# Patient Record
Sex: MALE | Race: WHITE | Employment: UNEMPLOYED | URBAN - METROPOLITAN AREA
[De-identification: names, ages, dates, MRNs, and addresses within clinical notes are randomized per-mention and may not be internally consistent; named-entity substitution may affect disease eponyms.]

---

## 2019-01-02 ENCOUNTER — TRANSFERRED RECORDS (OUTPATIENT)
Dept: HEALTH INFORMATION MANAGEMENT | Facility: CLINIC | Age: 64
End: 2019-01-02

## 2019-01-04 ENCOUNTER — TRANSFERRED RECORDS (OUTPATIENT)
Dept: HEALTH INFORMATION MANAGEMENT | Facility: CLINIC | Age: 64
End: 2019-01-04

## 2019-02-18 ENCOUNTER — TRANSFERRED RECORDS (OUTPATIENT)
Dept: HEALTH INFORMATION MANAGEMENT | Facility: CLINIC | Age: 64
End: 2019-02-18

## 2019-03-04 ENCOUNTER — TRANSFERRED RECORDS (OUTPATIENT)
Dept: HEALTH INFORMATION MANAGEMENT | Facility: CLINIC | Age: 64
End: 2019-03-04

## 2019-04-22 ENCOUNTER — TRANSFERRED RECORDS (OUTPATIENT)
Dept: HEALTH INFORMATION MANAGEMENT | Facility: CLINIC | Age: 64
End: 2019-04-22

## 2019-05-06 ENCOUNTER — TRANSFERRED RECORDS (OUTPATIENT)
Dept: HEALTH INFORMATION MANAGEMENT | Facility: CLINIC | Age: 64
End: 2019-05-06

## 2019-05-06 ENCOUNTER — DOCUMENTATION ONLY (OUTPATIENT)
Dept: CARE COORDINATION | Facility: CLINIC | Age: 64
End: 2019-05-06

## 2019-05-06 NOTE — TELEPHONE ENCOUNTER
Action    Action Taken Records received from [facility] via fax.       Action    Action Taken Records request and imaging request faxed to [facility]

## 2019-05-06 NOTE — TELEPHONE ENCOUNTER
RECORDS RECEIVED FROM: External - [facility]   DATE RECEIVED: 5/14/19   NOTES (FOR ALL VISITS) STATUS DETAILS   OFFICE NOTE from referring provider Received 5/1/19  4/5/19  3/15/19  2/27/18  (additional visits received)   OFFICE NOTE from other specialist Received [facility]:  5/6/19  3/6/19  2/26/19  1/10/19   DISCHARGE SUMMARY from hospital Care Everywhere [facility]:  2/20/19-2/22/19    [facility]:  1/17/19-1/30/19   DISCHARGE REPORT from the ER N/A    OPERATIVE REPORT N/A    MEDICATION LIST Received    IMAGING  (FOR ALL VISITS)     EMG N/A    EEG N/A    ECT N/A    MRI (HEAD, NECK, SPINE) Received [facility]:  MRI Brain 4/22/19  MRI Thoracic Spine 3/4/19  MRI Cervical Spine 3/4/19  MRI Brain 3/4/19  MRI Brain 2/12/19   CT (HEAD, NECK, SPINE) Care Everywhere [facility]:  CT Head Brain 2/20/19     Action    Action Taken Faxed records request to [facility]       Phone Call:     Contact Name [facility]    Outcome Images will be pushed

## 2019-05-10 NOTE — TELEPHONE ENCOUNTER
Imaging Received  [facility]   Image Type (x): Disc: X  PACS:    Exam Date/Name MRI Brain 4/22/19  MRI Thoracic Spine 3/4/19  MRI Cervical Spine 3/4/19  MRI Brain 3/4/19  MRI Brain 2/12/19 Comments: Imaging disks received via mail. Sent to scanning.

## 2019-05-14 ENCOUNTER — OFFICE VISIT (OUTPATIENT)
Dept: NEUROLOGY | Facility: CLINIC | Age: 64
End: 2019-05-14
Attending: PSYCHIATRY & NEUROLOGY
Payer: COMMERCIAL

## 2019-05-14 ENCOUNTER — PRE VISIT (OUTPATIENT)
Dept: NEUROLOGY | Facility: CLINIC | Age: 64
End: 2019-05-14

## 2019-05-14 VITALS
WEIGHT: 128.9 LBS | SYSTOLIC BLOOD PRESSURE: 103 MMHG | BODY MASS INDEX: 18.9 KG/M2 | HEART RATE: 77 BPM | DIASTOLIC BLOOD PRESSURE: 79 MMHG

## 2019-05-14 DIAGNOSIS — G37.9 DEMYELINATING DISEASE OF CENTRAL NERVOUS SYSTEM (H): Primary | ICD-10-CM

## 2019-05-14 PROCEDURE — G0463 HOSPITAL OUTPT CLINIC VISIT: HCPCS | Mod: ZF

## 2019-05-14 RX ORDER — PRAMIPEXOLE DIHYDROCHLORIDE 0.5 MG/1
0.5 TABLET ORAL
COMMUNITY
End: 2021-03-31

## 2019-05-14 RX ORDER — LORAZEPAM 1 MG/1
1 TABLET ORAL DAILY PRN
COMMUNITY
Start: 2019-01-30 | End: 2020-03-02

## 2019-05-14 RX ORDER — ZOLPIDEM TARTRATE 10 MG/1
10 TABLET ORAL AT BEDTIME
COMMUNITY
End: 2020-03-02

## 2019-05-14 RX ORDER — LANOLIN ALCOHOL/MO/W.PET/CERES
3 CREAM (GRAM) TOPICAL AT BEDTIME
COMMUNITY
Start: 2019-01-16 | End: 2020-05-14

## 2019-05-14 RX ORDER — MIRTAZAPINE 30 MG/1
30 TABLET, FILM COATED ORAL AT BEDTIME
Status: ON HOLD | COMMUNITY
Start: 2019-01-29 | End: 2020-05-10

## 2019-05-14 RX ORDER — HYDROXYZINE HYDROCHLORIDE 25 MG/1
TABLET, FILM COATED ORAL DAILY PRN
Refills: 0 | Status: ON HOLD | COMMUNITY
Start: 2019-04-26 | End: 2020-03-03

## 2019-05-14 RX ORDER — ALBUTEROL SULFATE 90 UG/1
2 AEROSOL, METERED RESPIRATORY (INHALATION) EVERY 4 HOURS PRN
COMMUNITY
Start: 2019-01-03

## 2019-05-14 RX ORDER — ERGOCALCIFEROL 1.25 MG/1
50000 CAPSULE, LIQUID FILLED ORAL WEEKLY
COMMUNITY
Start: 2019-03-01

## 2019-05-14 RX ORDER — CELECOXIB 100 MG/1
CAPSULE ORAL DAILY
Refills: 5 | Status: ON HOLD | COMMUNITY
Start: 2019-04-24 | End: 2020-03-03

## 2019-05-14 RX ORDER — GABAPENTIN 250 MG/5ML
SOLUTION ORAL 3 TIMES DAILY
Refills: 2 | COMMUNITY
Start: 2019-04-25 | End: 2020-03-02

## 2019-05-14 ASSESSMENT — ENCOUNTER SYMPTOMS
DYSURIA: 0
TREMORS: 0
HALLUCINATIONS: 0
HYPOTENSION: 0
DIZZINESS: 1
BACK PAIN: 1
DISTURBANCES IN COORDINATION: 1
RECTAL PAIN: 0
ARTHRALGIAS: 1
SPEECH CHANGE: 1
JAUNDICE: 0
SHORTNESS OF BREATH: 1
SYNCOPE: 1
ORTHOPNEA: 0
WEIGHT GAIN: 0
EXERCISE INTOLERANCE: 0
HEADACHES: 0
EYE WATERING: 0
INCREASED ENERGY: 1
FLANK PAIN: 0
COUGH DISTURBING SLEEP: 0
TINGLING: 1
JOINT SWELLING: 0
HEMOPTYSIS: 0
HEMATURIA: 0
TASTE DISTURBANCE: 1
TROUBLE SWALLOWING: 1
NERVOUS/ANXIOUS: 1
NECK PAIN: 1
ABDOMINAL PAIN: 0
HOARSE VOICE: 0
FATIGUE: 1
CHILLS: 0
FEVER: 0
NUMBNESS: 0
COUGH: 1
PANIC: 1
NAUSEA: 0
SMELL DISTURBANCE: 1
HEARTBURN: 0
SINUS CONGESTION: 0
EYE REDNESS: 0
ALTERED TEMPERATURE REGULATION: 0
BOWEL INCONTINENCE: 0
DIARRHEA: 0
POSTURAL DYSPNEA: 0
STIFFNESS: 1
LOSS OF CONSCIOUSNESS: 1
INSOMNIA: 1
LIGHT-HEADEDNESS: 1
SNORES LOUDLY: 0
POLYPHAGIA: 1
DECREASED CONCENTRATION: 1
LEG PAIN: 1
DECREASED APPETITE: 1
WEAKNESS: 1
MEMORY LOSS: 1
EYE PAIN: 0
DEPRESSION: 1
NIGHT SWEATS: 0
BLOOD IN STOOL: 0
DIFFICULTY URINATING: 1
DOUBLE VISION: 1
HYPERTENSION: 0
VOMITING: 0
MUSCLE WEAKNESS: 1
PARALYSIS: 0
BLOATING: 0
SPUTUM PRODUCTION: 1
DYSPNEA ON EXERTION: 0
SINUS PAIN: 0
CONSTIPATION: 0
MUSCLE CRAMPS: 1
SLEEP DISTURBANCES DUE TO BREATHING: 0
SORE THROAT: 0
WEIGHT LOSS: 1
WHEEZING: 1
POLYDIPSIA: 0
EYE IRRITATION: 0
MYALGIAS: 1
PALPITATIONS: 0
SEIZURES: 0

## 2019-05-14 ASSESSMENT — PAIN SCALES - GENERAL: PAINLEVEL: MODERATE PAIN (5)

## 2019-05-14 NOTE — LETTER
"5/14/2019       RE: Vincent Zuniga  [address]  [place] [address]     Dear Colleague,    Thank you for referring your patient, Vincent Zuniga, to the [facility] MULTIPLE SCLEROSIS at [facility]. Please see a copy of my visit note below.    Service Date: 05/14/2019      NEUROLOGY CONSULT      REASON FOR VISIT: Vincent Zuniga is 64-year-old man who I am meeting for the first time today.  He tells me he was referred by his primary physician, Dr. Mccain, for a second opinion regarding acute disseminated encephalomyelitis versus multiple sclerosis.      HISTORY OF PRESENT ILLNESS:  Iraj was admitted to [facility] in early January, he says because \"I was just out of it and didn't know what was happening.\"  His daughter, Michelle, accompanies him today and tells me he had been ill for 2 or 3 days but then on the third day he seemed weak and clumsy on the left side and had a left facial droop.  He was not talking much and his speech seemed slurred.  He had a brain MRI (not available to me today) which showed approximately 2 dozen white matter lesions, the majority of which were enhancing, felt to be consistent with ADEM.  He had a spinal tap which showed 61 nucleated cells, normal protein of 45, with 5 unique oligoclonal bands and normal IgG index and IgG synthesis rate.  CSF flow cytometry did not show evidence of lymphoma.  He had a CT of the chest, abdomen and pelvis which showed no evidence of malignancy.  He was treated with steroids, which he tolerated poorly, mainly with agitation and being unable to sleep.  His daughter does say his left-sided weakness improved after the steroids.  I believe he received plasmapheresis as well.  He was discharged but quickly readmitted, I believe, at [facility].  He had been recommended to go to a transitional care unit but refused.  There was briefly some consideration of seizure and his daughter describes those as an " "episode where she was trying to get him into the shower after he had been incontinent, and his \"eyes rolled up and he dropped, then he came to right away, but then it happened again.\"  Both of these episodes lasted at most 1 minute.  He was briefly treated with Keppra but then this was stopped.  These episodes sound most likely to be syncopal in nature.      Since discharge he has been following with Dr. Combs at [facility].  He had been considering ADEM versus multiple sclerosis.  A repeat MRI in April showed a new left parietal cortical/juxtacortical lesion.  Apparently at that point he was diagnosed with multiple sclerosis and was started on Aubagio.  He tolerates that fine.  His main residual symptoms are apathy and a feeling of global weakness as well as some cognitive slowing.  His daughter describes it as \"he's not eating, not speaking much, does not get up and move around like he used to.\"  Bladder and bowel function are fine.  He does not have any focal numbness or weakness.      PAST MEDICAL  HISTORY:   1.  History of alcohol and drug abuse.   2.  ADEM/demyelinating disease.   3.  Emphysema.   4.  GERD.      ALLERGIES:  No known drug allergies.      MEDICATIONS:   1.  Hydroxyzine.   2.  Gabapentin.   3.  Valproic acid.   4.  Celebrex.   5.  Vitamin D.   6.  Lorazepam p.r.n.   7.  Mirtazapine.   8.  Melatonin.   9.  Albuterol.   10.  Alprazolam.   11.  Advair.   12.  Pramipexole.   13.  Zolpidem.      FAMILY HISTORY:  There is no family history of multiple sclerosis.      SOCIAL HISTORY:  He lives with his daughter and I believe wife or significant other in [place].  He is a cigarette smoker.  He uses alcohol occasionally.  He denies recent drug use.      PHYSICAL EXAMINATION:   VITAL SIGNS:  Blood pressure 103/79.  Pulse 77.  Weight 128 pounds.   GENERAL:  He is a thin, age-appropriate  man accompanied by his daughter and in no apparent distress.  Affect is calm.   NEUROLOGIC:  Language " functions appear normal.  He recalls 1/3 items after delay.  He was unable to recite the months backwards, stopping about detention through after making 1 transposition error.  Pupils are equal and normally reactive.  There was no afferent pupillary defect.  The ophthalmoscopic exam was unremarkable.  Eye movements were full without diplopia or nystagmus.  Facial strength and sensation were normal.  Palate elevates midline.  Muscle bulk and tone are normal and muscle power is normal in the proximal and distal muscles of all 4 limbs.  There was a high frequency, low amplitude postural tremor of the hands consistent with essential tremor.  Finger tapping, toe tapping and finger-nose-finger were normal.  Light touch and pinprick were intact in the arms and legs.  Deep tendon reflexes were normal and symmetric.  Gait was narrow-based and stable.  Tandem gait was mildly impaired.      I reviewed extensive medical records including hospital notes, primary care notes and neurology clinic notes as well as laboratory results and MRIs of the brain and spine from February, March and April of this year as well as an older study from 2007.  I do not have the MRIs from January of this year available.  The MRI from 2007 was normal but the more recent scans show extensive T2 hyperintense lesions in the cerebral white matter including large periventricular lesions in the right hemisphere and multiple smaller ones in both hemispheres.  These lesions are consistent with, although not exactly typical for, multiple sclerosis.  On the 02/22 MRI there is a new cortical/juxtacortical lesion in the left parietal lobe.      Relevant labs not already described in the HPI include a negative paraneoplastic panel, negative voltage-gated potassium and calcium channel antibodies, negative SSA, SSB, Smith antibody and antiDNA antibodies, negative HSV-1 and 2 PCR on the CSF, negative HIV screen, negative Lyme on CSF, negative NMO IgG, normal thiamine  level of 125 and a low vitamin D of 14.5.      IMPRESSION:  ADEM (acute disseminated encephalomyelitis).  His clinical presentation in January with confusion and focal neurologic symptoms following a systemic illness and with multiple large inflammatory brain lesions, the majority of which were all actively enhancing, is certainly most consistent with ADEM.  While this disorder is rare in adults, I agree that is the most likely diagnosis.  The question of MS mainly arises due to the new lesion in April.  ADEM is generally a monophasic illness and that lesion clearly was not present on the earlier scans.  MS certainly is a possibility, but MS relapses seldom present with change in level of or content of consciousness, while ADEM virtually always does.  It is also quite unusual to present with the initial manifestations of MS in the 60s.  The positive oligoclonal bands are more common in MS than ADEM, but can be seen in either.      I spent 65 minutes with Iraj and his daughter today, greater than 50% of which was spent in counseling and coordination of care and going over his MRIs together.  The spinal MRIs were normal.  I discussed ADEM and MS and the similarities and differences between them.  I told him that I expect he will continue to gradually improve from this attack, over the course of about 1 year total.  He is trying to re-establish with physical and occupational therapy and I think that is reasonable given that he does not appear to be back at his baseline.  While I am not entirely convinced that he has multiple sclerosis, I do think it was reasonable to start him on treatment given the accumulating lesions,and he does meet diagnostic criteria for MS so I think it is prudent to try to prevent future attacks.  It is somewhat unusual to have patients on MS treatment (much less starting on treatment) in their 60s, but in this situation I would treat him up through his mid-60s if not through the entire 60s.  I  told him I agree completely with Dr. Combs's workup and management.      PLAN:  He will continue to follow with Dr. Combs at [facility].     BROOKLYN HERNANDEZ MD       D: 2019   T: 2019   MT: nh      Name:     LATRICE ROSADO   MRN:      [id number]        Account:      [id number]   :      1955           Service Date: 2019      Document: [id number]

## 2019-05-17 NOTE — PROGRESS NOTES
"Service Date: 05/14/2019      NEUROLOGY CONSULT      REASON FOR VISIT: Vincent Zuniga is 64-year-old man who I am meeting for the first time today.  He tells me he was referred by his primary physician, Dr. Mccain, for a second opinion regarding acute disseminated encephalomyelitis versus multiple sclerosis.      HISTORY OF PRESENT ILLNESS:  Iraj was admitted to [facility] in early January, he says because \"I was just out of it and didn't know what was happening.\"  His daughter, Michelle, accompanies him today and tells me he had been ill for 2 or 3 days but then on the third day he seemed weak and clumsy on the left side and had a left facial droop.  He was not talking much and his speech seemed slurred.  He had a brain MRI (not available to me today) which showed approximately 2 dozen white matter lesions, the majority of which were enhancing, felt to be consistent with ADEM.  He had a spinal tap which showed 61 nucleated cells, normal protein of 45, with 5 unique oligoclonal bands and normal IgG index and IgG synthesis rate.  CSF flow cytometry did not show evidence of lymphoma.  He had a CT of the chest, abdomen and pelvis which showed no evidence of malignancy.  He was treated with steroids, which he tolerated poorly, mainly with agitation and being unable to sleep.  His daughter does say his left-sided weakness improved after the steroids.  I believe he received plasmapheresis as well.  He was discharged but quickly readmitted, I believe, at [facility].  He had been recommended to go to a transitional care unit but refused.  There was briefly some consideration of seizure and his daughter describes those as an episode where she was trying to get him into the shower after he had been incontinent, and his \"eyes rolled up and he dropped, then he came to right away, but then it happened again.\"  Both of these episodes lasted at most 1 minute.  He was briefly treated with Keppra but then this was " "stopped.  These episodes sound most likely to be syncopal in nature.      Since discharge he has been following with Dr. Combs at [facility].  He had been considering ADEM versus multiple sclerosis.  A repeat MRI in April showed a new left parietal cortical/juxtacortical lesion.  Apparently at that point he was diagnosed with multiple sclerosis and was started on Aubagio.  He tolerates that fine.  His main residual symptoms are apathy and a feeling of global weakness as well as some cognitive slowing.  His daughter describes it as \"he's not eating, not speaking much, does not get up and move around like he used to.\"  Bladder and bowel function are fine.  He does not have any focal numbness or weakness.      PAST MEDICAL  HISTORY:   1.  History of alcohol and drug abuse.   2.  ADEM/demyelinating disease.   3.  Emphysema.   4.  GERD.      ALLERGIES:  No known drug allergies.      MEDICATIONS:   1.  Hydroxyzine.   2.  Gabapentin.   3.  Valproic acid.   4.  Celebrex.   5.  Vitamin D.   6.  Lorazepam p.r.n.   7.  Mirtazapine.   8.  Melatonin.   9.  Albuterol.   10.  Alprazolam.   11.  Advair.   12.  Pramipexole.   13.  Zolpidem.      FAMILY HISTORY:  There is no family history of multiple sclerosis.      SOCIAL HISTORY:  He lives with his daughter and I believe wife or significant other in [place].  He is a cigarette smoker.  He uses alcohol occasionally.  He denies recent drug use.      PHYSICAL EXAMINATION:   VITAL SIGNS:  Blood pressure 103/79.  Pulse 77.  Weight 128 pounds.   GENERAL:  He is a thin, age-appropriate  man accompanied by his daughter and in no apparent distress.  Affect is calm.   NEUROLOGIC:  Language functions appear normal.  He recalls 1/3 items after delay.  He was unable to recite the months backwards, stopping about prison through after making 1 transposition error.  Pupils are equal and normally reactive.  There was no afferent pupillary defect.  The ophthalmoscopic exam was " unremarkable.  Eye movements were full without diplopia or nystagmus.  Facial strength and sensation were normal.  Palate elevates midline.  Muscle bulk and tone are normal and muscle power is normal in the proximal and distal muscles of all 4 limbs.  There was a high frequency, low amplitude postural tremor of the hands consistent with essential tremor.  Finger tapping, toe tapping and finger-nose-finger were normal.  Light touch and pinprick were intact in the arms and legs.  Deep tendon reflexes were normal and symmetric.  Gait was narrow-based and stable.  Tandem gait was mildly impaired.      I reviewed extensive medical records including hospital notes, primary care notes and neurology clinic notes as well as laboratory results and MRIs of the brain and spine from February, March and April of this year as well as an older study from 2007.  I do not have the MRIs from January of this year available.  The MRI from 2007 was normal but the more recent scans show extensive T2 hyperintense lesions in the cerebral white matter including large periventricular lesions in the right hemisphere and multiple smaller ones in both hemispheres.  These lesions are consistent with, although not exactly typical for, multiple sclerosis.  On the 02/22 MRI there is a new cortical/juxtacortical lesion in the left parietal lobe.      Relevant labs not already described in the HPI include a negative paraneoplastic panel, negative voltage-gated potassium and calcium channel antibodies, negative SSA, SSB, Smith antibody and antiDNA antibodies, negative HSV-1 and 2 PCR on the CSF, negative HIV screen, negative Lyme on CSF, negative NMO IgG, normal thiamine level of 125 and a low vitamin D of 14.5.      IMPRESSION:  ADEM (acute disseminated encephalomyelitis).  His clinical presentation in January with confusion and focal neurologic symptoms following a systemic illness and with multiple large inflammatory brain lesions, the majority of  which were all actively enhancing, is certainly most consistent with ADEM.  While this disorder is rare in adults, I agree that is the most likely diagnosis.  The question of MS mainly arises due to the new lesion in April.  ADEM is generally a monophasic illness and that lesion clearly was not present on the earlier scans.  MS certainly is a possibility, but MS relapses seldom present with change in level of or content of consciousness, while ADEM virtually always does.  It is also quite unusual to present with the initial manifestations of MS in the 60s.  The positive oligoclonal bands are more common in MS than ADEM, but can be seen in either.      I spent 65 minutes with Iraj and his daughter today, greater than 50% of which was spent in counseling and coordination of care and going over his MRIs together.  The spinal MRIs were normal.  I discussed ADEM and MS and the similarities and differences between them.  I told him that I expect he will continue to gradually improve from this attack, over the course of about 1 year total.  He is trying to re-establish with physical and occupational therapy and I think that is reasonable given that he does not appear to be back at his baseline.  While I am not entirely convinced that he has multiple sclerosis, I do think it was reasonable to start him on treatment given the accumulating lesions,and he does meet diagnostic criteria for MS so I think it is prudent to try to prevent future attacks.  It is somewhat unusual to have patients on MS treatment (much less starting on treatment) in their 60s, but in this situation I would treat him up through his mid-60s if not through the entire 60s.  I told him I agree completely with Dr. Combs's workup and management.      PLAN:  He will continue to follow with Dr. Combs at [facility].         BROOKLYN HERNANDEZ MD             D: 05/17/2019   T: 05/17/2019   MT: nh      Name:     LATRICE ROSADO   MRN:      [id number]         Account:      [id number]   :      1955           Service Date: 2019      Document: [id number]

## 2020-03-02 ENCOUNTER — HOSPITAL ENCOUNTER (INPATIENT)
Facility: CLINIC | Age: 65
LOS: 2 days | Discharge: HOME | DRG: 058 | End: 2020-03-04
Attending: EMERGENCY MEDICINE | Admitting: PSYCHIATRY & NEUROLOGY
Payer: COMMERCIAL

## 2020-03-02 ENCOUNTER — APPOINTMENT (OUTPATIENT)
Dept: CT IMAGING | Facility: CLINIC | Age: 65
DRG: 058 | End: 2020-03-02
Attending: STUDENT IN AN ORGANIZED HEALTH CARE EDUCATION/TRAINING PROGRAM
Payer: COMMERCIAL

## 2020-03-02 ENCOUNTER — APPOINTMENT (OUTPATIENT)
Dept: MRI IMAGING | Facility: CLINIC | Age: 65
DRG: 058 | End: 2020-03-02
Attending: PSYCHIATRY & NEUROLOGY
Payer: COMMERCIAL

## 2020-03-02 DIAGNOSIS — R13.10 DYSPHAGIA, UNSPECIFIED TYPE: ICD-10-CM

## 2020-03-02 DIAGNOSIS — M62.81 GENERALIZED MUSCLE WEAKNESS: ICD-10-CM

## 2020-03-02 DIAGNOSIS — R53.1 GENERALIZED WEAKNESS: ICD-10-CM

## 2020-03-02 DIAGNOSIS — E44.0 MODERATE PROTEIN-CALORIE MALNUTRITION (H): ICD-10-CM

## 2020-03-02 DIAGNOSIS — J69.0 ASPIRATION PNEUMONITIS (H): ICD-10-CM

## 2020-03-02 DIAGNOSIS — G35 MULTIPLE SCLEROSIS (H): Primary | ICD-10-CM

## 2020-03-02 LAB
ALBUMIN SERPL-MCNC: 2.5 G/DL (ref 3.4–5)
ALBUMIN UR-MCNC: NEGATIVE MG/DL
ALP SERPL-CCNC: 84 U/L (ref 40–150)
ALT SERPL W P-5'-P-CCNC: 22 U/L (ref 0–70)
ANION GAP SERPL CALCULATED.3IONS-SCNC: 5 MMOL/L (ref 3–14)
APPEARANCE UR: CLEAR
AST SERPL W P-5'-P-CCNC: 20 U/L (ref 0–45)
BASOPHILS # BLD AUTO: 0 10E9/L (ref 0–0.2)
BASOPHILS NFR BLD AUTO: 0.3 %
BILIRUB SERPL-MCNC: 0.3 MG/DL (ref 0.2–1.3)
BILIRUB UR QL STRIP: NEGATIVE
BUN SERPL-MCNC: 9 MG/DL (ref 7–30)
CALCIUM SERPL-MCNC: 8.2 MG/DL (ref 8.5–10.1)
CHLORIDE SERPL-SCNC: 112 MMOL/L (ref 94–109)
CO2 SERPL-SCNC: 26 MMOL/L (ref 20–32)
COLOR UR AUTO: ABNORMAL
CREAT SERPL-MCNC: 0.59 MG/DL (ref 0.66–1.25)
DIFFERENTIAL METHOD BLD: ABNORMAL
EOSINOPHIL # BLD AUTO: 0.1 10E9/L (ref 0–0.7)
EOSINOPHIL NFR BLD AUTO: 0.5 %
ERYTHROCYTE [DISTWIDTH] IN BLOOD BY AUTOMATED COUNT: 14.3 % (ref 10–15)
GFR SERPL CREATININE-BSD FRML MDRD: >90 ML/MIN/{1.73_M2}
GLUCOSE SERPL-MCNC: 84 MG/DL (ref 70–99)
GLUCOSE UR STRIP-MCNC: NEGATIVE MG/DL
HCT VFR BLD AUTO: 33.4 % (ref 40–53)
HGB BLD-MCNC: 10.8 G/DL (ref 13.3–17.7)
HGB UR QL STRIP: NEGATIVE
IMM GRANULOCYTES # BLD: 0 10E9/L (ref 0–0.4)
IMM GRANULOCYTES NFR BLD: 0.2 %
KETONES UR STRIP-MCNC: NEGATIVE MG/DL
LEUKOCYTE ESTERASE UR QL STRIP: NEGATIVE
LYMPHOCYTES # BLD AUTO: 2.7 10E9/L (ref 0.8–5.3)
LYMPHOCYTES NFR BLD AUTO: 24.9 %
MCH RBC QN AUTO: 35.2 PG (ref 26.5–33)
MCHC RBC AUTO-ENTMCNC: 32.3 G/DL (ref 31.5–36.5)
MCV RBC AUTO: 109 FL (ref 78–100)
MONOCYTES # BLD AUTO: 1.9 10E9/L (ref 0–1.3)
MONOCYTES NFR BLD AUTO: 17.6 %
MUCOUS THREADS #/AREA URNS LPF: PRESENT /LPF
NEUTROPHILS # BLD AUTO: 6.2 10E9/L (ref 1.6–8.3)
NEUTROPHILS NFR BLD AUTO: 56.5 %
NITRATE UR QL: NEGATIVE
NRBC # BLD AUTO: 0 10*3/UL
NRBC BLD AUTO-RTO: 0 /100
PH UR STRIP: 6.5 PH (ref 5–7)
PLATELET # BLD AUTO: 155 10E9/L (ref 150–450)
POTASSIUM SERPL-SCNC: 4.1 MMOL/L (ref 3.4–5.3)
PROT SERPL-MCNC: 5.8 G/DL (ref 6.8–8.8)
RBC # BLD AUTO: 3.07 10E12/L (ref 4.4–5.9)
RBC #/AREA URNS AUTO: 1 /HPF (ref 0–2)
SODIUM SERPL-SCNC: 142 MMOL/L (ref 133–144)
SOURCE: ABNORMAL
SP GR UR STRIP: 1.01 (ref 1–1.03)
UROBILINOGEN UR STRIP-MCNC: NORMAL MG/DL (ref 0–2)
WBC # BLD AUTO: 11 10E9/L (ref 4–11)
WBC #/AREA URNS AUTO: 1 /HPF (ref 0–5)

## 2020-03-02 PROCEDURE — 81001 URINALYSIS AUTO W/SCOPE: CPT | Performed by: STUDENT IN AN ORGANIZED HEALTH CARE EDUCATION/TRAINING PROGRAM

## 2020-03-02 PROCEDURE — A9585 GADOBUTROL INJECTION: HCPCS | Performed by: PSYCHIATRY & NEUROLOGY

## 2020-03-02 PROCEDURE — 99285 EMERGENCY DEPT VISIT HI MDM: CPT | Mod: Z6 | Performed by: EMERGENCY MEDICINE

## 2020-03-02 PROCEDURE — 12000001 ZZH R&B MED SURG/OB UMMC

## 2020-03-02 PROCEDURE — 25800030 ZZH RX IP 258 OP 636: Performed by: STUDENT IN AN ORGANIZED HEALTH CARE EDUCATION/TRAINING PROGRAM

## 2020-03-02 PROCEDURE — 25000132 ZZH RX MED GY IP 250 OP 250 PS 637: Performed by: EMERGENCY MEDICINE

## 2020-03-02 PROCEDURE — 80053 COMPREHEN METABOLIC PANEL: CPT | Performed by: EMERGENCY MEDICINE

## 2020-03-02 PROCEDURE — 25000132 ZZH RX MED GY IP 250 OP 250 PS 637: Performed by: STUDENT IN AN ORGANIZED HEALTH CARE EDUCATION/TRAINING PROGRAM

## 2020-03-02 PROCEDURE — 25000128 H RX IP 250 OP 636: Performed by: STUDENT IN AN ORGANIZED HEALTH CARE EDUCATION/TRAINING PROGRAM

## 2020-03-02 PROCEDURE — 71250 CT THORAX DX C-: CPT

## 2020-03-02 PROCEDURE — 99285 EMERGENCY DEPT VISIT HI MDM: CPT | Mod: 25 | Performed by: EMERGENCY MEDICINE

## 2020-03-02 PROCEDURE — 25500064 ZZH RX 255 OP 636: Performed by: PSYCHIATRY & NEUROLOGY

## 2020-03-02 PROCEDURE — 25000132 ZZH RX MED GY IP 250 OP 250 PS 637: Performed by: PSYCHIATRY & NEUROLOGY

## 2020-03-02 PROCEDURE — 70553 MRI BRAIN STEM W/O & W/DYE: CPT

## 2020-03-02 PROCEDURE — 85025 COMPLETE CBC W/AUTO DIFF WBC: CPT | Performed by: EMERGENCY MEDICINE

## 2020-03-02 RX ORDER — POTASSIUM CHLORIDE 7.45 MG/ML
10 INJECTION INTRAVENOUS
Status: DISCONTINUED | OUTPATIENT
Start: 2020-03-02 | End: 2020-03-04 | Stop reason: HOSPADM

## 2020-03-02 RX ORDER — GADOBUTROL 604.72 MG/ML
7.5 INJECTION INTRAVENOUS ONCE
Status: DISCONTINUED | OUTPATIENT
Start: 2020-03-02 | End: 2020-03-04 | Stop reason: HOSPADM

## 2020-03-02 RX ORDER — ALBUTEROL SULFATE 90 UG/1
1-2 AEROSOL, METERED RESPIRATORY (INHALATION) EVERY 4 HOURS PRN
Status: DISCONTINUED | OUTPATIENT
Start: 2020-03-02 | End: 2020-03-02

## 2020-03-02 RX ORDER — NALOXONE HYDROCHLORIDE 0.4 MG/ML
.1-.4 INJECTION, SOLUTION INTRAMUSCULAR; INTRAVENOUS; SUBCUTANEOUS
Status: DISCONTINUED | OUTPATIENT
Start: 2020-03-02 | End: 2020-03-02

## 2020-03-02 RX ORDER — GABAPENTIN 800 MG/1
TABLET ORAL
Status: ON HOLD | COMMUNITY
Start: 2019-09-04 | End: 2020-03-03

## 2020-03-02 RX ORDER — CHOLESTYRAMINE 4 G/9G
2 POWDER, FOR SUSPENSION ORAL EVERY 8 HOURS
Status: DISCONTINUED | OUTPATIENT
Start: 2020-03-02 | End: 2020-03-02

## 2020-03-02 RX ORDER — SODIUM CHLORIDE 9 MG/ML
INJECTION, SOLUTION INTRAVENOUS CONTINUOUS
Status: DISCONTINUED | OUTPATIENT
Start: 2020-03-02 | End: 2020-03-03

## 2020-03-02 RX ORDER — LANOLIN ALCOHOL/MO/W.PET/CERES
3 CREAM (GRAM) TOPICAL
Status: DISCONTINUED | OUTPATIENT
Start: 2020-03-02 | End: 2020-03-04 | Stop reason: HOSPADM

## 2020-03-02 RX ORDER — ATORVASTATIN CALCIUM 20 MG/1
20 TABLET, FILM COATED ORAL DAILY
Status: DISCONTINUED | OUTPATIENT
Start: 2020-03-03 | End: 2020-03-04 | Stop reason: HOSPADM

## 2020-03-02 RX ORDER — POTASSIUM CHLORIDE 1.5 G/1.58G
20-40 POWDER, FOR SOLUTION ORAL
Status: DISCONTINUED | OUTPATIENT
Start: 2020-03-02 | End: 2020-03-04 | Stop reason: HOSPADM

## 2020-03-02 RX ORDER — CHOLESTYRAMINE 4 G/9G
2 POWDER, FOR SUSPENSION ORAL EVERY 8 HOURS
Status: DISCONTINUED | OUTPATIENT
Start: 2020-03-02 | End: 2020-03-04 | Stop reason: HOSPADM

## 2020-03-02 RX ORDER — TRAZODONE HYDROCHLORIDE 50 MG/1
50 TABLET, FILM COATED ORAL AT BEDTIME
COMMUNITY
Start: 2020-01-27 | End: 2020-05-14 | Stop reason: SINTOL

## 2020-03-02 RX ORDER — ESZOPICLONE 2 MG/1
TABLET, FILM COATED ORAL
Status: ON HOLD | COMMUNITY
Start: 2019-06-29 | End: 2020-03-03

## 2020-03-02 RX ORDER — LOPERAMIDE HCL 2 MG
CAPSULE ORAL
Status: ON HOLD | COMMUNITY
Start: 2020-02-21 | End: 2020-03-03

## 2020-03-02 RX ORDER — DIVALPROEX SODIUM 250 MG/1
500 TABLET, DELAYED RELEASE ORAL EVERY MORNING
Status: DISCONTINUED | OUTPATIENT
Start: 2020-03-03 | End: 2020-03-02

## 2020-03-02 RX ORDER — PAROXETINE 20 MG/1
20 TABLET, FILM COATED ORAL AT BEDTIME
Status: ON HOLD | COMMUNITY
Start: 2020-02-21 | End: 2020-03-03

## 2020-03-02 RX ORDER — AMOXICILLIN 250 MG
1 CAPSULE ORAL 2 TIMES DAILY PRN
Status: DISCONTINUED | OUTPATIENT
Start: 2020-03-02 | End: 2020-03-04 | Stop reason: HOSPADM

## 2020-03-02 RX ORDER — GADOBUTROL 604.72 MG/ML
7.5 INJECTION INTRAVENOUS ONCE
Status: COMPLETED | OUTPATIENT
Start: 2020-03-02 | End: 2020-03-02

## 2020-03-02 RX ORDER — HYDROMORPHONE HYDROCHLORIDE 2 MG/1
TABLET ORAL
COMMUNITY
Start: 2020-02-23 | End: 2020-05-14

## 2020-03-02 RX ORDER — LIDOCAINE 40 MG/G
CREAM TOPICAL
Status: DISCONTINUED | OUTPATIENT
Start: 2020-03-02 | End: 2020-03-04 | Stop reason: HOSPADM

## 2020-03-02 RX ORDER — HYDROMORPHONE HYDROCHLORIDE 2 MG/1
2 TABLET ORAL ONCE
Status: COMPLETED | OUTPATIENT
Start: 2020-03-02 | End: 2020-03-02

## 2020-03-02 RX ORDER — DIVALPROEX SODIUM 250 MG/1
500 TABLET, DELAYED RELEASE ORAL EVERY MORNING
Status: DISCONTINUED | OUTPATIENT
Start: 2020-03-03 | End: 2020-03-03

## 2020-03-02 RX ORDER — NALOXONE HYDROCHLORIDE 0.4 MG/ML
.1-.4 INJECTION, SOLUTION INTRAMUSCULAR; INTRAVENOUS; SUBCUTANEOUS
Status: DISCONTINUED | OUTPATIENT
Start: 2020-03-02 | End: 2020-03-04 | Stop reason: HOSPADM

## 2020-03-02 RX ORDER — LANSOPRAZOLE 30 MG/1
CAPSULE, DELAYED RELEASE ORAL
COMMUNITY
Start: 2020-01-03 | End: 2020-05-14

## 2020-03-02 RX ORDER — DIVALPROEX SODIUM 250 MG/1
750 TABLET, DELAYED RELEASE ORAL EVERY 12 HOURS SCHEDULED
Status: DISCONTINUED | OUTPATIENT
Start: 2020-03-02 | End: 2020-03-03

## 2020-03-02 RX ORDER — VALPROIC ACID 250 MG/1
CAPSULE, LIQUID FILLED ORAL
COMMUNITY
Start: 2019-10-24 | End: 2020-03-23 | Stop reason: DRUGHIGH

## 2020-03-02 RX ORDER — ATORVASTATIN CALCIUM 20 MG/1
20 TABLET, FILM COATED ORAL DAILY
COMMUNITY
Start: 2020-02-07 | End: 2021-03-31

## 2020-03-02 RX ORDER — ONDANSETRON 4 MG/1
4 TABLET, ORALLY DISINTEGRATING ORAL EVERY 6 HOURS PRN
Status: DISCONTINUED | OUTPATIENT
Start: 2020-03-02 | End: 2020-03-04 | Stop reason: HOSPADM

## 2020-03-02 RX ORDER — DOXYCYCLINE 100 MG/1
100 CAPSULE ORAL 2 TIMES DAILY
Status: ON HOLD | COMMUNITY
Start: 2020-01-29 | End: 2020-03-03

## 2020-03-02 RX ORDER — POLYETHYLENE GLYCOL 3350 17 G/17G
17 POWDER, FOR SOLUTION ORAL DAILY PRN
Status: DISCONTINUED | OUTPATIENT
Start: 2020-03-02 | End: 2020-03-04 | Stop reason: HOSPADM

## 2020-03-02 RX ORDER — POTASSIUM CL/LIDO/0.9 % NACL 10MEQ/0.1L
10 INTRAVENOUS SOLUTION, PIGGYBACK (ML) INTRAVENOUS
Status: DISCONTINUED | OUTPATIENT
Start: 2020-03-02 | End: 2020-03-04 | Stop reason: HOSPADM

## 2020-03-02 RX ORDER — ZOLPIDEM TARTRATE 12.5 MG/1
12.5 TABLET, FILM COATED, EXTENDED RELEASE ORAL AT BEDTIME
Status: ON HOLD | COMMUNITY
Start: 2019-07-30 | End: 2020-05-10

## 2020-03-02 RX ORDER — AMOXICILLIN 250 MG
2 CAPSULE ORAL 2 TIMES DAILY PRN
Status: DISCONTINUED | OUTPATIENT
Start: 2020-03-02 | End: 2020-03-04 | Stop reason: HOSPADM

## 2020-03-02 RX ORDER — LORAZEPAM 0.5 MG/1
TABLET ORAL
Status: ON HOLD | COMMUNITY
Start: 2019-06-07 | End: 2020-03-03

## 2020-03-02 RX ORDER — OXYCODONE HYDROCHLORIDE 5 MG/1
TABLET ORAL
COMMUNITY
Start: 2019-07-30 | End: 2020-05-14

## 2020-03-02 RX ORDER — ESCITALOPRAM OXALATE 10 MG/1
10 TABLET ORAL DAILY
Status: ON HOLD | COMMUNITY
Start: 2020-02-09 | End: 2020-03-03

## 2020-03-02 RX ORDER — DIVALPROEX SODIUM 250 MG/1
750 TABLET, DELAYED RELEASE ORAL EVERY MORNING
Status: DISCONTINUED | OUTPATIENT
Start: 2020-03-03 | End: 2020-03-02

## 2020-03-02 RX ORDER — MELOXICAM 15 MG/1
15 TABLET ORAL DAILY
COMMUNITY
Start: 2020-02-08 | End: 2021-03-31

## 2020-03-02 RX ORDER — DIVALPROEX SODIUM 250 MG/1
TABLET, DELAYED RELEASE ORAL
Status: ON HOLD | COMMUNITY
Start: 2020-02-22 | End: 2020-05-10

## 2020-03-02 RX ORDER — MAGNESIUM SULFATE HEPTAHYDRATE 40 MG/ML
4 INJECTION, SOLUTION INTRAVENOUS EVERY 4 HOURS PRN
Status: DISCONTINUED | OUTPATIENT
Start: 2020-03-02 | End: 2020-03-04 | Stop reason: HOSPADM

## 2020-03-02 RX ORDER — POTASSIUM CHLORIDE 750 MG/1
20-40 TABLET, EXTENDED RELEASE ORAL
Status: DISCONTINUED | OUTPATIENT
Start: 2020-03-02 | End: 2020-03-04 | Stop reason: HOSPADM

## 2020-03-02 RX ORDER — POTASSIUM CHLORIDE 29.8 MG/ML
20 INJECTION INTRAVENOUS
Status: DISCONTINUED | OUTPATIENT
Start: 2020-03-02 | End: 2020-03-04 | Stop reason: HOSPADM

## 2020-03-02 RX ORDER — GABAPENTIN 400 MG/1
400 CAPSULE ORAL 2 TIMES DAILY
COMMUNITY
Start: 2020-02-05 | End: 2020-10-28

## 2020-03-02 RX ORDER — LIDOCAINE 40 MG/G
CREAM TOPICAL
Status: DISCONTINUED | OUTPATIENT
Start: 2020-03-02 | End: 2020-03-02

## 2020-03-02 RX ORDER — ONDANSETRON 2 MG/ML
4 INJECTION INTRAMUSCULAR; INTRAVENOUS EVERY 6 HOURS PRN
Status: DISCONTINUED | OUTPATIENT
Start: 2020-03-02 | End: 2020-03-04 | Stop reason: HOSPADM

## 2020-03-02 RX ORDER — VARENICLINE TARTRATE 1 MG/1
1 TABLET, FILM COATED ORAL 2 TIMES DAILY
Status: ON HOLD | COMMUNITY
Start: 2019-06-29 | End: 2020-03-03

## 2020-03-02 RX ADMIN — HYDROMORPHONE HYDROCHLORIDE 2 MG: 2 TABLET ORAL at 18:55

## 2020-03-02 RX ADMIN — ENOXAPARIN SODIUM 40 MG: 40 INJECTION SUBCUTANEOUS at 23:53

## 2020-03-02 RX ADMIN — DIVALPROEX SODIUM 750 MG: 250 TABLET, DELAYED RELEASE ORAL at 23:52

## 2020-03-02 RX ADMIN — Medication 3 MG: at 23:52

## 2020-03-02 RX ADMIN — HYDROMORPHONE HYDROCHLORIDE 2 MG: 2 TABLET ORAL at 13:53

## 2020-03-02 RX ADMIN — SODIUM CHLORIDE: 9 INJECTION, SOLUTION INTRAVENOUS at 23:56

## 2020-03-02 RX ADMIN — GADOBUTROL 5 ML: 604.72 INJECTION INTRAVENOUS at 22:31

## 2020-03-02 ASSESSMENT — ENCOUNTER SYMPTOMS
COUGH: 1
ABDOMINAL PAIN: 0
BLOOD IN STOOL: 0
APPETITE CHANGE: 1
DIARRHEA: 1
FEVER: 0
DYSURIA: 0
TREMORS: 1
VOMITING: 0
HEMATURIA: 0
RHINORRHEA: 0
WEAKNESS: 1

## 2020-03-02 ASSESSMENT — ACTIVITIES OF DAILY LIVING (ADL)
RETIRED_EATING: 0-->INDEPENDENT
RETIRED_COMMUNICATION: 0-->UNDERSTANDS/COMMUNICATES WITHOUT DIFFICULTY
AMBULATION: 0-->INDEPENDENT
BATHING: 0-->INDEPENDENT
WHICH_OF_THE_ABOVE_FUNCTIONAL_RISKS_HAD_A_RECENT_ONSET_OR_CHANGE?: SWALLOWING
COGNITION: 0 - NO COGNITION ISSUES REPORTED
NUMBER_OF_TIMES_PATIENT_HAS_FALLEN_WITHIN_LAST_SIX_MONTHS: 1
TRANSFERRING: 0-->INDEPENDENT
FALL_HISTORY_WITHIN_LAST_SIX_MONTHS: YES
SWALLOWING: 2-->DIFFICULTY SWALLOWING LIQUIDS/FOODS
TOILETING: 0-->INDEPENDENT
DRESS: 0-->INDEPENDENT

## 2020-03-02 ASSESSMENT — MIFFLIN-ST. JEOR: SCORE: 1311.55

## 2020-03-02 NOTE — CONSULTS
[facility]  Neurology Consultation    Patient Name:  Vincent Zuniga  MRN:  [id number]    :  1955  Date of Service:  2020  Primary care provider:  Karel Orozco      Neurology consultation service was asked to see Vincent Zuniga by Dr. Juares to evaluate progressive decline in the setting of MS.    History of Present Illness:   65 year old male h/o multiple sclerosis diagnosed last year treated with Aubagio who presents with 1-2 weeks of acutely progressive decline. His history is obtained mostly from chart review and from discussion with his daughter as he is a difficult historian, speaking mostly in 2-3 word sentences. His daughter estimates that he has been on therapy with Aubagio for about 8 months, presenting with acutely progressive symptoms including unbalanced gait, urinary incontincne, taste changes, and decreased PO intake.  His daughter reports the he appeared to be mostly stable following his diagnosis of MS for the first 5-6 months of treatment with Aubagio, however about 2-3 months ago she began noticing a progressive decline in his functioning. He began complaining of his taste changing and subsequently began to eat and drink less. She says he was mostly eating mushroom soup for the last couple of months. He has been mostly independent with his ADL's, with intermittent need for assistance. However, over the past 1-2 weeks, she reports that he has had acutely worsening unsteady gait, unable to do anything without her having to assist him. He complains of a new sensation over the left temporal face, though is unable to specifically describe what this sensation is. He has had headaches, which his daughter states he has not experienced prior to the past 2 weeks. He endorses double vision, which his daughter says has been present occasionally at night or in the morning, however over the last 2 weeks has been nearly constant. He has persistent  "left lower leg numbness. He has had increasing difficulty with urinary incontinence Additionally, he has stopped taking anything by mouth over the past week, refusing to eat or drink. He has had a G tube placed, and is currently receiving all nutrition and fluids via the G tube. He has lost approximately 40 pounds over the past 2-3 months. His desire to communicate and to leave the house has been slowly dwindling, and his daughter notes that he has become much less communicative, limiting his responses to about 3-5 words maximum, over the past 2 months.  He was recently admitted to [facility] on 2/18 to 2/22/2020, and presented to the ED about one week later as he was contiuing to decline functionally at home.     His daughter also complains that he has had persistent issues with uncontrolled seizures. She describes that these seizures can occur in various forms, from isolated episodes of a leg shaking to \"grand mal\" type episodes. He is on Depakote, and she says that he has not had any recent dose changes. He has been following with [facility] for these. Daughter describes that patient will have good days and bad days, where he may not have any seizures one day and then as many as 20 the next.     His daughter reports that a possibly new active lesion was discovered on imaging with his previous admission at [facility], and that they have not yet been able to meet with an MS specialist to discuss this.    ROS  A 10-point ROS was performed as per HPI. Pertinent negatives: No nausea or vomiting. Positives:   Diarrhea, 4-5 episodes per day. Increased urinary incontinence.    PMH  Past Medical History:   Diagnosis Date     Arthritis      Asthma      Chronic infection     sinus     Chronic pain     secondary to disc disease     Coughing      Depression      Difficulty in walking(719.7)      Dyspnea on exertion      Emphysema      History of blood transfusion     after spleenectomy  1974     Numbness and " "tingling     in legs     Shortness of breath      Past Surgical History:   Procedure Laterality Date     BACK SURGERY      Lumbar     SEPTOPLASTY  10/31/2012    Procedure: SEPTOPLASTY;  Septoplasty, turbinoplasty, enlargement of maxillary ostia;  Surgeon: Carolina Robison MD;  Location: [facility] OR     spleen[  Age 18    spleenectomy       Medications   (Not in a hospital admission)      Allergies  Allergies   Allergen Reactions     Immune Globulin Rash       Social History  Social History     Tobacco Use     Smoking status: Current Every Day Smoker     Packs/day: 1.00     Years: 30.00     Pack years: 30.00     Types: Cigarettes     Smokeless tobacco: Never Used   Substance Use Topics     Alcohol use: No       Family History    Family History   Problem Relation Age of Onset     Cancer Father         Lung     Heart Disease Brother 35        Four heart attacks     Alcohol/Drug Brother      Psychotic Disorder Brother         Drug addiction     Unknown/Adopted Son      Unknown/Adopted Daughter      Unknown/Adopted Daughter      Unknown/Adopted Daughter      Unknown/Adopted Daughter          Physical Examination   Vitals: /80   Pulse 66   Temp 98.2  F (36.8  C) (Oral)   Resp 16   Ht 1.753 m (5' 9\")   Wt 52.6 kg (116 lb)   SpO2 96%   BMI 17.13 kg/m    General: Adult male patient, lying in bed, NAD. Appears cachectic.  HEENT: NC/AT, no icterus, op pink and moist  Cardiac: RRR  Chest: non-labored on RA  Abdomen: S/NT/ND  Extremities: Warm, no edema  Skin: No rash or lesion   Psych: Mood pleasant, affect congruent  Neuro:  Mental status: Awake, alert, attentive, oriented to self, time, place, and circumstance. Language is coherent with intact comprehension of complex commands, though he uses 3-word phrases at most.  Cranial nerves: VFF, PERRL, conjugate gaze, EOMI, facial sensation intact, face symmetric, shoulder shrug strong, tongue/uvula midline, no dysarthria.   Motor: Normal bulk and tone, though moderately " "rigid with possible clonus (1-2 beats) in ankle. 5/5 strength in 4/4 extremities.    - Moderate frequency and mild amplitude flapping tremors of the upper extremities, present both at rest and only mildly worsened with intention/activity  Reflexes: Normoreflexic and symmetric biceps, brachioradialis, triceps, patellae, and achilles (2+ in uppers and 1+ throughout in lowers). Negative Simpson, no clonus, R toes down-going, L equivocal.  Sensory: Intact to light touch.  Coordination: FNF and HS without ataxia or dysmetria (though tremulous). Rapid alternating movements intact.   Gait: Normal width, stride length, turn, and arm swing. Station normal. Heel, toe, and tandem walk deferred.    Investigations     Notable Laboratory Findings this Admission:    Protein 5.8  Albumin 2.5    Hgb 10.8      Notable Laboratory Findings from OSH:    LP 02/20/2020:  DIVINE-1 neg  DIVINE-2 neg  WBC 1  RBC 1  Neutrophil 0  Glucose 51  Protein 26  IGG 1.78  Albumin 15.8  IGG index 0.44  IGG synthesis <0.00  Oligoclonal bands 3 (equivocal)  Gram stain negative  Cytology negative for malignancy  Culture NGTD x5 days    Autoimmune encephalopathy panel negative    Vit B1 141.9 (wnl)  Vit B12 1,282 (elevated)  Folate 11.6    UA bland with 15 ketones    JCV antibody positive (3.85; indicative of prior exposure)    Imaging from OSH:    EXAM: MR SPINE THORACIC WWO  LOCATION: [facility]  DATE/TIME: 2/19/2020 7:21 PM    IMPRESSION:  \"1.  No cord signal abnormality or findings to explain clinical symptoms.  2.  Mild thoracic spondylosis at T7-T8 and T8-T9.\"     EXAM: MR SPINE CERVICAL WWO  LOCATION: [facility]  DATE/TIME: 2/19/2020 7:47 PM     IMPRESSION:  \"1.  No cord signal abnormality.  2.  Multilevel cervical spondylosis as described.  3.  Moderate to severe right and mild to moderate left foraminal stenosis at C3-C4.  4.  Severe right foraminal stenosis at C5-C6.  5.  Moderate to severe right and moderate to severe left foraminal " "stenosis at C6-C7.\"     EXAM: MR HEAD BRAIN WWO  LOCATION: [facility]  DATE/TIME: 2/18/2020 8:31 PM    IMPRESSION:  \"1.  History of demyelination is reported clinically. Multifocal areas of  T2/FLAIR hyperintense signal abnormality at the supratentorial level most marked  at the right centrum semiovale and corona radiata level with periventricular  predominance is compatible with patient's provided clinical history of   demyelination. There is an area of enhancing signal abnormality left periatrial  level which may reflect active phase plaque of demyelination with no additional  evidence for enhancing plaques at the supratentorial level. White matter signal  abnormality at the supratentorial level is largely new from 07/23/2013 brain MRI  performed without contrast.  2.  Poorly defined enhancement at the right paramedian cervicomedullary level  seen on axial data set may represent artifactual enhancement, however, the  possibility of choroid plexus enhancement or surface/leptomeningeal enhancement  enhancing abnormality cannot be excluded. There is not definite parenchymal  signal abnormality at this level. Could consider CSF evaluation in the  appropriate clinical setting to exclude infectious/inflammatory process or  active phase demyelination as indicated.   3.  No evidence for acute infarction.  4.  Additional details are provided above.\"     EXAM: CT CHEST ABDOMEN PELVIS W  LOCATION: [facility]  DATE/TIME: 2/18/2020 6:25 PM    FINDINGS:   \"LUNGS AND PLEURA: Emphysematous changes both lungs. Few calcified pulmonary granulomata. Triangular shaped nodular opacity in the right middle lobe along the minor fissure measuring 7 x 4 mm (series 3 image 201) likely benign possibly  an intrafissural lymph node. Small cluster of nodules or nodular infiltrate in  the right middle lobe base anteriorly and left upper lobe posteriorly likely due  to mild infectious process. Minimal nodularity along the left major " "fissure with  nodules measuring a maximum of 3-4 mm in mean diameter. A few strands of linear fibrosis or atelectasis in the lung bases. No acute appearing infiltrates.\"    IMPRESSION:  \"1.  Minimal nodular infiltrate or cluster of nodules left upper lobe posteriorly  and right middle lobe base likely due to an infectious or inflammatory process  of uncertain chronicity.  2.  Emphysematous changes both lungs.  3.  Triangular shaped nodular opacity right middle lobe along the minor fissure  with additional minimal nodularity along the left major fissure likely benign  possibly intrafissural lymph nodes. The largest nodular opacity in the right  middle lobe has a mean diameter of 5.5 mm.  4.  Postoperative splenectomy.\"        Assessment and Plan:    Mr. Vincent Zuniga is a 66yo M with h/o multiple sclerosis (on Aubagio, PINA virus serology indicative of past exposure), severe malnutrition with recent G-tube placement, and depression who presents for second opinion regarding MS management and failure to thrive.    # Multiple sclerosis  Recent outside imaging with new subacute vs active lesion (T2 flair hyperintensity of \"right centrum semiovale and corona radiata level with periventricular predominance\") suggestive of recurrent disease and failure of Aubagio. Initially diagnosed in 01/2019. Presented at that time with \"confusion, L-sided clumsiness and facial droop\" per chart review with approximately 2 dozen white matter lesions, the majority of which were enhancing. The possibility of acute demyelinating encephalomyelitis was raised, though unclear whether the patient ever officially received this diagnosis. He was initially treated with corticosteroids and course was c/b \"agitation and insomnia\" and followed by five sessions of plasma exchange. He was started on Aubagio and was followed by Dr. Combs at [facility] until Dr. Combs's group home. Appears to have had several months of relative stability before progressive " slow decline in general function over the last two months, leading to multiple admissions to [facility]. LP with CSF of 1 WBC, 1 RBC, glucose 51, protein 26, IGG index 0.44, IGG synthesis <0.00, and equivocal (3) oligoclonal bands. Autoimmune encephalopathy panel was negative. Imaging was as above -- in summary new periventricular lesion without evidence of spinal cord involvement of disease at this time.   - Admit to neurology  - Repeat MRI brain with and without contrast  - Aubagio elimination procedure   -8g cholestyramine q8g -or- 50g activated charcoal per pharmacy preference (given feeding tube)  - Will continue to consider alternative disease modifying agent  - Holding ergocalciferol  - Continue pta valproic acid  - Will attempt to get imaging pushed from OSH in AM    # Chronic cough  # Hx of DAMIAN nodular infiltrate  # COPD  HD stable and no evidence of sepsis at this point along with no leukocytosis (though upper limit of normal). However, history of minimal nodular infiltrates of DAMIAN, in the background of emphysematous changes. Unable to review imaging at this time, but appears to be somewhat minor on OSH read. However, given ongoing infection may have exacerbated current MS flare, warrants further investigation.  - Repeat CT chest  - UA to r/o UTI  - Trend WBC  - Cont advair, albuterol PRN    # Malnutrition 2/2 poor PO intake and taste disturbance, severe  # Cachexia  # Depression  # Insomnia  Etiology of malnutrition unclear, previously thought to be due to severe depression and tend to agree at this time. Now on G-tube feeds. Severity of malnutrition certainly suspicious underlying occult malignancy, which can be further explored this admission (or further as outpatient) should patient not respond MS management. Disturbed taste sensation may be secondary to Aubagio, though remains unclear. Several recent changes to antidepressants with most recent treatment with remeron to promote sleep and appetite.  -  Holding most home meds for now pending med rec (possible hx med non-compliance per OSH records)  - Nutrition consult for tube feeds  - Will consider psychiatry consult  - Will consider oncologic workup    # Macrocytic anemia  Unclear chronicity, but suspect subacute in the setting of malnutrition most likely to represent folate and/or B12 deficiency. However, recent (02/19) folate 11.6 (wnl, though highly variable if recently received replete meal) and B12 above reference range at 1,282. No suspicion for occult or active bleeding at this time.   - Monitor w CBC qAM  - Nutrition as above    # RLS  - Holding pramipexole    Thank you for involving Neurology in the care of Vincent Zuniga.  Please do not hesitate to call with questions/concerns (consult pager [phone]).      Patient was seen and discussed with Dr. Guerra.    Meagan Harris, MS3 functioned as a scribe for the preparation of this record. I interviewed and examined the patient independently and made edits as necessary    Jordan Cao MD PhD  Neurology Service  Internal Medicine PGY-1  Pager: [phone]    Resident/Fellow Attestation   I, Jennyfer Plaza, was present with the medical student who participated in the service and in the documentation of the note.  I have verified the history and personally performed the physical exam and medical decision making.  I agree with the assessment and plan of care as documented in the note.        Jennyfer Plaza MD  PGY2  Date of Service (when I saw the patient): 03/02/20    I saw and evaluated the patient with the resident and agree with the assessment and plan. I was present for key portions of the above examination.    Impression: I saw the patient in the ER on 3-2-2020. He has a recent diagnosis of MS 2018 and 2-4 week decline, MRI of the brain at outside hospital on 2-22-20 reports an enhancing lesion in the cervico medullary junction supportive of an MS exacerbation, patient WAS NOT treated with  steroids and has not improved, additionally CT chest from same date (2-22) reported findings suggestive of aspiration pneumonia (family reports cough for the past few weeks), patient WAS NOT treated with antibiotics.    Plan:  Admit for repeat MRI to evaluate for evidence of active inflammation in the cervicomedullary junctio that would require IV steroids.  Repeat CT chest to evaluate for aspiration pneumonia and start antibiotics  Swallowing evaluation since he may have dysphagia and silent aspiration.  The family understands and agrees with the plan.     Cassandra Guerra MD  Chief, Multiple Sclerosis Division  Department of Neurology  [facility]

## 2020-03-02 NOTE — ED PROVIDER NOTES
[place] EMERGENCY DEPARTMENT ([facility])  March 2, 2020    History     Chief Complaint   Patient presents with     Generalized Weakness     HPI  Vincent Zuniga is a 65 year old male  with history notable for multiple sclerosis from a year ago (on Aubagio), malnutrition (G-tube in place placed 2/20/20), and major depressive disorder who presents to the ED for multiple complaints and seeking a second opinion for his symptoms.  Per review of Care Everywhere, patient has had 2 hospitalizations in the past month, most recently from 2/28-3/2/2024 (2/18-2/22/20 admission as well) for declining health (strength) felt secondary to his MS over the past month.  He has had extensive testing done including MRI of the C/T-spine, and MRI of the brain, CT Chest/Abdomen/Pelvis,  JVC antibody titer,  CSF, spinal fluid culture stain, CSF cytology, encephalopathy autoimmune evaluation, ,IgG, H. pylori, and C. difficile  amongst others. Please see imaging results and laboratory results below. Neurology did see the patient during this last admission and was noted to have appointment with MS specialist in the next few months. He was also seen by Psychiatry during last admission,  who discontinued his Paxil and started him on Remeron.  Family requested that patient be transferred to tertiary care facility, [facility] for continuation of care. He was discharged from [facility] this morning and patient's family brought hm here.     Per his brother, patient has been gradually worsening weakness and has not been eating for the past several weeks.  Patient states that most of the generalized weakness is in his legs.  He states he has been able to ambulate around and able to go to the bathroom by himself for the last day and 1/2, which is an improvement from his previous hospitalization where he required assistance from his daughter, who lives with him.  His brother also noted that his tremors are worse  "today.  Patient is also complaining of \"explosive\" diarrhea and has 4-5 episodes in daily, but denies blood in the stool.  He also reports having 3 weeks of ongoing cough, left testicle pain has been ongoing for 1 week, ear fullness that started a week ago. His brother states the patient was able to eat a sausage link and some scrambled eggs this morning orally, but didn't have his ensure through his feeding tube. He otherwise denies fevers, dysuria, hematuria, abdominal pain, vomiting, or rhinorrhea.     Diagnostic Studies from 2/20/20:  -JVC antibody was positive  -CSF culture had no growth to date  -CSF showed 1 WBC, 1 RBC, protein 26, glucose 51, lymphocytes 91,  Monocytes 9  -Encephalopathy Autoimmune Eval CSF Spinal Fluid was negative  -CSF cytology was negative for malignancy  -C. difficile PCR and H pylori were negative  - LP showed oligoclonal bands        EXAM: MR SPINE THORACIC WWO  LOCATION: [facility]  DATE/TIME: 2/19/2020 7:21 PM  IMPRESSION:  1.  No cord signal abnormality or findings to explain clinical symptoms.  2.  Mild thoracic spondylosis at T7-T8 and T8-T9.    EXAM: MR SPINE CERVICAL WWO  LOCATION: [facility]  DATE/TIME: 2/19/2020 7:47 PM    IMPRESSION:  1.  No cord signal abnormality.  2.  Multilevel cervical spondylosis as described.  3.  Moderate to severe right and mild to moderate left foraminal stenosis at  C3-C4.  4.  Severe right foraminal stenosis at C5-C6.  5.  Moderate to severe right and moderate to severe left foraminal stenosis at  C6-C7.    EXAM: MR HEAD BRAIN WWO  LOCATION: [facility]  DATE/TIME: 2/18/2020 8:31 PM  IMPRESSION:  1.  History of demyelination is reported clinically. Multifocal areas of  T2/FLAIR hyperintense signal abnormality at the supratentorial level most marked  at the right centrum semiovale and corona radiata level with periventricular  predominance is compatible with patient's provided clinical history of  demyelination. There is an area of " enhancing signal abnormality left periatrial  level which may reflect active phase plaque of demyelination with no additional  evidence for enhancing plaques at the supratentorial level. White matter signal  abnormality at the supratentorial level is largely new from 07/23/2013 brain MRI  performed without contrast.    2.  Poorly defined enhancement at the right paramedian cervicomedullary level  seen on axial data set may represent artifactual enhancement, however, the  possibility of choroid plexus enhancement or surface/leptomeningeal enhancement  enhancing abnormality cannot be excluded. There is not definite parenchymal  signal abnormality at this level. Could consider CSF evaluation in the  appropriate clinical setting to exclude infectious/inflammatory process or  active phase demyelination as indicated.     3.  No evidence for acute infarction.    4.  Additional details are provided above.     EXAM: CT CHEST ABDOMEN PELVIS W  LOCATION: [facility]  DATE/TIME: 2/18/2020 6:25 PM  IMPRESSION:    1.  Minimal nodular infiltrate or cluster of nodules left upper lobe posteriorly  and right middle lobe base likely due to an infectious or inflammatory process  of uncertain chronicity.  2.  Emphysematous changes both lungs.  3.  Triangular shaped nodular opacity right middle lobe along the minor fissure  with additional minimal nodularity along the left major fissure likely benign  possibly intrafissural lymph nodes. The largest nodular opacity in the right  middle lobe has a mean diameter of 5.5 mm.  4.  Postoperative splenectomy.    PAST MEDICAL HISTORY  Past Medical History:   Diagnosis Date     Arthritis      Asthma      Chronic infection     sinus     Chronic pain     secondary to disc disease     Coughing      Depression      Difficulty in walking(719.7)      Dyspnea on exertion      Emphysema      History of blood transfusion     after spleenectomy  1974     Numbness and tingling     in legs     Shortness of  breath      PAST SURGICAL HISTORY  Past Surgical History:   Procedure Laterality Date     BACK SURGERY      Lumbar     SEPTOPLASTY  10/31/2012    Procedure: SEPTOPLASTY;  Septoplasty, turbinoplasty, enlargement of maxillary ostia;  Surgeon: Carolina Robison MD;  Location: [facility] OR     spleen[  Age 18    spleenectomy     FAMILY HISTORY  Family History   Problem Relation Age of Onset     Cancer Father         Lung     Heart Disease Brother 35        Four heart attacks     Alcohol/Drug Brother      Psychotic Disorder Brother         Drug addiction     Unknown/Adopted Son      Unknown/Adopted Daughter      Unknown/Adopted Daughter      Unknown/Adopted Daughter      Unknown/Adopted Daughter      SOCIAL HISTORY  Social History     Tobacco Use     Smoking status: Current Every Day Smoker     Packs/day: 1.00     Years: 30.00     Pack years: 30.00     Types: Cigarettes     Smokeless tobacco: Never Used   Substance Use Topics     Alcohol use: No     MEDICATIONS  No current facility-administered medications for this encounter.      Current Outpatient Medications   Medication     albuterol (PROVENTIL HFA) 108 (90 Base) MCG/ACT inhaler     ALPRAZolam (XANAX) 0.5 MG tablet     celecoxib (CELEBREX) 100 MG capsule     fluticasone-salmeterol (ADVAIR DISKUS) 250-50 MCG/DOSE diskus inhaler     gabapentin (NEURONTIN) 250 MG/5ML solution     hydrOXYzine (ATARAX) 25 MG tablet     LORazepam (ATIVAN) 1 MG tablet     melatonin 3 MG tablet     mirtazapine (REMERON) 30 MG tablet     pramipexole (MIRAPEX) 0.5 MG tablet     valproic acid (DEPAKENE) 250 MG/5ML syrup     vitamin D2 (ERGOCALCIFEROL) 21856 units (1250 mcg) capsule     zolpidem (AMBIEN) 10 MG tablet     ALLERGIES  Allergies   Allergen Reactions     Immune Globulin Rash         I have reviewed the Medications, Allergies, Past Medical and Surgical History, and Social History in the Epic system.    Review of Systems   Constitutional: Positive for appetite change (loss of appetite).  "Negative for fever.   HENT: Negative for rhinorrhea.    Respiratory: Positive for cough.    Gastrointestinal: Positive for diarrhea. Negative for abdominal pain, blood in stool and vomiting.   Genitourinary: Positive for testicular pain (L). Negative for dysuria and hematuria.   Neurological: Positive for tremors and weakness (generalized).   All other systems reviewed and are negative.      Physical Exam   BP: (!) 140/74  Pulse: 66  Temp: 98.2  F (36.8  C)  Resp: 18  Height: 175.3 cm (5' 9\")  Weight: 52.6 kg (116 lb)  SpO2: 97 %      Physical Exam  GEN: Alert, no acute distress, emaciated appearing, pale  HEENT:  PERRL, EOMI, Mucous membranes are moist.   Cardio:  RRR, no murmur, radial pulses equal bilaterally  PULM:  Lungs clear, good air movement, no wheezes, rales  Abd:  Soft, normal bowel sounds, no focal tenderness.  G-tube in place, no associated drainage or erythema.  Musculoskeletal:  normal range of motion, no lower extremity swelling or calf tenderness   exam: No scrotal or testicular pain or redness.  The area of pain is on the proximal medial left thigh area, not the testicle.  This area is not tender and has no erythema, crepitance, swelling.  Neuro:  Alert, not oriented but recognizes family members, Follows commands, CN exam is normal, finger to nose is normal, sensation and strength is normal throughout, patellar reflexes are normal, no pronator drift, there is significant tremor noted in the hands with raising the arms.  Skin:  Warm, dry    ED Course        Procedures   11:39 AM  The patient was seen and examined by Dr. Ramirez in Vertical Room A.             Labs Ordered and Resulted from Time of ED Arrival Up to the Time of Departure from the ED   CBC WITH PLATELETS DIFFERENTIAL - Abnormal; Notable for the following components:       Result Value    RBC Count 3.07 (*)     Hemoglobin 10.8 (*)     Hematocrit 33.4 (*)      (*)     MCH 35.2 (*)     Absolute Monocytes 1.9 (*)     All other " components within normal limits   COMPREHENSIVE METABOLIC PANEL - Abnormal; Notable for the following components:    Chloride 112 (*)     Creatinine 0.59 (*)     Calcium 8.2 (*)     Albumin 2.5 (*)     Protein Total 5.8 (*)     All other components within normal limits       Consults  Neurology: Responded (03/02/20 1121)  Other (Comment): Responded(i.r.) (03/02/20 5304)    Neurology saw the patient and plans to admit him for further treatment and evaluation.    Assessments & Plan (with Medical Decision Making)   Patient presents with generalized weakness that he is had for several weeks and is actually better over the last day and a half, however, he is requesting a second opinion from the neurology service here regarding his MS diagnosis and treatment options.  Patient is a very complex history and has had a lot of testing done recently.  He does not appear to have an acute infection, no sign of acute stroke, no focal deficits on my neurologic exam.  He will be admitted to the neurology service for further evaluation and treatment regarding his worsened MS.    I have reviewed the nursing notes.    I have reviewed the findings, diagnosis, plan and need for follow up with the patient.    New Prescriptions    No medications on file       Final diagnoses:   Multiple sclerosis (H)   Generalized muscle weakness     IJeovanny, am serving as a trained medical scribe to document services personally performed by Shyla Ramirez MD, based on the provider's statements to me.      IShyla MD, was physically present and have reviewed and verified the accuracy of this note documented by Jeovanny Taylor.     3/2/2020   [facility], [place], EMERGENCY DEPARTMENT     Shyla Ramirez MD  03/02/20 2718

## 2020-03-02 NOTE — ED TRIAGE NOTES
"Triage Assessment & Note:    BP (!) 140/74   Pulse 66   Temp 98.2  F (36.8  C) (Oral)   Resp 18   Ht 1.753 m (5' 9\")   Wt 52.6 kg (116 lb)   SpO2 97%   BMI 17.13 kg/m      Patient presents with: PT c/o increasing weakness in the presences of MS. PT reports he has been feeling more weakness than usual and has not been seen at the clinic for this.     Home Treatments/Remedies: None    Febrile / Afebrile? Afebrile     Duration of C/o: Several weeks     Timothy Rivera RN  March 2, 2020      "

## 2020-03-03 ENCOUNTER — APPOINTMENT (OUTPATIENT)
Dept: SPEECH THERAPY | Facility: CLINIC | Age: 65
DRG: 058 | End: 2020-03-03
Attending: STUDENT IN AN ORGANIZED HEALTH CARE EDUCATION/TRAINING PROGRAM
Payer: COMMERCIAL

## 2020-03-03 PROBLEM — J43.1 PANLOBULAR EMPHYSEMA (H): Status: ACTIVE | Noted: 2019-01-02

## 2020-03-03 PROBLEM — R19.7 DIARRHEA: Status: ACTIVE | Noted: 2020-02-28

## 2020-03-03 PROBLEM — E46 MALNUTRITION (H): Status: ACTIVE | Noted: 2020-02-28

## 2020-03-03 PROBLEM — R83.4 ABNORMAL CSF IMMUNOLOGICAL FINDING: Status: ACTIVE | Noted: 2019-02-21

## 2020-03-03 PROBLEM — E55.9 VITAMIN D DEFICIENCY: Status: ACTIVE | Noted: 2019-02-21

## 2020-03-03 PROBLEM — R56.9 GENERALIZED CONVULSIVE SEIZURE (H): Status: ACTIVE | Noted: 2019-01-17

## 2020-03-03 PROBLEM — F29 PSYCHOSIS, UNSPECIFIED PSYCHOSIS TYPE (H): Status: ACTIVE | Noted: 2017-02-02

## 2020-03-03 PROBLEM — Z72.0 TOBACCO ABUSE: Status: ACTIVE | Noted: 2019-01-02

## 2020-03-03 PROBLEM — R13.10 DYSPHAGIA: Status: ACTIVE | Noted: 2019-01-06

## 2020-03-03 PROBLEM — F33.9 RECURRENT MAJOR DEPRESSIVE EPISODES (H): Status: ACTIVE | Noted: 2017-02-09

## 2020-03-03 PROBLEM — Z91.199 MEDICALLY NONCOMPLIANT: Status: ACTIVE | Noted: 2019-01-02

## 2020-03-03 PROBLEM — R76.8 JC VIRUS ANTIBODY POSITIVE: Status: ACTIVE | Noted: 2020-02-28

## 2020-03-03 PROBLEM — G93.40 ENCEPHALOPATHY: Status: ACTIVE | Noted: 2019-01-17

## 2020-03-03 PROBLEM — R53.1 GENERALIZED WEAKNESS: Status: ACTIVE | Noted: 2019-01-17

## 2020-03-03 PROBLEM — E78.5 HYPERLIPIDEMIA: Status: ACTIVE | Noted: 2019-01-02

## 2020-03-03 PROBLEM — G93.40 ENCEPHALOPATHY: Status: RESOLVED | Noted: 2019-01-17 | Resolved: 2020-03-03

## 2020-03-03 LAB
ALBUMIN SERPL-MCNC: 2.8 G/DL (ref 3.4–5)
ALP SERPL-CCNC: 90 U/L (ref 40–150)
ALT SERPL W P-5'-P-CCNC: 26 U/L (ref 0–70)
ANION GAP SERPL CALCULATED.3IONS-SCNC: 4 MMOL/L (ref 3–14)
AST SERPL W P-5'-P-CCNC: 22 U/L (ref 0–45)
BILIRUB SERPL-MCNC: 0.4 MG/DL (ref 0.2–1.3)
BUN SERPL-MCNC: 6 MG/DL (ref 7–30)
CALCIUM SERPL-MCNC: 8.6 MG/DL (ref 8.5–10.1)
CHLORIDE SERPL-SCNC: 104 MMOL/L (ref 94–109)
CO2 SERPL-SCNC: 30 MMOL/L (ref 20–32)
CREAT SERPL-MCNC: 0.57 MG/DL (ref 0.66–1.25)
DEPRECATED CALCIDIOL+CALCIFEROL SERPL-MC: 43 UG/L (ref 20–75)
ERYTHROCYTE [DISTWIDTH] IN BLOOD BY AUTOMATED COUNT: 13.9 % (ref 10–15)
GFR SERPL CREATININE-BSD FRML MDRD: >90 ML/MIN/{1.73_M2}
GLUCOSE BLDC GLUCOMTR-MCNC: 75 MG/DL (ref 70–99)
GLUCOSE BLDC GLUCOMTR-MCNC: 79 MG/DL (ref 70–99)
GLUCOSE BLDC GLUCOMTR-MCNC: 88 MG/DL (ref 70–99)
GLUCOSE BLDC GLUCOMTR-MCNC: 93 MG/DL (ref 70–99)
GLUCOSE SERPL-MCNC: 76 MG/DL (ref 70–99)
HCT VFR BLD AUTO: 37.3 % (ref 40–53)
HGB BLD-MCNC: 12.1 G/DL (ref 13.3–17.7)
MAGNESIUM SERPL-MCNC: 1.8 MG/DL (ref 1.6–2.3)
MCH RBC QN AUTO: 35 PG (ref 26.5–33)
MCHC RBC AUTO-ENTMCNC: 32.4 G/DL (ref 31.5–36.5)
MCV RBC AUTO: 108 FL (ref 78–100)
PLATELET # BLD AUTO: 180 10E9/L (ref 150–450)
POTASSIUM SERPL-SCNC: 3.4 MMOL/L (ref 3.4–5.3)
PROCALCITONIN SERPL-MCNC: <0.05 NG/ML
PROT SERPL-MCNC: 6.4 G/DL (ref 6.8–8.8)
RBC # BLD AUTO: 3.46 10E12/L (ref 4.4–5.9)
SODIUM SERPL-SCNC: 138 MMOL/L (ref 133–144)
TSH SERPL DL<=0.005 MIU/L-ACNC: 2.33 MU/L (ref 0.4–4)
WBC # BLD AUTO: 7.7 10E9/L (ref 4–11)

## 2020-03-03 PROCEDURE — 84145 PROCALCITONIN (PCT): CPT | Performed by: STUDENT IN AN ORGANIZED HEALTH CARE EDUCATION/TRAINING PROGRAM

## 2020-03-03 PROCEDURE — 84443 ASSAY THYROID STIM HORMONE: CPT | Performed by: PSYCHIATRY & NEUROLOGY

## 2020-03-03 PROCEDURE — 25000128 H RX IP 250 OP 636: Performed by: STUDENT IN AN ORGANIZED HEALTH CARE EDUCATION/TRAINING PROGRAM

## 2020-03-03 PROCEDURE — 84145 PROCALCITONIN (PCT): CPT | Performed by: PSYCHIATRY & NEUROLOGY

## 2020-03-03 PROCEDURE — 92610 EVALUATE SWALLOWING FUNCTION: CPT | Mod: GN

## 2020-03-03 PROCEDURE — 25000132 ZZH RX MED GY IP 250 OP 250 PS 637: Performed by: PSYCHIATRY & NEUROLOGY

## 2020-03-03 PROCEDURE — 25000132 ZZH RX MED GY IP 250 OP 250 PS 637: Performed by: STUDENT IN AN ORGANIZED HEALTH CARE EDUCATION/TRAINING PROGRAM

## 2020-03-03 PROCEDURE — 12000001 ZZH R&B MED SURG/OB UMMC

## 2020-03-03 PROCEDURE — 83735 ASSAY OF MAGNESIUM: CPT | Performed by: PSYCHIATRY & NEUROLOGY

## 2020-03-03 PROCEDURE — 00000146 ZZHCL STATISTIC GLUCOSE BY METER IP

## 2020-03-03 PROCEDURE — 82306 VITAMIN D 25 HYDROXY: CPT | Performed by: PSYCHIATRY & NEUROLOGY

## 2020-03-03 PROCEDURE — 80053 COMPREHEN METABOLIC PANEL: CPT | Performed by: PSYCHIATRY & NEUROLOGY

## 2020-03-03 PROCEDURE — 92526 ORAL FUNCTION THERAPY: CPT | Mod: GN

## 2020-03-03 PROCEDURE — 36415 COLL VENOUS BLD VENIPUNCTURE: CPT | Performed by: PSYCHIATRY & NEUROLOGY

## 2020-03-03 PROCEDURE — 85027 COMPLETE CBC AUTOMATED: CPT | Performed by: PSYCHIATRY & NEUROLOGY

## 2020-03-03 PROCEDURE — 25800030 ZZH RX IP 258 OP 636: Performed by: STUDENT IN AN ORGANIZED HEALTH CARE EDUCATION/TRAINING PROGRAM

## 2020-03-03 RX ORDER — PRAMIPEXOLE DIHYDROCHLORIDE 0.5 MG/1
0.5 TABLET ORAL AT BEDTIME
Status: DISCONTINUED | OUTPATIENT
Start: 2020-03-03 | End: 2020-03-04 | Stop reason: HOSPADM

## 2020-03-03 RX ORDER — GABAPENTIN 400 MG/1
400 CAPSULE ORAL 3 TIMES DAILY
Status: DISCONTINUED | OUTPATIENT
Start: 2020-03-03 | End: 2020-03-04 | Stop reason: HOSPADM

## 2020-03-03 RX ORDER — AMINO AC/PROTEIN HYDR/WHEY PRO 10G-100/30
1 LIQUID (ML) ORAL 2 TIMES DAILY
Status: DISCONTINUED | OUTPATIENT
Start: 2020-03-03 | End: 2020-03-04 | Stop reason: HOSPADM

## 2020-03-03 RX ORDER — TRAZODONE HYDROCHLORIDE 50 MG/1
50 TABLET, FILM COATED ORAL AT BEDTIME
Status: DISCONTINUED | OUTPATIENT
Start: 2020-03-03 | End: 2020-03-03

## 2020-03-03 RX ORDER — DEXTROSE MONOHYDRATE 100 MG/ML
INJECTION, SOLUTION INTRAVENOUS CONTINUOUS PRN
Status: DISCONTINUED | OUTPATIENT
Start: 2020-03-03 | End: 2020-03-04 | Stop reason: HOSPADM

## 2020-03-03 RX ORDER — CELECOXIB 200 MG/1
200 CAPSULE ORAL 2 TIMES DAILY
Status: DISCONTINUED | OUTPATIENT
Start: 2020-03-03 | End: 2020-03-04 | Stop reason: HOSPADM

## 2020-03-03 RX ORDER — MULTIPLE VITAMINS W/ MINERALS TAB 9MG-400MCG
1 TAB ORAL DAILY
Status: DISCONTINUED | OUTPATIENT
Start: 2020-03-03 | End: 2020-03-04 | Stop reason: HOSPADM

## 2020-03-03 RX ORDER — ACETAMINOPHEN 325 MG/1
650 TABLET ORAL EVERY 4 HOURS PRN
Status: DISCONTINUED | OUTPATIENT
Start: 2020-03-03 | End: 2020-03-04 | Stop reason: HOSPADM

## 2020-03-03 RX ORDER — PANTOPRAZOLE SODIUM 20 MG/1
40 TABLET, DELAYED RELEASE ORAL
Status: DISCONTINUED | OUTPATIENT
Start: 2020-03-03 | End: 2020-03-04 | Stop reason: HOSPADM

## 2020-03-03 RX ORDER — MIRTAZAPINE 15 MG/1
30 TABLET, FILM COATED ORAL AT BEDTIME
Status: DISCONTINUED | OUTPATIENT
Start: 2020-03-03 | End: 2020-03-04 | Stop reason: HOSPADM

## 2020-03-03 RX ORDER — IBUPROFEN 600 MG/1
600 TABLET, FILM COATED ORAL EVERY 4 HOURS PRN
Status: DISCONTINUED | OUTPATIENT
Start: 2020-03-03 | End: 2020-03-03

## 2020-03-03 RX ADMIN — VALPROIC ACID 750 MG: 250 SOLUTION ORAL at 20:17

## 2020-03-03 RX ADMIN — CHOLESTYRAMINE 8 G: 4 POWDER, FOR SUSPENSION ORAL at 05:44

## 2020-03-03 RX ADMIN — SODIUM CHLORIDE: 9 INJECTION, SOLUTION INTRAVENOUS at 20:10

## 2020-03-03 RX ADMIN — GABAPENTIN 400 MG: 400 CAPSULE ORAL at 12:24

## 2020-03-03 RX ADMIN — AMOXICILLIN AND CLAVULANATE POTASSIUM 1 TABLET: 875; 125 TABLET, FILM COATED ORAL at 20:16

## 2020-03-03 RX ADMIN — CHOLESTYRAMINE 8 G: 4 POWDER, FOR SUSPENSION ORAL at 12:25

## 2020-03-03 RX ADMIN — PRAMIPEXOLE DIHYDROCHLORIDE 0.5 MG: 0.5 TABLET ORAL at 22:21

## 2020-03-03 RX ADMIN — CELECOXIB 200 MG: 200 CAPSULE ORAL at 12:24

## 2020-03-03 RX ADMIN — CHOLESTYRAMINE 8 G: 4 POWDER, FOR SUSPENSION ORAL at 21:05

## 2020-03-03 RX ADMIN — PANTOPRAZOLE SODIUM 40 MG: 20 TABLET, DELAYED RELEASE ORAL at 12:24

## 2020-03-03 RX ADMIN — GABAPENTIN 400 MG: 400 CAPSULE ORAL at 20:16

## 2020-03-03 RX ADMIN — VALPROIC ACID 500 MG: 250 SOLUTION ORAL at 09:46

## 2020-03-03 RX ADMIN — ACETAMINOPHEN 650 MG: 325 TABLET, FILM COATED ORAL at 09:47

## 2020-03-03 RX ADMIN — MIRTAZAPINE 30 MG: 15 TABLET, FILM COATED ORAL at 22:21

## 2020-03-03 RX ADMIN — ACETAMINOPHEN 650 MG: 325 TABLET, FILM COATED ORAL at 16:10

## 2020-03-03 RX ADMIN — MULTIPLE VITAMINS W/ MINERALS TAB 1 TABLET: TAB at 16:10

## 2020-03-03 RX ADMIN — VALPROIC ACID 750 MG: 250 SOLUTION ORAL at 12:26

## 2020-03-03 RX ADMIN — ENOXAPARIN SODIUM 40 MG: 40 INJECTION SUBCUTANEOUS at 20:17

## 2020-03-03 RX ADMIN — FLUTICASONE FUROATE AND VILANTEROL TRIFENATATE 1 PUFF: 100; 25 POWDER RESPIRATORY (INHALATION) at 12:25

## 2020-03-03 RX ADMIN — ACETAMINOPHEN 650 MG: 325 TABLET, FILM COATED ORAL at 20:17

## 2020-03-03 RX ADMIN — GABAPENTIN 400 MG: 400 CAPSULE ORAL at 16:09

## 2020-03-03 RX ADMIN — ATORVASTATIN CALCIUM 20 MG: 20 TABLET, FILM COATED ORAL at 09:07

## 2020-03-03 RX ADMIN — MELATONIN TAB 3 MG 3 MG: 3 TAB at 22:21

## 2020-03-03 ASSESSMENT — ACTIVITIES OF DAILY LIVING (ADL)
ADLS_ACUITY_SCORE: 17
ADLS_ACUITY_SCORE: 17
ADLS_ACUITY_SCORE: 19
ADLS_ACUITY_SCORE: 17

## 2020-03-03 NOTE — PROGRESS NOTES
03/03/20 1313   General Information   Onset Date 03/02/20   Start of Care Date 03/03/20   Referring Physician Jennyfer Plaza MD   Patient Profile Review/OT: Additional Occupational Profile Info See Profile for full history and prior level of function   Patient/Family Goals Statement To eat and drink but continue to have main source of nurtition/hydration via PEG   Swallowing Evaluation Bedside swallow evaluation   Behaviorial Observations WFL (within functional limits)  (Suspect poor health historian)   Mode of current nutrition Oral diet   Type of oral diet Regular;Thin liquid   Respiratory Status Room air   Comments Pt is a 65 year old male h/o multiple sclerosis diagnosed last year treated with Aubagio who presents with 1-2 weeks of acutely progressive decline. His history is obtained mostly from chart review and from discussion with his daughter as he is a difficult historian, speaking mostly in 2-3 word sentences. His daughter estimates that he has been on therapy with Aubagio for about 8 months, presenting with acutely progressive symptoms including unbalanced gait, urinary incontincne, taste changes, and decreased PO intake.  His daughter reports to MD the he appeared to be mostly stable following his diagnosis of MS for the first 5-6 months of treatment with Aubagio, however about 2-3 months ago she began noticing a progressive decline in his functioning. He began complaining of his taste changing and subsequently began to eat and drink less. She says he was mostly eating mushroom soup for the last couple of months. Additionally, he has stopped taking anything by mouth over the past week, refusing to eat or drink. He has had a G tube placed, and is currently receiving all nutrition and fluids via the G tube. He has lost approximately 40 pounds over the past 2-3 months.   His daughter also complains that he has had persistent issues with uncontrolled seizures. She describes that these seizures can  "occur in various forms, from isolated episodes of a leg shaking to \"grand mal\" type episodes. He is on Depakote, and she says that he has not had any recent dose changes. He has been following with [facility] for these.    Clinical Swallow Evaluation   Oral Musculature generally intact  (generalized weakness, WFL)   Structural Abnormalities none present   Dentition present and adequate  (mssing various teeth)   Mucosal Quality adequate   Mandibular Strength and Mobility intact   Oral Labial Strength and Mobility WFL   Lingual Strength and Mobility WFL   Buccal Strength and Mobility intact   Laryngeal Function Cough;Throat clear;Swallow;Voicing initiated  (WFL)   Oral Musculature Comments Oral mech unremarkable.  No significant dysarthria present.   Additional Documentation Yes   Swallow Eval   Feeding Assistance frequent cues/help required  (per tremors for self-feeding with thin liquids)   Clinical Swallow Eval: Thin Liquid Texture Trial   Mode of Presentation, Thin Liquids cup;straw;fed by clinician   Volume of Liquid or Food Presented 1 oz   Oral Phase of Swallow Premature pharyngeal entry   Pharyngeal Phase of Swallow impaired;coughing/choking;reduction in laryngeal movement   Diagnostic Statement High aspiration risk   Clinical Swallow Eval: Nectar Thick Liquid Texture Trial   Mode of Presentation, Nectar cup;straw;fed by clinician   Volume of Nectar Presented 4 oz   Oral Phase, [place] WFL   Pharyngeal Phase, [place] intact   Diagnostic Statement No overt s/sx of aspiration, oral phase WFL   Clinical Swallow Eval: Solid Food Texture Trial   Mode of Presentation, Solid self-fed   Volume of Solid Food Presented 1.5 crackers   Oral Phase, Solid WFL   Pharyngeal Phase, Solid intact   Diagnostic Statement No overt s/sx of aspiration, oral phase WFL   Swallow Compensations   Swallow Compensations Alternate viscosity of consistencies;Pacing;Reduce amounts   Esophageal Phase of Swallow   Patient reports or presents " with symptoms of esophageal dysphagia No   General Therapy Interventions   Planned Therapy Interventions Dysphagia Treatment   Dysphagia treatment Oropharyngeal exercise training;Modified diet education;Instruction of safe swallow strategies;Compensatory strategies for swallowing   Swallow Eval: Clinical Impressions   Skilled Criteria for Therapy Intervention Skilled criteria met.  Treatment indicated.   Functional Assessment Scale (FAS) 3   Treatment Diagnosis Moderate orophayrngeal dysphagia   Diet texture recommendations Regular diet;Nectar thick liquids   Recommended Feeding/Eating Techniques alternate between small bites and sips of food/liquid;maintain upright posture during/after eating for 30 mins;small sips/bites   Demonstrates Need for Referral to Another Service dietitian;occupational therapy;physical therapy   Therapy Frequency 5x/week   Predicted Duration of Therapy Intervention (days/wks) 2 weeks   Anticipated Discharge Disposition extended care facility   Risks and Benefits of Treatment have been explained. Yes   Patient, family and/or staff in agreement with Plan of Care Yes   Clinical Impression Comments Clinical swallow evaluation completed per MD order.  Pt presents with a moderate oropharyngeal dysphagia in the setting of MS, weakness.  Recommend regular diet and nectar-thick liquids with intermittent supervision.  Ensure pt is fully alert and upright for all PO, given small bites/sips, alternating bites/sips.  Pt awake and alert, oral mech WFL.  Coughing with thin liquid via cup/straw in limited trials.  No overt s/sx of aspiration with nectar thick liquids via cup/straw, solid texture.  Oral phase WFL.  Per pt report, limited PO consumption PTA, most of nutrition/hydration needs meet via FT.  Pt will require VFSS prior to diet advancement per hx of aspiration with delayed cough and silent aspiration with thin liquids on VFSS completed 6/2019.  SLP to follow for oropharyngeal strengthening  exercises, PO tolerance, VFSS in 2-3 weeks or as appropriate per progress in therapy.  VFSS may be completed as OP.   Total Evaluation Time   Total Evaluation Time (Minutes) 10

## 2020-03-03 NOTE — PLAN OF CARE
Status: Pt admitted for functional decline. Hx of MS, reports having had some seizures recently   VS: VSS.   Neuros: A&Ox4, flat affect. Essential tremor throughout. Reports numbness and pain in LLE starting from knee down. Weakness throughout, 5/5.  GI: Clear liquids, PEG in place. Tolerates meds orally.   : Voiding with frequency all night, reports intermittent incontinence at home, none here. Brief in place.  Respiratory: WDL.    Skin: WDL. Pale, cachexic.   IV: PIV infusing NS @ 100 ml/hr.   Activity: SBA/GB/with IV pole  Pain: Reports minor pain in his LLE which is baseline.   Plan of care: Continue to monitor and follow POC.

## 2020-03-03 NOTE — PLAN OF CARE
Status: Pt admitted from ED approximately at 2100 for functional decline. Hx of MS, reports having had some seizures recently (seizure pads ordered) and bed alarm is on.      VS: VSS.   Neuros: A/OX4, flat affect. Essential tremor throughout. Reports numbness and pain in LLE starting from knee down otherwise intact.        GI: Clear liquids, PEG in place. Tolerates most meds via PEG, per report can swallow liquids well.    : Voiding with frequency, reports intermittent incontinence at home, none here. Brief in place.        Respiratory: WDL.   Skin: WDL. Pale, cachexic.   IV: PIV SL currently as pt went down to MRI, should be started on  ml/hr.   Activity: SBA, GB due to unsteady gait.   Pain: Reports minor pain in his LLE which is baseline.   Plan of care: UA sent to lab. Pt went to MRI at approximately 2200. Continue to monitor and follow POC.

## 2020-03-03 NOTE — PLAN OF CARE
Discharge Planner SLP   Patient plan for discharge: Pt did not state  Current status: Clinical swallow evaluation completed per MD order.  Pt presents with a moderate oropharyngeal dysphagia in the setting of MS, weakness.  Recommend regular diet and nectar-thick liquids with intermittent supervision.  Ensure pt is fully alert and upright for all PO, given small bites/sips, alternating bites/sips.   Coughing with thin liquid via cup/straw in limited trials.  No overt s/sx of aspiration with nectar thick liquids via cup/straw, solid texture.  Per pt report, limited PO consumption PTA, most of nutrition/hydration needs meet via FT.  Pt will require VFSS prior to diet advancement per hx of aspiration with delayed cough and silent aspiration with thin liquids on VFSS completed 6/2019.  SLP to follow for oropharyngeal strengthening exercises, PO tolerance, VFSS in 2-3 weeks or as appropriate per progress in therapy.  VFSS may be completed as OP.  Barriers to return to prior living situation: Dysphagia, weakness, medical status  Recommendations for discharge: Defer to PT, anticipate pt will require SLP swallow tx at discharge  Rationale for recommendations: Below baseline function; pt will benefit from ongoing ST targeting swallowing       Entered by: Maribeth Gaming 03/03/2020 1:33 PM

## 2020-03-03 NOTE — ED NOTES
[facility], [place]   ED Nurse to Floor Handoff     Vincent Zuniga is a 65 year old male who speaks English and lives with family members,  in a home  They arrived in the ED by car from home    ED Chief Complaint: Generalized Weakness    ED Dx;   Final diagnoses:   Multiple sclerosis (H)   Generalized muscle weakness         Needed?: No    Allergies:   Allergies   Allergen Reactions     Immune Globulin Rash   .  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     Numbness and tingling     in legs     Shortness of breath       Baseline Mental status: WDL  Current Mental Status changes: at basesline    Infection present or suspected this encounter: no  Sepsis suspected: No  Isolation type: No active isolations     Activity level - Baseline/Home:  Independent  Activity Level - Current:  Up with assist    Bariatric equipment needed?: No    In the ED these meds were given:   Medications   melatonin liquid 3 mg (has no administration in time range)   HYDROmorphone (DILAUDID) tablet 2 mg (2 mg Oral Given 3/2/20 3283)   HYDROmorphone (DILAUDID) tablet 2 mg (2 mg Oral Given 3/2/20 4355)       Drips running?  No    Home pump  No    Current LDAs  Peripheral IV 03/02/20 Left Hand (Active)   Site Assessment WDL 3/2/2020 12:32 PM   Line Status Saline locked 3/2/2020 12:32 PM   Phlebitis Scale 0-->no symptoms 3/2/2020 12:32 PM   Infiltration Scale 0 3/2/2020 12:32 PM   Number of days: 0       ETT (adult) 7.5  (Active)   Number of days: 2679       Incision/Surgical Site 10/31/12 Bilateral Nose (Active)   Number of days: 2679       Labs results:   Labs Ordered and Resulted from Time of ED Arrival Up to the Time  "of Departure from the ED   CBC WITH PLATELETS DIFFERENTIAL - Abnormal; Notable for the following components:       Result Value    RBC Count 3.07 (*)     Hemoglobin 10.8 (*)     Hematocrit 33.4 (*)      (*)     MCH 35.2 (*)     Absolute Monocytes 1.9 (*)     All other components within normal limits   COMPREHENSIVE METABOLIC PANEL - Abnormal; Notable for the following components:    Chloride 112 (*)     Creatinine 0.59 (*)     Calcium 8.2 (*)     Albumin 2.5 (*)     Protein Total 5.8 (*)     All other components within normal limits   ROUTINE UA WITH MICROSCOPIC REFLEX TO CULTURE   INTAKE AND OUTPUT   ENCOURAGE FLUIDS   DELIRIUM PREVENTION AND TREATMENT   PATIENT CARE ORDER   PATIENT EDUCATION: UNDERSTANDING DELIRIUM       Imaging Studies: No results found for this or any previous visit (from the past 24 hour(s)).    Recent vital signs:   /75   Pulse 60   Temp 98.2  F (36.8  C) (Oral)   Resp 16   Ht 1.753 m (5' 9\")   Wt 52.6 kg (116 lb)   SpO2 98%   BMI 17.13 kg/m      Higinio Coma Scale Score: 15 (03/02/20 1857)       Cardiac Rhythm: Other  Pt needs tele? No  Skin/wound Issues: G tube    Code Status: Full Code    Pain control: poor    Nausea control: pt had none    Abnormal labs/tests/findings requiring intervention: NA    Family present during ED course? Yes   Family Comments/Social Situation comments: Daughter is caregiver for pt    Tasks needing completion: CT/UA    Abimbola Ramos, RN    [phone] [facility]      "

## 2020-03-03 NOTE — PROGRESS NOTES
CLINICAL NUTRITION SERVICES - ASSESSMENT NOTE     Nutrition Prescription    RECOMMENDATIONS FOR MDs/PROVIDERS TO ORDER:  - Continue oral diet as tolerated; SLP assessment in the setting of c/o swallowing problems/choking.   - Consider checking vitamin D and replacing if low if pt not already on replacement PTA.   - Total daily fluids/adjustments per MD   - Electrolyte replacement per protocol as indicated (do not suspect high risk refeeding if pt administering appropriate TFs, however, unclear of intakes PTA considering weight status).  - Bowel regimen as indicated     Malnutrition Status:    - Severe malnutrition in the context of acute on chronic illness     Recommendations already ordered by Registered Dietitian (RD):  - Recommend initiating via current access/gastrostomy: Isosource 1.5, 1 can QID (4 cans daily) to provide 1000 ml, 1500 kcals (28 kcals/kg), 68 gm PRO, 176 gm CHO, 15 gm fiber, 760 ml free water.   - Rec additional Prosource BID: 22 gm PRO, 80 kcals. Total provisions = 1580 kcals (30 kcals/kg) and 85 gm PRO (1.6 gm/kg).  - Bolus 1 can QID or per pt preference.  - Recommend 30-60 ml q4hr fluid flushes for tube patency. Additional fluids and/or adjustments per MD.    - Order multivitamin/mineral (15 ml/day via FT) to help ensure micronutrient needs being met with suspected hypermetabolic demands and potential interruptions to TF infusions.  - Elevated HOB during and directly after boluses.  - Ordered emory counts to help assess PO intakes and EN adjustment needs.   - Ordered Boost Plus with meals BID     Future/Additional Recommendations:  - EN initiation via long-term access/tolerance   - SLP/PO/supp adequacy (emory counts ordered 3/3) and EN adjustments   - Weight trends     REASON FOR ASSESSMENT  Vincent Zuniga is a/an 65 year old male assessed by the dietitian for Provider Order - Registered Dietitian to Assess and Order TF per Medical Nutrition Therapy Protocol    Medical history: h/o COPD,  "multiple sclerosis (on Aubagio, PINA virus serology indicative of past exposure), severe malnutrition with recent G-tube placement, and depression.    NUTRITION HISTORY  Per chart review:  Per daughter: He began complaining of his taste changing and subsequently began to eat and drink less. She says he was mostly eating mushroom soup for the last couple of months. Does report a history over the past 3 days or so of choking sensation when eating/swallowing.    H/o macrocytic anemia; recent folate and B12 WNL. Pt has h/o malnutrition per chart.     Per pt:  Pt reports weight loss PTA: 40 lbs over 2 weeks per report. Pt uses FT at home for nutrition (daughter helps) with Ensure? Pt boluses 4 per day (approximately 1400 kcals and 52 gm PRO). Pt endorses minimal PO intake and appetite. Duration of interview kept timely per pt request to rest.     CURRENT NUTRITION ORDERS  Diet: Regular  Intake/Tolerance: minimal. Does not tolerate much at baseline.   Pt prefers to bolus TFs as he was doing at home. Does not want continuous or nocturnal regimen.     LABS  Labs reviewed  BUN: 6 (L)  Creatinine: 0.57 (L)  Ketones negative     MEDICATIONS  Medications reviewed  Lipitor   Remeron  IVF @ 100 ml/hr     ANTHROPOMETRICS  Height: 175.3 cm (5' 9\") 69\"  Most Recent Weight: 52.6 kg (116 lb)    IBW: 73 kg;   BMI: Underweight BMI <18.5  Weight History:   Wt Readings from Last 9 Encounters:   03/02/20 52.6 kg (116 lb)   05/14/19 58.5 kg (128 lb 14.4 oz)   11/28/12 55.8 kg (123 lb)   11/07/12 56.2 kg (123 lb 12.8 oz)   10/24/12 52.9 kg (116 lb 9.6 oz)   10/22/12 54.8 kg (120 lb 12.8 oz)   9% weight loss over ~ 10 months. UBW reported as 145 lbs about 1 year ago = 20% weight loss over ~ 1 year per pt report.     Dosing Weight: 53 kg (admit weight)    ASSESSED NUTRITION NEEDS  Estimated Energy Needs: 8629-2714+ kcals/day (30 - 35+ kcals/kg )  Justification: Increased needs  Estimated Protein Needs:  grams protein/day (1.5 - 2+ grams of " pro/kg)  Justification: Increased needs  Estimated Fluid Needs: 1619-7250 mL/day (1 mL/kcal)   Justification: Maintenance and Per provider pending fluid status    PHYSICAL FINDINGS  See malnutrition section below.  Poor skin turgor; pale; dry  Obvious fat/muscle wasting throughout entire body     MALNUTRITION  % Intake: Unable to assess: reports using EN PTA  % Weight Loss: Up to 20% in 1 year (non-severe)  Subcutaneous Fat Loss: Facial region, Upper arm, Lower arm and Thoracic/intercostal: all severe  Muscle Loss: Temporal, Facial & jaw region, Scapular bone, Thoracic region (clavicle, acromium bone, deltoid, trapezius, pectoral), Upper arm (bicep, tricep), Lower arm  (forearm), Dorsal hand, Upper leg (quadricep, hamstring), Patellar region and Posterior calf: all severe  Fluid Accumulation/Edema: None noted  Malnutrition Diagnosis: Severe malnutrition in the context of acute on chronic illness     NUTRITION DIAGNOSIS  Inadequate oral intake related to dysphagia, neuro status, decreased appetite as evidenced by minimal PO intakes requiring long-term EN to meet 100% nutrition needs.       INTERVENTIONS  Implementation  Nutrition Education: Provided education on nutrition POC; role of RD; supplements    Enteral Nutrition - Initiate as ordered and monitor  Stas counts and supplements; monitor weight/PO and need for EN adjustments     Goals  Total avg nutritional intake to meet a minimum of 30 kcal/kg and 1.5 g PRO/kg daily (per dosing wt 53 kg).     Monitoring/Evaluation  Progress toward goals will be monitored and evaluated per protocol.    Viviana Dias RD, LD, [facility]  Neuro ICU  Pager: [phone]

## 2020-03-03 NOTE — PHARMACY-CONSULT NOTE
Pharmacy Tube Feeding Consult    Medication reviewed for administration by feeding tube and for potential food/drug interactions.    Recommendation: No changes are needed at this time. Patient able to tolerate oral medications.     Pharmacy will continue to follow as new medications are ordered.    Live SullivanD

## 2020-03-03 NOTE — PLAN OF CARE
Status: Admitted r/t functional decline. Hx of MS  Vitals: VSS  Neuros: A/O x4, flat affect. N/T, pain in LLE from knee down. Generalized weakness  IV: PIV infusing NS at 100/hr  Resp/trach: RA O2 sats stable, Lungs clear  Diet: Reg diet, nectar thick liq.  Bowel status: BS+ No BM today  : Voiding  Skin: Intact - pale.  Pain: LLE pain, improved w/gabapentin, celebrex, and tylenol  Activity: Up indep. In room, Otherwise SBA/GB  Plan: Start TF tonight (bolus), pending Mg results. Daughter requesting call from Neuro team - Mds paged. Continue to monitor and follow POC.

## 2020-03-03 NOTE — PROGRESS NOTES
Care Coordinator Progress Note    Admission Date/Time:  3/2/2020  Attending MD:  Dr Cassandra Guerra    Data  Chart reviewed, discussed with interdisciplinary team. In 6A Discharge Rounds Dr Plaza reported pt has MS, now with failure to thrive, malnourished. Has a g-tube; refusing to eat because food tastes bad. Plan for chest CT & brain MRI. Pt was at home with his daughter, possible caregiver burnout.     Patient was admitted for:    Multiple sclerosis  Generalized muscle weakness.  Failure to thrive    Pt was recently hospitalized at [facility] from 2-18 to 2-22 and 2-28 to 3-02. Family was requesting a transfer to a tertiary care facility; he was discharged from [facility] and family brought him to the [facility] ER.      Concerns with insurance coverage for discharge needs: None. Pt's insurance is [employer] Non [employer].   Current Living Situation: Patient lives with family.  Support System: Supportive  Services Involved: DME  Transportation at Discharge: Family or friend will provide  Transportation to Medical Appointments:   - Name of caregiver: Daughter  Barriers to Discharge: None    Coordination of Care and Referrals  Chart reviewed. Noted g-tube was placed at an outside hospital. Has lost about 40 pounds in the last 2-3 months. Nutrition consulted.        Plan  Anticipated Discharge Date:  TBD  Anticipated Discharge Plan:   RN[facility] will continue to follow for plan of care and any discharge needs.       Uma De Dios RN Care Coordinator  Unit 6A, [facility]

## 2020-03-03 NOTE — H&P
"See consult note from 3/2/2020.    Changes today:  Patient Summary:  Mr. Vincent Zuniga is a 66yo M with h/o multiple sclerosis (on Aubagio, PINA virus serology indicative of past exposure), severe malnutrition with recent G-tube placement, and depression who presents for evaluation of likely MS relapse and failure to thrive.    Interval history:   No acute events overnight. Was not able to sleep well as his roommate's monitor alarms went off multiple times. Is taking his cholestyramine orally, thought complains of the taste and would be interested in having this administered through the G tube if possible. Does report a history over the past 3 days or so of choking sensation when eating/swallowing. Denies a history of dream enactment or falling out of bed at night.    Physical Examination   Vitals: BP (!) 146/68   Pulse 60   Temp 97.6  F (36.4  C) (Oral)   Resp 16   Ht 1.753 m (5' 9\")   Wt 52.6 kg (116 lb)   SpO2 100%   BMI 17.13 kg/m    General: Adult, in NAD, cooperative. Cachectic  HEENT: NC/AT, no icterus, op pink and moist  Cardiac: RRR, no m/r/g  Chest: CTAB, no w/c/r  Abdomen: S/NT/ND  Extremities: No LE swelling.  Skin: No rash or lesion.   Psych: Mood pleasant, affect flat    Neuro:  Mental status: Awake, alert, attentive, oriented. Speech is fluent, no dysarthria.  Cranial nerves: CN2-12 tested: eyes conjugate, PERRL, EOMI, occasional saccadic intrusions in smooth pursuit, face symmetric, facial sensation intact, shoulder shrug strong, tongue/uvula midline.   Motor: Tone normal on right side, increased in left upper and lower extremities. 5/5 strength in all 4 extremities. No atrophy or twitches. Tremor present in bilateral arms, hands, and tongue, only mildly increased with intention/activity.  Reflexes: Symmetric biceps, brachioradialis, patellar, and achilles (2+ in upper extremities, 1+ in lower extremities). Toes down-going.on right, up-going on the left. About 1-2 beats of clonus in left " "ankle.  Sensory: Intact to LT and PP in all four extremities  Coordination: FNF no dysmetria, PEEWEE normal  Gait: Not tested.       Investigations   Labs:  CBC: Hbg 12.1 (L),  (H)  UA: WNL    Imaging:   MR Brain W/WO Contrast 3/3/2020:   Impression:   1. The study demonstrates between 15 and 20 foci of T2-hyperintensity  within the cerebral white matter consistent with the clinical  suspicion of demyelinating disease. There are no foci of abnormal  enhancement noted intracranially .   2. There has been a marginal decrease in conspicuity of the lesions  particularly the more confluent area in the right frontal lobe. No  definite new lesions.    Ct Chest W/O Contrast 3/2/2020:   Impression:   1. Scattered tree-in-bud nodularity throughout the lungs, most  pronounced in the right upper lobe, in addition to streaky bibasilar  consolidation. Favor infection/aspiration, especially given patulous  esophagus and debris in the central airways.  2. Multiple scattered pulmonary nodules bilaterally, largest measuring  6 mm in the right middle lobe. Consider comparison with outside images  if available to document stability. Otherwise, could obtain follow-up  chest CT in 3-6 months then 18-24 months per Fleischner Society  criteria.    Assessment and Plan:  Mr. Vincent Zuniga is a 66yo M with h/o multiple sclerosis (on Aubagio, PINA virus serology indicative of past exposure), severe malnutrition with recent G-tube placement, and depression who presents for evaluation of likely relapse of MS and failure to thrive.      # Multiple sclerosis  Recent outside imaging with new subacute vs active lesion (T2 flair hyperintensity of \"right centrum semiovale and corona radiata level with periventricular predominance\") suggestive of recurrent disease and failure of Aubagio. Initially diagnosed in 01/2019. Presented at that time with \"confusion, L-sided clumsiness and facial droop\" per chart review with approximately 2 dozen white " "matter lesions, the majority of which were enhancing. The possibility of acute demyelinating encephalomyelitis was raised, though unclear whether the patient ever officially received this diagnosis. He was initially treated with corticosteroids and course was c/b \"agitation and insomnia\" and followed by five sessions of plasma exchange. He was started on Aubagio and was followed by Dr. Combs at [facility] until Dr. Combs's alf. Appears to have had several months of relative stability before progressive slow decline in general function over the last two months, leading to multiple admissions to [facility]. [facility] with CSF of 1 WBC, 1 RBC, glucose 51, protein 26, IGG index 0.44, IGG synthesis <0.00, and equivocal (3) oligoclonal bands. Autoimmune encephalopathy panel was negative. Imaging was as above -- in summary new periventricular lesion without evidence of spinal cord involvement of disease at this time. Repeat MRI on 3/3/2020 with no new enhancements or evidence of active MS attack; therefore no need to treat with IV steroids.  - Aubagio elimination procedure             -8g cholestyramine q8g -or- 50g activated charcoal per pharmacy preference (given feeding tube)  - Will continue to consider alternative disease modifying agent. Patient will need to follow up closely in MS clinic for this.  - Holding ergocalciferol  - Place PT consult to facilitate discharge planning to TCU vs ARU     # Chronic cough  # Hx of DAMIAN nodular infiltrate  # COPD  HD stable and no evidence of sepsis at this point along with no leukocytosis (though upper limit of normal). However, history of minimal nodular infiltrates of DAMIAN, in the background of emphysematous changes. Unable to review imaging at this time, but appears to be somewhat minor on OSH read. However, given ongoing infection may have exacerbated current MS flare, warrants further investigation. CT Chest 3/2/2020 with evidence of aspiration/infection with tree-in-bud nodularity " and streaky bibasilar consolidation, with scattered pulmonary nodules, largest of which is 6 mm in right middle lobe. UA on 3/2 WNL. Remains afebrile with stable vital signs.  - Speech consult to evaluate for dysphagia/aspiration  - Trend WBC  - Cont advair, albuterol PRN  - Will add Augmentin 875 mg BID x 5 days to cover for aspiration pneumonia given consolidation seen on CT  - Delirium precautions     # Malnutrition 2/2 poor PO intake and dysgeusia, moderately-severe  # Cachexia  # Depression  # Insomnia  Etiology of malnutrition unclear, previously thought to be due to severe depression. This is likely a factor but does not exclude other processes Severity of malnutrition concerning for occult malignancy, which may be further explored pending acute MS management. Dysgeusia  likely to be a side effect of Aubagio,  Several recent changes to antidepressants with most recent treatment with remeron to promote sleep and appetite.  - Nutrition consult for tube feeds  - Will consider psychiatry consult  - Possible oncologic workup pending response to medication changes, likely outpatient  - Speech consult to evaluate for dysphagia/aspiration as above  - Home mirtazepine     # Macrocytic anemia  Unclear chronicity, but suspect subacute in the setting of malnutrition most likely to represent folate and/or B12 deficiency. However, folate=11.6 and B12=1,282, although this result can sometimes be falsely normal/high following a meal. More likely medication side effect from Aubagio or depakote. No suspicion for occult or active bleeding at this time.   - Monitor w CBC qAM  - Nutrition as above  - Workup outpatient with PCP if appropriate        Chronic problems  # RLS  - Home pramipexole    #Acid reflux  - Home pantoprazoel    #Seizures   Home depakote    #Chroinc pain  -Tylenol PRN  -Celecoxib (substitued for meloxicam per pharm)  - gabapentin 400 TID  -Holding home opioids and benzos    #HLD  - Home atorvastatin 20mg  qday      FEN: tube feeds per dietitian, NS 100mL/h maintinence will change to TKO d/t adequate volume through PO and G tube intake  DVT prophylaxis: enoxaparin subQ, SCDs  Code: Full (noted DNR in some OSH notes, will follow up in AM)      Disposition Plan   Expected discharge in 1 day to TCU once evaluated by speech and PT.     Meagan Harris  Medical Student, 3rd Year      Patient was seen and discussed with Dr. Guerra      Resident/Fellow Attestation   I, Jennyfer Plaza, was present with the medical student who participated in the service and in the documentation of the note.  I have verified the history and personally performed the physical exam and medical decision making.  I agree with the assessment and plan of care as documented in the note.        Jennyfer Plaza MD  PGY2  Date of Service (when I saw the patient): 03/03/20    I was present with the medical student who participated in the service and in the documentation of the note. I have verified the history and personally performed the physical exam and medical decision making. The assessment and plan of care as documented in the note was developed by me and discussed with the student.     I saw and evaluated the patient with the resident and agree with the assessment and plan. I performed neurological examination myself.    Impression: RRMS s/p relapse with presumed enhancement on the cervicomedullary junction on 2-22-20, not given IV steroids. Current MRI does not show enhancement so no need for steroids at this point. Patient has a hx of 40 lbs weight loss and was found to have dysphagia and silent aspiration as well as aspiration pneumonia. He was started on a diet with thick liquids only and will continue to use peg tube to supplement caloric intake. Can continue antibiotics as OP. PT recommended OP sessions so he can be discharged home.     Family is concerned about his behavior and is possible that he has some cognitive impairment worsened  by THC use, would recommend neuropsychological testing as OP and f/u with MS NP to start Mayzent (Siponimod). He is JCV positive. He has been advice against smoking and counseled on the importance of eating.      Cassandra Guerra MD  Chief, Multiple Sclerosis Division  Department of Neurology  [facility]

## 2020-03-03 NOTE — PHARMACY-CONSULT NOTE
Pharmacy Delirium Chart Review    Upon chart review, the following medications may contribute to possible patient delirium:   -Alprazolam  -Celecoxib  -Escitalopram  -Eszopiclone  -Gabapentin  -Hydromorphone  -Hydroxyzine   -Mirtazipine  -Oxycodone  -Paroxetine  -Pramiprexole  -Trazodone  -Zolpidem      Please consult unit pharmacist with further questions.    Aleks Altamirano, Pharm.D.

## 2020-03-04 ENCOUNTER — APPOINTMENT (OUTPATIENT)
Dept: SPEECH THERAPY | Facility: CLINIC | Age: 65
DRG: 058 | End: 2020-03-04
Payer: COMMERCIAL

## 2020-03-04 ENCOUNTER — APPOINTMENT (OUTPATIENT)
Dept: PHYSICAL THERAPY | Facility: CLINIC | Age: 65
DRG: 058 | End: 2020-03-04
Attending: STUDENT IN AN ORGANIZED HEALTH CARE EDUCATION/TRAINING PROGRAM
Payer: COMMERCIAL

## 2020-03-04 ENCOUNTER — PATIENT OUTREACH (OUTPATIENT)
Dept: CARE COORDINATION | Facility: CLINIC | Age: 65
End: 2020-03-04

## 2020-03-04 VITALS
HEIGHT: 69 IN | SYSTOLIC BLOOD PRESSURE: 136 MMHG | TEMPERATURE: 96.2 F | HEART RATE: 60 BPM | OXYGEN SATURATION: 96 % | BODY MASS INDEX: 17.18 KG/M2 | DIASTOLIC BLOOD PRESSURE: 84 MMHG | RESPIRATION RATE: 16 BRPM | WEIGHT: 116 LBS

## 2020-03-04 LAB
ANION GAP SERPL CALCULATED.3IONS-SCNC: 6 MMOL/L (ref 3–14)
BUN SERPL-MCNC: 7 MG/DL (ref 7–30)
CALCIUM SERPL-MCNC: 8.3 MG/DL (ref 8.5–10.1)
CHLORIDE SERPL-SCNC: 111 MMOL/L (ref 94–109)
CO2 SERPL-SCNC: 24 MMOL/L (ref 20–32)
CREAT SERPL-MCNC: 0.48 MG/DL (ref 0.66–1.25)
ERYTHROCYTE [DISTWIDTH] IN BLOOD BY AUTOMATED COUNT: 13.8 % (ref 10–15)
GFR SERPL CREATININE-BSD FRML MDRD: >90 ML/MIN/{1.73_M2}
GLUCOSE BLDC GLUCOMTR-MCNC: 89 MG/DL (ref 70–99)
GLUCOSE SERPL-MCNC: 90 MG/DL (ref 70–99)
HCT VFR BLD AUTO: 32.9 % (ref 40–53)
HGB BLD-MCNC: 11 G/DL (ref 13.3–17.7)
MAGNESIUM SERPL-MCNC: 1.9 MG/DL (ref 1.6–2.3)
MCH RBC QN AUTO: 35.5 PG (ref 26.5–33)
MCHC RBC AUTO-ENTMCNC: 33.4 G/DL (ref 31.5–36.5)
MCV RBC AUTO: 106 FL (ref 78–100)
PHOSPHATE SERPL-MCNC: 3.3 MG/DL (ref 2.5–4.5)
PLATELET # BLD AUTO: 159 10E9/L (ref 150–450)
POTASSIUM SERPL-SCNC: 3.8 MMOL/L (ref 3.4–5.3)
RBC # BLD AUTO: 3.1 10E12/L (ref 4.4–5.9)
SODIUM SERPL-SCNC: 141 MMOL/L (ref 133–144)
WBC # BLD AUTO: 6.2 10E9/L (ref 4–11)

## 2020-03-04 PROCEDURE — 00000146 ZZHCL STATISTIC GLUCOSE BY METER IP

## 2020-03-04 PROCEDURE — 25000132 ZZH RX MED GY IP 250 OP 250 PS 637: Performed by: STUDENT IN AN ORGANIZED HEALTH CARE EDUCATION/TRAINING PROGRAM

## 2020-03-04 PROCEDURE — 36415 COLL VENOUS BLD VENIPUNCTURE: CPT | Performed by: PSYCHIATRY & NEUROLOGY

## 2020-03-04 PROCEDURE — 85027 COMPLETE CBC AUTOMATED: CPT | Performed by: PSYCHIATRY & NEUROLOGY

## 2020-03-04 PROCEDURE — 25000132 ZZH RX MED GY IP 250 OP 250 PS 637: Performed by: PSYCHIATRY & NEUROLOGY

## 2020-03-04 PROCEDURE — 40000894 ZZH STATISTIC OT IP EVAL DEFER: Performed by: OCCUPATIONAL THERAPIST

## 2020-03-04 PROCEDURE — 84100 ASSAY OF PHOSPHORUS: CPT | Performed by: PSYCHIATRY & NEUROLOGY

## 2020-03-04 PROCEDURE — 92526 ORAL FUNCTION THERAPY: CPT | Mod: GN | Performed by: REHABILITATION PRACTITIONER

## 2020-03-04 PROCEDURE — 80048 BASIC METABOLIC PNL TOTAL CA: CPT | Performed by: PSYCHIATRY & NEUROLOGY

## 2020-03-04 PROCEDURE — 83735 ASSAY OF MAGNESIUM: CPT | Performed by: PSYCHIATRY & NEUROLOGY

## 2020-03-04 PROCEDURE — 97161 PT EVAL LOW COMPLEX 20 MIN: CPT | Mod: GP

## 2020-03-04 RX ORDER — CHOLESTYRAMINE 4 G/9G
2 POWDER, FOR SUSPENSION ORAL 3 TIMES DAILY
Qty: 54 PACKET | Refills: 0 | Status: SHIPPED | OUTPATIENT
Start: 2020-03-03 | End: 2020-03-23

## 2020-03-04 RX ADMIN — Medication 1 PACKET: at 12:04

## 2020-03-04 RX ADMIN — CHOLESTYRAMINE 8 G: 4 POWDER, FOR SUSPENSION ORAL at 13:16

## 2020-03-04 RX ADMIN — CELECOXIB 200 MG: 200 CAPSULE ORAL at 09:53

## 2020-03-04 RX ADMIN — GABAPENTIN 400 MG: 400 CAPSULE ORAL at 09:53

## 2020-03-04 RX ADMIN — VALPROIC ACID 500 MG: 250 SOLUTION ORAL at 09:51

## 2020-03-04 RX ADMIN — MULTIPLE VITAMINS W/ MINERALS TAB 1 TABLET: TAB at 09:54

## 2020-03-04 RX ADMIN — PANTOPRAZOLE SODIUM 40 MG: 20 TABLET, DELAYED RELEASE ORAL at 09:53

## 2020-03-04 RX ADMIN — Medication 1 PACKET: at 08:59

## 2020-03-04 RX ADMIN — CHOLESTYRAMINE 8 G: 4 POWDER, FOR SUSPENSION ORAL at 05:41

## 2020-03-04 RX ADMIN — FLUTICASONE FUROATE AND VILANTEROL TRIFENATATE 1 PUFF: 100; 25 POWDER RESPIRATORY (INHALATION) at 09:52

## 2020-03-04 RX ADMIN — VALPROIC ACID 750 MG: 250 SOLUTION ORAL at 13:15

## 2020-03-04 RX ADMIN — GABAPENTIN 400 MG: 400 CAPSULE ORAL at 13:15

## 2020-03-04 RX ADMIN — AMOXICILLIN AND CLAVULANATE POTASSIUM 1 TABLET: 875; 125 TABLET, FILM COATED ORAL at 09:53

## 2020-03-04 RX ADMIN — ATORVASTATIN CALCIUM 20 MG: 20 TABLET, FILM COATED ORAL at 09:53

## 2020-03-04 ASSESSMENT — ACTIVITIES OF DAILY LIVING (ADL)
ADLS_ACUITY_SCORE: 17
ADLS_ACUITY_SCORE: 17
ADLS_ACUITY_SCORE: 13
ADLS_ACUITY_SCORE: 17

## 2020-03-04 NOTE — DISCHARGE SUMMARY
[facility] [place]    Neurology Discharge Summary    Date of Admission: 3/2/2020  Date of Discharge: March 4, 2020    Disposition: Discharged to home  Primary Care Physician: Karel Orozco     Admission Diagnosis:   Multiple sclerosis    Discharge Diagnosis:   Multiple sclerosis  Protein-calorie Malnutrition  Dysphagia  Aspiration pneumonia      Problem Leading to Hospitalization (from [facility]):   65 year old male h/o multiple sclerosis diagnosed last year treated with Aubagio who presents with 1-2 weeks of acutely progressive decline. He had been stable for about the first 5-6 months of therapy, but over the past 2-3 months has seen progressive functional decline, with worsening unsteady gait, urinary incontinence, taste changes, poor PO intake, and 40 pound weight loss. He had acute worsening of these symptoms over the past 1-2 weeks, to the point where he was not eating or drinking. He was recently admitted to [facility] on 2/18 to 2/22/2020, and presented to the ED about one week later as he was contiuing to decline functionally at home.     Please see H&P dated for further details about presentation.      Brief Hospital Course:   Patient was initially seen and evaluated in the ED 3/2/2020. He was admitted to the Neurology service for evaluation by PT/OT in the context of malnutrition and concern for Aubagio treatment failure. Repeat brain MRI was performed 3/2/2020 and demonstrated no new enhancing lesions since prior admission, however Dr. Guerra's evaluation of outside images revealed small cervicomedullary enhancing lesion and overall increased disease burden. Decision was made to discontinue Aubagio given side effects and disease progression, and elimination protocol was initiated 3/2/2020, with 8g cholestyramine TID x 10 days. He was evaluated by speech therapy 3/3/2020 due to concern for silent aspiration on chest imaging and was changed to nectar thick liquids per their  recs. PT and OT both evaluated the patient 3/4/2020 and felt he was stable for outpatient therapies.     Family was concerned that patient would not thrive after returning home and would bounce back to the emergency room soon because they didn't feel they could take care of him. He has failed outpatient therapy in the past and relies on his daughter for most needs. She feels he will start smoking cigarettes and marijuana again, not do his exercises, not eat, and continue to decline which has been the trend so far. She is exhausted from caring for him. Unfortunately, this is not a qualifying reason to continue inpatient therapy, and there are no options immediately available from the inpatient unit. Social work met with family and discussed options, recommending they undergo assessment through [facility] to qualify for [facility] support, with access to home care/aid and a . Additionally, they recommended assisted living options to address family concerns about patient's ability to function independently, especially those residences that accept county aid through wavered services programs offered through [facility].     Follow up with MS clinic to discuss new therapy options has been arranged for March 16 at 1 PM at the [facility] with Marielena Brooks NP.       PERTINENT INVESTIGATIONS    Labs  Lab Results   Component Value Date    WBC 6.2 03/04/2020     Lab Results   Component Value Date    RBC 3.10 03/04/2020     Lab Results   Component Value Date    HGB 11.0 03/04/2020     Lab Results   Component Value Date    HCT 32.9 03/04/2020     No components found for: MCT  Lab Results   Component Value Date     03/04/2020     Lab Results   Component Value Date    MCH 35.5 03/04/2020     Lab Results   Component Value Date    MCHC 33.4 03/04/2020     Lab Results   Component Value Date    RDW 13.8 03/04/2020     Lab Results   Component Value Date     03/04/2020     Last Basic Metabolic  "Panel:  Lab Results   Component Value Date     03/04/2020      Lab Results   Component Value Date    POTASSIUM 3.8 03/04/2020     Lab Results   Component Value Date    CHLORIDE 111 03/04/2020     Lab Results   Component Value Date    RIO 8.3 03/04/2020     Lab Results   Component Value Date    CO2 24 03/04/2020     Lab Results   Component Value Date    BUN 7 03/04/2020     Lab Results   Component Value Date    CR 0.48 03/04/2020     Lab Results   Component Value Date    GLC 90 03/04/2020         Imaging:  All images have been reviewed personally by me and by Dr. Guerra. See addendum by Dr. Guerra for her interpretation of imaging.    MR Brain W/WO Contrast 3/2/2020:  Compared to 2/18/2020 MRI, there is resolution of enhancement of the right paramedian cervicomedulllary level lesion.     CT Chest W/O Contrast 3/2/2020:  \"1. Scattered tree-in-bud nodularity throughout the lungs, most  pronounced in the right upper lobe, in addition to streaky bibasilar  consolidation. Favor infection/aspiration, especially given patulous  esophagus and debris in the central airways.  2. Multiple scattered pulmonary nodules bilaterally, largest measuring  6 mm in the right middle lobe. Consider comparison with outside images  if available to document stability. Otherwise, could obtain follow-up  chest CT in 3-6 months then 18-24 months per Fleischner Society  Criteria.\"    Outside Imaging Reviewed:  MR Head Brain W/WO Contrast 2/18/2020 ([facility]) reviewed:  \"1.  History of demyelination is reported clinically. Multifocal areas of  T2/FLAIR hyperintense signal abnormality at the supratentorial level most marked at the right centrum semiovale and corona radiata level with periventricular predominance is compatible with patient's provided clinical history of demyelination. There is an area of enhancing signal abnormality left periatrial level which may reflect active phase plaque of demyelination with no additional evidence " "for enhancing plaques at the supratentorial level. White matter signal abnormality at the supratentorial level is largely new from 07/23/2013 brain MRI performed without contrast.  2.  Poorly defined enhancement at the right paramedian cervicomedullary level seen on axial data set may represent artifactual enhancement, however, the possibility of choroid plexus enhancement or surface/leptomeningeal enhancement enhancing abnormality cannot be excluded. There is not definite parenchymal signal abnormality at this level. Could consider CSF evaluation in the appropriate clinical setting to exclude infectious/inflammatory process or active phase demyelination as indicated.   3.  No evidence for acute infarction.\"        PHYSICAL EXAMINATION  Constitutional : generalized muscle wasting apparent  HEENT: atraumatic, anicteric. Very low blink rate.  CVC: S1/S2 sinusal no murmurs  Lungs: Clear. On room air. No respiratory distress.   Adomen: soft, non tender, bowel movements heard  Extremities: Pulses preserved in four extremities. No edema. Well perfused  Psychiatry: reports good mood but flat affect.     Neuroexam  Alert and oriented to place, date, self and reasons of hospitalization.  Follow commands  Face movements and sensation symmetric  Eyes: motility preserved, no nystagmus  Tongue centered  Bilateral hand tremors at rest which worsen with activation  Subtly increased tone in L>R arm which increases with contralateral activation  No dysmetria (arms and legs tested)  Strength 5/5 on arms and legs, proximally and distally  Reflexes: Diffusely brisk but symmetric, no clonus bilat, + Babinski on L  Sensory exam to light touch: preserved   No aphasia  No dysarthria    Additional recommendations and follow up:       Review of your medicines      START taking      Dose / Directions   amoxicillin-clavulanate 875-125 MG tablet  Commonly known as:  AUGMENTIN  Indication:  Community Acquired Pneumonia  Used for:  Aspiration " pneumonitis (H)      Dose:  1 tablet  Take 1 tablet by mouth every 12 hours for 4 days  Quantity:  8 tablet  Refills:  0     cholestyramine 4 g packet  Commonly known as:  QUESTRAN      Dose:  2 packet  Take 2 packets (8 g) by mouth 3 times daily for 9 days  Quantity:  54 packet  Refills:  0     STARCH-MALTO DEXTRIN Powd  Commonly known as:  [employer] Instant Food Thickener      Dose:  1 Units  Take 1 Units by mouth as needed (to thicken all thin liquids taken orally to prevent silent aspiration into lungs)  Quantity:  1020 g  Refills:  3        CONTINUE these medicines which have NOT CHANGED      Dose / Directions   atorvastatin 20 MG tablet  Commonly known as:  LIPITOR      Dose:  20 mg  Take 20 mg by mouth daily  Refills:  0     Breo Ellipta 100-25 MCG/INH inhaler  Generic drug:  fluticasone-vilanterol      INHALE 1 DOSE BY MOUTH ONCE DAILY  Refills:  0     divalproex sodium delayed-release 250 MG DR tablet  Commonly known as:  DEPAKOTE      TAKE 2 TABLETS BY MOUTH ONCE DAILY IN THE MORNING AND 3 TABLETS AT NOON AND 3 IN THE EVENING  Refills:  0     gabapentin 400 MG capsule  Commonly known as:  NEURONTIN      TAKE 1 CAPSULE BY MOUTH THREE TIMES DAILY  Refills:  0     HYDROmorphone 2 MG tablet  Commonly known as:  DILAUDID      TAKE 1 2 (ONE HALF) TABLET BY MOUTH EVERY 4 HOURS AS NEEDED FOR PAIN  Refills:  0     LANsoprazole 30 MG DR capsule  Commonly known as:  PREVACID      TAKE 1 CAPSULE BY MOUTH ONCE DAILY  Refills:  0     melatonin 3 MG tablet      Dose:  3 mg  Take 3 mg by mouth At Bedtime  Refills:  0     meloxicam 15 MG tablet  Commonly known as:  MOBIC      Dose:  15 mg  Take 15 mg by mouth daily  Refills:  0     Mirapex 0.5 MG tablet  Generic drug:  pramipexole      Dose:  0.5 mg  Take 0.5 mg by mouth At Bedtime  Refills:  0     mirtazapine 30 MG tablet  Commonly known as:  REMERON      Dose:  30 mg  Take 30 mg by mouth At Bedtime  Refills:  0     oxyCODONE 5 MG tablet  Commonly known as:  ROXICODONE       TAKE 1 TABLET BY MOUTH THREE TIMES DAILY FOR BREAKTHROUGH PAIN  Refills:  0     Proventil  (90 Base) MCG/ACT inhaler  Generic drug:  albuterol      Dose:  2 puff  Inhale 2 puffs into the lungs every 4 hours as needed  Refills:  0     traZODone 50 MG tablet  Commonly known as:  DESYREL      Dose:  50 mg  Take 50 mg by mouth At Bedtime  Refills:  0     valproic acid 250 MG capsule  Commonly known as:  DEPAKENE      TAKE 3 CAPSULES BY MOUTH THREE TIMES DAILY  Refills:  0     valproic acid 250 MG/5ML syrup  Commonly known as:  DEPAKENE      3 times daily  Refills:  2     vitamin D2 48951 units (1250 mcg) capsule  Commonly known as:  ERGOCALCIFEROL      Dose:  50,000 Units  Take 50,000 Units by mouth once a week  Refills:  0     zolpidem ER 12.5 MG CR tablet  Commonly known as:  AMBIEN CR      Dose:  12.5 mg  Take 12.5 mg by mouth At Bedtime  Refills:  0           Where to get your medicines      Some of these will need a paper prescription and others can be bought over the counter. Ask your nurse if you have questions.    Bring a paper prescription for each of these medications    amoxicillin-clavulanate 875-125 MG tablet    cholestyramine 4 g packet    STARCH-MALTO DEXTRIN Powd          Physical Therapy Referral      Speech Therapy Referral      Reason for your hospital stay    Multiple sclerosis     Adult [facility] Follow-up and recommended labs and tests    Follow up with Dr. Cassandra Guerra and Dylan Brooks NP, at [facility], on Monday 3/16/20 at 1:00PM  to evaluate treatment change. No follow up labs or test are needed.    Appointments on [facility] and/or [facility] (with [facility] provider or service). Call [phone] if you haven't heard regarding these appointments within 7 days of discharge.     When to contact your care team    Call the neurology clinic or provider on call if you have any of the following: worsening swallowing, new weakness, changes in sensation, changes in vision.     Tubes and  drains    You are going home with the following tubes or drains: feeding tube G-Tube.   Tube cares per hospital or home care instructions     Discharge Instructions     Activity    Your activity upon discharge: activity as tolerated     Full Code     Diet    Follow this diet upon discharge:      Adult Formula Bolus Feeding: QID Isosource 1.5; Route: Gastrostomy; 1,000; mL(s); Additional free water (mL): 30 ml before and after/patency flushes; Feeding Tube Flush Frequency: At least 15-30 mL water before and after med...      Snacks/Supplements Adult: Boost Plus; With Meals      Regular Diet Adult Nectar Thickened Liquids (pre-thickened or use instant food thickener)       Patient was seen and discussed with Dr. Charlie Harris  Medical Student, 3rd Year      Resident/Fellow Attestation   I, Jennyfer Plaza, was present with the medical student who participated in the service and in the documentation of the note.  I have verified the history and personally performed the physical exam and medical decision making.  I agree with the assessment and plan of care as documented in the note.        Jennyfer Plaza MD  PGY2  Date of Service (when I saw the patient): 03/04/20    Attending:    I was present with the medical student who participated in the service and in the documentation of the note. I have verified the history and personally performed the physical exam and medical decision making. The assessment and plan of care as documented in the note was developed by me and discussed with the student.     I saw and evaluated the patient with the resident and agree with the assessment and plan.       Cassandra Guerra MD  Chief, Multiple Sclerosis Division  Department of Neurology  [facility] [place]

## 2020-03-04 NOTE — PLAN OF CARE
Patient discharged home with family in stable condition.  AVS given and discussed with patient and daughters. Questions answered.

## 2020-03-04 NOTE — PLAN OF CARE
Speech Language Therapy Discharge Summary    Reason for therapy discharge:    Discharged to home with outpatient therapy.    Progress towards therapy goal(s). See goals on Care Plan in [facility] electronic health record for goal details.  Goals partially met.  Barriers to achieving goals:   discharge from facility.    Therapy recommendation(s):    Continued therapy is recommended.  Rationale/Recommendations: Recommend continuation of regular solids/nectar thick liquids. Pt will benefit from repeat videoswallow study to determine readiness for advancement to thin liquids.

## 2020-03-04 NOTE — PLAN OF CARE
OT: after observation and conversation with PT post PT evaluation it is concluded that this pt has no acute OT needs. Pt is currently near or at baseline with functional mobility and ambulating in hallway without assist. Pt would likely benefit from home PT/OT safety evaluation as per PT note. Defer OT at this time.

## 2020-03-04 NOTE — PLAN OF CARE
6A - Eval only    Discharge Planner PT   Patient plan for discharge: Home ASAP  Current status: Pt reporting baseline strength/mobility. IND with transfers and gait x500', IND on stairs without rail. No further IP PT needs, discharge this PM per rounds. Some difficulty managing IV pole in tight double room with roommate cords all over the floor.  Barriers to return to prior living situation: No mobility barriers  Recommendations for discharge: Home with OP PT  Rationale for recommendations: Pt at baseline mobility. Does exhibit some gait impairments and baseline decreased strength and would benefit from OP PT specializing with pt's with MS.       Entered by: Hoang Zuluaga 03/04/2020 10:18 AM

## 2020-03-04 NOTE — PLAN OF CARE
"Discharge Planner SLP   Patient plan for discharge: Did not discuss today.  Current status: Patient seen bedside for dysphagia management. Pt observed with nectar thick liquids by straw with no overt s/s aspiration. Pt declines offer of solids. Pt expresses dislike of nectar thickened liquids \"they took away the only thing I like- chicken broth\". Pt educated that chicken broth can be thickened to nectar consistency, but he states he does not like the taste. Pt educated in rationale for modified diet but he feels he would not aspirate if he sat upright when drinking. Also reviewed plan to repeat video swallow study in 2-3 weeks pending progress. Pt verbalized understanding.      Recommend: Continue regular solids/nectar thick liquids.  Alternate between small bites and sips of food/liquid;maintain upright posture during/after eating for 30 mins;small sips/bites.  Barriers to return to prior living situation: dysphagia, medical needs.  Recommendations for discharge: Home with OP SLP as needed.  Rationale for recommendations: Swallow remains below baseline.       Entered by: Omayra Cotton 03/04/2020 11:14 AM       "

## 2020-03-04 NOTE — PROGRESS NOTES
Social Work Services Progress Note    Hospital Day: 3  Collaborated with:  Patients daughter Michelle along with family friend.    Data:  Pt was evaluated by Physical Therapy and discharge to home is recommended.  OT concluded that pt has no acute OT needs.  Nursing notes indicate that pt is alert and oriented x4.  Per Neurology (Dr. Plaza), pt is medically ready for discharge.  Pt's daughter asked to talk with someone about discharge planning stating that they've not yet been seen.      Intervention:  SW met with pt who gave permission to talk with his daughter and family friend about discharge planning.  Daughter voices concerns that pt does not follow through with caring for himself in the home and would like pt to have more oversight in the home. Daughter voices concern that pt has been getting weaker and she would like pt to continue to receive physical therapy.    Physical Therapy has recommended outpt physical therapy.  Pt will receive discharge orders for this.  SW spoke with daughter and friend about obtaining a [place] assessment through [facility] (SW provided education and contact info) to determine whether or not pt would be eligible for home services through the [facility].   Daughter and friend initially spoke of the possibility of pt moving to a setting that is more supportive (assisted living) thus, SW provided a senior housing directory, highlighted and paper clipped facility's near to home and encourage pursuit of facility's that accept EW as this would be important if the [place] results in approval for services.  Daughter then stated that they are not looking to move patient.  SW inquired as to whether or not daughter and friend are familiar with the MS Society.  Daughter states that she is and has had contact with them in the past.  SW encouraged daughter continue using the MS Society for support and resources.      Assessment: Pt's daughter is frustrated as she observes pt  having difficulty in the home.  Daughter would like pt to continue to receive therapy to help pt to get stronger. Daughter frustrated that a rehab stay was not recommended.      Plan:    Anticipated Disposition:  Discharge to home today.      Barriers to d/c plan:  None at this time.    Follow Up:  As discharge to home is recommended, discharge to home is coordinated by the 6A RNCC if needs are identified.  The 6A RN CC is aware of pt as evidenced by 3/3/2020 pended progress note entry.        GLORIA Cordon  Social Work, 6A  Phone:  [phone]  Pager:  [phone]  3/4/2020

## 2020-03-04 NOTE — PLAN OF CARE
0263-1308  A&Ox4. Flat affect. Tremor baseline. G tube clamped between bolus feeding. 1 can given. Tolerated well. Also on regular diet. Had chicken broth. PIV with NS@100ml/hr. Up SBA and gait belt. Tylenol given for leg pain.

## 2020-03-04 NOTE — PROGRESS NOTES
SW phoned pt's daughter (Michelle) and provided name and phone number to [facility] clinic SW (Verona White) to utilize as a out patient contact SW (as needed) for resource information/support.    GLORIA Cordon  Social Work, 6A  Phone:  [phone]  Pager:  [phone]  3/4/2020

## 2020-03-04 NOTE — PLAN OF CARE
VSS except intermittently puma in the 50's. Denied pain.  A&O x 4.  Neuros intact except flat affect, generalized weakness, and fine motor tremors.  Denied n/t/pain to LLE.  PIV SL.  G-tube clamped.  Goal 4 cans TF a day when awake.  Received scheduled Cholestyramine via PEG (ok by MD and pharmacy) at 0540; no meds to be given until 0940 per order.  On regular diet with nectar thick liquids; tolerated 75% chicken broth overnight. Stas counts begin today.  Voiding spontaneously.  Pt reported having BM yesterday.  Up SBA, GB.  PT and SLP today.  Continue with POC.

## 2020-03-04 NOTE — PROGRESS NOTES
03/04/20 1000   Quick Adds   Type of Visit Initial PT Evaluation   Living Environment   Lives With child(nabila), adult   Living Arrangements house   Home Accessibility stairs within home   Number of Stairs, Within Home, Primary 10   Stair Railings, Within Home, Primary railing on right side (ascending)   Transportation Anticipated family or friend will provide   Living Environment Comment Pt lives with adult daughter who assists as needed. Daughter does driving   Self-Care   Usual Activity Tolerance moderate   Current Activity Tolerance fair   Regular Exercise No   Equipment Currently Used at Home none   Activity/Exercise/Self-Care Comment IND with ADLs. Daughter assists with community errands   Functional Level Prior   Ambulation 0-->independent   Transferring 0-->independent   Toileting 0-->independent   Bathing 0-->independent   Communication 0-->understands/communicates without difficulty   Fall history within last six months no   Which of the above functional risks had a recent onset or change? none   Prior Functional Level Comment IND with mobility. Generalized weakness at baseline, decreased activity   General Information   Onset of Illness/Injury or Date of Surgery - Date 03/02/20   Referring Physician Xochitl Omalley MD   Patient/Family Goals Statement Home ASAP   Pertinent History of Current Problem (include personal factors and/or comorbidities that impact the POC) 64yo M with h/o multiple sclerosis (on Aubagio, PINA virus serology indicative of past exposure), severe malnutrition with recent G-tube placement, and depression who presents for evaluation of likely MS relapse and failure to thrive.   Precautions/Limitations fall precautions   General Info Comments Pt reports no deficits   Cognitive Status Examination   Orientation orientation to person, place and time   Level of Consciousness alert   Follows Commands and Answers Questions 100% of the time   Pain Assessment   Patient Currently in Pain  "  (baseline LLE)   Integumentary/Edema   Integumentary/Edema no deficits were identifed   Posture    Posture Protracted shoulders;Forward head position   Range of Motion (ROM)   ROM Comment WFL   Strength   Strength Comments >3/5 functionally   Bed Mobility   Bed Mobility Comments IND   Transfer Skills   Transfer Comments INd   Gait   Gait Comments IND x 500' with and without IV pole. Decreased step length, but appropriate pacing, downward gaze. 4 stairs reciprocally without rail   Balance   Balance Comments No concerns with head turns of speed changes in busy [place]   Sensory Examination   Sensory Perception Comments LLE numb/tingle   Coordination   Coordination Comments NT. Baseline UE tremor   Clinical Impression   Criteria for Skilled Therapeutic Intervention evaluation only   PT Diagnosis Gait  impairments   Influenced by the following impairments Generalized weakness, MS   Functional limitations due to impairments None   Clinical Presentation Stable/Uncomplicated   Clinical Presentation Rationale Medically stable   Clinical Decision Making (Complexity) Low complexity   Anticipated Discharge Disposition Home with Outpatient Therapy   Risk & Benefits of therapy have been explained Yes   Patient, Family & other staff in agreement with plan of care Yes   Clinical Impression Comments Pt at baseline mobility, does present with generalized weakness and gait impairment, appropriate for OP PT intervention. Planned discharge today per team   [facility] AM-PAC TM \"6 Clicks\"   2016, Trustees of [facility], under license to [employer].  All rights reserved.   6 Clicks Short Forms Basic Mobility Inpatient Short Form   [facility] AM-PAC  \"6 Clicks\" V.2 Basic Mobility Inpatient Short Form   1. Turning from your back to your side while in a flat bed without using bedrails? 4 - None   2. Moving from lying on your back to sitting on the side of a flat bed without using bedrails? 4 - None   3. Moving " to and from a bed to a chair (including a wheelchair)? 4 - None   4. Standing up from a chair using your arms (e.g., wheelchair, or bedside chair)? 4 - None   5. To walk in hospital room? 4 - None   6. Climbing 3-5 steps with a railing? 4 - None   Basic Mobility Raw Score (Score out of 24.Lower scores equate to lower levels of function) 24   Total Evaluation Time   Total Evaluation Time (Minutes) 15

## 2020-03-05 NOTE — PROGRESS NOTES
Care Coordinator Progress Note    Admission Date/Time:  3/2/2020  Attending MD:      Data  Chart reviewed, discussed with interdisciplinary team. In 6A Discharge Rounds Dr Plaza reported plan is discharge today.    PT recommending outpt PT.     Patient was admitted for:    Multiple sclerosis (H)  Generalized muscle weakness  Aspiration pneumonitis (H)  Dysphagia, unspecified type  Generalized weakness  Moderate protein-calorie malnutrition (H).    Coordination of Care and Referrals   Introduced myself to pt and explained I help with discharge planning. Pt alert, aware of plan for discharge. Pt's daughter arrived. She requested to speak with the doctors and I notified Dr Plaza. Daughter also asking about tube feeding for home. She said when pt had the g-tube placed they were given a script. Daughter tried to get it filled on her own but was told the supplies were not covered by insurance. I was not aware tube feeding supplies were needed for discharge. I spoke with Dr Plaza and obtained script for gravity tube feeding supplies. Pt & family did not have a preference for a medical supplier; offered choice. Explained I will send the script to the medical supplier, along with clinical notes from MD, Dietician and Speech Therapy. Pt & daughter in agreement.   Faxed script, H&P, Dietician note & Speech notes to [facility]. Called later and confirmed with Yanet they received the fax. Yanet faxed a form to be completed by the doctor. Informed Yanet pt discharged. She will follow-up with daughter for tube feeding supplies.    Spoke with pt's nurse and she will send pt home with some gravity feeding supplies.     Addendum 3-:  Order form signed by Dr Guerra and faxed back to [facility].      Assessment  Pt needing home tube feeding supplies.      Plan  Anticipated Discharge Date:  Today  Anticipated Discharge Plan:   Discharge home with daughter.   [facility] will follow-up with daughter  regarding delivery of tube feeding supplies.  Outpt PT/OT.     Uma De Dios, RN Care Coordinator  Unit [address], [facility]  Phone:  [phone]

## 2020-03-06 NOTE — PROGRESS NOTES
Patient was called three times and no answer so post 24 hr DC follow up calls will be closed out, message was left with contact number for department seen by or following up     Follow up with Dr. Cassandra Guerra and Dylan Brooks NP, at [facility], on Monday 3/16/20 at 1:00PM  to evaluate treatment change. No follow up labs or test are needed.     Appointments on [place] and/or [facility] (with [facility] or [facility] provider or service). Call [phone] if you haven't heard regarding these appointments within 7 days of discharge.

## 2020-03-07 NOTE — PROGRESS NOTES
Clarification of prior dx malnutrition:    Non-Severe protein-calorie Malnutrition -   - Weight Loss > 5% x 1 mo  - Oral Intake < 75% for > 1 mo  - Mild Muscle Loss   - Mild Subcutaneous Fat Loss      Jennyfer Plaza M.D.  PGY-2 Neurology  Pager #[phone]

## 2020-03-23 ENCOUNTER — VIRTUAL VISIT (OUTPATIENT)
Dept: NEUROLOGY | Facility: CLINIC | Age: 65
End: 2020-03-23
Attending: PSYCHIATRY & NEUROLOGY
Payer: COMMERCIAL

## 2020-03-23 DIAGNOSIS — E55.9 VITAMIN D DEFICIENCY: ICD-10-CM

## 2020-03-23 DIAGNOSIS — G35 MS (MULTIPLE SCLEROSIS) (H): Primary | ICD-10-CM

## 2020-03-23 RX ORDER — CHOLECALCIFEROL (VITAMIN D3) 50 MCG
2 TABLET ORAL DAILY
Qty: 60 TABLET | Refills: 3 | Status: SHIPPED | OUTPATIENT
Start: 2020-03-23 | End: 2020-07-27

## 2020-03-23 NOTE — PROGRESS NOTES
** After review of patient s situation, this visit was changed from an in-person visit to a telephone visit to reduce risk of COVID19 exposure**     Vincent is a 65 year old male who is being evaluated via a billable telephone visit on 3/16/2020 for the diagnosis of Multiple Sclerosis. He is new to me, but had been seen by Hayes Doe and Charlie. Purpose of this telephone follow up is to discuss a new treatment option for his diagnosis. I talked to patient and his daughter, Michelle.     HISTORY OF PRESENT ILLNESS: Please refer to Dr. Doe's initial visit note on 5/14/2019. Then on 3/2/2020, patient presented to [facility] ED progressive functional decline, with worsening unsteady gait, urinary incontinence, taste changes, poor PO intake, and 40 pound weight loss. Per discharge summary, repeat brain MRI was performed 3/2/2020 and demonstrated no new enhancing lesions since prior admission, however Dr. Guerra's evaluation of outside images revealed small cervicomedullary enhancing lesion and overall increased disease burden. Decision was made to discontinue Aubagio given side effects and disease progression, and elimination protocol was initiated 3/2/2020, with 8g cholestyramine TID x 10 days.      INTERVAL HISTORY SINCE LAST VISIT: Patient was discharged to home on 3/4/2020. Per patient's daughter, overall he is doing better after coming home. He stopped taking Trazodone which apparently reduced his fall frequency. His most recent fall was a week ago. No recent illness.     Patient denies any new changes in vision, balance, strength or sensation suggestive of new relapse of multiple sclerosis since he was seen in the hospital. Currently not on any DMT since Aubagio was discontinued during his recent hospitalization.     No major issues with his speech. Continues to have some difficulty with swallowing. He is currently on TF three times a day through his PEG tube and also takes some nectar thick liquids orally.  "    From his initial episode, he reports left arm and left leg weakness. He ambulates independently. Also has a walker available if needed. No falls for the past one week.     He rates his fatigue as moderate. Insomnia has been an issue since Trazodone was discontinued. He has neurogenic bladder symptoms of urinary frequency, urgency and occasional incontinence. Also reports some diarrhea, but per patient \"its from the chicken broth\".     Vit D: No current supplementation      3/2/2020 MRI Brain:  Impression:   1. The study demonstrates between 15 and 20 foci of T2-hyperintensity  within the cerebral white matter consistent with the clinical  suspicion of demyelinating disease. There are no foci of abnormal  enhancement noted intracranially .   2. There has been a marginal decrease in conspicuity of the lesions  particularly the more confluent area in the right frontal lobe. No  definite new lesions.      PHYSICAL EXAM: N/A (telephone visit)     ASSESSMENT/ PLAN:   1. Multiple Sclerosis, currently on no immunotherapy: Initial episode in 1/2019. Was started on Aubagio by an outside neurologist. He was stable for about the first 5-6 months of therapy. Presented to [facility] ED on 3/2 with progressive functional decline and some acute worsening of his symptoms. Repeat brain MRI was performed 3/2/2020 and demonstrated no new enhancing lesions since prior admission, however Dr. Guerra's evaluation of outside images revealed small cervicomedullary enhancing lesion and overall increased disease burden. Accelerated elimination for Aubagio was completed with 8g cholestyramine TID x 10 days and patient was discharged to home on 3/4/20.     Discussed the case with Dr. Guerra who will be taking his care over, who recommended a treatment switch to Mayzent. I discussed this new medication in detail. We will send the start form and medication packet to his home address. His cbc and cmp was essentially normal during his most recent " hospitalization. But will need a varizella zoster antibody testing. He had an EKG in 1/2019 which showed SR. He will need to complete an ophthalmology evaluation to screen for macular edema and genetic testing prior to starting Mayzent. With the recent COVID-19 outbreak, we do not know when we will be able to get the patient in for these evaluations. But we will update the patient.     2. Vit D supplementation: His most recent level from 3/2020 was 43. Per daughter, he is not on any current supplementation. I sent a prescription for vit D3 4000 international unit(s) daily to his outpatient pharmacy.     3. Neurocognitive evaluation: Will refer the patient for this evaluation per Dr. Guerra's recommendation.     4. Please contact us with questions or concerns.     Marielena Brooks CNP  [facility]          Phone call contact time:  Call Started at 1300  Call Ended at 1330

## 2020-04-13 DIAGNOSIS — G35 MS (MULTIPLE SCLEROSIS) (H): ICD-10-CM

## 2020-04-13 PROCEDURE — 86787 VARICELLA-ZOSTER ANTIBODY: CPT | Performed by: NURSE PRACTITIONER

## 2020-04-13 PROCEDURE — 36415 COLL VENOUS BLD VENIPUNCTURE: CPT | Performed by: NURSE PRACTITIONER

## 2020-04-14 LAB — VZV IGG SER QL IA: 4.1 AI (ref 0–0.8)

## 2020-04-23 ENCOUNTER — TELEPHONE (OUTPATIENT)
Dept: NEUROLOGY | Facility: CLINIC | Age: 65
End: 2020-04-23

## 2020-04-23 NOTE — TELEPHONE ENCOUNTER
Prior Authorization Specialty Medication Request    Medication/Dose: Mayzent 2mg  ICD code (if different than what is on RX): Active Secondary Progressive Multiple Sclerosis, G35  Previously Tried and Failed:  Aubagio     Important Lab Values: n/a  Rationale: Initiation of alternate disease modifying therapy for demyelinating disease, please approve.    Insurance Name: [employer]  Insurance ID: [id number]  Insurance Phone Number: [phone]    Pharmacy Information (if different than what is on RX)  Name:  n/a  Phone:  n/a

## 2020-04-28 ENCOUNTER — TELEPHONE (OUTPATIENT)
Dept: NEUROLOGY | Facility: CLINIC | Age: 65
End: 2020-04-28

## 2020-04-28 DIAGNOSIS — R00.1 BRADYCARDIA: Primary | ICD-10-CM

## 2020-04-28 NOTE — TELEPHONE ENCOUNTER
MEDICAL RECORDS REQUEST   [facility]  Urology Clinic  [address] [place] [address]  PHONE: [phone]  Fax: [phone]        FUTURE VISIT INFORMATION                                                   Vincent Ahsan Zuniga, : 1955 scheduled for future visit at [facility]    APPOINTMENT INFORMATION:    Date: 8/3/20 12PM    Provider:  Tobias Martin MD    Reason for Visit/Diagnosis: Incontinence     REFERRAL INFORMATION:    Referring provider:  CHRISTIANO TANG    Specialty: MD    Referring providers clinic:  [facility]    Clinic contact number:  [phone]    RECORDS REQUESTED FOR VISIT                                                     NOTES  STATUS/DETAILS   OFFICE NOTE from referring provider  yes   OFFICE NOTE from other specialist  no   DISCHARGE SUMMARY from hospital  yes   DISCHARGE REPORT from the ER  yes   OPERATIVE REPORT  no   MEDICATION LIST  no   LABS     URINALYSIS (UA)  yes     PRE-VISIT CHECKLIST      Record collection complete Yes- Internal recs in epic    Appointment appropriately scheduled           (right time/right provider) Yes   MyChart activation If no, please explain: In process    Questionnaire complete If no, please explain: In process     Completed by: Emi Ruvalcaba

## 2020-04-28 NOTE — TELEPHONE ENCOUNTER
Patient is going to start on Mayzent; Start form completed and sent to PA team; Patient has been approved through his insurance, however, has a large OOP cost; Hopefully patient will be eligible for assistance; Baseline labs/EKG/ME screening need to be completed; Patient to have FDO as well.    Nila Salinas, MS RN Care Coordinator

## 2020-04-28 NOTE — TELEPHONE ENCOUNTER
[facility] Call Center    Phone Message    May a detailed message be left on voicemail: yes     Reason for Call: Other: per call from Pharmacy has additional questions for the Prior Auth and is requesting a call back to discuss it.      Action Taken: Message routed to:  [facility] Neurology    Travel Screening: Not Applicable

## 2020-04-28 NOTE — TELEPHONE ENCOUNTER
Start form and PA approval letter faxed to May[employer] for review. Also, scanned Start form into Media.     Thank you,    Lulu Dey [facility]  Specialty Pharmacy Clinic [facility] [address] [address]  Ph: [phone] Fax: [phone]  [email or web address]

## 2020-04-28 NOTE — TELEPHONE ENCOUNTER
Prior Authorization Approval    Authorization Effective Date: 3/29/2020  Authorization Expiration Date: 4/28/2021  Medication: Mayzent 2MG Tablets (APPROVED)  Approved Dose/Quantity: 30 days  Reference #:     Insurance Company: [employer] - Phone [phone] Fax [phone]  Expected CoPay: $2195.32     CoPay Card Available:      Foundation Assistance Needed: [facility]  Which Pharmacy is filling the prescription (Not needed for infusion/clinic administered): [facility] - [place], [place] - [address]   Pharmacy Notified: No  Patient Notified: No    Once start form is completed it will be faxed to [facility] for review and co-pay assistance. Patient has Medicare Part D so would not qualify for the copay assistance card, but may be able to enroll in patient assistance program.

## 2020-04-28 NOTE — TELEPHONE ENCOUNTER
PA Initiation    Medication: Mayzent 2MG Tablets (PENDING)  Insurance Company: [facility] - Phone [phone] Fax [phone]  Pharmacy Filling the Rx: [facility] - [place], [place] - [facility] [address]  Filling Pharmacy Phone: [phone]  Filling Pharmacy Fax: [phone]  Start Date: 4/28/2020    Manually faxed PA form to [facility] for Mayzent PA review.

## 2020-05-04 NOTE — TELEPHONE ENCOUNTER
Update from Mayzent support, that the nurse has tried reaching the patient several times without success, but will continue to reach out.    Nila Salinas, MS RN Care Coordinator

## 2020-05-08 ENCOUNTER — APPOINTMENT (OUTPATIENT)
Dept: GENERAL RADIOLOGY | Facility: CLINIC | Age: 65
End: 2020-05-08
Attending: FAMILY MEDICINE
Payer: COMMERCIAL

## 2020-05-08 ENCOUNTER — APPOINTMENT (OUTPATIENT)
Dept: MRI IMAGING | Facility: CLINIC | Age: 65
End: 2020-05-08
Attending: FAMILY MEDICINE
Payer: COMMERCIAL

## 2020-05-08 ENCOUNTER — HOSPITAL ENCOUNTER (OUTPATIENT)
Facility: CLINIC | Age: 65
Setting detail: OBSERVATION
Discharge: HOME | End: 2020-05-10
Attending: FAMILY MEDICINE | Admitting: INTERNAL MEDICINE
Payer: COMMERCIAL

## 2020-05-08 DIAGNOSIS — R41.0 CONFUSION: ICD-10-CM

## 2020-05-08 DIAGNOSIS — G35 MULTIPLE SCLEROSIS (H): ICD-10-CM

## 2020-05-08 DIAGNOSIS — R79.89 INCREASED AMMONIA LEVEL: ICD-10-CM

## 2020-05-08 DIAGNOSIS — E86.0 DEHYDRATION: ICD-10-CM

## 2020-05-08 DIAGNOSIS — R35.0 URINARY FREQUENCY: ICD-10-CM

## 2020-05-08 DIAGNOSIS — F29 PSYCHOSIS, UNSPECIFIED PSYCHOSIS TYPE (H): Primary | ICD-10-CM

## 2020-05-08 DIAGNOSIS — R79.81 ABNORMAL BLOOD-GAS LEVEL: ICD-10-CM

## 2020-05-08 DIAGNOSIS — F32.A DEPRESSIVE DISORDER: ICD-10-CM

## 2020-05-08 LAB
ALBUMIN SERPL-MCNC: 2.8 G/DL (ref 3.4–5)
ALBUMIN UR-MCNC: NEGATIVE MG/DL
ALP SERPL-CCNC: 89 U/L (ref 40–150)
ALT SERPL W P-5'-P-CCNC: 32 U/L (ref 0–70)
AMMONIA PLAS-SCNC: 72 UMOL/L (ref 10–50)
ANION GAP SERPL CALCULATED.3IONS-SCNC: 7 MMOL/L (ref 3–14)
APPEARANCE UR: CLEAR
AST SERPL W P-5'-P-CCNC: 23 U/L (ref 0–45)
BASOPHILS # BLD AUTO: 0 10E9/L (ref 0–0.2)
BASOPHILS NFR BLD AUTO: 0.2 %
BILIRUB SERPL-MCNC: 0.2 MG/DL (ref 0.2–1.3)
BILIRUB UR QL STRIP: NEGATIVE
BUN SERPL-MCNC: 32 MG/DL (ref 7–30)
CALCIUM SERPL-MCNC: 8.4 MG/DL (ref 8.5–10.1)
CHLORIDE SERPL-SCNC: 108 MMOL/L (ref 94–109)
CO2 SERPL-SCNC: 28 MMOL/L (ref 20–32)
COLOR UR AUTO: YELLOW
CREAT SERPL-MCNC: 0.64 MG/DL (ref 0.66–1.25)
DIFFERENTIAL METHOD BLD: ABNORMAL
EOSINOPHIL # BLD AUTO: 0.1 10E9/L (ref 0–0.7)
EOSINOPHIL NFR BLD AUTO: 1.5 %
ERYTHROCYTE [DISTWIDTH] IN BLOOD BY AUTOMATED COUNT: 13.1 % (ref 10–15)
GFR SERPL CREATININE-BSD FRML MDRD: >90 ML/MIN/{1.73_M2}
GLUCOSE SERPL-MCNC: 90 MG/DL (ref 70–99)
GLUCOSE UR STRIP-MCNC: NEGATIVE MG/DL
HCT VFR BLD AUTO: 36.9 % (ref 40–53)
HGB BLD-MCNC: 11.8 G/DL (ref 13.3–17.7)
HGB UR QL STRIP: NEGATIVE
IMM GRANULOCYTES # BLD: 0 10E9/L (ref 0–0.4)
IMM GRANULOCYTES NFR BLD: 0.2 %
INTERPRETATION ECG - MUSE: NORMAL
KETONES UR STRIP-MCNC: 10 MG/DL
LACTATE BLD-SCNC: 1.7 MMOL/L (ref 0.7–2)
LEUKOCYTE ESTERASE UR QL STRIP: NEGATIVE
LYMPHOCYTES # BLD AUTO: 2.6 10E9/L (ref 0.8–5.3)
LYMPHOCYTES NFR BLD AUTO: 30 %
MCH RBC QN AUTO: 35 PG (ref 26.5–33)
MCHC RBC AUTO-ENTMCNC: 32 G/DL (ref 31.5–36.5)
MCV RBC AUTO: 110 FL (ref 78–100)
MONOCYTES # BLD AUTO: 1.2 10E9/L (ref 0–1.3)
MONOCYTES NFR BLD AUTO: 14.2 %
MUCOUS THREADS #/AREA URNS LPF: PRESENT /LPF
NEUTROPHILS # BLD AUTO: 4.6 10E9/L (ref 1.6–8.3)
NEUTROPHILS NFR BLD AUTO: 53.9 %
NITRATE UR QL: NEGATIVE
NRBC # BLD AUTO: 0 10*3/UL
NRBC BLD AUTO-RTO: 0 /100
NT-PROBNP SERPL-MCNC: 97 PG/ML (ref 0–900)
PH UR STRIP: 7 PH (ref 5–7)
PLATELET # BLD AUTO: 191 10E9/L (ref 150–450)
PLATELET # BLD EST: ABNORMAL 10*3/UL
POTASSIUM SERPL-SCNC: 4.2 MMOL/L (ref 3.4–5.3)
PROT SERPL-MCNC: 5.8 G/DL (ref 6.8–8.8)
RBC # BLD AUTO: 3.37 10E12/L (ref 4.4–5.9)
RBC #/AREA URNS AUTO: 1 /HPF (ref 0–2)
SODIUM SERPL-SCNC: 143 MMOL/L (ref 133–144)
SOURCE: ABNORMAL
SP GR UR STRIP: 1.02 (ref 1–1.03)
TROPONIN I SERPL-MCNC: 0.02 UG/L (ref 0–0.04)
UROBILINOGEN UR STRIP-MCNC: NORMAL MG/DL (ref 0–2)
WBC # BLD AUTO: 8.6 10E9/L (ref 4–11)
WBC #/AREA URNS AUTO: 2 /HPF (ref 0–5)

## 2020-05-08 PROCEDURE — 80053 COMPREHEN METABOLIC PANEL: CPT | Performed by: FAMILY MEDICINE

## 2020-05-08 PROCEDURE — 70553 MRI BRAIN STEM W/O & W/DYE: CPT

## 2020-05-08 PROCEDURE — 40000358 ZZHCL STATISTIC DRUG SCREEN MULTIPLE (METRO): Performed by: STUDENT IN AN ORGANIZED HEALTH CARE EDUCATION/TRAINING PROGRAM

## 2020-05-08 PROCEDURE — 93010 ELECTROCARDIOGRAM REPORT: CPT | Mod: Z6 | Performed by: FAMILY MEDICINE

## 2020-05-08 PROCEDURE — 87086 URINE CULTURE/COLONY COUNT: CPT | Performed by: FAMILY MEDICINE

## 2020-05-08 PROCEDURE — 80307 DRUG TEST PRSMV CHEM ANLYZR: CPT | Performed by: STUDENT IN AN ORGANIZED HEALTH CARE EDUCATION/TRAINING PROGRAM

## 2020-05-08 PROCEDURE — 25500064 ZZH RX 255 OP 636: Performed by: FAMILY MEDICINE

## 2020-05-08 PROCEDURE — 96360 HYDRATION IV INFUSION INIT: CPT | Mod: 59 | Performed by: FAMILY MEDICINE

## 2020-05-08 PROCEDURE — 81001 URINALYSIS AUTO W/SCOPE: CPT | Performed by: FAMILY MEDICINE

## 2020-05-08 PROCEDURE — 82140 ASSAY OF AMMONIA: CPT | Performed by: FAMILY MEDICINE

## 2020-05-08 PROCEDURE — 87040 BLOOD CULTURE FOR BACTERIA: CPT | Performed by: FAMILY MEDICINE

## 2020-05-08 PROCEDURE — 71045 X-RAY EXAM CHEST 1 VIEW: CPT

## 2020-05-08 PROCEDURE — 93005 ELECTROCARDIOGRAM TRACING: CPT | Performed by: FAMILY MEDICINE

## 2020-05-08 PROCEDURE — 25800030 ZZH RX IP 258 OP 636: Performed by: FAMILY MEDICINE

## 2020-05-08 PROCEDURE — 83605 ASSAY OF LACTIC ACID: CPT | Performed by: FAMILY MEDICINE

## 2020-05-08 PROCEDURE — 96361 HYDRATE IV INFUSION ADD-ON: CPT | Performed by: FAMILY MEDICINE

## 2020-05-08 PROCEDURE — 80164 ASSAY DIPROPYLACETIC ACD TOT: CPT | Performed by: FAMILY MEDICINE

## 2020-05-08 PROCEDURE — A9585 GADOBUTROL INJECTION: HCPCS | Performed by: FAMILY MEDICINE

## 2020-05-08 PROCEDURE — 85025 COMPLETE CBC W/AUTO DIFF WBC: CPT | Performed by: FAMILY MEDICINE

## 2020-05-08 PROCEDURE — 84484 ASSAY OF TROPONIN QUANT: CPT | Performed by: FAMILY MEDICINE

## 2020-05-08 PROCEDURE — 99285 EMERGENCY DEPT VISIT HI MDM: CPT | Mod: 25 | Performed by: FAMILY MEDICINE

## 2020-05-08 PROCEDURE — 83880 ASSAY OF NATRIURETIC PEPTIDE: CPT | Performed by: FAMILY MEDICINE

## 2020-05-08 PROCEDURE — 80165 DIPROPYLACETIC ACID FREE: CPT | Performed by: FAMILY MEDICINE

## 2020-05-08 RX ORDER — LIDOCAINE 40 MG/G
CREAM TOPICAL
Status: DISCONTINUED | OUTPATIENT
Start: 2020-05-08 | End: 2020-05-09

## 2020-05-08 RX ORDER — GADOBUTROL 604.72 MG/ML
7.5 INJECTION INTRAVENOUS ONCE
Status: COMPLETED | OUTPATIENT
Start: 2020-05-08 | End: 2020-05-08

## 2020-05-08 RX ORDER — SODIUM CHLORIDE 9 MG/ML
1000 INJECTION, SOLUTION INTRAVENOUS CONTINUOUS
Status: DISCONTINUED | OUTPATIENT
Start: 2020-05-08 | End: 2020-05-09

## 2020-05-08 RX ADMIN — SODIUM CHLORIDE 1000 ML: 9 INJECTION, SOLUTION INTRAVENOUS at 15:27

## 2020-05-08 RX ADMIN — SODIUM CHLORIDE 1000 ML: 9 INJECTION, SOLUTION INTRAVENOUS at 19:46

## 2020-05-08 RX ADMIN — GADOBUTROL 7.5 ML: 604.72 INJECTION INTRAVENOUS at 22:38

## 2020-05-08 ASSESSMENT — MIFFLIN-ST. JEOR: SCORE: 1311.55

## 2020-05-08 NOTE — CONSULTS
"[facility]  Neurology Consultation    Patient Name:  Vincent Zuniga  MRN:  [id number]    :  1955  Date of Service:  May 8, 2020  Primary care provider:  No Ref-Primary, Physician      Neurology consultation service was asked to see Vincent Zuniga by Dr. Austin to evaluate multiple sclerosis.    History of Present Illness:   65 year old male h/o multiple sclerosis, protein-calorie malnutrition, dysphagia, COPD, tobacco use, marijuana use, anxiety, depression who presented to ED 2020 with two-day history of altered mental status and concern for multiple sclerosis exacerbation.    Patient himself is not aware of why he he is in the emergency department.  He is guessing that he has been dizzy when he is standing up and that is why he is here.  Dizziness has been occurring for the past couple of days per patient.  Denies any other query for review of systems.    Called daughter Michelle ([phone]):  After stopping aubagio 3/2020, patient had improved with time.  He was able to bathe on own, was able to cook on his own, was walking on his own for a while.  Was able to leave him on his own while she was running errands.    He appeared to be in his new improved state of health until 2 days ago.  At that point, he appeared to be more quiet and forgetful than usual.  He also reported double vision to his daughter 20 evening.  He reverted to drinking only chicken broth (had also been nibbling on solid foods prior).  He did not sleep the evening of .  On 2020, he was acting anxious per daughter and was \"on the go\".  Daughter notes that his tremor of his right upper extremity was more pronounced this morning.  He was perseverating on cashing in a lottery ticket that was already cashed in.  Daughter also noted that he has urinated less than usual for the past few days.    Daughter reports that prior episodes of multiple sclerosis exacerbation included " confusion, double vision, urinary frequency.  The only difference from his prior episodes is that he is not going to the bathroom as often.     In addition, patient at one point mentioned that his lungs hurt for 2 weeks recently, it is unclear to daughter whether it represented a new symptom or his usual COPD.  He typically smokes marijuana and had been smoking more lately to the point that the daughter took away his marijuana on 5/7/2020.    Recent admission 3/2/2020 occurred in the setting of worsening gait, urinary incontinence, poor p.o. intake, weight loss.  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    Patient has not had any recent changes in medication regimen per daughter.    Per chart review, depakote was started for mood stabilization while admitted to [place] [facility] 2/2/2017 - 2/9/2017.    [facility]  EEG performed 10/2019 (after observed myoclonic activity clinically), there reportedly was slowing over the right frontotemporal polymorphic delta.  No epileptiform activity seen.  Per clinic, there is no documentation of GTC or similar seizure activity.  Depakote was prescribed 750 mg TID 10/2019.  No new prescriptions since that time.    ROS  A 10-point ROS was performed as per HPI.    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     Numbness and tingling     in legs     Shortness of breath      Past Surgical History:   Procedure Laterality Date     BACK SURGERY      Lumbar     SEPTOPLASTY  10/31/2012    Procedure: SEPTOPLASTY;  Septoplasty, turbinoplasty, enlargement of maxillary ostia;  Surgeon: Carolina Robison MD;   Location: [facility] OR     spleen[  Age 18    spleenectomy       Medications   (Not in a hospital admission)    Current Facility-Administered Medications   Medication     lidocaine (LMX4) cream     lidocaine 1 % 0.1-1 mL     sodium chloride (PF) 0.9% PF flush 3 mL     sodium chloride (PF) 0.9% PF flush 3 mL     sodium chloride 0.9% infusion     Current Outpatient Medications   Medication     albuterol (PROVENTIL HFA) 108 (90 Base) MCG/ACT inhaler     atorvastatin (LIPITOR) 20 MG tablet     BREO ELLIPTA 100-25 MCG/INH inhaler     divalproex sodium delayed-release (DEPAKOTE) 250 MG DR tablet     gabapentin (NEURONTIN) 400 MG capsule     HYDROmorphone (DILAUDID) 2 MG tablet     LANsoprazole (PREVACID) 30 MG DR capsule     melatonin 3 MG tablet     meloxicam (MOBIC) 15 MG tablet     mirtazapine (REMERON) 30 MG tablet     oxyCODONE (ROXICODONE) 5 MG tablet     pramipexole (MIRAPEX) 0.5 MG tablet     STARCH-MALTO DEXTRIN ([employer] INSTANT FOOD THICKENER) POWD     traZODone (DESYREL) 50 MG tablet     valproic acid (DEPAKENE) 250 MG/5ML syrup     vitamin D2 (ERGOCALCIFEROL) 37313 units (1250 mcg) capsule     vitamin D3 (CHOLECALCIFEROL) 2000 units (50 mcg) tablet     zolpidem ER (AMBIEN CR) 12.5 MG CR tablet       Allergies  Allergies   Allergen Reactions     Immune Globulin Rash       Social History  Social History     Tobacco Use     Smoking status: Current Every Day Smoker     Packs/day: 1.00     Years: 30.00     Pack years: 30.00     Types: Cigarettes     Smokeless tobacco: Never Used   Substance Use Topics     Alcohol use: No   Lives with daughter who is his full-time caretaker.  Smokes cigarettes and marijuana.  Denies alcohol use.    Family History    Family History   Problem Relation Age of Onset     Cancer Father         Lung     Heart Disease Brother 35        Four heart attacks     Alcohol/Drug Brother      Psychotic Disorder Brother         Drug addiction     Unknown/Adopted Son      Unknown/Adopted Daughter       "Unknown/Adopted Daughter      Unknown/Adopted Daughter      Unknown/Adopted Daughter          Physical Examination   Vitals: /77   Pulse 68   Temp 98.2  F (36.8  C) (Oral)   Resp 16   Ht 1.753 m (5' 9\")   Wt 52.6 kg (116 lb)   SpO2 95%   BMI 17.13 kg/m    General: Adult male patient, lying in bed, NAD  HEENT: NC/AT, no icterus, op moist  Cardiac: RRR  Chest: non-labored on RA  Abdomen: Soft, G-tube in place  Extremities: Warm, no edema  Skin: No rash or lesion   Psych: Affect pleasant  Neuro:  Mental status: Awake, alert, slightly limited attention span, oriented to self, place, month, year, day of week, not oriented to circumstance. Language is sparse with intact comprehension of 3-step commands, naming and repetition.  Cranial nerves: VFF, PERRL, conjugate gaze, EOMI, facial sensation intact to light touch, face symmetric, shoulder shrug strong, tongue midline, no dysarthria.  Motor: Normal bulk, spasticity present bilateral upper extremities. Postural tremor present bilateral upper extremities. Hip flexion 4+ bilaterally, otherwise 5/5 strength in 4/4 extremities.   Reflexes: Normo reflexic and symmetric biceps, brachioradialis, triceps.  Hyperreflexic patellae (left more than right), and 2+ achilles. Negative Simpson, no clonus, right toe downgoing, left toe upgoing (chronic for this patient).  Sensory: Reportedly intact to light touch, pin, vibration, and proprioception throughout.  Coordination: FNF and HS without ataxia or dysmetria. Rapid alternating movements slowed bilaterally.  Gait: Normal width, stride length at ED bedside. Station normal. Difficulty with heel and toe walk.  Unable to perform tandem walk.    Investigations   Recent Labs   Lab 05/08/20  1518   WBC 8.6   HGB 11.8*   *         POTASSIUM 4.2   CHLORIDE 108   CO2 28   BUN 32*   CR 0.64*   ANIONGAP 7   RIO 8.4*   GLC 90   ALBUMIN 2.8*   PROTTOTAL 5.8*   BILITOTAL 0.2   ALKPHOS 89   ALT 32   AST 23   TROPI " "0.016     Ammonia 72    CXR clear    MRI brain 3/2/2020  \"Impression:   1. The study demonstrates between 15 and 20 foci of T2-hyperintensity  within the cerebral white matter consistent with the clinical  suspicion of demyelinating disease. There are no foci of abnormal  enhancement noted intracranially .   2. There has been a marginal decrease in conspicuity of the lesions  particularly the more confluent area in the right frontal lobe. No  definite new lesions.\"    Impression  65 year old male past medical history including multiple sclerosis, protein-calorie malnutrition, dysphagia, COPD, tobacco use, marijuana use, anxiety, depression who presented to ED 5/8/2020 with two-day history of altered mental status and concern for multiple sclerosis exacerbation.    Laboratory studies include ammonia 72, BUN 32, creatinine 0.64.  UA unremarkable.  No brain imaging available yet this visit.  Neurological exam with similar findings as discharge 3/4/2020.    Unclear at this time if patient's findings represent acute MS flare.  More likely pseudo-relapse in the setting of metabolic abnormality, no change in nature of neurologic symptoms, and no evidence of new focal finding on exam.    Patient on divalproex with \"seizure\" propagated in chart, but this medication appears to have been started 2/6/2017 for mood stabilization.  No epileptiform activity on prior EEG 10/2019.  Given hyperammonemia which may be contributing to current altered mental status, would be reasonable to trial a decrease in depakote dose.    Recommendations  - MRI brain with and without contrast    - if positive for new MS lesion, then neurology service will admit    - if negative for evidence of new lesion, then would recommend medicine admission for correction of metabolic abnormality leading to pseudo-relapse  - given hyperammonemia, recommend decrease in depakote dose to 500-500-500 instead of current home regimen  - follow depakote level (total and " free)    Thank you for involving Neurology in the care of Vincent Zuniga.  Please do not hesitate to call with questions/concerns (consult pager [phone]).      Patient was discussed with Dr. Orellana.    Nila Wagoner MD   Neurology PGY-2  [phone]

## 2020-05-08 NOTE — ED TRIAGE NOTES
"Triage Assessment & Note:    /75   Pulse 76   Temp 98.2  F (36.8  C) (Oral)   Resp 16   Ht 1.753 m (5' 9\")   Wt 52.6 kg (116 lb)   SpO2 95%   BMI 17.13 kg/m        Patient presents with: Pt comes to triage with reports of confusion and urine frequency. Pt reports that he just had a urine tract infection. Pt reports not eating/ drinking much due to dental issues. No reports of fever, cough, SOB, or CP    Home Treatments/Remedies: pt does not know what medication he takes, has been taking what is placed in medication cup    Febrile / Afebrile: afebrile    Duration of C/o: several days    Jerica Martinez RN  May 8, 2020        "

## 2020-05-09 PROBLEM — R41.82 ALTERED MENTAL STATUS: Status: ACTIVE | Noted: 2020-05-09

## 2020-05-09 LAB
ALBUMIN SERPL-MCNC: 2.8 G/DL (ref 3.4–5)
ALP SERPL-CCNC: 86 U/L (ref 40–150)
ALT SERPL W P-5'-P-CCNC: 30 U/L (ref 0–70)
ANION GAP SERPL CALCULATED.3IONS-SCNC: 4 MMOL/L (ref 3–14)
AST SERPL W P-5'-P-CCNC: 20 U/L (ref 0–45)
BACTERIA SPEC CULT: NO GROWTH
BILIRUB SERPL-MCNC: 0.2 MG/DL (ref 0.2–1.3)
BUN SERPL-MCNC: 20 MG/DL (ref 7–30)
CALCIUM SERPL-MCNC: 8.5 MG/DL (ref 8.5–10.1)
CHLORIDE SERPL-SCNC: 109 MMOL/L (ref 94–109)
CO2 SERPL-SCNC: 27 MMOL/L (ref 20–32)
CREAT SERPL-MCNC: 0.58 MG/DL (ref 0.66–1.25)
ERYTHROCYTE [DISTWIDTH] IN BLOOD BY AUTOMATED COUNT: 13.2 % (ref 10–15)
GFR SERPL CREATININE-BSD FRML MDRD: >90 ML/MIN/{1.73_M2}
GLUCOSE SERPL-MCNC: 81 MG/DL (ref 70–99)
HCT VFR BLD AUTO: 37.6 % (ref 40–53)
HGB BLD-MCNC: 12.3 G/DL (ref 13.3–17.7)
Lab: NORMAL
MCH RBC QN AUTO: 35.4 PG (ref 26.5–33)
MCHC RBC AUTO-ENTMCNC: 32.7 G/DL (ref 31.5–36.5)
MCV RBC AUTO: 108 FL (ref 78–100)
PLATELET # BLD AUTO: 187 10E9/L (ref 150–450)
POTASSIUM SERPL-SCNC: 4.1 MMOL/L (ref 3.4–5.3)
PROT SERPL-MCNC: 5.7 G/DL (ref 6.8–8.8)
RBC # BLD AUTO: 3.47 10E12/L (ref 4.4–5.9)
SODIUM SERPL-SCNC: 140 MMOL/L (ref 133–144)
SPECIMEN SOURCE: NORMAL
VALPROATE FREE SERPL-MCNC: 5.2 UG/ML (ref 6–20)
VALPROATE FREE SERPL-MCNC: 5.3 UG/ML (ref 6–20)
VALPROATE SERPL-MCNC: 45 MG/L (ref 50–100)
VALPROATE SERPL-MCNC: 90 MG/L (ref 50–100)
VIT B12 SERPL-MCNC: 899 PG/ML (ref 193–986)
WBC # BLD AUTO: 7.9 10E9/L (ref 4–11)

## 2020-05-09 PROCEDURE — 99285 EMERGENCY DEPT VISIT HI MDM: CPT | Mod: 25 | Performed by: FAMILY MEDICINE

## 2020-05-09 PROCEDURE — 85027 COMPLETE CBC AUTOMATED: CPT | Performed by: STUDENT IN AN ORGANIZED HEALTH CARE EDUCATION/TRAINING PROGRAM

## 2020-05-09 PROCEDURE — 99220 ZZC INITIAL OBSERVATION CARE,LEVL III: CPT | Mod: GC | Performed by: INTERNAL MEDICINE

## 2020-05-09 PROCEDURE — 25000132 ZZH RX MED GY IP 250 OP 250 PS 637: Performed by: STUDENT IN AN ORGANIZED HEALTH CARE EDUCATION/TRAINING PROGRAM

## 2020-05-09 PROCEDURE — 80053 COMPREHEN METABOLIC PANEL: CPT | Performed by: STUDENT IN AN ORGANIZED HEALTH CARE EDUCATION/TRAINING PROGRAM

## 2020-05-09 PROCEDURE — 96361 HYDRATE IV INFUSION ADD-ON: CPT | Performed by: FAMILY MEDICINE

## 2020-05-09 PROCEDURE — 80164 ASSAY DIPROPYLACETIC ACD TOT: CPT | Performed by: STUDENT IN AN ORGANIZED HEALTH CARE EDUCATION/TRAINING PROGRAM

## 2020-05-09 PROCEDURE — G0378 HOSPITAL OBSERVATION PER HR: HCPCS

## 2020-05-09 PROCEDURE — 36415 COLL VENOUS BLD VENIPUNCTURE: CPT | Performed by: STUDENT IN AN ORGANIZED HEALTH CARE EDUCATION/TRAINING PROGRAM

## 2020-05-09 PROCEDURE — 80165 DIPROPYLACETIC ACID FREE: CPT | Performed by: STUDENT IN AN ORGANIZED HEALTH CARE EDUCATION/TRAINING PROGRAM

## 2020-05-09 PROCEDURE — 25000132 ZZH RX MED GY IP 250 OP 250 PS 637: Performed by: INTERNAL MEDICINE

## 2020-05-09 PROCEDURE — 82607 VITAMIN B-12: CPT | Performed by: STUDENT IN AN ORGANIZED HEALTH CARE EDUCATION/TRAINING PROGRAM

## 2020-05-09 RX ORDER — DEXTROSE MONOHYDRATE 100 MG/ML
INJECTION, SOLUTION INTRAVENOUS CONTINUOUS PRN
Status: DISCONTINUED | OUTPATIENT
Start: 2020-05-09 | End: 2020-05-10 | Stop reason: HOSPADM

## 2020-05-09 RX ORDER — ALBUTEROL SULFATE 90 UG/1
2 AEROSOL, METERED RESPIRATORY (INHALATION) EVERY 4 HOURS PRN
Status: DISCONTINUED | OUTPATIENT
Start: 2020-05-09 | End: 2020-05-10 | Stop reason: HOSPADM

## 2020-05-09 RX ORDER — MECOBALAMIN 5000 MCG
30 TABLET,DISINTEGRATING ORAL
Status: DISCONTINUED | OUTPATIENT
Start: 2020-05-09 | End: 2020-05-09

## 2020-05-09 RX ORDER — ACETAMINOPHEN 650 MG/1
650 SUPPOSITORY RECTAL EVERY 4 HOURS PRN
Status: DISCONTINUED | OUTPATIENT
Start: 2020-05-09 | End: 2020-05-10 | Stop reason: HOSPADM

## 2020-05-09 RX ORDER — GABAPENTIN 400 MG/1
400 CAPSULE ORAL 3 TIMES DAILY
Status: DISCONTINUED | OUTPATIENT
Start: 2020-05-09 | End: 2020-05-09

## 2020-05-09 RX ORDER — GABAPENTIN 250 MG/5ML
400 SOLUTION ORAL 3 TIMES DAILY
Status: DISCONTINUED | OUTPATIENT
Start: 2020-05-09 | End: 2020-05-10 | Stop reason: HOSPADM

## 2020-05-09 RX ORDER — ATORVASTATIN CALCIUM 20 MG/1
20 TABLET, FILM COATED ORAL DAILY
Status: DISCONTINUED | OUTPATIENT
Start: 2020-05-09 | End: 2020-05-10 | Stop reason: HOSPADM

## 2020-05-09 RX ORDER — ACETAMINOPHEN 325 MG/1
650 TABLET ORAL EVERY 4 HOURS PRN
Status: DISCONTINUED | OUTPATIENT
Start: 2020-05-09 | End: 2020-05-10 | Stop reason: HOSPADM

## 2020-05-09 RX ORDER — NALOXONE HYDROCHLORIDE 0.4 MG/ML
.1-.4 INJECTION, SOLUTION INTRAMUSCULAR; INTRAVENOUS; SUBCUTANEOUS
Status: DISCONTINUED | OUTPATIENT
Start: 2020-05-09 | End: 2020-05-10 | Stop reason: HOSPADM

## 2020-05-09 RX ORDER — DIVALPROEX SODIUM 250 MG/1
500 TABLET, DELAYED RELEASE ORAL 3 TIMES DAILY
Status: DISCONTINUED | OUTPATIENT
Start: 2020-05-09 | End: 2020-05-09

## 2020-05-09 RX ORDER — MIRTAZAPINE 15 MG/1
30 TABLET, FILM COATED ORAL AT BEDTIME
Status: DISCONTINUED | OUTPATIENT
Start: 2020-05-09 | End: 2020-05-10

## 2020-05-09 RX ORDER — ONDANSETRON 4 MG/1
4 TABLET, ORALLY DISINTEGRATING ORAL EVERY 6 HOURS PRN
Status: DISCONTINUED | OUTPATIENT
Start: 2020-05-09 | End: 2020-05-10 | Stop reason: HOSPADM

## 2020-05-09 RX ORDER — ONDANSETRON 2 MG/ML
4 INJECTION INTRAMUSCULAR; INTRAVENOUS EVERY 6 HOURS PRN
Status: DISCONTINUED | OUTPATIENT
Start: 2020-05-09 | End: 2020-05-10 | Stop reason: HOSPADM

## 2020-05-09 RX ORDER — PRAMIPEXOLE DIHYDROCHLORIDE 0.5 MG/1
0.5 TABLET ORAL AT BEDTIME
Status: DISCONTINUED | OUTPATIENT
Start: 2020-05-09 | End: 2020-05-10 | Stop reason: HOSPADM

## 2020-05-09 RX ADMIN — GABAPENTIN 400 MG: 250 SUSPENSION ORAL at 15:14

## 2020-05-09 RX ADMIN — FLUTICASONE FUROATE AND VILANTEROL TRIFENATATE 1 PUFF: 100; 25 POWDER RESPIRATORY (INHALATION) at 10:33

## 2020-05-09 RX ADMIN — VALPROIC ACID 500 MG: 250 SOLUTION ORAL at 16:58

## 2020-05-09 RX ADMIN — MIRTAZAPINE 30 MG: 15 TABLET, FILM COATED ORAL at 21:28

## 2020-05-09 RX ADMIN — PRAMIPEXOLE DIHYDROCHLORIDE 0.5 MG: 0.5 TABLET ORAL at 04:24

## 2020-05-09 RX ADMIN — MULTIVITAMIN 15 ML: LIQUID ORAL at 15:14

## 2020-05-09 RX ADMIN — ATORVASTATIN CALCIUM 20 MG: 20 TABLET, FILM COATED ORAL at 10:33

## 2020-05-09 RX ADMIN — PANTOPRAZOLE SODIUM 40 MG: 40 TABLET, DELAYED RELEASE ORAL at 10:33

## 2020-05-09 RX ADMIN — GABAPENTIN 400 MG: 250 SUSPENSION ORAL at 10:33

## 2020-05-09 RX ADMIN — GABAPENTIN 400 MG: 250 SUSPENSION ORAL at 19:58

## 2020-05-09 RX ADMIN — VALPROIC ACID 500 MG: 250 SOLUTION ORAL at 19:57

## 2020-05-09 RX ADMIN — PRAMIPEXOLE DIHYDROCHLORIDE 0.5 MG: 0.5 TABLET ORAL at 21:28

## 2020-05-09 ASSESSMENT — ACTIVITIES OF DAILY LIVING (ADL)
DRESS: 0-->INDEPENDENT
NUMBER_OF_TIMES_PATIENT_HAS_FALLEN_WITHIN_LAST_SIX_MONTHS: 5
TOILETING: 0-->INDEPENDENT
BATHING: 0-->INDEPENDENT
TRANSFERRING: 0-->INDEPENDENT
COGNITION: 0 - NO COGNITION ISSUES REPORTED
WHICH_OF_THE_ABOVE_FUNCTIONAL_RISKS_HAD_A_RECENT_ONSET_OR_CHANGE?: FALL HISTORY
RETIRED_EATING: 0-->INDEPENDENT
SWALLOWING: 0-->SWALLOWS FOODS/LIQUIDS WITHOUT DIFFICULTY
FALL_HISTORY_WITHIN_LAST_SIX_MONTHS: YES
RETIRED_COMMUNICATION: 0-->UNDERSTANDS/COMMUNICATES WITHOUT DIFFICULTY
AMBULATION: 0-->INDEPENDENT

## 2020-05-09 ASSESSMENT — MIFFLIN-ST. JEOR
SCORE: 1385.38
SCORE: 1314.27

## 2020-05-09 NOTE — PROGRESS NOTES
-diagnostic tests and consults completed and resulted:not met  -vital signs normal or at patient baseline: met  -returns to baseline functional status: unsure at this time

## 2020-05-09 NOTE — ED PROVIDER NOTES
[facility] ([facility])  5/08/20 ED 27  3:13 PM   History     Chief Complaint   Patient presents with     Altered Mental Status     Urinary Frequency     The history is provided by the patient and medical records. The history is limited by the condition of the patient (some confusion).     Vincent Zuniga is a 65 year old male with prior history of MS, encephalopathy who presents with altered mental status and urinary frequency. He feels tired and dizzy. He states at baseline his MS is manageable, is followed by neurologist. Symptoms started to change over the past few months. In the past few days symptoms have worsened as well, with increased confusion and generalized weakness. He has had urinary frequency as well. No history of prostate infections, UTIs or diabetes. No diarrhea, cough or fever. He notes history of lung disease COPD home but is not on O2, no change in breathing. He lives at home with daughter who noticed some changes and felt he needed to be seen. This does seem similar to prior MS flares. He has had urine/staph infection, was treated with antibiotics. He has not been eating or drinking much due to dental issues, doesn't know what medications he is taking. No abdominal pain or diarrhea. He walked in to the Emergency Department, just feels generally weak and unable to pick his feet up.     Discussed with daughter by phone she notes patient last couple days has been more confused repeating himself, consistent with his MS flares no fevers reported other changes noted.  Patient gets tube feedings decreased p.o. intake.  Daughter sets of medications unclear if taking appropriate etc.    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 Additional pertinent items: None    Family History  Family History   Problem Relation Age of Onset     Cancer Father         Lung     Heart Disease Brother 35        Four heart attacks     Alcohol/Drug Brother      Psychotic Disorder Brother         Drug addiction     Unknown/Adopted Son      Unknown/Adopted Daughter      Unknown/Adopted Daughter      Unknown/Adopted Daughter      Unknown/Adopted Daughter      Family history was reviewed with the patient. Additional pertinent items: None    Social History  Social History     Tobacco Use     Smoking status: Current Every Day Smoker     Packs/day: 1.00     Years: 30.00     Pack years: 30.00     Types: Cigarettes     Smokeless tobacco: Never Used   Substance Use Topics     Alcohol use: No     Drug use: No      Social history was reviewed with the patient. Additional pertinent items: None    Review of Systems   Constitutional: Positive for activity change, appetite change (poor) and fatigue. Negative for fever.        Patient uses a walker risk of fall   HENT: Negative for congestion, sore throat and voice change.    Eyes: Negative for redness and visual disturbance.   Respiratory: Positive for shortness of breath (chronic no change copd). Negative for cough.    Cardiovascular: Negative for chest pain.   Gastrointestinal:  "Negative for abdominal pain, nausea and vomiting.   Genitourinary: Positive for frequency. Negative for difficulty urinating, dysuria and hematuria.   Musculoskeletal: Positive for gait problem. Negative for arthralgias and neck stiffness.   Skin: Negative for color change and rash.   Allergic/Immunologic: Negative for immunocompromised state.   Neurological: Positive for weakness (generalized). Negative for headaches.   Hematological: Does not bruise/bleed easily.   Psychiatric/Behavioral: Positive for confusion, decreased concentration and dysphoric mood. Negative for hallucinations.   All other systems reviewed and are negative.    A complete review of systems was performed with pertinent positives and negatives noted in the HPI, and all other systems negative.    Physical Exam   BP: 119/75  Pulse: 76  Temp: 98.2  F (36.8  C)  Resp: 16  Height: 175.3 cm (5' 9\")  Weight: 52.6 kg (116 lb)  SpO2: 95 %  Lying Orthostatic BP: 136/84  Lying Orthostatic Pulse: 60 bpm  Sitting Orthostatic BP: 129/84  Sitting Orthostatic Pulse: 64 bpm  Standing Orthostatic BP: 110/88  Standing Orthostatic Pulse: 70 bpm  Physical Exam  Vitals signs and nursing note reviewed.   Constitutional:       General: He is in acute distress.      Appearance: He is ill-appearing. He is not toxic-appearing or diaphoretic.   HENT:      Head: Atraumatic dry mm.   Eyes:      General: No scleral icterus.     Pupils: Pupils are equal, round, and reactive to light.   Cardiovascular:      Heart sounds: Normal heart sounds.   Pulmonary:      Effort: No respiratory distress.      Breath sounds: Normal breath sounds.   Abdominal:      General: Bowel sounds are normal.      Palpations: Abdomen is soft.      Tenderness: There is no abdominal tenderness.   Musculoskeletal:         General: No tenderness.   Skin:     General: Skin is warm.      Findings: No rash.   Neuro: generalized weakness noted without focal findings seen on gross exam. Patient oriented to " person and place and time on initial eval.  Psych:  Flat affect noted    ED Course      Procedures      Patient valuated here in the ER upon arrival.  History per daughter also noted.  Patient was reported to have a UTI treated but this is not true according to daughter.  In the ER we did get a urinalysis which did not show signs of infection.  IV established labs are drawn patient received normal saline liter bolus in the ER also.  Evaluating labs cardiac work-up otherwise negative EKG normal.  Chest x-ray did not show any acute infiltrates.  Patient's ammonia noted to be approximately 72.  White count 8.6 hemoglobin 1.8.  Other liver function test within normal limits.  Glucose noted to be 90 creatinine 0.64 BUN is 32 which is elevated previously.  Discussed with daughter she was concerned about MS flare talk to neurology who recommended she come to the ER they did see the patient in the ER feel this point is unclear if symptoms are truly related to MS exacerbation we did do an MRI scan of the brain with and without the demyelinating disease is seen but no active flare identified at this point then we recommend admitting to medicine for further work-up with the elevated ammonia etc. could be reflective of his Depakote that he is taking he is also potentially dehydrated will continue fluids and need to be reassessed at this point otherwise stable does not show signs of any infection etc. patient to be admitted to medicine.       EKG Interpretation:      Interpreted by Tres Austin MD  Time reviewed: 1520  Symptoms at time of EKG: weakness and confusion   Rhythm: normal sinus   Rate: normal  Axis: normal  Ectopy: none  Conduction: normal  ST Segments/ T Waves: No ST-T wave changes  Q Waves: none  Comparison to prior: No old EKG available    Clinical Impression: normal EKG                      Results for orders placed or performed during the hospital encounter of 05/08/20   XR Chest Port 1 View     Status:  None    Narrative    EXAM: XR CHEST PORT 1 VW  5/8/2020 3:41 PM      HISTORY: SOB and fatigue with MS    COMPARISON: Chest x-ray from 9/22/2010, chest CT from 3/2/2020    FINDINGS:   Upright portable AP radiograph of the chest. Trachea is midline, heart  size is normal. No focal pulmonary opacity, pneumothorax, or pleural  effusion. Calcified granuloma of the right lower lobe is again seen.  No acute osseous or abdominal abnormality.        Impression    IMPRESSION:   No acute cardiopulmonary disease.    I have personally reviewed the examination and initial interpretation  and I agree with the findings.    MYAH HUFF MD   MR Brain w/o & w Contrast     Status: None    Narrative    EXAM: MR BRAIN W/O and W CONTRAST  LOCATION: [facility]  DATE/TIME: 5/8/2020 10:10 PM    INDICATION: Confusion. Multiple sclerosis.  COMPARISON: MRI brain dated 03/02/2020.  CONTRAST: 7.5mL Gadavist  TECHNIQUE: Multiplanar multisequence head MRI without and with intravenous contrast according to the MS protocol.    FINDINGS:  INTRACRANIAL CONTENTS: No acute or subacute infarct. No mass, acute hemorrhage, or extra-axial fluid collections. Stable burden of demyelinating plaques consistent with patient's diagnosis of multiple sclerosis. Moderate generalized cerebral atrophy. No   hydrocephalus. Normal position of the cerebellar tonsils. No definite pathologic contrast enhancement.    SELLA: No abnormality accounting for technique.    OSSEOUS STRUCTURES/SOFT TISSUES: Normal marrow signal. The major intracranial vascular flow voids are maintained.     ORBITS: No abnormality accounting for technique.     SINUSES/MASTOIDS: No paranasal sinus mucosal disease. No middle ear or mastoid effusion.       Impression    IMPRESSION:  1.  Unchanged burden of demyelinating plaques consistent with the patient's diagnosis of multiple sclerosis.  2.  No enhancing plaques or other findings to suggest active demyelination.  3.  No acute  intracranial process.   Routine UA with microscopic     Status: Abnormal   Result Value Ref Range    Color Urine Yellow     Appearance Urine Clear     Glucose Urine Negative NEG^Negative mg/dL    Bilirubin Urine Negative NEG^Negative    Ketones Urine 10 (A) NEG^Negative mg/dL    Specific Gravity Urine 1.024 1.003 - 1.035    Blood Urine Negative NEG^Negative    pH Urine 7.0 5.0 - 7.0 pH    Protein Albumin Urine Negative NEG^Negative mg/dL    Urobilinogen mg/dL Normal 0.0 - 2.0 mg/dL    Nitrite Urine Negative NEG^Negative    Leukocyte Esterase Urine Negative NEG^Negative    Source Midstream Urine     WBC Urine 2 0 - 5 /HPF    RBC Urine 1 0 - 2 /HPF    Mucous Urine Present (A) NEG^Negative /LPF   CBC with platelets differential     Status: Abnormal   Result Value Ref Range    WBC 8.6 4.0 - 11.0 10e9/L    RBC Count 3.37 (L) 4.4 - 5.9 10e12/L    Hemoglobin 11.8 (L) 13.3 - 17.7 g/dL    Hematocrit 36.9 (L) 40.0 - 53.0 %     (H) 78 - 100 fl    MCH 35.0 (H) 26.5 - 33.0 pg    MCHC 32.0 31.5 - 36.5 g/dL    RDW 13.1 10.0 - 15.0 %    Platelet Count 191 150 - 450 10e9/L    Diff Method Automated Method     % Neutrophils 53.9 %    % Lymphocytes 30.0 %    % Monocytes 14.2 %    % Eosinophils 1.5 %    % Basophils 0.2 %    % Immature Granulocytes 0.2 %    Nucleated RBCs 0 0 /100    Absolute Neutrophil 4.6 1.6 - 8.3 10e9/L    Absolute Lymphocytes 2.6 0.8 - 5.3 10e9/L    Absolute Monocytes 1.2 0.0 - 1.3 10e9/L    Absolute Eosinophils 0.1 0.0 - 0.7 10e9/L    Absolute Basophils 0.0 0.0 - 0.2 10e9/L    Abs Immature Granulocytes 0.0 0 - 0.4 10e9/L    Absolute Nucleated RBC 0.0     Platelet Estimate Confirming automated cell count    Comprehensive metabolic panel     Status: Abnormal   Result Value Ref Range    Sodium 143 133 - 144 mmol/L    Potassium 4.2 3.4 - 5.3 mmol/L    Chloride 108 94 - 109 mmol/L    Carbon Dioxide 28 20 - 32 mmol/L    Anion Gap 7 3 - 14 mmol/L    Glucose 90 70 - 99 mg/dL    Urea Nitrogen 32 (H) 7 - 30 mg/dL     Creatinine 0.64 (L) 0.66 - 1.25 mg/dL    GFR Estimate >90 >60 mL/min/[1.73_m2]    GFR Estimate If Black >90 >60 mL/min/[1.73_m2]    Calcium 8.4 (L) 8.5 - 10.1 mg/dL    Bilirubin Total 0.2 0.2 - 1.3 mg/dL    Albumin 2.8 (L) 3.4 - 5.0 g/dL    Protein Total 5.8 (L) 6.8 - 8.8 g/dL    Alkaline Phosphatase 89 40 - 150 U/L    ALT 32 0 - 70 U/L    AST 23 0 - 45 U/L   Lactic acid whole blood     Status: None   Result Value Ref Range    Lactic Acid 1.7 0.7 - 2.0 mmol/L   Nt probnp inpatient (BNP)     Status: None   Result Value Ref Range    N-Terminal Pro BNP Inpatient 97 0 - 900 pg/mL   Troponin I     Status: None   Result Value Ref Range    Troponin I ES 0.016 0.000 - 0.045 ug/L   Ammonia     Status: Abnormal   Result Value Ref Range    Ammonia 72 (H) 10 - 50 umol/L   CBC with platelets     Status: Abnormal   Result Value Ref Range    WBC 7.9 4.0 - 11.0 10e9/L    RBC Count 3.47 (L) 4.4 - 5.9 10e12/L    Hemoglobin 12.3 (L) 13.3 - 17.7 g/dL    Hematocrit 37.6 (L) 40.0 - 53.0 %     (H) 78 - 100 fl    MCH 35.4 (H) 26.5 - 33.0 pg    MCHC 32.7 31.5 - 36.5 g/dL    RDW 13.2 10.0 - 15.0 %    Platelet Count 187 150 - 450 10e9/L   Comprehensive metabolic panel     Status: Abnormal   Result Value Ref Range    Sodium 140 133 - 144 mmol/L    Potassium 4.1 3.4 - 5.3 mmol/L    Chloride 109 94 - 109 mmol/L    Carbon Dioxide 27 20 - 32 mmol/L    Anion Gap 4 3 - 14 mmol/L    Glucose 81 70 - 99 mg/dL    Urea Nitrogen 20 7 - 30 mg/dL    Creatinine 0.58 (L) 0.66 - 1.25 mg/dL    GFR Estimate >90 >60 mL/min/[1.73_m2]    GFR Estimate If Black >90 >60 mL/min/[1.73_m2]    Calcium 8.5 8.5 - 10.1 mg/dL    Bilirubin Total 0.2 0.2 - 1.3 mg/dL    Albumin 2.8 (L) 3.4 - 5.0 g/dL    Protein Total 5.7 (L) 6.8 - 8.8 g/dL    Alkaline Phosphatase 86 40 - 150 U/L    ALT 30 0 - 70 U/L    AST 20 0 - 45 U/L   Valproic Acid Free     Status: Abnormal   Result Value Ref Range    Valproic Acid Free 5.3 (L) 6.0 - 20.0 ug/mL   Valproic acid     Status: Abnormal    Result Value Ref Range    Valproic Acid Level 45 (L) 50 - 100 mg/L   Valproic acid     Status: None   Result Value Ref Range    Valproic Acid Level 90 50 - 100 mg/L   Valproic Acid Free     Status: Abnormal   Result Value Ref Range    Valproic Acid Free 5.2 (L) 6.0 - 20.0 ug/mL   Vitamin B12     Status: None   Result Value Ref Range    Vitamin B12 899 193 - 986 pg/mL   Drug screen urine     Status: Abnormal (In process)   Result Value Ref Range    Benzodiazepine Qual Urine Positive (A) NEG^Negative    Cannabinoids Qual Urine Positive (A) NEG^Negative    Cocaine Qual Urine Negative NEG^Negative    Opiates Qualitative Urine Negative NEG^Negative    Acetaminophen Qual PENDING NEG^Negative    Amantadine Qual PENDING NEG^Negative    Amitriptyline Qual PENDING NEG^Negative    Amoxapine Qual PENDING NEG^Negative    Amphetamines Qual PENDING NEG^Negative    Atropine Qual PENDING NEG^Negative    Bupropion Qual PENDING NEG^Negative    Caffeine Qual PENDING NEG^Negative    Carbamazepine Qual PENDING NEG^Negative    Chlorpheniramine Qual PENDING NEG^Negative    Chlorpromazine Qual PENDING NEG^Negative    Citalopram Qual PENDING NEG^Negative    Clomipramine Qual PENDING NEG^Negative    Cocaine Qual PENDING NEG^Negative    Codeine Qual PENDING NEG^Negative    Desipramine Qual PENDING NEG^Negative    Dextromethorphan Qual PENDING NEG^Negative    Diphenhydramine Qual PENDING NEG^Negative    Doxepin/metabolite Qual PENDING NEG^Negative    Doxylamine Qual PENDING NEG^Negative    Ephedrine or pseudo Qual PENDING NEG^Negative    Fentanyl Qual PENDING NEG^Negative    Fluoxetine and metab Qual PENDING NEG^Negative    Hydrocodone Qual PENDING NEG^Negative    Hydromorphone Qual PENDING NEG^Negative    Ibuprofen Qual PENDING NEG^Negative    Imipramine Qual PENDING NEG^Negative    Ketamine Qual PENDING NEG^Negative    Lamotrigine Qual PENDING NEG^Negative    Lidocaine Qual PENDING NEG^Negative    Loxapine Qual PENDING NEG^Negative     Maprotiline Qual PENDING NEG^Negative    MDMA Qual PENDING NEG^Negative    Meperidine Qual PENDING NEG^Negative    Methadone Qual PENDING NEG^Negative    Methamphetamine Qual PENDING NEG^Negative    Mirtazapine Qual PENDING NEG^Negative    Morphine Qual PENDING NEG^Negative    Nicotine Qual PENDING NEG^Negative    Nortriptyline Qual PENDING NEG^Negative    Olanzapine Qual PENDING NEG^Negative    Oxycodone Qual PENDING NEG^Negative    Pentazocine Qual PENDING NEG^Negative    Phencyclidine Qual PENDING NEG^Negative    Phentermine Qual PENDING NEG^Negative    Propofol Qual PENDING NEG^Negative    Propoxyphene Qual PENDING NEG^Negative    Propranolol Qual PENDING NEG^Negative    Pyrilamine Qual PENDING NEG^Negative    Quetiapine Metab Qual PENDING NEG^Negative    Salicylate Qual PENDING NEG^Negative    Sertraline Qual PENDING NEG^Negative    Theobromine Qual PENDING NEG^Negative    Trimipramine Qual PENDING NEG^Negative    Topiramate Qual PENDING NEG^Negative    Tramadol Qual PENDING NEG^Negative    Venlafaxine Qual PENDING NEG^Negative   Phosphorus     Status: None   Result Value Ref Range    Phosphorus 3.8 2.5 - 4.5 mg/dL   Basic metabolic panel     Status: Abnormal   Result Value Ref Range    Sodium 140 133 - 144 mmol/L    Potassium 4.3 3.4 - 5.3 mmol/L    Chloride 108 94 - 109 mmol/L    Carbon Dioxide 30 20 - 32 mmol/L    Anion Gap 2 (L) 3 - 14 mmol/L    Glucose 117 (H) 70 - 99 mg/dL    Urea Nitrogen 18 7 - 30 mg/dL    Creatinine 0.67 0.66 - 1.25 mg/dL    GFR Estimate >90 >60 mL/min/[1.73_m2]    GFR Estimate If Black >90 >60 mL/min/[1.73_m2]    Calcium 8.7 8.5 - 10.1 mg/dL   CBC with platelets     Status: Abnormal   Result Value Ref Range    WBC 10.0 4.0 - 11.0 10e9/L    RBC Count 3.73 (L) 4.4 - 5.9 10e12/L    Hemoglobin 13.3 13.3 - 17.7 g/dL    Hematocrit 40.3 40.0 - 53.0 %     (H) 78 - 100 fl    MCH 35.7 (H) 26.5 - 33.0 pg    MCHC 33.0 31.5 - 36.5 g/dL    RDW 12.9 10.0 - 15.0 %    Platelet Count 198 150 -  450 10e9/L   EKG 12-lead, tracing only     Status: None   Result Value Ref Range    Interpretation ECG Click View Image link to view waveform and result    Urine Culture Aerobic Bacterial     Status: None    Specimen: Urine Midstream; Midstream Urine   Result Value Ref Range    Specimen Description Midstream Urine     Special Requests Specimen received in preservative     Culture Micro No growth    Blood culture     Status: None (Preliminary result)    Specimen: Arm, Right; Blood    Right Arm   Result Value Ref Range    Specimen Description Blood Right Arm     Culture Micro No growth after 2 days      Medications   albuterol (PROAIR HFA/PROVENTIL HFA/VENTOLIN HFA) 108 (90 Base) MCG/ACT inhaler 2 puff (has no administration in time range)   atorvastatin (LIPITOR) tablet 20 mg (20 mg Oral Given 5/10/20 0834)   fluticasone-vilanterol (BREO ELLIPTA) 100-25 MCG/INH inhaler 1 puff (1 puff Inhalation Given 5/10/20 0834)   pramipexole (MIRAPEX) tablet 0.5 mg (0.5 mg Oral Given 5/9/20 2128)   acetaminophen (TYLENOL) tablet 650 mg (has no administration in time range)   acetaminophen (TYLENOL) Suppository 650 mg (has no administration in time range)   naloxone (NARCAN) injection 0.1-0.4 mg (has no administration in time range)   melatonin tablet 1 mg (has no administration in time range)   ondansetron (ZOFRAN-ODT) ODT tab 4 mg (has no administration in time range)     Or   ondansetron (ZOFRAN) injection 4 mg (has no administration in time range)   gabapentin (NEURONTIN) solution 400 mg (400 mg Oral Given 5/10/20 0834)   pantoprazole (PROTONIX) 2 mg/mL suspension 40 mg (40 mg Oral Given 5/10/20 0834)   acetaminophen (TYLENOL) solution 650 mg (has no administration in time range)   dextrose 10% infusion (has no administration in time range)   multivitamins w/minerals (CERTAVITE) liquid 15 mL (15 mLs Per Feeding Tube Given 5/10/20 0834)   valproic acid (DEPAKENE) solution 500 mg (500 mg Oral or G tube Given 5/10/20 0834)   0.9%  sodium chloride BOLUS (0 mLs Intravenous Stopped 5/8/20 1849)   gadobutrol (GADAVIST) injection 7.5 mL (7.5 mLs Intravenous Given 5/8/20 2238)        Assessments & Plan (with Medical Decision Making)  65-year-old male history of multiple sclerosis who is had flares in the past before presents the ER with a couple days of some intermittent confusion some generalized weakness also.  Patient history is more per daughter via phone.  She is concerned that this could be a flare.  Patient is had some urinary frequency but is not been treated for UTI or previous history of this.  Patient has no reports of any current falls etc. or head trauma.  Patient presented here just generally feeling weak without fever did not display any COVID symptoms etc. IV established labs drawn urinalysis negative for infection EKG cardiac labs otherwise negative chest x-ray negative labs reveal elevated ammonia at 72 unclear etiology liver function test normal no history of liver disease.  Patient's BUN to creatinine ratio is greater than 20-1 concerning for an dehydration IV fluids given also.  Neurology consult recommended MRI MRI brain with and without did not show any active flare but chronic demyelinating disease consistent with MS.  Felt at this point would admit to medicine continue for dehydrated symptoms also further exploration of elevated ammonia which may just be reflective of his Depakote.  Patient is point otherwise stable will be admitted.         I have reviewed the nursing notes. I have reviewed the findings, diagnosis, plan and need for follow up with the patient.    Current Discharge Medication List          Final diagnoses:   Confusion   Increased ammonia level   Multiple sclerosis (H)   Dehydration     [place]illy ROSADO, am serving as a trained medical scribe to document services personally performed by Tres Austin MD based on the provider's statements to me on May 8, 2020.  This document has been checked and  approved by the attending provider.    I, Tres Austin MD, was physically present and have reviewed and verified the accuracy of this note documented by Philly Taylor, medical scribe.     --  Tres Austin MD  Emergency Medicine   [facility], [facility] EMERGENCY DEPARTMENT  5/8/2020    Patient evaluated in the emergency department during the COVID-19 pandemic period. Careful attention to patients safety was addressed throughout the evaluation. Evaluation and treatment management was initiated with disposition made efficiently and appropriate as possible to minimize any risk of potential exposure to patient during this evaluation.  This note was created at least in part by the use of dragon voice dictation system. Inadvertent typographical errors may still exist.  Tres Austin MD.         Tres Austin MD  05/10/20 [id number]

## 2020-05-09 NOTE — PROGRESS NOTES
"CLINICAL NUTRITION SERVICES - ASSESSMENT NOTE     Nutrition Prescription    Recommendations:  Consider diet advancement for comfort pending safety    Malnutrition Status:    Unable to determine as unable to assess all nutrition parameters at this time.     Interventions by Registered Dietitian (RD):  TF regimen ordered:   - Isosource 1.5, 1 can (250 ml) QID via Gtube to provide 1000 ml/day, 1500 kcal (25 kcal/kg), 68 g protein (1.1 g/kg), 176 g CHO, 760 ml H2O and 14 g fiber daily.   - FWF of 90 ml before and after each bolus to meet hydration needs.    - Certavite daily        REASON FOR ASSESSMENT  Vincent Zuniga is a 65 year old male assessed by the dietitian for Provider Order - Registered Dietitian to Assess and Order TF per Medical Nutrition Therapy Protocol    NUTRITION HISTORY   Per discussion with RN, pt unable to provide meaningful nutrition history 2/2 altered mental status.     Per discussion with pt's daughter (Michelle), majority of nutrition needs are met through bolus feeds via G tube. Daughter reports pt started Isosource 1.5 back in March 2020 and has since gained +15 lbs. Pt rarely takes food by mouth. If he does, he only consumes broth or creamy soups.    Home TF regimen:  Isosource 1.5, 1 can (250 ml) TID via G tube to provide 750 ml/day, 1125 kcal (19 kcal/kg), 51 g protein (0.9 g protein/kg), 132 g CHO, 570 ml H2O and 11 g fiber daily. Daughter reports pt receives flushes twice daily which amount to 1 L Pedialyte per day (additional 100 kcal) and vitamin D daily.     CURRENT NUTRITION ORDERS  Diet: NPO  Nutrition support: None ordered currently    LABS  Labs reviewed    MEDICATIONS  Medications reviewed    ANTHROPOMETRICS  Height: 175.3 cm (5' 9\")  Most Recent Weight: 60 kg (132 lb 4.4 oz)    IBW: 72.7 kg  BMI: Normal BMI  Weight History:   Wt Readings from Last 10 Encounters:   05/09/20 60 kg (132 lb 4.4 oz)   03/02/20 52.6 kg (116 lb)   05/14/19 58.5 kg (128 lb 14.4 oz)   11/28/12 55.8 " kg (123 lb)   11/07/12 56.2 kg (123 lb 12.8 oz)   10/24/12 52.9 kg (116 lb 9.6 oz)   10/22/12 54.8 kg (120 lb 12.8 oz)       Dosing Weight: 60 kg (actual, based on admit weight of 60 kg on 5/9/20)     ASSESSED NUTRITION NEEDS  Estimated Energy Needs: 1200 - 1500 kcals/day (20 - 25 kcals/kg)  Justification: Maintenance  Estimated Protein Needs: 60 - 90+ grams protein/day (1.0 - 1.5+ grams of pro/kg)  Justification: Maintenance  Estimated Fluid Needs: 1200 - 1500 mL/day (1 mL/kcal)   Justification: Maintenance    PHYSICAL FINDINGS  Unable to obtain nutrition-focused physical assessment to ensure social distancing and to minimize use of PPE. Will complete per MD request. See malnutrition section below.    MALNUTRITION  % Intake: Decreased intake does not meet criteria  % Weight Loss: None noted  Subcutaneous Fat Loss: Unable to assess  Muscle Loss: Unable to assess  Fluid Accumulation/Edema: Unable to assess  Malnutrition Diagnosis: Unable to determine as unable to assess all nutrition parameters at this time.     NUTRITION DIAGNOSIS  Inadequate enteral nutrition infusion related to dependent on enteral nutrition support via Gtube to meet majority of nutrition needs as evidenced by no nutrition support ordered/started.       INTERVENTIONS  Implementation  Enteral Nutrition - Initiate   Nutrition Education - Per RN, pt not appropriate condition for nutrition education. Nutrition plan communicated with pt's daughter.      Goals  Total avg nutritional intake to meet a minimum of 20 kcal/kg and 1.2 g PRO/kg daily (per dosing wt 60 kg).     Monitoring/Evaluation  Progress toward goals will be monitored and evaluated per protocol.    Mya Dumont RD, LD  Weekend Pager: [phone]

## 2020-05-09 NOTE — PROGRESS NOTES
"  Care Coordinator Progress Note    Admission Date/Time:  5/8/2020  Attending MD:  Katelyn Piña MD    Data  Chart reviewed, discussed with interdisciplinary team.   Patient was admitted for:    Confusion  Increased ammonia level  Multiple sclerosis (H)  Dehydration.    Concerns with insurance coverage for discharge needs: None.  Current Living Situation: Patient lives with family.  Support System: Supportive and Involved  Services Involved: DME  Transportation at Discharge: Car and Family or friend will provide  Transportation to Medical Appointments:   - Name of caregiver: javier Martinez  Barriers to Discharge: Medical Stability    Coordination of Care and Referrals: Provided patient/family with options for DME.        Patient presented with two-day history of altered mental status and concern for multiple sclerosis exacerbation, imaging found to be unchanged and he is admitted overnight to observation for alteration of mental status.     Patient was flagged for needing \"MOON\" document as patient is admitted under observation and has Medicare.     Per chart review, patient is not at mental status baseline, therefore is not appropriate to review \"MOON\" document.     Writer spoke with patient's daughter, Michelle to introduce the role of the care coordinator and assess discharge needs. Writer discussed \"MOON\" document. Michelle stated understanding and gave verbal authorization for the document. Michelle reported that patient does not receive any in-home supports or services at this time. Patient uses [facility] for tube feeding supplies and formula which patient and daughter manage this independently. Michelle could not anticipate any other discharge needs at this time and had no further questions or concerns at this time. RNCC will follow as needed.    Copy of form sent to unit 6A to be placed at patient's bedside.      Plan  Anticipated Discharge Date:  TBD  Anticipated Discharge Plan:  Home with " clinic follow-up as recommended.     Natasha Cutler, RN, BSN, PHN  Care Coordinator   P: [phone], [facility]

## 2020-05-09 NOTE — H&P
[facility], [place]    History and Physical - [facility] Night Float Service        Date of Admission:  5/8/2020    Assessment & Plan   Vincent Zuniga is a 65 year old male admitted on 5/8/2020. He has a  medical history significant for multiple sclerosis, protein-calorie malnutrition, dysphagia, COPD, tobacco use, marijuana use, anxiety, depression who presented to ED 5/8/2020 with two-day history of altered mental status and concern for multiple sclerosis exacerbation, imaging found to be unchanged and he is admitted overnight to observation for alteration of mental status.    #Confusion  #Weakness   #Multiple sclerosis    #Hyperammoniemia   Presented with 2 days of confusion and weakness/instability. There was concern that this could be relapse of MS as his Aubagio was discontinued 3/2020.  MRI imaging however appeared unchanged from prior. Neuro was consulted in the ED. Ammonia was found to be elevated to 72 and this was thought to be elevated secondary to Depakote (which he is on for mood stabilization) and dose was decreased to 500 mg TID from home dose (previously 500 AM, and 750 mg afternoon and evening). On admission he was alert and oriented and mentation was clear. Weakness is likely secondary to poor nutrition however it is non-focal and neuro exam doesn't seem unchanged from prior exams and significant only for right upper extremity tremors which has been present. His confusion/hyperammoniemia likely tied together and likely secondary to depakote effect given no underlying liver disease known.  -CTM overnight on observation   -consider PT/OT as deemed necessary, likely could discharge in the morning   -plan to reduce outpatient Depakote dose to 500 mg TID   -repeat labs in AM including valproic acid levels (total and free)    #COPD  #Tobacco use disorder   On room air, afebrile, normal WBC. No concern for COPD exacerbation or infection at this time.  -PTA  "fluticasone-vilanterol inhaler   -PTA albuterol inhaler PRN    #Anxiety  #Depression  -PTA mirtazapine at bedtime   -holding home bezos   -Depakote for mood stabilization ordered at reduced dose given ammonia level 500 mg TID    #Protein-calorie malnutrition   #Dyshagia  #Cachexia   Etiology of malnutrition unclear, previously thought to be due to severe depression. This is likely a factor but does not exclude other processes given severity of malnutrition concerning for occult malignancy and outpatient work up should be considered. Also thought to be secondary to Aubagio (prior MS medication) though this has been discontinued for over a month and per daughter has been \"nibbling\" on some solid food.   Has G tube for feeds and gets TFs at home.  Consider SLP prior to PO feeding unclear if he tolerates food by mouth well previous hospitalization there was concern for silent aspiration    #RLS  -PTA pramipexole     #GERD  -PTA lansoprazole     #Chroinc pain  -Tylenol PRN  -gabapentin 400 TID  -holding home opioids and benzos    #HLD  -PTA atorvastatin        Diet: None ordered   Fluids: None  DVT Prophylaxis: Ambulate every shift  Meeks Catheter: not present  Code Status: Full code     Disposition Plan  : Observation admission   Expected discharge: Tomorrow, recommended to prior living arrangement once mental status at baseline.  Entered: Mirit Mohsen Yacoup, MD 05/09/2020, 1:23 AM     Patient will be formally staffed in the morning.    Mirit Mohsen Yacoup, MD  [facility] Night Float Service  [facility], [place]  Pager: [phone]  Please see sticky note for cross cover information  ______________________________________________________________________    Chief Complaint   Confusion and clumsiness     History is obtained from the patient and from the chart    History of Present Illness   Vincent Zuniga is a 65 year old man with a medical history significant for multiple sclerosis, protein-calorie " "malnutrition, dysphagia, COPD, tobacco use, marijuana use, anxiety, depression who presented to ED 5/8/2020 with two-day history of altered mental status and concern for multiple sclerosis exacerbation.    Per Vincent, he was brought into the ED because he was been \"off balance\" for a couple of days. He reports that he has been feeling weak and had a fall this morning. He denies hitting his head or hurting anything and he thinks he was able to brace himself. Additionally he does report he has been having some episodes of confusion over the last couple of days as well and reports \"I have been repeating my questions a lot\". He otherwise denies any other symptoms. He currently denies any pain, nausea, vomiting, diarrhea, shortness of breath, fevers, chills, chest pain. He lives with his daughter who helps him with his medications and his tube feeds. He does occasional eat some foods by mouth but typically he reports no appetite. He had some soup today in the ED.     Neurology was consulted in the ED for concern of MS flare and they contacted pt daughter and per chart review: pt used to take Aubagio for his MS until 3/2020 when he stopped it due to worsening gait, urinary incontinence, poor p.o. intake, weight loss.    Daughter was contacted in the ED and noted per neurology:  \"After stopping aubagio 3/2020, patient had improved with time.  He was able to bathe on own, was able to cook on his own, was walking on his own for a while. Was able to leave him on his own while she was running errands.     He appeared to be in his new improved state of health until 2 days ago.  At that point, he appeared to be more quiet and forgetful than usual.  He also reported double vision to his daughter 5/6/20 evening.  He reverted to drinking only chicken broth (had also been nibbling on solid foods prior).  He did not sleep the evening of 5/7.  On 5/8/2020, he was acting anxious per daughter and was \"on the go\".  Daughter notes that " "his tremor of his right upper extremity was more pronounced this morning.  He was perseverating on cashing in a lottery ticket that was already cashed in.  Daughter also noted that he has urinated less than usual for the past few days.     Daughter reports that prior episodes of multiple sclerosis exacerbation included confusion, double vision, urinary frequency.  The only difference from his prior episodes is that he is not going to the bathroom as often.\"    In the ED he was vitally stable with labs only remarkable for Ammonia of 72. He received 1 L NS as well as some maintenance NS and admitted for observation.      Review of Systems    The 10 point Review of Systems is negative other than noted in the HPI or here.     Past Medical History    I have reviewed this patient's medical history and updated it with pertinent information if needed.   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  Past Surgical History:   Procedure Laterality Date     BACK SURGERY      Lumbar     SEPTOPLASTY  10/31/2012    Procedure: SEPTOPLASTY;  Septoplasty, turbinoplasty, enlargement of maxillary ostia;  Surgeon: Carolina Robison MD;  Location: [facility] OR     spleen[  Age 18    spleenectomy        Social History   I have reviewed this patient's social history and updated it with pertinent information if needed. Vincent Zuniga  reports that he has been smoking cigarettes. He has a 30.00 pack-year smoking history. He has never used smokeless tobacco. He " reports that he does not drink alcohol or use drugs.    Family History   I have reviewed this patient's family history and updated it with pertinent information if needed.   Family History   Problem Relation Age of Onset     Cancer Father         Lung     Heart Disease Brother 35        Four heart attacks     Alcohol/Drug Brother      Psychotic Disorder Brother         Drug addiction     Unknown/Adopted Son      Unknown/Adopted Daughter      Unknown/Adopted Daughter      Unknown/Adopted Daughter      Unknown/Adopted Daughter        Prior to Admission Medications   Prior to Admission Medications   Prescriptions Last Dose Informant Patient Reported? Taking?   BREO ELLIPTA 100-25 MCG/INH inhaler Unknown at Unknown time  Yes No   Sig: INHALE 1 DOSE BY MOUTH ONCE DAILY   HYDROmorphone (DILAUDID) 2 MG tablet Unknown at Unknown time  Yes No   Sig: TAKE 1 2 (ONE HALF) TABLET BY MOUTH EVERY 4 HOURS AS NEEDED FOR PAIN   LANsoprazole (PREVACID) 30 MG DR capsule Unknown at Unknown time  Yes No   Sig: TAKE 1 CAPSULE BY MOUTH ONCE DAILY   STARCH-MALTO DEXTRIN ([employer] INSTANT FOOD THICKENER) POWD Unknown at Unknown time  No No   Sig: Take 1 Units by mouth as needed (to thicken all thin liquids taken orally to prevent silent aspiration into lungs)   Patient not taking: Reported on 3/23/2020   albuterol (PROVENTIL HFA) 108 (90 Base) MCG/ACT inhaler Unknown at Unknown time  Yes No   Sig: Inhale 2 puffs into the lungs every 4 hours as needed   atorvastatin (LIPITOR) 20 MG tablet Unknown at Unknown time  Yes No   Sig: Take 20 mg by mouth daily   divalproex sodium delayed-release (DEPAKOTE) 250 MG DR tablet Unknown at Unknown time  Yes No   Sig: TAKE 2 TABLETS BY MOUTH ONCE DAILY IN THE MORNING AND 3 TABLETS AT NOON AND 3 IN THE EVENING   gabapentin (NEURONTIN) 400 MG capsule Unknown at Unknown time  Yes No   Sig: TAKE 1 CAPSULE BY MOUTH THREE TIMES DAILY   melatonin 3 MG tablet Unknown at Unknown time  Yes No   Sig: Take 3 mg by mouth At  Bedtime   meloxicam (MOBIC) 15 MG tablet Unknown at Unknown time  Yes No   Sig: Take 15 mg by mouth daily   mirtazapine (REMERON) 30 MG tablet Unknown at Unknown time  Yes No   Sig: Take 30 mg by mouth At Bedtime   oxyCODONE (ROXICODONE) 5 MG tablet Unknown at Unknown time  Yes No   Sig: TAKE 1 TABLET BY MOUTH THREE TIMES DAILY FOR BREAKTHROUGH PAIN   pramipexole (MIRAPEX) 0.5 MG tablet Unknown at Unknown time  Yes No   Sig: Take 0.5 mg by mouth At Bedtime   traZODone (DESYREL) 50 MG tablet Unknown at Unknown time  Yes No   Sig: Take 50 mg by mouth At Bedtime   valproic acid (DEPAKENE) 250 MG/5ML syrup Unknown at Unknown time  Yes No   Sig: 3 times daily   vitamin D2 (ERGOCALCIFEROL) 37198 units (1250 mcg) capsule Unknown at Unknown time  Yes No   Sig: Take 50,000 Units by mouth once a week   vitamin D3 (CHOLECALCIFEROL) 2000 units (50 mcg) tablet Unknown at Unknown time  No No   Sig: Take 2 tablets (4,000 Units) by mouth daily   zolpidem ER (AMBIEN CR) 12.5 MG CR tablet Unknown at Unknown time  Yes No   Sig: Take 12.5 mg by mouth At Bedtime      Facility-Administered Medications: None     Allergies   Allergies   Allergen Reactions     Immune Globulin Rash       Physical Exam   Vital Signs: Temp: 98.2  F (36.8  C) Temp src: Oral BP: 128/80 Pulse: 52   Resp: 16 SpO2: 94 % O2 Device: None (Room air)    Weight: 116 lbs 0 oz    Constitutional: awake, alert, cooperative, no apparent distress, and appears stated age  Eyes: Extra ocular muscles intact, sclera clear, conjunctiva normal  ENT: Normocephalic, without obvious abnormality, atraumatic  Hematologic / Lymphatic: no cervical lymphadenopathy  Respiratory: No increased work of breathing, good air exchange, clear to auscultation bilaterally, no crackles or wheezing  Cardiovascular: normal apical pulses , normal S1 and S2 and no murmur noted  GI: abdomen soft, non-distended, non-tender, no masses palpated. G-tube in place without surrounding erythema   Skin: normal skin  color, texture, turgor  Musculoskeletal: no lower extremity pitting edema present  Neurologic: No focal neurologic deficits. Bilateral tremor of upper extremities     Data   Data reviewed today: I reviewed all medications, new labs and imaging results over the last 24 hours.    Recent Labs   Lab 05/08/20  1518   WBC 8.6   HGB 11.8*   *         POTASSIUM 4.2   CHLORIDE 108   CO2 28   BUN 32*   CR 0.64*   ANIONGAP 7   RIO 8.4*   GLC 90   ALBUMIN 2.8*   PROTTOTAL 5.8*   BILITOTAL 0.2   ALKPHOS 89   ALT 32   AST 23   TROPI 0.016   Ammonia: 72    Brain MRI w & w/o contrast:  IMPRESSION:  1.  Unchanged burden of demyelinating plaques consistent with the patient's diagnosis of multiple sclerosis.  2.  No enhancing plaques or other findings to suggest active demyelination.  3.  No acute intracranial process.

## 2020-05-09 NOTE — PROGRESS NOTES
Comments: -diagnostic tests and consults completed and resulted - No  -vital signs normal or at patient baseline - VSS  -returns to baseline functional status - Unsure at this time

## 2020-05-09 NOTE — PHARMACY-CONSULT NOTE
Pharmacy Tube Feeding Consult    Medication reviewed for administration by feeding tube and for potential food/drug interactions.    Recommendation:    Gabapentin and acetaminophen were changed to liquid to facilitate administration via feeding tube    Lansoprazole tablet was changed to pantoprazole liquid per formulary and to facilitate administration via feeding tube    Atorvastatin, mirtazapine, and pramipexole can be crushed for feeding tube administration if patient is unable to swallow    Divalproex is delayed-release and cannot be crushed. Patient is taking it for mood stabilization. Can consider changing to valproic acid solution at same dose. Per Elias Blanco's note, they are discussing with Neurology regarding this - will await their recommendation.    Pharmacy will continue to follow as new medications are ordered.    Maria Brower, PharmD, BCPS  5/9/2020 2:15 PM

## 2020-05-09 NOTE — PROGRESS NOTES
Status: New admission.  Arrived from ED around 0230.  Admitted to 6A with altered mental status.  Hx: MS  Vitals: VSS/A  Neuros: A&Ox4.  Denies N/T.  Reports weakness to arms and legs, and trouble with balance. Reports, fall at home on 5/8.   Bilateral UE tremors.   HAIRSTON.  Strengths: 5/5 throughout.    IV: R)  PIV S/L  Resp/trach: WDL  Diet: (no diet ordered yet).  PEG in place.  Per patient, PEG placed at this facility in March, 2020.    Bowel status: per patient, unknown  : Voiding spont, without difficulty  Skin: intact  Pain: denies  Activity: Up to bathroom, with SBA, GB. Walker ordered.  Social:  Primary contact, Rosalee, Daughter.  Phone number in chart, and on white board.    Plan: Observation Status.

## 2020-05-09 NOTE — PROGRESS NOTES
Observation Status Goals:  1. diagnostic tests and consults completed and resulted - (no, unknown)  2. vital signs normal or at patient baseline -VSS/A  3. returns to baseline functional status -no (reports weakness to arms and legs)  Nurse to notify provider when observation goals have been met and patient is ready for discharge.

## 2020-05-09 NOTE — PLAN OF CARE
Vitals: VSS on RA  Neuros: Alert and oriented x3, disoriented to exact date. Tremors to BUE.  IV: PIV SL'd  Resp/trach: WNL on RA  Diet: Regular with nectar thick liquids-needs supervision. Bolus TF 4 cans/day-PEG tube. Had first can at 1630.  Bowel status: Bs+x4, no BM this shift  : Voiding  Skin: Intact  Pain: Denied  Activity: Up with SBA, GB, walker.  Social: No visitors this shift  Plan: PT/OT consult.

## 2020-05-09 NOTE — PROGRESS NOTES
[facility], [place]    Progress Note - Marpardeep 2 Service        Date of Admission:  5/8/2020    Assessment & Plan   Vincent Zuniga is a 65 year old male admitted on 5/8/2020. He has a  medical history significant for multiple sclerosis, protein-calorie malnutrition, dysphagia, COPD, tobacco use, marijuana use, anxiety, depression who presented to ED 5/8/2020 with two-day history of altered mental status and concern for multiple sclerosis exacerbation, imaging found to be unchanged and he is admitted overnight to observation for alteration of mental status.     #Confusion  #Weakness   #Multiple sclerosis    #Hyperammoniemia   Presented with 2 days of confusion and weakness/instability. There was concern that this could be relapse of MS as his Aubagio was discontinued 3/2020.  MRI imaging however appeared unchanged from prior. Neuro was consulted in the ED. Ammonia was found to be elevated to 72 and this was thought to be elevated secondary to Depakote (which he is on for mood stabilization) and dose was decreased to 500 mg TID from home dose (previously 500 AM, and 750 mg afternoon and evening). On admission he was alert and oriented and mentation was clear. Weakness is likely secondary to poor nutrition however it is non-focal and neuro exam doesn't seem unchanged from prior exams and significant only for right upper extremity tremors which has been present.   -PT/OT consulted  -Discussed Depakote adjustments with neurology (he reports difficulty swallowing pills)    #Orthostatic hypotension  #Urinary frequency  #Nocturia/polyuria  May be related to underlying MS v. BPH v. med side effects v. dehydration.  - I/O  - PVRs     #COPD  #Tobacco use disorder   On room air, afebrile, normal WBC. No concern for COPD exacerbation or infection at this time.  -PTA fluticasone-vilanterol inhaler   -PTA albuterol inhaler PRN     #Anxiety  #Depression  -PTA mirtazapine at bedtime   -Depakote  "500 mg TID     #Protein-calorie malnutrition   #Dyshagia  #Cachexia   Etiology of malnutrition unclear, previously thought to be due to severe depression. This is likely a factor but does not exclude other processes given severity of malnutrition concerning for occult malignancy and outpatient work up should be considered. Also thought to be secondary to Aubagio (prior MS medication) though this has been discontinued for over a month and per daughter has been \"nibbling\" on some solid food. Has G tube for feeds and gets TFs at home.  -Nutrition consulted  -SLP consulted     #RLS  -PTA pramipexole      #GERD  -PTA lansoprazole      #Chronic pain  -Tylenol PRN  -PTA Gabapentin 400 TID     #HLD  -PTA atorvastatin      Diet: Regular diet  Fluids: None  DVT Prophylaxis: Ambulate every shift  Meeks Catheter: not present  Code Status: Full code     Disposition Plan   Expected discharge: Tomorrow, recommended to prior living arrangement once mental status at baseline.  Entered: Ariane Moya MD 05/09/2020, 12:32 PM     The patient's care was discussed with the Attending Physician, Dr. Piña.    Ariane Moya MD  [address]  Pager: [phone]  Please see sticky note for cross cover information  ______________________________________________________________________    Interval History   Patient doing well this morning. Thinks he is here for dizziness which has now resolved. Per nursing, patient having difficulty with depakote pills due to size. Denies fevers, chills, chest pain, weakness, falls.    Data reviewed today: I reviewed all medications, new labs and imaging results over the last 24 hours.    Physical Exam   Vital Signs: Temp: 97.9  F (36.6  C) Temp src: Oral BP: 133/85 Pulse: 55   Resp: 16 SpO2: 95 % O2 Device: None (Room air)    Weight: 132 lbs 4.42 oz  Constitutional: awake, alert, cooperative, no apparent distress, and appears stated age  Eyes: Extra ocular " muscles intact, sclera clear, conjunctiva normal  ENT: Normocephalic, without obvious abnormality, atraumatic  Hematologic / Lymphatic: no cervical lymphadenopathy  Respiratory: No increased work of breathing, good air exchange, clear to auscultation bilaterally, no crackles or wheezing  Cardiovascular: normal apical pulses , normal S1 and S2 and no murmur noted  GI: abdomen soft, non-distended, non-tender, no masses palpated. G-tube in place without surrounding erythema   Skin: normal skin color, texture, turgor  Musculoskeletal: no lower extremity pitting edema present, bilateral UE and LE strength 5/5 in all muscle groups  Neurologic: CN2-12 intact. Bilateral tremor of upper extremities (L>R)    Data   Recent Labs   Lab 05/09/20  0408 05/08/20  1518   WBC 7.9 8.6   HGB 12.3* 11.8*   * 110*    191    143   POTASSIUM 4.1 4.2   CHLORIDE 109 108   CO2 27 28   BUN 20 32*   CR 0.58* 0.64*   ANIONGAP 4 7   RIO 8.5 8.4*   GLC 81 90   ALBUMIN 2.8* 2.8*   PROTTOTAL 5.7* 5.8*   BILITOTAL 0.2 0.2   ALKPHOS 86 89   ALT 30 32   AST 20 23   TROPI  --  0.016     Recent Results (from the past 24 hour(s))   XR Chest Port 1 View    Narrative    EXAM: XR CHEST PORT 1 VW  5/8/2020 3:41 PM      HISTORY: SOB and fatigue with MS    COMPARISON: Chest x-ray from 9/22/2010, chest CT from 3/2/2020    FINDINGS:   Upright portable AP radiograph of the chest. Trachea is midline, heart  size is normal. No focal pulmonary opacity, pneumothorax, or pleural  effusion. Calcified granuloma of the right lower lobe is again seen.  No acute osseous or abdominal abnormality.        Impression    IMPRESSION:   No acute cardiopulmonary disease.    I have personally reviewed the examination and initial interpretation  and I agree with the findings.    MYAH HUFF MD   MR Brain w/o & w Contrast    Narrative    EXAM: MR BRAIN W/O and W CONTRAST  LOCATION: [facility]  DATE/TIME: 5/8/2020 10:10 PM    INDICATION: Confusion.  Multiple sclerosis.  COMPARISON: MRI brain dated 03/02/2020.  CONTRAST: 7.5mL Gadavist  TECHNIQUE: Multiplanar multisequence head MRI without and with intravenous contrast according to the MS protocol.    FINDINGS:  INTRACRANIAL CONTENTS: No acute or subacute infarct. No mass, acute hemorrhage, or extra-axial fluid collections. Stable burden of demyelinating plaques consistent with patient's diagnosis of multiple sclerosis. Moderate generalized cerebral atrophy. No   hydrocephalus. Normal position of the cerebellar tonsils. No definite pathologic contrast enhancement.    SELLA: No abnormality accounting for technique.    OSSEOUS STRUCTURES/SOFT TISSUES: Normal marrow signal. The major intracranial vascular flow voids are maintained.     ORBITS: No abnormality accounting for technique.     SINUSES/MASTOIDS: No paranasal sinus mucosal disease. No middle ear or mastoid effusion.       Impression    IMPRESSION:  1.  Unchanged burden of demyelinating plaques consistent with the patient's diagnosis of multiple sclerosis.  2.  No enhancing plaques or other findings to suggest active demyelination.  3.  No acute intracranial process.     Medications       atorvastatin  20 mg Oral Daily     divalproex sodium delayed-release  500 mg Oral TID     fluticasone-vilanterol  1 puff Inhalation Daily     gabapentin  400 mg Oral TID     mirtazapine  30 mg Oral At Bedtime     pantoprazole  40 mg Oral QAM AC     pramipexole  0.5 mg Oral At Bedtime

## 2020-05-09 NOTE — PROGRESS NOTES
Comments: -diagnostic tests and consults completed and resulted - No PT/OT/SP ordered to eval  -vital signs normal or at patient baseline - VSS  -returns to baseline functional status - Unsure at this time

## 2020-05-09 NOTE — PLAN OF CARE
VSS. Denies pain. Pt d/o to exact date. BUE tremors and generalized weakness. Voiding spontaneously, PVR 50ml. Unsure of last BM. Up with SBA/GB and walker. Pt has PEG tube, new orders for TF bolus (has not had bolus yet, waiting for TF). All medications through PEG, pt stated he has trouble swallowing pills. Pt was on regular diet this am, had 3 cups of broth. MD then made pt NPO, would like speech to evaluate. Speech unable to see pt today but from previous notes stated pt has been on regular diet with nectar thin liquids (supervision). Pt is also a silent aspirator. Pt upset he cannot eat at this time. Waiting for PT/OT evals too. Daughter Michelle updated by RN and MD also aware to call her. Bed/chair alarm on at all times, pt has called appropriately.

## 2020-05-10 ENCOUNTER — PATIENT OUTREACH (OUTPATIENT)
Dept: CARE COORDINATION | Facility: CLINIC | Age: 65
End: 2020-05-10

## 2020-05-10 VITALS
TEMPERATURE: 97 F | HEART RATE: 60 BPM | DIASTOLIC BLOOD PRESSURE: 81 MMHG | WEIGHT: 123.5 LBS | RESPIRATION RATE: 16 BRPM | HEIGHT: 69 IN | OXYGEN SATURATION: 97 % | SYSTOLIC BLOOD PRESSURE: 144 MMHG | BODY MASS INDEX: 18.29 KG/M2

## 2020-05-10 LAB
ANION GAP SERPL CALCULATED.3IONS-SCNC: 2 MMOL/L (ref 3–14)
BUN SERPL-MCNC: 18 MG/DL (ref 7–30)
CALCIUM SERPL-MCNC: 8.7 MG/DL (ref 8.5–10.1)
CHLORIDE SERPL-SCNC: 108 MMOL/L (ref 94–109)
CO2 SERPL-SCNC: 30 MMOL/L (ref 20–32)
CREAT SERPL-MCNC: 0.67 MG/DL (ref 0.66–1.25)
ERYTHROCYTE [DISTWIDTH] IN BLOOD BY AUTOMATED COUNT: 12.9 % (ref 10–15)
GFR SERPL CREATININE-BSD FRML MDRD: >90 ML/MIN/{1.73_M2}
GLUCOSE SERPL-MCNC: 117 MG/DL (ref 70–99)
HCT VFR BLD AUTO: 40.3 % (ref 40–53)
HGB BLD-MCNC: 13.3 G/DL (ref 13.3–17.7)
MCH RBC QN AUTO: 35.7 PG (ref 26.5–33)
MCHC RBC AUTO-ENTMCNC: 33 G/DL (ref 31.5–36.5)
MCV RBC AUTO: 108 FL (ref 78–100)
PHOSPHATE SERPL-MCNC: 3.8 MG/DL (ref 2.5–4.5)
PLATELET # BLD AUTO: 198 10E9/L (ref 150–450)
POTASSIUM SERPL-SCNC: 4.3 MMOL/L (ref 3.4–5.3)
RBC # BLD AUTO: 3.73 10E12/L (ref 4.4–5.9)
SODIUM SERPL-SCNC: 140 MMOL/L (ref 133–144)
WBC # BLD AUTO: 10 10E9/L (ref 4–11)

## 2020-05-10 PROCEDURE — 40000894 ZZH STATISTIC OT IP EVAL DEFER: Performed by: OCCUPATIONAL THERAPIST

## 2020-05-10 PROCEDURE — 99217 ZZC OBSERVATION CARE DISCHARGE: CPT | Mod: GC | Performed by: INTERNAL MEDICINE

## 2020-05-10 PROCEDURE — 84100 ASSAY OF PHOSPHORUS: CPT | Performed by: STUDENT IN AN ORGANIZED HEALTH CARE EDUCATION/TRAINING PROGRAM

## 2020-05-10 PROCEDURE — 25000132 ZZH RX MED GY IP 250 OP 250 PS 637: Performed by: STUDENT IN AN ORGANIZED HEALTH CARE EDUCATION/TRAINING PROGRAM

## 2020-05-10 PROCEDURE — 36415 COLL VENOUS BLD VENIPUNCTURE: CPT | Performed by: STUDENT IN AN ORGANIZED HEALTH CARE EDUCATION/TRAINING PROGRAM

## 2020-05-10 PROCEDURE — G0378 HOSPITAL OBSERVATION PER HR: HCPCS

## 2020-05-10 PROCEDURE — 80048 BASIC METABOLIC PNL TOTAL CA: CPT | Performed by: STUDENT IN AN ORGANIZED HEALTH CARE EDUCATION/TRAINING PROGRAM

## 2020-05-10 PROCEDURE — 85027 COMPLETE CBC AUTOMATED: CPT | Performed by: STUDENT IN AN ORGANIZED HEALTH CARE EDUCATION/TRAINING PROGRAM

## 2020-05-10 PROCEDURE — 25000132 ZZH RX MED GY IP 250 OP 250 PS 637: Performed by: INTERNAL MEDICINE

## 2020-05-10 RX ORDER — PAROXETINE 10 MG/1
20 TABLET, FILM COATED ORAL AT BEDTIME
Qty: 90 TABLET | Refills: 3 | Status: SHIPPED | OUTPATIENT
Start: 2020-05-10 | End: 2020-05-14

## 2020-05-10 RX ADMIN — GABAPENTIN 400 MG: 250 SUSPENSION ORAL at 08:34

## 2020-05-10 RX ADMIN — FLUTICASONE FUROATE AND VILANTEROL TRIFENATATE 1 PUFF: 100; 25 POWDER RESPIRATORY (INHALATION) at 08:34

## 2020-05-10 RX ADMIN — PANTOPRAZOLE SODIUM 40 MG: 40 TABLET, DELAYED RELEASE ORAL at 08:34

## 2020-05-10 RX ADMIN — MULTIVITAMIN 15 ML: LIQUID ORAL at 08:34

## 2020-05-10 RX ADMIN — ATORVASTATIN CALCIUM 20 MG: 20 TABLET, FILM COATED ORAL at 08:34

## 2020-05-10 RX ADMIN — VALPROIC ACID 500 MG: 250 SOLUTION ORAL at 08:34

## 2020-05-10 ASSESSMENT — MIFFLIN-ST. JEOR: SCORE: 1345.57

## 2020-05-10 NOTE — PROGRESS NOTES
Observation Status Goals:  1. diagnostic tests and consults completed and resulted - not met  2. vital signs normal or at patient baseline -met  3. returns to baseline functional status - unknown   Nurse to notify provider when observation goals have been met and patient is ready for discharge.

## 2020-05-10 NOTE — PROGRESS NOTES
Observation Status Goals:  1. diagnostic tests and consults completed and resulted - not met  2. vital signs normal or at patient baseline -met  3. returns to baseline functional status - Met  Nurse to notify provider when observation goals have been met and patient is ready for discharge.

## 2020-05-10 NOTE — PROGRESS NOTES
Status:altered mental status. Observation Status.  Hx: MS  Vitals: VSS/A  Neuros: A&Ox4.  Denies N/T. Per patient report, weakness is improved from yesterday.  Bilateral UE tremors.   HAIRSTON.  Strengths: 5/5 throughout.    IV: PIV S/L  Resp/trach: WDL  Diet: Nectar thick, Regular. PEG in place.  TF (Isosource 1.5) bolus, 4 cans per day.  1st feeding (1 can)  done at 0600.  Bowel status: per patient, unknown  : Voiding spont, without difficulty.    Skin: intact  Pain: denies  Activity: Up to bathroom, with SBA, GB. Walker.  Plan:  Continue with current POC.      0600: 1 can (250mL) TF done, at 0600.

## 2020-05-10 NOTE — DISCHARGE SUMMARY
[facility], [place]  Discharge Summary - Medicine & Pediatrics       Date of Admission:  5/8/2020  Date of Discharge:  5/10/2020  Discharging Provider: Katelyn Piña MD  Discharge Service: Elias 2    Discharge Diagnoses   Confusion of unknown etiology - resolved  Orthostatic hypotension  Polyuria/Nocturia  Anxiety/Depression    Follow-ups Needed After Discharge   Follow-up Appointments     Follow Up and recommended labs and tests      Follow up with PCP and/or outpatient neurologist regarding the following   issues  - urinary concerns  - possibility of getting a new sleep study, adjusting medications for   sleep (many meds may cause altered mental status)  - previously on paroxetine and unclear why it was discontinued             Unresulted Labs Ordered in the Past 30 Days of this Admission     Date and Time Order Name Status Description    5/8/2020 1516 Blood culture Preliminary       These results will be followed up by PCP    Discharge Disposition   Discharged to home  Condition at discharge: Good    Hospital Course   Vincent Zuniga is a 65 year old male with PMH significant for MS, protein-calorie malnutrition, dysphagia, COPD, tobacco use, marijuana use, anxiety, depression who presented to ED 5/8/2020 with two-day history of altered mental status and concern for multiple sclerosis exacerbation, imaging found to be unchanged and he was admitted to obs for alteration of mental status.    #Confusion  #Weakness   #Multiple sclerosis    #Hyperammoniemia   #Orthostatic hypotension secondary to dehydration   Presented with 2 days of confusion and weakness/instability. There was concern that this could be relapse of MS as his Aubagio was discontinued 3/2020.  MRI imaging however appeared unchanged from prior. Neuro was consulted in the ED. Ammonia was found to be elevated to 72 and this was thought to be elevated secondary to Depakote (which he is on for mood stabilization) and  "dose was decreased to 500 mg TID from home dose (previously 500 AM, and 750 mg afternoon and evening). On admission he was alert and oriented and mentation was clear, and his neuro exam was reasssuringly unchanged from prior documented exams and significant only for right upper extremity tremors which has been present.  His weakness and dizziness was suspected secondary to poor nutrition and dehydration, polypharmacy, with possible contribution from polyuria (though this did not persist in the hospital). His dizziness and weakness improved with IVF resuscitation and continuing TF, and he was reportedly at baseline at discharge.  -Depakene decreased to 500mg dose TID  -in discussion with his daughter, his overnight sedating medications were adjusted, see med rec below. He is reportedly not taking narcotics at home.   -Outpatient neurology follow up    #Urinary frequency  #Nocturia/polyuria  May be related to underlying MS v. BPH v. med side effects. He had no sign of UTI, UOP was normal here with normal sodiums, PVRs were within normal limits, and overall history less suspicious for DI or BPH, and empiric therapy was not started inpatient.    - Outpatient neurology follow up       #Anxiety  #Depression  - Restarted paroxetine at 10mg daily with plan to increase dose to 20mg in 1 week.  - Follow up with outpatient PCP for further management  - Consider outpatient sleep study     #Protein-calorie malnutrition   #hx of dyshagia  #Cachexia   Etiology of malnutrition unclear, previously thought to be due to severe depression. This is likely a factor but does not exclude other processes given severity of malnutrition concerning for occult malignancy and outpatient work up should be considered. Also thought to be secondary to Aubagio (prior MS medication) though this has been discontinued for over a month and per daughter has been \"nibbling\" on some solid food. Has G tube for feeds and gets TFs at home. Nutrition and SLP " recommended continuing outpatient regimen since he has been followed by them as an outpatient. Additionally, he states the issues with eating are caused by a lack of taste buds rather than dysphagia (but he did have some difficulty swallowing pills during this admission)    Consultations This Hospital Stay   PHYSICAL THERAPY ADULT IP CONSULT  OCCUPATIONAL THERAPY ADULT IP CONSULT  SPEECH LANGUAGE PATH ADULT IP CONSULT  NUTRITION SERVICES ADULT IP CONSULT  NUTRITION SERVICES ADULT IP CONSULT  PHARMACY IP CONSULT    Code Status   Full Code     The patient was discussed with Dr. Ayana Moya MD  [place] [id number] Service  [facility]  Pager: [phone]       Physician Attestation   I, Katelyn Piña, saw and evaluated this patient prior to discharge.  I discussed the patient with the resident/fellow and agree with plan of care as documented in the note.      I personally reviewed vital signs, medications, labs and imaging.    I personally spent 35 minutes on discharge activities.    Katelyn Piña MD  Date of Service (when I saw the patient): 5/10/20    ______________________________________________________________________    Physical Exam   Vital Signs: Temp: 97  F (36.1  C) Temp src: Oral BP: (!) 144/81 Pulse: 60   Resp: 16 SpO2: 97 % O2 Device: None (Room air)    Weight: 123 lbs 8 oz     Constitutional: awake, alert, cooperative, no apparent distress, chronically ill appearing   Eyes: Extra ocular muscles intact, sclera clear, conjunctiva normal  ENT: Normocephalic, without obvious abnormality, atraumatic  Hematologic / Lymphatic: no cervical lymphadenopathy  Respiratory: No increased work of breathing, good air exchange, clear to auscultation bilaterally, no crackles or wheezing  Cardiovascular: normal apical pulses , normal S1 and S2 and no murmur noted  GI: abdomen soft, non-distended, non-tender, no masses palpated. G-tube in place without surrounding  erythema   Skin: normal skin color, texture, turgor  Musculoskeletal: no lower extremity pitting edema present, bilateral UE and LE strength 5/5 in all muscle groups  Neurologic: CN2-12 intact. Bilateral tremor of upper extremities (L>R)      Primary Care Physician   Physician No Ref-Primary    Discharge Orders      Reason for your hospital stay    Altered mental status     Follow Up and recommended labs and tests    Follow up with PCP and/or outpatient neurologist regarding the following issues  - urinary concerns  - possibility of getting a new sleep study, adjusting medications for sleep (many meds may cause altered mental status)  - previously on paroxetine and unclear why it was discontinued     When to contact your care team    Call your primary doctor if you have any of the following: acute changes in mental status, urinary retention     Activity    Your activity upon discharge: activity as tolerated     Full Code     Diet    Follow this diet upon discharge: Orders Placed This Encounter      Adult Formula Bolus Feeding: QID Isosource 1.5; Route: Gastrostomy; 4; Can(s); Medication - Feeding Tube Flush Frequency: At least 15-30 mL water before and after medication administration and with tube clogging; 5/9: Bolus 1 can (250 ml) QID (ie ...      Regular Diet Adult Nectar Thickened Liquids (pre-thickened or use instant food thickener)       Significant Results and Procedures   Most Recent 3 CBC's:  Recent Labs   Lab Test 05/10/20  0650 05/09/20  0408 05/08/20  1518   WBC 10.0 7.9 8.6   HGB 13.3 12.3* 11.8*   * 108* 110*    187 191     Most Recent 3 BMP's:  Recent Labs   Lab Test 05/10/20  0650 05/09/20  0408 05/08/20  1518    140 143   POTASSIUM 4.3 4.1 4.2   CHLORIDE 108 109 108   CO2 30 27 28   BUN 18 20 32*   CR 0.67 0.58* 0.64*   ANIONGAP 2* 4 7   RIO 8.7 8.5 8.4*   * 81 90     Most Recent 2 LFT's:  Recent Labs   Lab Test 05/09/20  0408 05/08/20  1518   AST 20 23   ALT 30 32    ALKPHOS 86 89   BILITOTAL 0.2 0.2   ,   Results for orders placed or performed during the hospital encounter of 05/08/20   XR Chest Port 1 View    Narrative    EXAM: XR CHEST PORT 1 VW  5/8/2020 3:41 PM      HISTORY: SOB and fatigue with MS    COMPARISON: Chest x-ray from 9/22/2010, chest CT from 3/2/2020    FINDINGS:   Upright portable AP radiograph of the chest. Trachea is midline, heart  size is normal. No focal pulmonary opacity, pneumothorax, or pleural  effusion. Calcified granuloma of the right lower lobe is again seen.  No acute osseous or abdominal abnormality.        Impression    IMPRESSION:   No acute cardiopulmonary disease.    I have personally reviewed the examination and initial interpretation  and I agree with the findings.    MYAH HUFF MD   MR Brain w/o & w Contrast    Narrative    EXAM: MR BRAIN W/O and W CONTRAST  LOCATION: [facility]  DATE/TIME: 5/8/2020 10:10 PM    INDICATION: Confusion. Multiple sclerosis.  COMPARISON: MRI brain dated 03/02/2020.  CONTRAST: 7.5mL Gadavist  TECHNIQUE: Multiplanar multisequence head MRI without and with intravenous contrast according to the MS protocol.    FINDINGS:  INTRACRANIAL CONTENTS: No acute or subacute infarct. No mass, acute hemorrhage, or extra-axial fluid collections. Stable burden of demyelinating plaques consistent with patient's diagnosis of multiple sclerosis. Moderate generalized cerebral atrophy. No   hydrocephalus. Normal position of the cerebellar tonsils. No definite pathologic contrast enhancement.    SELLA: No abnormality accounting for technique.    OSSEOUS STRUCTURES/SOFT TISSUES: Normal marrow signal. The major intracranial vascular flow voids are maintained.     ORBITS: No abnormality accounting for technique.     SINUSES/MASTOIDS: No paranasal sinus mucosal disease. No middle ear or mastoid effusion.       Impression    IMPRESSION:  1.  Unchanged burden of demyelinating plaques consistent with the patient's  diagnosis of multiple sclerosis.  2.  No enhancing plaques or other findings to suggest active demyelination.  3.  No acute intracranial process.       Discharge Medications   Current Discharge Medication List      START taking these medications    Details   PARoxetine (PAXIL) 10 MG tablet Take 2 tablets (20 mg) by mouth At Bedtime Take 1 tablet daily for 1 week then full dose (2 tablets daily) afterwards.  Qty: 90 tablet, Refills: 3    Associated Diagnoses: Depressive disorder         CONTINUE these medications which have CHANGED    Details   valproic acid (DEPAKENE) 250 MG/5ML syrup 10 mLs (500 mg) by Oral or G tube route 3 times daily  Qty: 900 mL, Refills: 3    Associated Diagnoses: Psychosis, unspecified psychosis type (H)         CONTINUE these medications which have NOT CHANGED    Details   albuterol (PROVENTIL HFA) 108 (90 Base) MCG/ACT inhaler Inhale 2 puffs into the lungs every 4 hours as needed      atorvastatin (LIPITOR) 20 MG tablet Take 20 mg by mouth daily      BREO ELLIPTA 100-25 MCG/INH inhaler INHALE 1 DOSE BY MOUTH ONCE DAILY      gabapentin (NEURONTIN) 400 MG capsule TAKE 1 CAPSULE BY MOUTH THREE TIMES DAILY      HYDROmorphone (DILAUDID) 2 MG tablet TAKE 1 2 (ONE HALF) TABLET BY MOUTH EVERY 4 HOURS AS NEEDED FOR PAIN      LANsoprazole (PREVACID) 30 MG DR capsule TAKE 1 CAPSULE BY MOUTH ONCE DAILY      melatonin 3 MG tablet Take 3 mg by mouth At Bedtime      meloxicam (MOBIC) 15 MG tablet Take 15 mg by mouth daily      oxyCODONE (ROXICODONE) 5 MG tablet TAKE 1 TABLET BY MOUTH THREE TIMES DAILY FOR BREAKTHROUGH PAIN      pramipexole (MIRAPEX) 0.5 MG tablet Take 0.5 mg by mouth At Bedtime      STARCH-MALTO DEXTRIN ([employer] INSTANT FOOD THICKENER) POWD Take 1 Units by mouth as needed (to thicken all thin liquids taken orally to prevent silent aspiration into lungs)  Qty: 1020 g, Refills: 3    Associated Diagnoses: Dysphagia, unspecified type      traZODone (DESYREL) 50 MG tablet Take 50 mg by mouth At  Bedtime      vitamin D2 (ERGOCALCIFEROL) 62630 units (1250 mcg) capsule Take 50,000 Units by mouth once a week      vitamin D3 (CHOLECALCIFEROL) 2000 units (50 mcg) tablet Take 2 tablets (4,000 Units) by mouth daily  Qty: 60 tablet, Refills: 3    Associated Diagnoses: Vitamin D deficiency         STOP taking these medications       divalproex sodium delayed-release (DEPAKOTE) 250 MG DR tablet Comments:   Reason for Stopping:         mirtazapine (REMERON) 30 MG tablet Comments:   Reason for Stopping:         zolpidem ER (AMBIEN CR) 12.5 MG CR tablet Comments:   Reason for Stopping:             Allergies   Allergies   Allergen Reactions     Immune Globulin Rash

## 2020-05-10 NOTE — PLAN OF CARE
Status: Pt admitted for AMS  Vitals: VSS on RA  Neuros: Alert & oriented x4, flat, slow,  BUE tremors, GW  IV: SL  Resp/trach: Denies SOB  Diet: Regular diet w/nectar thick, had a total of 2 out of 4 cans completed today  : Voiding spontaneously, urgency, bladder scanned for 168  Pain: Denies  Activity: SBA w/GB and walker  Plan: Continue to monitor and follow POC

## 2020-05-10 NOTE — PLAN OF CARE
"6A OT - Defer    Discharge Planner OT   Patient plan for discharge: Home with assist from daughter    Current status: Pt's affect flat, with quiet voice. Pt is oriented x3, answered 5 problem-based home safety questions accurately. Pt reports IND with all ADLs, with no AE. Pt lives with his daughter who provides assist with all IADLs, including medication set up/reminders to take meds, meal prep, cleaning/laundry, and driving. Pt does not work and reports \"watching some tv\" as a typical daily activity.Pt completed toileting with supervision, including transfer, garment management, cleaning, and hand hygiene at sink, demonstrating safe functional mobility and task completeness.  Pt ambulated x200 ft in hallway with no AE, steady on his ft. No acute OT/PT needs identified at this time, will complete orders. Non-billable encounter.    Barriers to return to prior living situation: Medical status    Recommendations for discharge: Home with assist from daughter    Rationale for recommendations: Pt is at functional baseline, receives assist with IADLs, as needed.        Entered by: Brynn Carrasco 05/10/2020 8:33 AM       "

## 2020-05-10 NOTE — PROGRESS NOTES
Observation Status Goals:  1. diagnostic tests and consults completed and resulted - Met  2. vital signs normal or at patient baseline -met  3. returns to baseline functional status - Met  Nurse to notify provider when observation goals have been met and patient is ready for discharge

## 2020-05-10 NOTE — PLAN OF CARE
-diagnostic tests and consults completed and resulted:not met  -vital signs normal or at patient baseline: met  -returns to baseline functional status: unsure at this time   Nasal discomfort Nasal discomfort Nasal discomfort Nasal discomfort Nasal discomfort Nasal discomfort

## 2020-05-10 NOTE — PLAN OF CARE
Discharge Planner PT   Patient plan for discharge: not discussed     Current status: PT eval received and appreciated. Per chart review and interdisciplinary discussion, pt without IP therapy needs. PT to defer. Will complete orders. Ambulates in zaldivar indep. No AD. Please re consult if pt has change in status.     Barriers to return to prior living situation: medical needs    Recommendations for discharge: defer to OT    Rationale for recommendations: defer to OT       Entered by: Alysa Andujar 05/10/2020 9:32 AM

## 2020-05-10 NOTE — PLAN OF CARE
VSS. Denies pain. Neuros intact. BUE tremors at baseline. Voiding spontaneously, PVR 38 ml. Unsure of last BM. Up with SBA/GB and walker. Pt has PEG tube, tolerated 2 bolus TF. Also on regular diet with nectar thick liquids. Worked with OT. Pt is being discharged home at this time. Discharge medications and instructions gone and questions clarified with MD. Daughter here a this time to pick pt up. Pt declined to  medications, stated he has them at home already.

## 2020-05-11 LAB
ACETAMINOPHEN QUAL: NEGATIVE
AMANTADINE: NEGATIVE
AMITRIPTYLINE QUAL: NEGATIVE
AMOXAPINE: NEGATIVE
AMPHETAMINES QUAL: POSITIVE
ATROPINE: NEGATIVE
BENZODIAZ UR QL: POSITIVE
BUPROPION QUAL: NEGATIVE
CAFFEINE QUAL: POSITIVE
CANNABINOIDS UR QL SCN: POSITIVE
CARBAMAZEPINE QUAL: NEGATIVE
CHLORPHENIRAMINE: NEGATIVE
CHLORPROMAZINE: NEGATIVE
CITALOPRAM QUAL: NEGATIVE
CLOMIPRAMINE QUAL: NEGATIVE
COCAINE QUAL: NEGATIVE
COCAINE UR QL: NEGATIVE
CODEINE QUAL: NEGATIVE
DESIPRAMINE QUAL: NEGATIVE
DEXTROMETHORPHAN: NEGATIVE
DIPHENHYDRAMINE: NEGATIVE
DOXEPIN/METABOLITE: NEGATIVE
DOXYLAMINE: NEGATIVE
EPHEDRINE OR PSEUDO: NEGATIVE
FENTANYL QUAL: NEGATIVE
FLUOXETINE AND METAB: NEGATIVE
HYDROCODONE QUAL: NEGATIVE
HYDROMORPHONE QUAL: NEGATIVE
IBUPROFEN QUAL: NEGATIVE
IMIPRAMINE QUAL: NEGATIVE
KETAMINE QUAL: NEGATIVE
LAMOTRIGINE QUAL: NEGATIVE
LIDOCAIN SPEC QL: NEGATIVE
LOXAPINE: NEGATIVE
MAPROTYLINE: NEGATIVE
MDMA QUAL: NEGATIVE
MEPERIDINE QUAL: NEGATIVE
METHAMPHETAMINE: NEGATIVE
METHODONE QUAL: NEGATIVE
MIRTAZAPINE QUAL: NEGATIVE
MORPHINE QUAL: NEGATIVE
NICOTINE: POSITIVE
NORTRIPTYLINE QUAL: NEGATIVE
OLANZAPINE QUAL: NEGATIVE
OPIATES UR QL SCN: NEGATIVE
OXYCODONE QUAL: NEGATIVE
PENTAZOCINE: NEGATIVE
PHENCYCLIDINE QUAL: NEGATIVE
PHENTERMINE: NEGATIVE
PROPOFOL QUAL: NEGATIVE
PROPOXPHENE QUAL: NEGATIVE
PROPRANOLOL QUAL: NEGATIVE
PYRILAMINE: NEGATIVE
QUETIAPINE METAB QUAL: NEGATIVE
SALICYLATE QUAL: NEGATIVE
SERTRALINE QUAL: NEGATIVE
THEOBROMINE: POSITIVE
TOPIRAMATE QUAL: NEGATIVE
TRAMADOL QUAL: NEGATIVE
TRIMIPRAMINE QUAL: NEGATIVE
VENLAFAXINE QUAL: NEGATIVE

## 2020-05-12 NOTE — PROGRESS NOTES
Patient was called three times and no answer so post 24 hr DC follow up calls will be closed out    Ramona Camara CMA   Post Hospital Discharge Team

## 2020-05-14 ENCOUNTER — VIRTUAL VISIT (OUTPATIENT)
Dept: NEUROLOGY | Facility: CLINIC | Age: 65
End: 2020-05-14
Attending: PSYCHIATRY & NEUROLOGY
Payer: COMMERCIAL

## 2020-05-14 DIAGNOSIS — Z79.51 LONG TERM (CURRENT) USE OF INHALED STEROIDS: ICD-10-CM

## 2020-05-14 DIAGNOSIS — G35 MULTIPLE SCLEROSIS (H): Primary | ICD-10-CM

## 2020-05-14 LAB
BACTERIA SPEC CULT: NO GROWTH
SPECIMEN SOURCE: NORMAL

## 2020-05-14 RX ORDER — CLONAZEPAM 0.5 MG/1
1 TABLET ORAL
Qty: 30 TABLET | Refills: 3 | Status: SHIPPED | OUTPATIENT
Start: 2020-05-14 | End: 2020-10-28

## 2020-05-14 RX ORDER — ESCITALOPRAM OXALATE 20 MG/1
20 TABLET ORAL DAILY
Qty: 30 TABLET | Refills: 4 | Status: SHIPPED | OUTPATIENT
Start: 2020-05-14 | End: 2020-10-29

## 2020-05-14 RX ORDER — OXYBUTYNIN CHLORIDE 5 MG/1
5 TABLET ORAL DAILY
COMMUNITY

## 2020-05-14 NOTE — PROGRESS NOTES
" Vincent Zuniga is a 66 y/o man who is being evaluated via a billable video visit.      The patient has been notified of following:     \"This video visit will be conducted via a call between you and your physician/provider. We have found that certain health care needs can be provided without the need for an in-person physical exam.  This service lets us provide the care you need with a video conversation.  If a prescription is necessary we can send it directly to your pharmacy.  If lab work is needed we can place an order for that and you can then stop by our lab to have the test done at a later time.    Video visits are billed at different rates depending on your insurance coverage.  Please reach out to your insurance provider with any questions.    If during the course of the call the physician/provider feels a video visit is not appropriate, you will not be charged for this service.\"    Patient has given verbal consent for Video visit? Yes    How would you like to obtain your AVS? Tati    Patient would like the video invitation sent by: Text to cell phone: [phone]    Will anyone else be joining your video visit? No        Video-Visit Details    Type of service:  Video Visit    Video Start Time: 9:30  Video End Time: 10:06    Originating Location (pt. Location): Home    Distant Location (provider location):  [facility] MULTIPLE SCLEROSIS      Yonny used      [facility]LE [facility]CL[facility]IC  NEW PATIENT EVALUATION/CONSULTATION    Referral source:   [address] / [facility] [address]          PRINCIPAL NEUROLOGIC DIAGNOSIS: Multiple Sclerosis    DISEASE SUMMARY  Date of onset: 1-19 confusion, L-sided clumsiness and facial droop  Date of diagnosis of MS: 1-19  Disease course at onset: Relapsing Remitting  Current disease course: Relapsing Remitting  Previous disease therapies: Aubagio  Current disease therapy: P  Most recent MRI brain: 3-20  Most recent MRI " "cervical spine: 3-20  CSF: 3 OCB  JCV serology result and date: P      HISTORY OF ILLNESS:    An opinion on this 65 year old right handed genetic male  was requested by Dr. Nila Wagoner for management of MS. The patient was accompanied by raquel.     He was seen by me during his admission to the [facility] from 3-2 to 304 2020, the following is the HPI from that admission:    \"65 year old male h/o multiple sclerosis diagnosed last year treated with Aubagio who presents with 1-2 weeks of acutely progressive decline. He had been stable for about the first 5-6 months of therapy, but over the past 2-3 months has seen progressive functional decline, with worsening unsteady gait, urinary incontinence, taste changes, poor PO intake, and 40 pound weight loss. He had acute worsening of these symptoms over the past 1-2 weeks, to the point where he was not eating or drinking. He was recently admitted to [facility] on 2/18 to 2/22/2020, and presented to the ED about one week later as he was contiuing to decline functionally at home. MRI was performed 3/2/2020 and demonstrated no new enhancing lesions since prior admission, however Dr. Guerra's evaluation of outside images revealed small cervicomedullary enhancing lesion and overall increased disease burden\".     In the hospital he underwent a peg tube placement and social issues were addressed by . He lives alone and doesn't take good care of himself, smokes THC and cigarettes and doesn't eat, fortunately his daughter Rosalee keeps a close eye on him.    \"65-year-old male recently diagnosed with relapsing remitting multiple sclerosis, he was started on Aubagio in 1-19  but did not tolerated, he was having GI issues, hair thinning and worsening depression which is unlikely related to Aubagio.  He was switched to Mayzent and still awaiting approval for it.  He was doing well until a couple of weeks ago when he developed sudden confusion, he was taken to " the ER and found to be extremely dehydrated, no UTI was found.  Once he was hydrated his mental status returned to normal, and he has been doing well since.    He and his daughter deny any new neurological symptoms or any worsening of his previous symptoms including dysphagia and back he is no longer using thickening powder and is able to drink water without choking.    Current issues are difficulty sleeping which he is supposed to take 100 mg of trazodone for he does not like to take it secondary to side effects of sedation the next day.  He usually goes to bed between 9 and 11 PM wakes up at 1 AM stays awake until 6 PM takes a nap wakes up and stays awake until 9 or 11.    He reports walking a quarter of a mile daily but misses a few days.  In terms of his mood he continues to be depressed despite taking 20 mg of Paxil.  During his last hospitalization he was started on oxybutynin for overactive bladder and thinks it might be working since he is not waking up to go to the bathroom in the middle of the night but he is only been on it for 2 days.  His daughter reports that the he will underwent an EGG while in the hospital there was no evidence of abnormal epileptic discharges.    Her no other issues or concerns no weight loss he is still using his G-tube and obtaining enough calories that way.  According to the daughter they are still awaiting the new disease modifying therapy.    Current Symptoms:  1. Uncontrolled depression/anxiety  2. Difficulty staying asleep  3. 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     Numbness and tingling     in legs     Shortness of breath        Past  Surgical History:   Procedure Laterality Date     BACK SURGERY      Lumbar     SEPTOPLASTY  10/31/2012    Procedure: SEPTOPLASTY;  Septoplasty, turbinoplasty, enlargement of maxillary ostia;  Surgeon: Carolina Robison MD;  Location: [facility] OR     spleen[  Age 18    spleenectomy              Current Outpatient Prescriptions:  Current Outpatient Medications   Medication     albuterol (PROVENTIL HFA) 108 (90 Base) MCG/ACT inhaler     atorvastatin (LIPITOR) 20 MG tablet     gabapentin (NEURONTIN) 400 MG capsule     meloxicam (MOBIC) 15 MG tablet     oxybutynin (DITROPAN) 5 MG tablet     pramipexole (MIRAPEX) 0.5 MG tablet     STARCH-MALTO DEXTRIN ([employer] INSTANT FOOD THICKENER) POWD     valproic acid (DEPAKENE) 250 MG/5ML syrup     vitamin D2 (ERGOCALCIFEROL) 06190 units (1250 mcg) capsule     vitamin D3 (CHOLECALCIFEROL) 2000 units (50 mcg) tablet     No current facility-administered medications for this visit.           ALLERGIES       Allergies   Allergen Reactions     Methylprednisolone Other (See Comments)     Immune Globulin Rash         Social History    Social History     Socioeconomic History     Marital status:      Spouse name: Not on file     Number of children: Not on file     Years of education: Not on file     Highest education level: Not on file   Occupational History     Not on file   Social Needs     Financial resource strain: Not on file     Food insecurity     Worry: Not on file     Inability: Not on file     Transportation needs     Medical: Not on file     Non-medical: Not on file   Tobacco Use     Smoking status: Current Every Day Smoker     Packs/day: 1.00     Years: 30.00     Pack years: 30.00     Types: Cigarettes     Smokeless tobacco: Never Used   Substance and Sexual Activity     Alcohol use: No     Drug use: No     Sexual activity: Not Currently   Lifestyle     Physical activity     Days per week: Not on file     Minutes per session: Not on file     Stress: Not on file   Relationships      Social connections     Talks on phone: Not on file     Gets together: Not on file     Attends Confucianist service: Not on file     Active member of club or organization: Not on file     Attends meetings of clubs or organizations: Not on file     Relationship status: Not on file     Intimate partner violence     Fear of current or ex partner: Not on file     Emotionally abused: Not on file     Physically abused: Not on file     Forced sexual activity: Not on file   Other Topics Concern     Parent/sibling w/ CABG, MI or angioplasty before 65F 55M? Yes   Social History Narrative     Not on file         FAMILY HISTORY     Family History   Problem Relation Age of Onset     Cancer Father         Lung     Heart Disease Brother 35        Four heart attacks     Alcohol/Drug Brother      Psychotic Disorder Brother         Drug addiction     Unknown/Adopted Son      Unknown/Adopted Daughter      Unknown/Adopted Daughter      Unknown/Adopted Daughter      Unknown/Adopted Daughter          REVIEW OF SYSTEMS:    Comprehensive review of systems otherwise was negative, including constitutional, head and neck, cardiovascular, pulmonary, gastrointestinal, endocrine, urologic, reproductive, rheumatic, hematologic, immunologic, dermatologic, and psychiatric.    Nutritional concerns: None  Driving issues: None   Safety concerns regarding living situations and safety at home: None  Risk of falls: None  Pain: None      ASSESSMENT:    Relapsing remitting multiple sclerosis currently stable on no disease modifying therapy, awaiting approval of medicine?  I have not been able to see the patient face-to-face since admission in March mostly due to the pandemic.  Today we did a video encounter.    PLAN:    Is experiencing anxiety which is not being controlled by Paxil, therefore I will stop Paxil start Lexapro 20 mg a day.    Since he does not like the side effects of trazodone we will stop it and start clonazepam 1 mg 30 minutes before  bedtime, he has been advised to walk 2 to 3 hours before going to bed and to avoid taking a nap once he wakes up.    He does have chronic back pain for which he takes Mobic for, he is also on gabapentin 400 mg 3 times a day complains of feeling tired so we will decrease his gabapentin to after lunch and prior to bedtime in order to decrease the side effects.  The patient will be seen again in 4 months with repeat MRIs of the brain cervical and thoracic spine to establish a new baseline.    He and his daughter understand and agree with the plan    Finally I will follow the patient up in 4 month(s) as long as the patient is doing well. I instructed the patient to call or mychart my office with any concerns or questions.    I spent 30 minutes in this visit, with >50% direct patient time spent counseling about prognosis, treatment options, and coordination of care.     My recommendations will be communicated back to the patient's physician(s) by mail.  Follow-up is expected to be with me.      Cassandra Guerra MD  Chief, Multiple Sclerosis Division  Department of Neurology  [facility] [place]

## 2020-05-14 NOTE — TELEPHONE ENCOUNTER
The [place] nurse was finally able to connect with the patient and his daughter today; He is scheduled for his baseline studies on 5/20/20; He is also being referred over to their assistance foundation, given his high OOP cost for the medication; Will update Dr. Guerra.    Nila Salinas, MS RN Care Coordinator

## 2020-05-25 ENCOUNTER — TRANSFERRED RECORDS (OUTPATIENT)
Dept: HEALTH INFORMATION MANAGEMENT | Facility: CLINIC | Age: 65
End: 2020-05-25

## 2020-05-26 NOTE — TELEPHONE ENCOUNTER
Patient's baseline testing were done yesterday, 5/25/20; Will await results, which will take at least a week.    Nila Salinas, MS RN Care Coordinator

## 2020-05-28 NOTE — TELEPHONE ENCOUNTER
EKG results rec'd, scanned to chart and placed in Dr. Guerra's folder. Labs and ME screen pending.

## 2020-06-02 ENCOUNTER — TRANSFERRED RECORDS (OUTPATIENT)
Dept: HEALTH INFORMATION MANAGEMENT | Facility: CLINIC | Age: 65
End: 2020-06-02

## 2020-06-03 ENCOUNTER — TELEPHONE (OUTPATIENT)
Dept: NEUROLOGY | Facility: CLINIC | Age: 65
End: 2020-06-03

## 2020-06-03 NOTE — TELEPHONE ENCOUNTER
[facility] Call Center    Phone Message    May a detailed message be left on voicemail: yes     Reason for Call: Other: Please call Jessenia with status update of results of EKG faxed to our clinic yesterday. Thank you.     Action Taken: Message routed to:  [facility] ([facility]):  Neurology    Travel Screening: Not Applicable

## 2020-06-04 NOTE — TELEPHONE ENCOUNTER
Patient's genotype labs and baseline eye exam received and scanned in the media tab; Will notify Dr. Guerra for review.    Nila Salinas, MS RN Care Coordinator

## 2020-06-09 NOTE — TELEPHONE ENCOUNTER
I received the following response from Dr. Guerra:    Reviewed, 2 mg/day    Cleared for therapy form completed and placed in Dr. Guerra's folder for signature; Once signed when she is in clinic on Thursday, this will be faxed.    Nila Salinas MS RN Care Coordinator

## 2020-06-18 NOTE — TELEPHONE ENCOUNTER
Patient's daughter apparently questioning why the patient needs an FDO for the Mayzent; Marielena, please assist in answering this question; Thank you.    Nila Salinas, MS RN Care Coordinator

## 2020-06-18 NOTE — TELEPHONE ENCOUNTER
I called patient's daughter at [phone] to discuss her questions, but she was not available. Will try again this afternoon.

## 2020-07-01 ENCOUNTER — TRANSFERRED RECORDS (OUTPATIENT)
Dept: HEALTH INFORMATION MANAGEMENT | Facility: CLINIC | Age: 65
End: 2020-07-01

## 2020-07-02 NOTE — TELEPHONE ENCOUNTER
Per Dr. Guerra, OK to continue Mayzent and patient should have a repeat EKG in a month. Order placed and voice message left to patient's daughter.

## 2020-07-02 NOTE — TELEPHONE ENCOUNTER
Per FDO document, patient's pre and post FDO EKG showed NS with possible anterior infarct and his QTc increased from 396-442.  Patient had asymptomatic bradycardia as well.     Prior EKGs in our system from 5/8/2020 and 5/25/20 showed NSR. Will check with Dr. Guerra to see whether patient should have a Cardiology evaluation to continue Mayzent.

## 2020-07-14 ENCOUNTER — VIRTUAL VISIT (OUTPATIENT)
Dept: NEUROPSYCHOLOGY | Facility: CLINIC | Age: 65
End: 2020-07-14
Attending: NURSE PRACTITIONER
Payer: COMMERCIAL

## 2020-07-14 DIAGNOSIS — F39 IMPAIRED MOOD REGULATION (H): ICD-10-CM

## 2020-07-14 DIAGNOSIS — R41.3 MEMORY LOSS: Primary | ICD-10-CM

## 2020-07-14 DIAGNOSIS — F41.9 ANXIOUSNESS: ICD-10-CM

## 2020-07-14 DIAGNOSIS — F12.29: ICD-10-CM

## 2020-07-14 NOTE — PROGRESS NOTES
The patient was seen for video neuropsychological evaluation at the request of Marielena Brooks for the purposes of diagnostic clarification and treatment planning.  120 minutes of test administration and scoring were provided by this writer.  Please see Dr. Sebas Stallings's report for a full interpretation of the findings.

## 2020-07-24 ENCOUNTER — PRE VISIT (OUTPATIENT)
Dept: UROLOGY | Facility: CLINIC | Age: 65
End: 2020-07-24

## 2020-07-24 NOTE — TELEPHONE ENCOUNTER
Reason for visit: Incontinence consult     Relevant information: Neurogenic bladder due to MS    Records/imaging/labs/orders: some records available    Pt called: phone number not active    At Rooming: standard

## 2020-07-27 DIAGNOSIS — E55.9 VITAMIN D DEFICIENCY: ICD-10-CM

## 2020-07-27 RX ORDER — CHOLECALCIFEROL (VITAMIN D3) 50 MCG
TABLET ORAL
Qty: 180 TABLET | Refills: 0 | Status: SHIPPED | OUTPATIENT
Start: 2020-07-27 | End: 2020-10-29
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
## 2020-07-27 NOTE — TELEPHONE ENCOUNTER
Received refill request for Vitamin D from [facility]; Patient was last seen in May and has follow up appointment in September with Dr. Guerra; Refilled per MS refill protocol.    Nila Salinas MS RN Care Coordinator

## 2020-08-03 ENCOUNTER — OFFICE VISIT (OUTPATIENT)
Dept: UROLOGY | Facility: CLINIC | Age: 65
End: 2020-08-03
Attending: PSYCHIATRY & NEUROLOGY
Payer: COMMERCIAL

## 2020-08-03 ENCOUNTER — PRE VISIT (OUTPATIENT)
Dept: UROLOGY | Facility: CLINIC | Age: 65
End: 2020-08-03

## 2020-08-03 VITALS — HEIGHT: 69 IN | WEIGHT: 128 LBS | BODY MASS INDEX: 18.96 KG/M2

## 2020-08-03 DIAGNOSIS — G35 MULTIPLE SCLEROSIS (H): Primary | ICD-10-CM

## 2020-08-03 ASSESSMENT — MIFFLIN-ST. JEOR: SCORE: 1365.98

## 2020-08-03 ASSESSMENT — PAIN SCALES - GENERAL: PAINLEVEL: NO PAIN (0)

## 2020-08-03 NOTE — LETTER
8/3/2020       RE: Vincent Zuniga  [address]  [place] [address]     Dear Colleague,    Thank you for referring your patient, Vincent Zuniga, to the [facility] UROLOGY AND INST FOR PROSTATE AND UROLOGIC CANCERS at [facility]. Please see a copy of my visit note below.    Vincent Zuniga is a 65 year old man with 2 yr diagnosis of MS who is seen in consultation for urinary incontinence. He was in depends since the diagnosis. Was started on Ditropan 5mg every day and now is able to wear underwear and accidents are only every few days. Still has urgency and some urge incontinence.     No hematuria, no UTIs, no dysuria.    Mild dry mouth.    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     Numbness and tingling     in legs     Shortness of breath    MS    Past Surgical History:   Procedure Laterality Date     BACK SURGERY      Lumbar     SEPTOPLASTY  10/31/2012    Procedure: SEPTOPLASTY;  Septoplasty, turbinoplasty, enlargement of maxillary ostia;  Surgeon: Carolina Robison MD;  Location: [facility] OR     spleen[  Age 18    spleenectomy       Current Outpatient Medications   Medication     albuterol (PROVENTIL HFA) 108 (90 Base) MCG/ACT inhaler     atorvastatin (LIPITOR) 20 MG tablet     Cholecalciferol (VITAMIN D3) 50 MCG (2000 UT) TABS     clonazePAM (KLONOPIN) 0.5 MG tablet     escitalopram (LEXAPRO) 20 MG tablet     gabapentin (NEURONTIN) 400 MG capsule     meloxicam (MOBIC) 15 MG tablet     oxybutynin (DITROPAN) 5 MG tablet     pramipexole (MIRAPEX) 0.5 MG tablet     STARCH-MALTO DEXTRIN ([employer] INSTANT FOOD THICKENER) POWD     valproic acid (DEPAKENE) 250 MG/5ML syrup      vitamin D2 (ERGOCALCIFEROL) 36300 units (1250 mcg) capsule     No current facility-administered medications for this visit.       ROS: 10 point ROS neg other than the symptoms noted above in the HPI.    No family history of urologic problems similar to those being experienced by the patient.     Exam:  Constitutional: healthy, alert, no distress   Head: Normocephalic. No masses, lesions, tenderness or abnormalities  Respiratory: breathing non-labored  Gastrointestinal: Abdomen soft, non-tender. BS normal. No masses, organomegaly, has G tube in place  : Normal external genitalia without lesions; circumcised without meatal stenosis  Musculoskeletal: extremities normal- no gross deformities noted, gait normal and normal muscle tone  Skin: no suspicious lesions or rashes  Neurologic: positive findings: shuffling gait, rest tremor  Psychiatric: affect flat and fatigued    Post-void residual urine volume by U/S today: <60cc    A/P: MS with urge incontinence now improved on low dose of oxybutinin. He is emptying well by U/S. Can continue on oxybutinin. THis can be titrated up as needed to 30mg a day. No need to f/u with me unless symptoms refractory to max dose.            Again, thank you for allowing me to participate in the care of your patient.      Sincerely,    Tobias Martin MD

## 2020-08-03 NOTE — PATIENT INSTRUCTIONS
Please follow up as needed     It was a pleasure meeting with you today.  Thank you for allowing me and my team the privilege of caring for you today.  YOU are the reason we are here, and I truly hope we provided you with the excellent service you deserve.  Please let us know if there is anything else we can do for you so that we can be sure you are leaving completely satisfied with your care experience.

## 2020-08-03 NOTE — NURSING NOTE
Chief Complaint   Patient presents with     Consult     Incontinence consult       Dariana Singh MA

## 2020-08-03 NOTE — PROGRESS NOTES
Vincent Zuniga is a 65 year old man with 2 yr diagnosis of MS who is seen in consultation for urinary incontinence. He was in depends since the diagnosis. Was started on Ditropan 5mg every day and now is able to wear underwear and accidents are only every few days. Still has urgency and some urge incontinence.     No hematuria, no UTIs, no dysuria.    Mild dry mouth.    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     Numbness and tingling     in legs     Shortness of breath    MS    Past Surgical History:   Procedure Laterality Date     BACK SURGERY      Lumbar     SEPTOPLASTY  10/31/2012    Procedure: SEPTOPLASTY;  Septoplasty, turbinoplasty, enlargement of maxillary ostia;  Surgeon: Carolina Robison MD;  Location: [facility] OR     spleen[  Age 18    spleenectomy       Current Outpatient Medications   Medication     albuterol (PROVENTIL HFA) 108 (90 Base) MCG/ACT inhaler     atorvastatin (LIPITOR) 20 MG tablet     Cholecalciferol (VITAMIN D3) 50 MCG (2000 UT) TABS     clonazePAM (KLONOPIN) 0.5 MG tablet     escitalopram (LEXAPRO) 20 MG tablet     gabapentin (NEURONTIN) 400 MG capsule     meloxicam (MOBIC) 15 MG tablet     oxybutynin (DITROPAN) 5 MG tablet     pramipexole (MIRAPEX) 0.5 MG tablet     STARCH-MALTO DEXTRIN ([employer] INSTANT FOOD THICKENER) POWD     valproic acid (DEPAKENE) 250 MG/5ML syrup     vitamin D2 (ERGOCALCIFEROL) 26971 units (1250 mcg) capsule     No current facility-administered medications for this visit.       ROS: 10 point ROS neg other than the symptoms noted above in the HPI.    No family history of urologic problems similar to those being experienced by the patient.     Exam:  Constitutional:  healthy, alert, no distress   Head: Normocephalic. No masses, lesions, tenderness or abnormalities  Respiratory: breathing non-labored  Gastrointestinal: Abdomen soft, non-tender. BS normal. No masses, organomegaly, has G tube in place  : Normal external genitalia without lesions; circumcised without meatal stenosis  Musculoskeletal: extremities normal- no gross deformities noted, gait normal and normal muscle tone  Skin: no suspicious lesions or rashes  Neurologic: positive findings: shuffling gait, rest tremor  Psychiatric: affect flat and fatigued    Post-void residual urine volume by U/S today: <60cc    A/P: MS with urge incontinence now improved on low dose of oxybutinin. He is emptying well by U/S. Can continue on oxybutinin. THis can be titrated up as needed to 30mg a day. No need to f/u with me unless symptoms refractory to max dose.

## 2020-08-28 ENCOUNTER — TELEPHONE (OUTPATIENT)
Dept: NEUROLOGY | Facility: CLINIC | Age: 65
End: 2020-08-28

## 2020-08-28 DIAGNOSIS — E55.9 VITAMIN D DEFICIENCY: ICD-10-CM

## 2020-08-28 NOTE — TELEPHONE ENCOUNTER
[facility] Call Center    Phone Message    May a detailed message be left on voicemail: yes     Reason for Call: Patients daughter Michelle called and would like a call back to discuss feeding tube and feeding 3 times a day - wondering if that could be increased. Please call her back at [phone].     Action Taken: Message routed to:  [facility] ([facility]): Neurology    Travel Screening: Not Applicable

## 2020-08-31 RX ORDER — CHOLECALCIFEROL (VITAMIN D3) 50 MCG
TABLET ORAL
Qty: 180 TABLET | Refills: 0 | OUTPATIENT
Start: 2020-08-31

## 2020-08-31 NOTE — TELEPHONE ENCOUNTER
Received refill request for Vitamin D; This medication was refilled for 3 months on 7/27/20, therefore, this is a duplicate request; Rx denied per [facility] refill protocol.    Nila Salinas, MS RN Care Coordinator

## 2020-08-31 NOTE — TELEPHONE ENCOUNTER
I tried calling the patient's daughter back, however, she did not answer and I was unable to leave a message; This request for tube feeding management would not be done so by Dr. Guerra, neurologist, and this request should be re-directed to either the patient's primary care physician, or whoever has been managing this for the patient.    Nila Salinas, MS RN Care Coordinator

## 2020-09-15 ENCOUNTER — DOCUMENTATION ONLY (OUTPATIENT)
Dept: NEUROLOGY | Facility: CLINIC | Age: 65
End: 2020-09-15

## 2020-09-30 ENCOUNTER — TELEPHONE (OUTPATIENT)
Dept: NEUROLOGY | Facility: CLINIC | Age: 65
End: 2020-09-30

## 2020-09-30 NOTE — TELEPHONE ENCOUNTER
I called and talked to Michelle (patient's daughter). She is wondering whether we can increase his tube feed orders. I asked her to contact patient's PCP with this request. She agrees with this.

## 2020-09-30 NOTE — TELEPHONE ENCOUNTER
[facility] Call Center    Phone Message    May a detailed message be left on voicemail: yes     Reason for Call: Order(s): Other:   Reason for requested: Patient daughter is calling on behalf of patient in regards to his feeding orders. Patient goes through [facility] for orders and daughter is wondering if feeding orders can be doubled since daughter states patient is skinny.  Please contact [facility]: [phone]        Date needed: assap  Provider name: Dr. Guerra       Action Taken: Other:  NEUROLOGY    Travel Screening: Not Applicable

## 2020-10-07 ENCOUNTER — HOSPITAL ENCOUNTER (EMERGENCY)
Facility: CLINIC | Age: 65
Discharge: HOME | End: 2020-10-07
Attending: EMERGENCY MEDICINE | Admitting: EMERGENCY MEDICINE
Payer: COMMERCIAL

## 2020-10-07 ENCOUNTER — APPOINTMENT (OUTPATIENT)
Dept: GENERAL RADIOLOGY | Facility: CLINIC | Age: 65
End: 2020-10-07
Attending: EMERGENCY MEDICINE
Payer: COMMERCIAL

## 2020-10-07 VITALS
BODY MASS INDEX: 19.2 KG/M2 | HEART RATE: 49 BPM | WEIGHT: 130 LBS | TEMPERATURE: 98.5 F | RESPIRATION RATE: 16 BRPM | DIASTOLIC BLOOD PRESSURE: 75 MMHG | OXYGEN SATURATION: 98 % | SYSTOLIC BLOOD PRESSURE: 111 MMHG

## 2020-10-07 DIAGNOSIS — M62.81 GENERALIZED MUSCLE WEAKNESS: ICD-10-CM

## 2020-10-07 DIAGNOSIS — R42 LIGHT HEADEDNESS: ICD-10-CM

## 2020-10-07 DIAGNOSIS — E86.0 DEHYDRATION: ICD-10-CM

## 2020-10-07 LAB
ALBUMIN SERPL-MCNC: 3.2 G/DL (ref 3.4–5)
ALBUMIN UR-MCNC: NEGATIVE MG/DL
ALP SERPL-CCNC: 60 U/L (ref 40–150)
ALT SERPL W P-5'-P-CCNC: 46 U/L (ref 0–70)
ANION GAP SERPL CALCULATED.3IONS-SCNC: 6 MMOL/L (ref 3–14)
APPEARANCE UR: CLEAR
AST SERPL W P-5'-P-CCNC: 35 U/L (ref 0–45)
BASOPHILS # BLD AUTO: 0 10E9/L (ref 0–0.2)
BASOPHILS NFR BLD AUTO: 0 %
BILIRUB SERPL-MCNC: 0.2 MG/DL (ref 0.2–1.3)
BILIRUB UR QL STRIP: NEGATIVE
BUN SERPL-MCNC: 19 MG/DL (ref 7–30)
CALCIUM SERPL-MCNC: 9.1 MG/DL (ref 8.5–10.1)
CHLORIDE SERPL-SCNC: 98 MMOL/L (ref 94–109)
CO2 SERPL-SCNC: 28 MMOL/L (ref 20–32)
COLOR UR AUTO: YELLOW
CREAT SERPL-MCNC: 0.54 MG/DL (ref 0.66–1.25)
CRP SERPL-MCNC: <2.9 MG/L (ref 0–8)
DIFFERENTIAL METHOD BLD: ABNORMAL
EOSINOPHIL # BLD AUTO: 0.3 10E9/L (ref 0–0.7)
EOSINOPHIL NFR BLD AUTO: 4 %
ERYTHROCYTE [DISTWIDTH] IN BLOOD BY AUTOMATED COUNT: 13.1 % (ref 10–15)
GFR SERPL CREATININE-BSD FRML MDRD: >90 ML/MIN/{1.73_M2}
GLUCOSE SERPL-MCNC: 112 MG/DL (ref 70–99)
GLUCOSE UR STRIP-MCNC: NEGATIVE MG/DL
HCT VFR BLD AUTO: 41.1 % (ref 40–53)
HGB BLD-MCNC: 14.4 G/DL (ref 13.3–17.7)
HGB UR QL STRIP: NEGATIVE
KETONES UR STRIP-MCNC: 5 MG/DL
LACTATE BLD-SCNC: 1.7 MMOL/L (ref 0.7–2)
LEUKOCYTE ESTERASE UR QL STRIP: NEGATIVE
LYMPHOCYTES # BLD AUTO: 3.3 10E9/L (ref 0.8–5.3)
LYMPHOCYTES NFR BLD AUTO: 44 %
MAGNESIUM SERPL-MCNC: 2.2 MG/DL (ref 1.6–2.3)
MCH RBC QN AUTO: 35.6 PG (ref 26.5–33)
MCHC RBC AUTO-ENTMCNC: 35 G/DL (ref 31.5–36.5)
MCV RBC AUTO: 102 FL (ref 78–100)
MONOCYTES # BLD AUTO: 0.3 10E9/L (ref 0–1.3)
MONOCYTES NFR BLD AUTO: 4 %
MUCOUS THREADS #/AREA URNS LPF: PRESENT /LPF
NEUTROPHILS # BLD AUTO: 3.6 10E9/L (ref 1.6–8.3)
NEUTROPHILS NFR BLD AUTO: 48 %
NITRATE UR QL: NEGATIVE
PH UR STRIP: 6.5 PH (ref 5–7)
PLATELET # BLD AUTO: 196 10E9/L (ref 150–450)
POTASSIUM SERPL-SCNC: 4.2 MMOL/L (ref 3.4–5.3)
PROT SERPL-MCNC: 6.8 G/DL (ref 6.8–8.8)
RBC # BLD AUTO: 4.04 10E12/L (ref 4.4–5.9)
RBC #/AREA URNS AUTO: 2 /HPF (ref 0–2)
SODIUM SERPL-SCNC: 132 MMOL/L (ref 133–144)
SOURCE: ABNORMAL
SP GR UR STRIP: 1.02 (ref 1–1.03)
STOMATOCYTES BLD QL SMEAR: SLIGHT
TARGETS BLD QL SMEAR: SLIGHT
TRANS CELLS #/AREA URNS HPF: <1 /HPF (ref 0–1)
TROPONIN I SERPL-MCNC: <0.015 UG/L (ref 0–0.04)
TSH SERPL DL<=0.005 MIU/L-ACNC: 0.84 MU/L (ref 0.4–4)
UROBILINOGEN UR STRIP-MCNC: NORMAL MG/DL (ref 0–2)
WBC # BLD AUTO: 7.5 10E9/L (ref 4–11)
WBC #/AREA URNS AUTO: 1 /HPF (ref 0–5)

## 2020-10-07 PROCEDURE — 99207 PR NO BILLABLE SERVICE THIS VISIT: CPT | Performed by: PSYCHIATRY & NEUROLOGY

## 2020-10-07 PROCEDURE — 83605 ASSAY OF LACTIC ACID: CPT | Performed by: EMERGENCY MEDICINE

## 2020-10-07 PROCEDURE — 71045 X-RAY EXAM CHEST 1 VIEW: CPT | Mod: 26 | Performed by: STUDENT IN AN ORGANIZED HEALTH CARE EDUCATION/TRAINING PROGRAM

## 2020-10-07 PROCEDURE — 85025 COMPLETE CBC W/AUTO DIFF WBC: CPT | Performed by: EMERGENCY MEDICINE

## 2020-10-07 PROCEDURE — 84443 ASSAY THYROID STIM HORMONE: CPT | Performed by: EMERGENCY MEDICINE

## 2020-10-07 PROCEDURE — 83735 ASSAY OF MAGNESIUM: CPT | Performed by: EMERGENCY MEDICINE

## 2020-10-07 PROCEDURE — 84484 ASSAY OF TROPONIN QUANT: CPT | Performed by: EMERGENCY MEDICINE

## 2020-10-07 PROCEDURE — 99285 EMERGENCY DEPT VISIT HI MDM: CPT | Mod: 25 | Performed by: EMERGENCY MEDICINE

## 2020-10-07 PROCEDURE — 93010 ELECTROCARDIOGRAM REPORT: CPT | Performed by: EMERGENCY MEDICINE

## 2020-10-07 PROCEDURE — 96360 HYDRATION IV INFUSION INIT: CPT | Performed by: EMERGENCY MEDICINE

## 2020-10-07 PROCEDURE — 86140 C-REACTIVE PROTEIN: CPT | Performed by: EMERGENCY MEDICINE

## 2020-10-07 PROCEDURE — 87040 BLOOD CULTURE FOR BACTERIA: CPT | Performed by: EMERGENCY MEDICINE

## 2020-10-07 PROCEDURE — 81001 URINALYSIS AUTO W/SCOPE: CPT | Performed by: EMERGENCY MEDICINE

## 2020-10-07 PROCEDURE — 93005 ELECTROCARDIOGRAM TRACING: CPT | Performed by: EMERGENCY MEDICINE

## 2020-10-07 PROCEDURE — 87086 URINE CULTURE/COLONY COUNT: CPT | Performed by: EMERGENCY MEDICINE

## 2020-10-07 PROCEDURE — 258N000003 HC RX IP 258 OP 636: Performed by: EMERGENCY MEDICINE

## 2020-10-07 PROCEDURE — 71045 X-RAY EXAM CHEST 1 VIEW: CPT

## 2020-10-07 PROCEDURE — 80053 COMPREHEN METABOLIC PANEL: CPT | Performed by: EMERGENCY MEDICINE

## 2020-10-07 RX ORDER — SODIUM CHLORIDE 9 MG/ML
INJECTION, SOLUTION INTRAVENOUS CONTINUOUS
Status: DISCONTINUED | OUTPATIENT
Start: 2020-10-07 | End: 2020-10-07 | Stop reason: HOSPADM

## 2020-10-07 RX ADMIN — SODIUM CHLORIDE 1000 ML: 9 INJECTION, SOLUTION INTRAVENOUS at 15:58

## 2020-10-07 NOTE — ED AVS SNAPSHOT
[facility] Emergency Department  [address] [address]  Phone: [phone]                                    Vincent Zuniga   MRN: [id number]    Department: [facility] Emergency Department   Date of Visit: 10/7/2020           After Visit Summary Signature Page    I have received my discharge instructions, and my questions have been answered. I have discussed any challenges I see with this plan with the nurse or doctor.    ..........................................................................................................................................  Patient/Patient Representative Signature      ..........................................................................................................................................  Patient Representative Print Name and Relationship to Patient    ..................................................               ................................................  Date                                   Time    ..........................................................................................................................................  Reviewed by Signature/Title    ...................................................              ..............................................  Date                                               Time          22EPIC Rev 08/18

## 2020-10-07 NOTE — ED PROVIDER NOTES
ED Provider Note  [facility]      History     Chief Complaint   Patient presents with     Fall     HPI  Vincent Zuniga is a 65 year old male with a past medical history significant for MS, malnutrition, dysphagia, g tube dependence and COPD who presents here to the Emergency Department for evaluation after a fall.  Patient reports feeling more light headed and off balance for about 1 week now. He states that he is also fatigued. He denies any vertigo sensation.  He states his lightheadedness gets worse if he stands up.  Patient denies associated chest pain, trouble breathing, or palpitations.  Patient states his symptoms come and go, and he is experiencing slight lightheadedness currently.  He reports a concern for MS flare are the cause of his symptoms; he was diagnosed a year and a half ago.  Patient reports yesterday, he went to sit down when he fell backwards.  He denies hitting his head or loss of consciousness. He states he landed on his butt. He denies any back pain, neck pain, or extremity pain. Patient denies other falls recently. Patient reports a 1 day history of a headache across the front of his forehead that started yesterday. He states that he has had many similar headaches in the past. This is mild and gradual. Patient reports weakness in his bilateral legs that is at his baseline.  He reports his right side is typically weaker due to his MS. Patient reports he has not been eating or drinking normally for about 6 months and thus he has a feeding tube, he states he gets fees through it 3 times a day.  He denies episodes of emesis with each feeding.  He states he has not been able to sleep well for about 6 months.  Patient reports worsening tremors that first started about one year ago.  Patient states he has had previous episodes of these symptoms which was thougth to be due to MS flare. Daughter Michelle was concerned about this and recommended he come to the ED  today.  Patient is unsure about what medications he is taking.  He denies being anticoagulated.  Patient denies any other pain or new numbness or tingling.  He denies chest pain, trouble breathing, cough, fever, chills, or body aches.  He denies visual changes or tinnitus or hearing changes. No recent illness.  Patient denies runny or stuffy nose.  Denies nausea, vomiting, diarrhea, and abdominal pain.  He denies melena, hematochezia, or hematemesis. He denies any urinary symptoms.  He denies recent sick or COVID exposures.  Patient states he has not seen neurology regarding his MS recently, and has not had recent steroids for his MS. He does smoke marijuana daily and has medical marijuana. No other drug or alcohol use. Denies any suicidal ideation. He states at baseline he is unsteady related to his MS. His baseline deficits from MS include cogitative impairment, unsteady gait, urinary incontinence, and poor PO intake. He later states he feels fine and came here as his daughter wanted him to. He is not currently on any immunotherapy.     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     Numbness and tingling     in legs     Shortness of breath      Past Surgical History:   Procedure Laterality Date     BACK SURGERY      Lumbar     SEPTOPLASTY  10/31/2012    Procedure: SEPTOPLASTY;  Septoplasty, turbinoplasty, enlargement of maxillary ostia;  Surgeon: Carolina Robison MD;  Location: [facility] OR     spleen[  Age 18    spleenectomy          oxybutynin (DITROPAN) 5 MG tablet       valproic acid (DEPAKENE) 250 MG/5ML syrup       albuterol (PROVENTIL HFA) 108 (90 Base) MCG/ACT inhaler       atorvastatin  (LIPITOR) 20 MG tablet       Cholecalciferol (VITAMIN D3) 50 MCG (2000 UT) TABS       clonazePAM (KLONOPIN) 2 MG tablet       escitalopram (LEXAPRO) 20 MG tablet       meloxicam (MOBIC) 15 MG tablet       pramipexole (MIRAPEX) 0.5 MG tablet       STARCH-MALTO DEXTRIN ([employer] INSTANT FOOD THICKENER) POWD       vitamin D2 (ERGOCALCIFEROL) 19363 units (1250 mcg) capsule      Allergies   Allergen Reactions     Methylprednisolone Other (See Comments)     Immune Globulin Rash     Family History  Family History   Problem Relation Age of Onset     Cancer Father         Lung     Heart Disease Brother 35        Four heart attacks     Alcohol/Drug Brother      Psychotic Disorder Brother         Drug addiction     Unknown/Adopted Son      Unknown/Adopted Daughter      Unknown/Adopted Daughter      Unknown/Adopted Daughter      Unknown/Adopted Daughter      Social History   Social History     Tobacco Use     Smoking status: Current Every Day Smoker     Packs/day: 1.00     Years: 30.00     Pack years: 30.00     Types: Cigarettes     Smokeless tobacco: Never Used   Substance Use Topics     Alcohol use: No     Drug use: Yes     Types: Marijuana      Past medical history, past surgical history, medications, allergies, family history, and social history were reviewed with the patient. No additional pertinent items.       Review of Systems  A complete review of systems was performed with pertinent positives and negatives noted in the HPI, and all other systems negative.    Physical Exam   BP: 90/67  Pulse: 70  Temp: 98.5  F (36.9  C)  Resp: 18  Weight: 59 kg (130 lb)  SpO2: 97 %  Physical Exam  Vitals signs reviewed.   Constitutional:       General: He is not in acute distress.     Appearance: He is well-developed.      Comments: Thin chronically ill-appearing.   HENT:      Head: Normocephalic and atraumatic.      Comments: No scalp hematoma.  No hemotympanum bilaterally.  No signs of head or facial trauma.  No facial bone tenderness.   No jaw malocclusion.  No intraoral trauma.  No otorrhea or rhinorrhea.  No schwarz sign or raccoon eyes.     Right Ear: Tympanic membrane normal.      Left Ear: Tympanic membrane normal.      Nose: Nose normal.      Mouth/Throat:      Mouth: Mucous membranes are dry.      Pharynx: No oropharyngeal exudate or posterior oropharyngeal erythema.   Eyes:      General: No visual field deficit.     Extraocular Movements: Extraocular movements intact.      Conjunctiva/sclera: Conjunctivae normal.      Pupils: Pupils are equal, round, and reactive to light.   Neck:      Musculoskeletal: Normal range of motion and neck supple. No neck rigidity or muscular tenderness.      Comments: No midline cervical spine tenderness.  Cardiovascular:      Rate and Rhythm: Normal rate and regular rhythm.      Pulses: Normal pulses.      Heart sounds: Normal heart sounds. No murmur. No friction rub. No gallop.    Pulmonary:      Effort: Pulmonary effort is normal. No respiratory distress.      Breath sounds: Normal breath sounds. No stridor. No wheezing or rales.   Abdominal:      General: Bowel sounds are normal. There is no distension.      Palpations: Abdomen is soft. There is no mass.      Tenderness: There is no abdominal tenderness. There is no right CVA tenderness, left CVA tenderness, guarding or rebound.      Comments: G-tube in place without surrounding erythema or discharge or any tenderness.   Musculoskeletal: Normal range of motion.         General: No swelling, tenderness or deformity.      Comments: No bony tenderness of the extremities.  Normal range of motion of all 4 extremities.  No midline thoracic or lumbar spine tenderness.   Lymphadenopathy:      Cervical: No cervical adenopathy.   Skin:     General: Skin is warm and dry.      Capillary Refill: Capillary refill takes less than 2 seconds.      Findings: No rash.   Neurological:      Mental Status: He is alert and oriented to person, place, and time.      GCS: GCS eye  subscore is 4. GCS verbal subscore is 5. GCS motor subscore is 6.      Cranial Nerves: Cranial nerves are intact. No cranial nerve deficit, dysarthria or facial asymmetry.      Sensory: Sensation is intact. No sensory deficit.      Motor: Tremor present. No abnormal muscle tone.      Coordination: Finger-Nose-Finger Test and Heel to Shin Test normal.      Comments: Normal bulk and tone. Low amplitude tremor of the hands, R>L (baseline per patient). 5/5 strength in 4/4 extremities with exception of 4+ strength at bilateral hips.  See neurology note for full neurologic exam.   Psychiatric:      Comments: Flat affect         ED Course     3:41 PM  The patient was seen and examined by Mónica Lutz MD in Room ED29.    Procedures             EKG Interpretation:      Interpreted by Mónica Lutz MD  Time reviewed: 4:15 pm  Symptoms at time of EKG: light headedness   Rhythm: sinus bradycardia   Rate: normal  Axis: normal  Ectopy: none  Conduction: normal  ST Segments/ T Waves: No ST-T wave changes  Q Waves: none  Comparison to prior: Unchanged from 5/25/20    Clinical Impression: no evidence of acute ischemia. Sinus bradycardia. Unchanged from prior.                 Results for orders placed or performed during the hospital encounter of 10/07/20   XR Chest Port 1 View     Status: None    Narrative    XR CHEST PORT 1 VW  10/7/2020 4:25 PM      HISTORY: light headedness, MS, lethargy, eval for pneumonia, etc    COMPARISON: Chest x-ray 5/8/2020    TECHNIQUE: Upright frontal view of the chest    FINDINGS: No focal airspace opacity, pneumothorax, or pleural  effusion. Calcified granuloma in the right lower lung zone.  Cardiomediastinal silhouette and pulmonary vasculature are within  normal limits. The trachea is midline. Upper abdomen is unremarkable.  No suspicious osseous lesion.      Impression    IMPRESSION: No acute airspace disease.    I have personally reviewed the examination and initial interpretation  and  I agree with the findings.    LEONEL GEE, DO   CBC with platelets differential     Status: Abnormal   Result Value Ref Range    WBC 7.5 4.0 - 11.0 10e9/L    RBC Count 4.04 (L) 4.4 - 5.9 10e12/L    Hemoglobin 14.4 13.3 - 17.7 g/dL    Hematocrit 41.1 40.0 - 53.0 %     (H) 78 - 100 fl    MCH 35.6 (H) 26.5 - 33.0 pg    MCHC 35.0 31.5 - 36.5 g/dL    RDW 13.1 10.0 - 15.0 %    Platelet Count 196 150 - 450 10e9/L    Diff Method Manual Differential     % Neutrophils 48.0 %    % Lymphocytes 44.0 %    % Monocytes 4.0 %    % Eosinophils 4.0 %    % Basophils 0.0 %    Absolute Neutrophil 3.6 1.6 - 8.3 10e9/L    Absolute Lymphocytes 3.3 0.8 - 5.3 10e9/L    Absolute Monocytes 0.3 0.0 - 1.3 10e9/L    Absolute Eosinophils 0.3 0.0 - 0.7 10e9/L    Absolute Basophils 0.0 0.0 - 0.2 10e9/L    Stomatocytes Slight     Target Cells Slight    Comprehensive metabolic panel     Status: Abnormal   Result Value Ref Range    Sodium 132 (L) 133 - 144 mmol/L    Potassium 4.2 3.4 - 5.3 mmol/L    Chloride 98 94 - 109 mmol/L    Carbon Dioxide 28 20 - 32 mmol/L    Anion Gap 6 3 - 14 mmol/L    Glucose 112 (H) 70 - 99 mg/dL    Urea Nitrogen 19 7 - 30 mg/dL    Creatinine 0.54 (L) 0.66 - 1.25 mg/dL    GFR Estimate >90 >60 mL/min/[1.73_m2]    GFR Estimate If Black >90 >60 mL/min/[1.73_m2]    Calcium 9.1 8.5 - 10.1 mg/dL    Bilirubin Total 0.2 0.2 - 1.3 mg/dL    Albumin 3.2 (L) 3.4 - 5.0 g/dL    Protein Total 6.8 6.8 - 8.8 g/dL    Alkaline Phosphatase 60 40 - 150 U/L    ALT 46 0 - 70 U/L    AST 35 0 - 45 U/L   Lactic acid whole blood     Status: None   Result Value Ref Range    Lactic Acid 1.7 0.7 - 2.0 mmol/L   UA with Microscopic     Status: Abnormal   Result Value Ref Range    Color Urine Yellow     Appearance Urine Clear     Glucose Urine Negative NEG^Negative mg/dL    Bilirubin Urine Negative NEG^Negative    Ketones Urine 5 (A) NEG^Negative mg/dL    Specific Gravity Urine 1.016 1.003 - 1.035    Blood Urine Negative NEG^Negative    pH Urine 6.5 5.0  - 7.0 pH    Protein Albumin Urine Negative NEG^Negative mg/dL    Urobilinogen mg/dL Normal 0.0 - 2.0 mg/dL    Nitrite Urine Negative NEG^Negative    Leukocyte Esterase Urine Negative NEG^Negative    Source Midstream Urine     WBC Urine 1 0 - 5 /HPF    RBC Urine 2 0 - 2 /HPF    Transitional Epi <1 0 - 1 /HPF    Mucous Urine Present (A) NEG^Negative /LPF   Troponin I     Status: None   Result Value Ref Range    Troponin I ES <0.015 0.000 - 0.045 ug/L   Magnesium     Status: None   Result Value Ref Range    Magnesium 2.2 1.6 - 2.3 mg/dL   CRP inflammation     Status: None   Result Value Ref Range    CRP Inflammation <2.9 0.0 - 8.0 mg/L   TSH with free T4 reflex     Status: None   Result Value Ref Range    TSH 0.84 0.40 - 4.00 mU/L   EKG 12 lead     Status: None   Result Value Ref Range    Interpretation ECG Click View Image link to view waveform and result    Urine Culture     Status: None    Specimen: Urine clean catch; Midstream Urine   Result Value Ref Range    Specimen Description Midstream Urine     Special Requests Specimen received in preservative     Culture Micro No growth    Blood culture     Status: None    Specimen: Hand, Right; Blood    Right Hand   Result Value Ref Range    Specimen Description Blood Right Hand     Culture Micro No growth      Medications   0.9% sodium chloride BOLUS (0 mLs Intravenous Stopped 10/7/20 1717)        Assessments & Plan (with Medical Decision Making)   Patient presents with fatigue and lightheadedness and had a fall yesterday.  He states he just lost his balance and fell onto his bottom but did not strike his head or pass out.  He has history of MS with baseline deficits of cognitive dysfunction, urinary incontinence, unsteady gait, and poor oral intake and thus has a G-tube for feeds.  He states he has been tolerating his feeds.  He states otherwise though he does not take much p.o.  Sounds like his daughter Michelle was concerned about him and concerned this may be an MS  flare and he is not currently on any medication for his MS so she wanted him to come here to be seen.  When I ask him how he is feeling currently he states he feels fine.  Here he does not have any new focal neurologic deficits.  He denies any vertigo sensation and has no nystagmus or evidence of cerebellar dysfunction.  He ambulated here without difficulty.  His heart rate fluctuated from 49-77.  We did obtain an EKG which revealed sinus bradycardia without any sign of AV block or arrhythmia and no signs of acute ischemia.  No signs of Brugada syndrome, LVH, or WPW.  No sign of right ventricular dysplasia.  Normal intervals.  He denies any lightheadedness or prodromal symptoms prior to this fall and states that he just lost his balance with that.  He does report he has some intermittent lightheadedness especially upon standing.  Differential diagnosis includes but is not limited to dehydration, vasovagal event, orthostatic hypotension, MS flare, UTI or other infectious cause, anemia.  Did consider the potential of arrhythmia however here even when his heart rate was 49 it was sinus bradycardia.  He denies any significant symptoms at this time.  Initially stating he was slightly lightheaded however throughout his stay here he stated this resolved.  We did give him IV fluids and he stated he felt entirely improved with this.  We did contact neurology who came to evaluate him as well.  We obtain laboratory studies including blood culture and UA and urine culture.  Urinalysis was largely unremarkable.  He does have 5 ketones potentially representing dehydration.  Troponin was less than 0.015.  CRP was less than 2.9.  Lactic acid was normal 1.7.  Magnesium was normal at 2.2.  TSH was within normal limits as well.  CBC is largely unremarkable with a white blood cell count of 7.5 and hemoglobin of 14.4.  CMP reveals a slightly low sodium of 132 likely related to some dehydration.  Otherwise within normal limits.  Chest  x-ray was unremarkable.  He has no signs of trauma and complains of no pain other than a mild headache which he gets with his MS.  He declines any pain medication and states this did improve with fluids as well.  His blood pressure was slightly soft in the 90s on arrival but did improve back to normal with IV fluids.  He has no signs of sepsis here and no obvious sign of infection with normal no leukocytosis and normal CRP and no infectious symptoms.  He has no chest pain or shortness of breath.  Neurology did not feel this represented an MS flare and did not feel he warranted any further imaging of his head or spine.  He has no sign of injury from the fall.  He again was able to ambulate here without any lightheadedness or difficulty or ataxia.    Neurology felt that he could follow-up as an outpatient related to his MS.  I did talk to the patient about the potential of observation stay for further hydration however he declined this stating that he was feeling much improved.  Patient's son was also with him and felt that he was entirely at his baseline.  I spoke with the patient's daughter Michelle on the phone and she was comfortable with him returning home if he was feeling improved and said they would follow closely with his neurologist and primary care doctor as well.  I feel this lightheadedness is less likely cardiac in nature with a reassuring work-up however with his slightly low heart rate I am unsure exactly what is causing this and could potentially be medication related.  Daughter does report that he does smoke marijuana daily and she feels this may be contributing to his symptoms as well.  She does states she will ensure that he has close outpatient follow-up.  Patient was in agreement with this plan and has capacity to make this decision and participate in the shared decision-making.  Patient and daughter were in agreement with the plan.  He was given return precautions.  He voiced understanding.  He  was discharged home in improved condition.    I have reviewed the nursing notes. I have reviewed the findings, diagnosis, plan and need for follow up with the patient.    Discharge Medication List as of 10/7/2020  8:01 PM          Final diagnoses:   Light headedness   Generalized muscle weakness   Dehydration   I, Natalie Gonzales, am serving as a trained medical scribe to document services personally performed by Mónica Lutz MD, based on the provider's statements to me.     I, Mónica Lutz MD, was physically present and have reviewed and verified the accuracy of this note documented by Natalie Gonzales.     --  Mónica Lutz MD  [facility] EMERGENCY DEPARTMENT  10/7/2020     Mónica Lutz MD  11/03/20 0109

## 2020-10-07 NOTE — ED TRIAGE NOTES
Pt presents ambulatory to triage with c/o a possible MS flare up. Yesterday stated he was dehydrated and witnessed fall last night. Denies hitting his head or currently taking blood thinners. Daughter states this morning he is lethargic with worsening tremors which could be a MS flare up  But she isn't sure.     Hx: MS

## 2020-10-07 NOTE — CONSULTS
"[facility]  Neurology Consultation    Patient Name:  Vincent Zuniga  MRN:  [id number]    :  1955  Date of Service:  2020  Primary care provider:  No Ref-Primary, Physician      Neurology consultation service was asked to see Vincent Zuniga by  to evaluate for possible MS flare.    History of Present Illness:   Mr. Zuniga is a 65 year old male with h/o of MS, protein-calorie malnutrition requiring G tube, dysphagia, COPD, tobacco use, marijuana use, anxiety, and depression who presents for evaluation of increased fatigue, recent fall, insomnia, and insomnia. Patient and his son (JOSE FRANCISCO) report he is at his neurological baseline, however, his daughter and caregiver Michelle is concerned he is having an MS flare and encouraged him to present to the ED. Patient was diagnosed with MS in . He was initially started on Aubagio and stable for 5-6 months, however, he was found to have small cervicomedullary enhancing lesion during admission in 2020 and Aubagio was switched to Mayzent. This was also subsequently discontinued but I am unable to find out why or when. His baseline deficits from MS prior to admission include cognitive impairment, unsteady gait, urinary incontinence, and poor PO intake.     I was able to speak to patient's daughter, Michelle, over the phone. She is concerned that for the past 2 days he has been \"shutting down, not talking, and off balance\" and this was how his previous MS flares have presented. He has slept very poorly for the past 6 months. He has endorsed dizziness to her for the past 2 days. She says he \"smokes pot \" and is concerned this is affecting his health. He recently obtained medical marijuana license as well.     Mr. Zuniga endorses poor appetite and fatigue. He denies tingling, numbness, weakness, changes in bowel or bladder habits. His last fall was yesterday, and it was his only fall of the year. He " "ambulates independently and has not noticed any recent changes in his gait. He denies memory problems. He does not think he is having an MS flare and reports feeling \"fine\". He endorses insomnia and wakes up around 2-3AM everyday and has trouble falling asleep after that. He endorses mild frontal headache today and says this is no different from his normal headaches in intensity and location. His son is at bedside and believes his father is at his physical and neurological baseline.     ROS  A 10-point ROS was performed as per HPI. See pertinent positives and negatives above.     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     Numbness and tingling     in legs     Shortness of breath      Past Surgical History:   Procedure Laterality Date     BACK SURGERY      Lumbar     SEPTOPLASTY  10/31/2012    Procedure: SEPTOPLASTY;  Septoplasty, turbinoplasty, enlargement of maxillary ostia;  Surgeon: Carolina Robison MD;  Location: [facility] OR     spleen[  Age 18    spleenectomy       Medications   Vitamin D, Depakote    Allergies  Allergies   Allergen Reactions     Methylprednisolone Other (See Comments)     Immune Globulin Rash       Social History  Endorses tobacco and recreational drug use.     Family History    Reviewed and noncontributory       Physical Examination   Vitals: /71   Pulse 50   Temp 98.5  F (36.9  C)   Resp 16   Wt 59 kg (130 lb)   SpO2 97%   BMI 19.20 kg/m    General: Adult male patient, lying in bed, NAD  HEENT: NC/AT, no icterus, dry mucus membranes, Poor dentition.   Chest: non-labored on RA  Abdomen: PEG present.   Extremities: Warm, no edema  Skin: No rash or lesion   Psych: Flat affect "   Neuro:  Mental status: Awake, alert, attentive, oriented to self, time, place, and circumstance. Language is fluent and coherent with intact comprehension of complex commands.   Cranial nerves: VFF, PERRL, conjugate gaze, no APD, EOMI, facial sensation intact, face symmetric, shoulder shrug strong, tongue/uvula midline, no dysarthria.   Motor: Normal bulk and tone. Low amplitude tremor of the hands, R>L (baseline per patient and son). 5/5 strength in 4/4 extremities with exception of 4+ strength at bilateral hips.   Reflexes: 2+ at brachioradialis, triceps, patellae, and 1+ at achilles. Negative Simpson, no clonus, toes down-going.  Sensory: Intact to light touch and pinprick x 4 extremities   Coordination: FNF and HS without ataxia or dysmetria. Rapid alternating movements intact.   Gait: Narrow based with short steps. Able to walk on heels, backwards, and intact tandem walk.      Impression  Overall, patient had an essentially normal neurological exam. It is exceedingly rare for MS flare up to present with isolated dizziness without other subcortical signs. My suspicion for MS flare-up is quite low. It is worth checking basic labs and basic infection workup as  toxic-metabolic and infectious insults can present with worsening of pre-existing baseline MS symptoms; his workup here so far has been relatively unremarkable. I suspect the 2 day decline that patient's daughter has described is related to dehydration, as patient's systolic blood pressure was noted to be in the 90s upon arrival and improved with fluids.     Recommendations  -No need for MR at this time.   - CBC, BMP, CXR, UA (already completed)   - Keep follow-up appointment with Dr. Guerra scheduled for 10/28/20.     Thank you for involving Neurology in the care of Vincent Zuniga.  Please do not hesitate to call with questions/concerns (consult pager [phone]).      Patient was discussed with Dr. Villalobos.     Camden Milner MD  PGY-2 Neurology  Resident  P: [phone]

## 2020-10-08 LAB
BACTERIA SPEC CULT: NO GROWTH
INTERPRETATION ECG - MUSE: NORMAL
Lab: NORMAL
SPECIMEN SOURCE: NORMAL

## 2020-10-08 NOTE — DISCHARGE INSTRUCTIONS
Please make an appointment to follow up with Your Primary Care Provider and Neurology Clinic (phone: [phone]) as soon as possible. Follow up with your primary doctor for repeat labs.     Stay well hydrated.     Return to the ED if you develop worsening light headedness, fever, numbness, tingling, weakness, headache, vomiting, abdominal pain, speech difficulty, vision changes, difficulty walking, or any new or worsening concerns.

## 2020-10-09 NOTE — RESULT ENCOUNTER NOTE
Final urine culture report is NEGATIVE per [place] [facility] Result protocol.    If NEGATIVE result, no change in treatment, per [place] [facility] Result protocol.

## 2020-10-13 LAB
BACTERIA SPEC CULT: NO GROWTH
SPECIMEN SOURCE: NORMAL

## 2020-10-28 ENCOUNTER — MEDICAL CORRESPONDENCE (OUTPATIENT)
Dept: HEALTH INFORMATION MANAGEMENT | Facility: CLINIC | Age: 65
End: 2020-10-28

## 2020-10-28 ENCOUNTER — OFFICE VISIT (OUTPATIENT)
Dept: NEUROLOGY | Facility: CLINIC | Age: 65
End: 2020-10-28
Attending: PSYCHIATRY & NEUROLOGY
Payer: COMMERCIAL

## 2020-10-28 VITALS
WEIGHT: 129.5 LBS | OXYGEN SATURATION: 98 % | BODY MASS INDEX: 19.18 KG/M2 | SYSTOLIC BLOOD PRESSURE: 108 MMHG | DIASTOLIC BLOOD PRESSURE: 71 MMHG | HEIGHT: 69 IN | HEART RATE: 69 BPM

## 2020-10-28 DIAGNOSIS — Z11.59 ENCOUNTER FOR SCREENING FOR OTHER VIRAL DISEASES: ICD-10-CM

## 2020-10-28 DIAGNOSIS — G35 MULTIPLE SCLEROSIS (H): ICD-10-CM

## 2020-10-28 PROCEDURE — G0463 HOSPITAL OUTPT CLINIC VISIT: HCPCS

## 2020-10-28 PROCEDURE — 99215 OFFICE O/P EST HI 40 MIN: CPT | Mod: GC | Performed by: PSYCHIATRY & NEUROLOGY

## 2020-10-28 RX ORDER — CLONAZEPAM 2 MG/1
2 TABLET ORAL
Qty: 30 TABLET | Refills: 3 | Status: SHIPPED | OUTPATIENT
Start: 2020-10-28 | End: 2021-02-03

## 2020-10-28 ASSESSMENT — PAIN SCALES - GENERAL: PAINLEVEL: NO PAIN (0)

## 2020-10-28 ASSESSMENT — MIFFLIN-ST. JEOR: SCORE: 1372.79

## 2020-10-28 NOTE — LETTER
10/28/2020       RE: Vincent Zuniga  [address]  [place] [address]     Dear Colleague,    Thank you for referring your patient, Vincent Zuniga, to the [facility] [place] [facility]. Please see a copy of my visit note below.    [facility], [place]   Neurology Clinic Note  Vincent Zuniga  [id number]  10/28/2020    HPI:   Last time the patient was seen on 5/14/2020 and the plan was as the following   He was experiencing anxiety which was not being controlled by Paxil, therefore I will stop Paxil start Lexapro 20 mg a day.     Since he does not like the side effects of trazodone we will stop it and start clonazepam 1 mg 30 minutes before bedtime, he has been advised to walk 2 to 3 hours before going to bed and to avoid taking a nap once he wakes up.     He does have chronic back pain for which he takes Mobic for, he is also on gabapentin 400 mg 3 times a day complains of feeling tired so we will decrease his gabapentin to after lunch and prior to bedtime in order to decrease the side effects.  The patient will be seen again in 4 months with repeat MRIs of the brain cervical and thoracic spine to establish a new baseline.    Interval history   Things are ok.     He started Mayzent for about a week then stopped, he got really sick on it and dizzy and fatigued and lethargic. They called the clinic and he was told to stop the medicine. Last week he was in the hospital for sever dehydration, he fell and he was off balance and super lethargic and dizzy, he is on feeding tube. He was given fluid on 10/7 and the dizziness went away. Tremors are getting worse and sleeping is difficult, gets up at 2-3 am. Sometimes he does not sleep at all. He is taking klonipin 1 mg HS. He was on ambien and trazadone and they made him drowsy and he stopped them. No fatigues or constipation or UR. His family doctor stopped  gabapentine.     ROS:  A 10-point ROS was performed, negative except as per HPI.    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     Numbness and tingling     in legs     Shortness of breath        PSH:  Past Surgical History:   Procedure Laterality Date     BACK SURGERY      Lumbar     SEPTOPLASTY  10/31/2012    Procedure: SEPTOPLASTY;  Septoplasty, turbinoplasty, enlargement of maxillary ostia;  Surgeon: Carolina Robison MD;  Location: [facility] OR     spleen[  Age 18    spleenectomy       Allergies:  Allergies   Allergen Reactions     Methylprednisolone Other (See Comments)     Immune Globulin Rash       Medications:    Current Outpatient Medications:      albuterol (PROVENTIL HFA) 108 (90 Base) MCG/ACT inhaler, Inhale 2 puffs into the lungs every 4 hours as needed, Disp: , Rfl:      atorvastatin (LIPITOR) 20 MG tablet, Take 20 mg by mouth daily, Disp: , Rfl:      Cholecalciferol (VITAMIN D3) 50 MCG (2000 UT) TABS, Take 2 tablets by mouth once daily, Disp: 180 tablet, Rfl: 0     clonazePAM (KLONOPIN) 0.5 MG tablet, Take 2 tablets (1 mg) by mouth nightly as needed for anxiety, Disp: 30 tablet, Rfl: 3     escitalopram (LEXAPRO) 20 MG tablet, Take 1 tablet (20 mg) by mouth daily, Disp: 30 tablet, Rfl: 4     gabapentin (NEURONTIN) 400 MG capsule, 400 mg by NG or Feeding tube route 2 times daily, Disp: , Rfl:      meloxicam (MOBIC) 15 MG tablet, Take 15 mg by mouth daily, Disp: , Rfl:      oxybutynin (DITROPAN) 5 MG tablet, Take 5 mg by mouth daily, Disp: , Rfl:      pramipexole (MIRAPEX) 0.5 MG tablet, Take 0.5 mg by mouth At Bedtime, Disp: , Rfl:      STARCH-MALTO DEXTRIN ([employer] INSTANT FOOD THICKENER) POWD, Take 1 Units by  mouth as needed (to thicken all thin liquids taken orally to prevent silent aspiration into lungs), Disp: 1020 g, Rfl: 3     valproic acid (DEPAKENE) 250 MG/5ML syrup, 10 mLs (500 mg) by Oral or G tube route 3 times daily, Disp: 900 mL, Rfl: 3     vitamin D2 (ERGOCALCIFEROL) 63838 units (1250 mcg) capsule, Take 50,000 Units by mouth once a week, Disp: , Rfl:     Social History:  Social History     Tobacco Use     Smoking status: Current Every Day Smoker     Packs/day: 1.00     Years: 30.00     Pack years: 30.00     Types: Cigarettes     Smokeless tobacco: Never Used   Substance Use Topics     Alcohol use: No       Family History:  Family History   Problem Relation Age of Onset     Cancer Father         Lung     Heart Disease Brother 35        Four heart attacks     Alcohol/Drug Brother      Psychotic Disorder Brother         Drug addiction     Unknown/Adopted Son      Unknown/Adopted Daughter      Unknown/Adopted Daughter      Unknown/Adopted Daughter      Unknown/Adopted Daughter          Objective   There were no vitals taken for this visit.  General: pt laying comfortably in bed, breathing easily on ra, in NAD   HEENT: no oral ulcers   Chest: cta   Heart: rrr  Abdomen: soft, nt, nd, +BS  Ext: no edema   Skin: no rashes  Neurological examination:  Mental status:  Patient is alert, attentive, and oriented x 3.  Language is coherent and fluent without dysarthria or aphasia.  Memory, comprehension and ability to follow commands were intact.        Cranial nerves: Pupils were round and reacted to light.  Extraocular movements were full. There was no face, jaw, palate or tongue weakness or atrophy. Hearing was grossly intact. Shrugging shoulder is normal  Motor exam: No atrophy or fasciculations.  No action or percussion myotonia or paramyotonia.  Manual muscle testing revealed the following MRC grade muscle power:    Right Left   Neck flexion 5     Neck extension 5     Shoulder ext rotation 5  5    Shoulder abduction  5 5   Elbow extension 5 5   Elbow flexion             5 5   Wrist extension 5 5   Wrist flexion 5 5   Finger extension 5 5   Finger flexion 5 5   FDI 5 5   APB 5 5   Hip extension 5 5    Hip flexion 5 4   Knee extension 5 5   Knee flexion 5 5   Dorsiflexion 5 5   Plantarflexion 5 5   Eversion 5 5   Inversion 5 5   Ext Hallucis Longus 5 5      Complex motor skills revealed normal coordination.  Finger-nose- finger and heel to shin were intact.     resting and postural tremors R>L     Sensory exam revealed vibration to be R/L 7/9s at the toes, >15/>15s at the fingers. DECREASED PP on both feet  Gait was slow.  Pt was able to walk on heels, toes and was not able to do tandem without any difficulty.       Deep tendon reflexes:    Right Left   Triceps 2 2   Biceps 2 2   Brachioradialis 2 2   Knee jerk 2 3   Ankle jerk 2 3   Plantar responses were flexor bilaterally. Simpson's negative b/l     Investigations    Brain MRI 5/8/2020  Is experiencing anxiety which is not being controlled by Paxil, therefore I will stop Paxil start Lexapro 20 mg a day.     Since he does not like the side effects of trazodone we will stop it and start clonazepam 1 mg 30 minutes before bedtime, he has been advised to walk 2 to 3 hours before going to bed and to avoid taking a nap once he wakes up.     He does have chronic back pain for which he takes Mobic for, he is also on gabapentin 400 mg 3 times a day complains of feeling tired so we will decrease his gabapentin to after lunch and prior to bedtime in order to decrease the side effects.  The patient will be seen again in 4 months with repeat MRIs of the brain cervical and thoracic spine to establish a new baseline.      Impression:  Vincent Zuniga is a 65 year old year old male with h/o relapsing remitting multiple sclerosis who presents for regular follow up    #relapsing remitting multiple sclerosis.   he was started on Aubagio in 1-19  but did not tolerated, he was having GI issues,  hair thinning and worsening depression which is unlikely related to Aubagio.  He was switched to Mayzent then stopped after one week from taking it as he did not tolerate the medicine. 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 We went over ocrevus as a third option in his case and we went over the side effects of the medications, he agreed to take the medicine, it is infusion once every 6 months. We discussed that his immunes system will be weaker and his response to any vaccine will not be optimal and he was ok with that, anyway we think this medicine will be a good option for him given the fact he failed 2 previous different DMD in the past.     #Anxiety and depression   He is on lexapro 20 mg. Things are getting worse. Initially he did not agree to see psychiatrist but eventually he was convinced to do so.        #Back pain.   He is on Meloxicam. Is stable    #Tremors   Getting worse. Could be multifactorial, related to anxiety, lack of sleep and MS (cerebellar outflow tremors). We will increase the pramipexole to BID instead of once daily     #Sleeping problems  We will increase the dose of klonopin to 1.5 mg or 2 mg HS      Recommendations:   1. Increase klonipin to 2 mg HS   2. Refer to psychiatry   3. We will start ocrevus, hepatitis B surface antigen and core antibody were ordered  4. We will decide on doing brain and spine MRI on next visit   5. Make pramipexole 0.5 mg BID   6. Refer to [facility] for assistance with engagement in healthy activities to avoid isolation.    RTC 3 months    Patient was seen and discussed with attending neurologist, Dr. Charlie Jacobs MD  Neurology G4  [phone]    60 min were spent with the patient> 50 %  Counseling and coordinating care by faculty

## 2020-10-28 NOTE — PROGRESS NOTES
[facility], [place]   Neurology Clinic Note  Vincent Zuniga  [id number]  10/28/2020    HPI:   Last time the patient was seen on 5/14/2020 and the plan was as the following   He was experiencing anxiety which was not being controlled by Paxil, therefore I will stop Paxil start Lexapro 20 mg a day.     Since he does not like the side effects of trazodone we will stop it and start clonazepam 1 mg 30 minutes before bedtime, he has been advised to walk 2 to 3 hours before going to bed and to avoid taking a nap once he wakes up.     He does have chronic back pain for which he takes Mobic for, he is also on gabapentin 400 mg 3 times a day complains of feeling tired so we will decrease his gabapentin to after lunch and prior to bedtime in order to decrease the side effects.  The patient will be seen again in 4 months with repeat MRIs of the brain cervical and thoracic spine to establish a new baseline.    Interval history   Things are ok.     He started Mayzent for about a week then stopped, he got really sick on it and dizzy and fatigued and lethargic. They called the clinic and he was told to stop the medicine. Last week he was in the hospital for sever dehydration, he fell and he was off balance and super lethargic and dizzy, he is on feeding tube. He was given fluid on 10/7 and the dizziness went away. Tremors are getting worse and sleeping is difficult, gets up at 2-3 am. Sometimes he does not sleep at all. He is taking klonipin 1 mg HS. He was on ambien and trazadone and they made him drowsy and he stopped them. No fatigues or constipation or UR. His family doctor stopped gabapentine.     ROS:  A 10-point ROS was performed, negative except as per HPI.    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     Numbness and tingling     in legs     Shortness of breath        PSH:  Past Surgical History:   Procedure Laterality Date     BACK SURGERY      Lumbar     SEPTOPLASTY  10/31/2012    Procedure: SEPTOPLASTY;  Septoplasty, turbinoplasty, enlargement of maxillary ostia;  Surgeon: Carolina Robison MD;  Location: [facility] OR     spleen[  Age 18    spleenectomy       Allergies:  Allergies   Allergen Reactions     Methylprednisolone Other (See Comments)     Immune Globulin Rash       Medications:    Current Outpatient Medications:      albuterol (PROVENTIL HFA) 108 (90 Base) MCG/ACT inhaler, Inhale 2 puffs into the lungs every 4 hours as needed, Disp: , Rfl:      atorvastatin (LIPITOR) 20 MG tablet, Take 20 mg by mouth daily, Disp: , Rfl:      Cholecalciferol (VITAMIN D3) 50 MCG (2000 UT) TABS, Take 2 tablets by mouth once daily, Disp: 180 tablet, Rfl: 0     clonazePAM (KLONOPIN) 0.5 MG tablet, Take 2 tablets (1 mg) by mouth nightly as needed for anxiety, Disp: 30 tablet, Rfl: 3     escitalopram (LEXAPRO) 20 MG tablet, Take 1 tablet (20 mg) by mouth daily, Disp: 30 tablet, Rfl: 4     gabapentin (NEURONTIN) 400 MG capsule, 400 mg by NG or Feeding tube route 2 times daily, Disp: , Rfl:      meloxicam (MOBIC) 15 MG tablet, Take 15 mg by mouth daily, Disp: , Rfl:      oxybutynin (DITROPAN) 5 MG tablet, Take 5 mg by mouth daily, Disp: , Rfl:      pramipexole (MIRAPEX) 0.5 MG tablet, Take 0.5 mg by mouth At Bedtime, Disp: , Rfl:      STARCH-MALTO DEXTRIN ([employer] INSTANT FOOD THICKENER) POWD, Take 1 Units by mouth as needed (to thicken all thin liquids taken orally to prevent silent aspiration into lungs), Disp: 1020 g, Rfl: 3     valproic acid (DEPAKENE) 250 MG/5ML syrup, 10 mLs (500 mg) by Oral or G tube route 3 times daily, Disp: 900 mL, Rfl: 3     vitamin D2 (ERGOCALCIFEROL) 52298 units (1250 mcg) capsule, Take  50,000 Units by mouth once a week, Disp: , Rfl:     Social History:  Social History     Tobacco Use     Smoking status: Current Every Day Smoker     Packs/day: 1.00     Years: 30.00     Pack years: 30.00     Types: Cigarettes     Smokeless tobacco: Never Used   Substance Use Topics     Alcohol use: No       Family History:  Family History   Problem Relation Age of Onset     Cancer Father         Lung     Heart Disease Brother 35        Four heart attacks     Alcohol/Drug Brother      Psychotic Disorder Brother         Drug addiction     Unknown/Adopted Son      Unknown/Adopted Daughter      Unknown/Adopted Daughter      Unknown/Adopted Daughter      Unknown/Adopted Daughter          Objective   There were no vitals taken for this visit.  General: pt laying comfortably in bed, breathing easily on ra, in NAD   HEENT: no oral ulcers   Chest: cta   Heart: rrr  Abdomen: soft, nt, nd, +BS  Ext: no edema   Skin: no rashes  Neurological examination:  Mental status:  Patient is alert, attentive, and oriented x 3.  Language is coherent and fluent without dysarthria or aphasia.  Memory, comprehension and ability to follow commands were intact.        Cranial nerves: Pupils were round and reacted to light.  Extraocular movements were full. There was no face, jaw, palate or tongue weakness or atrophy. Hearing was grossly intact. Shrugging shoulder is normal  Motor exam: No atrophy or fasciculations.  No action or percussion myotonia or paramyotonia.  Manual muscle testing revealed the following MRC grade muscle power:    Right Left   Neck flexion 5     Neck extension 5     Shoulder ext rotation 5  5    Shoulder abduction 5 5   Elbow extension 5 5   Elbow flexion             5 5   Wrist extension 5 5   Wrist flexion 5 5   Finger extension 5 5   Finger flexion 5 5   FDI 5 5   APB 5 5   Hip extension 5 5    Hip flexion 5 4   Knee extension 5 5   Knee flexion 5 5   Dorsiflexion 5 5   Plantarflexion 5 5   Eversion 5 5   Inversion 5 5    Ext Hallucis Longus 5 5      Complex motor skills revealed normal coordination.  Finger-nose- finger and heel to shin were intact.     resting and postural tremors R>L     Sensory exam revealed vibration to be R/L 7/9s at the toes, >15/>15s at the fingers. DECREASED PP on both feet  Gait was slow.  Pt was able to walk on heels, toes and was not able to do tandem without any difficulty.       Deep tendon reflexes:    Right Left   Triceps 2 2   Biceps 2 2   Brachioradialis 2 2   Knee jerk 2 3   Ankle jerk 2 3   Plantar responses were flexor bilaterally. Simpson's negative b/l     Investigations    Brain MRI 5/8/2020  Is experiencing anxiety which is not being controlled by Paxil, therefore I will stop Paxil start Lexapro 20 mg a day.     Since he does not like the side effects of trazodone we will stop it and start clonazepam 1 mg 30 minutes before bedtime, he has been advised to walk 2 to 3 hours before going to bed and to avoid taking a nap once he wakes up.     He does have chronic back pain for which he takes Mobic for, he is also on gabapentin 400 mg 3 times a day complains of feeling tired so we will decrease his gabapentin to after lunch and prior to bedtime in order to decrease the side effects.  The patient will be seen again in 4 months with repeat MRIs of the brain cervical and thoracic spine to establish a new baseline.      Impression:  Vincent Zuniga is a 65 year old year old male with h/o relapsing remitting multiple sclerosis who presents for regular follow up    #relapsing remitting multiple sclerosis.   he was started on Aubagio in 1-19  but did not tolerated, he was having GI issues, hair thinning and worsening depression which is unlikely related to Aubagio.  He was switched to Mayzent then stopped after one week from taking it as he did not tolerate the medicine. 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 We went over ocrevus as a third option in his case and we went over the side effects of the  medications, he agreed to take the medicine, it is infusion once every 6 months. We discussed that his immunes system will be weaker and his response to any vaccine will not be optimal and he was ok with that, anyway we think this medicine will be a good option for him given the fact he failed 2 previous different DMD in the past.     #Anxiety and depression   He is on lexapro 20 mg. Things are getting worse. Initially he did not agree to see psychiatrist but eventually he was convinced to do so.        #Back pain.   He is on Meloxicam. Is stable    #Tremors   Getting worse. Could be multifactorial, related to anxiety, lack of sleep and MS (cerebellar outflow tremors). We will increase the pramipexole to BID instead of once daily     #Sleeping problems  We will increase the dose of klonopin to 1.5 mg or 2 mg HS      Recommendations:   1. Increase klonipin to 2 mg HS   2. Refer to psychiatry   3. We will start ocrevus, hepatitis B surface antigen and core antibody were ordered  4. We will decide on doing brain and spine MRI on next visit   5. Make pramipexole 0.5 mg BID   6. Refer to [facility] for assistance with engagement in healthy activities to avoid isolation.    RTC 3 months    Patient was seen and discussed with attending neurologist, Dr. Charlie Jacobs MD  Neurology G4  [phone]    60 min were spent with the patient> 50 %  Counseling and coordinating care by faculty

## 2020-10-29 DIAGNOSIS — E55.9 VITAMIN D DEFICIENCY: ICD-10-CM

## 2020-10-29 DIAGNOSIS — G35 MULTIPLE SCLEROSIS (H): ICD-10-CM

## 2020-10-29 RX ORDER — CHOLECALCIFEROL (VITAMIN D3) 50 MCG
TABLET ORAL
Qty: 180 TABLET | Refills: 0 | Status: SHIPPED | OUTPATIENT
Start: 2020-10-29 | End: 2021-01-29 | Stop reason: DRUGHIGH

## 2020-10-29 RX ORDER — ESCITALOPRAM OXALATE 20 MG/1
TABLET ORAL
Qty: 90 TABLET | Refills: 1 | Status: SHIPPED | OUTPATIENT
Start: 2020-10-29 | End: 2021-08-12

## 2020-10-29 NOTE — TELEPHONE ENCOUNTER
Received refill request for escitalopram; Rx pended to Dr. Guerra due to high risk medication interaction notification.    Nila Salinas, MS RN Care Coordinator

## 2020-11-04 ENCOUNTER — TELEPHONE (OUTPATIENT)
Dept: NEUROLOGY | Facility: CLINIC | Age: 65
End: 2020-11-04

## 2020-11-04 RX ORDER — HEPARIN SODIUM,PORCINE 10 UNIT/ML
5 VIAL (ML) INTRAVENOUS
Status: CANCELLED | OUTPATIENT
Start: 2020-11-04

## 2020-11-04 RX ORDER — METHYLPREDNISOLONE SODIUM SUCCINATE 125 MG/2ML
125 INJECTION, POWDER, LYOPHILIZED, FOR SOLUTION INTRAMUSCULAR; INTRAVENOUS ONCE
Status: CANCELLED | OUTPATIENT
Start: 2020-11-04

## 2020-11-04 RX ORDER — HEPARIN SODIUM (PORCINE) LOCK FLUSH IV SOLN 100 UNIT/ML 100 UNIT/ML
5 SOLUTION INTRAVENOUS
Status: CANCELLED | OUTPATIENT
Start: 2020-11-04

## 2020-11-04 RX ORDER — DIPHENHYDRAMINE HCL 25 MG
50 CAPSULE ORAL ONCE
Status: CANCELLED | OUTPATIENT
Start: 2020-11-04

## 2020-11-04 RX ORDER — ACETAMINOPHEN 325 MG/1
650 TABLET ORAL ONCE
Status: CANCELLED | OUTPATIENT
Start: 2020-11-04

## 2020-11-04 NOTE — TELEPHONE ENCOUNTER
Patient was in for an office visit with Dr. Guerra last week and the decision was made for him to start on Ocrevus; The start form was signed by Dr. Guerra and the patient; Baseline lab work ordered, but for some reason the patient did not complete this when he was at the clinic last week; I tried calling the patient's daughter, however, the phone number was busy; I need to discuss infusion center location with her, as well as the fact that the patient must have his lab work done; I also need to find out what kind of a reaction the patient has to methylprednisolone, as it is listed as an allergy; New Ocrevus therapy plan orders placed and routed to Dr. Guerra for review and signature in the meantime; I will try calling the daughter again later today or tomorrow.    Nila Salinas, MS RN Care Coordinator

## 2020-11-05 NOTE — TELEPHONE ENCOUNTER
I tried calling the daughter again at the only number listed in the patient's chart, and it continues to be busy; I will try calling on Monday.    Nila Salinas, MS RN Care Coordinator

## 2020-11-09 ENCOUNTER — TELEPHONE (OUTPATIENT)
Dept: NEUROLOGY | Facility: CLINIC | Age: 65
End: 2020-11-09

## 2020-11-09 DIAGNOSIS — Z11.59 ENCOUNTER FOR SCREENING FOR OTHER VIRAL DISEASES: ICD-10-CM

## 2020-11-09 DIAGNOSIS — G35 MULTIPLE SCLEROSIS (H): ICD-10-CM

## 2020-11-09 DIAGNOSIS — Z79.51 LONG TERM (CURRENT) USE OF INHALED STEROIDS: ICD-10-CM

## 2020-11-09 LAB
ALBUMIN SERPL-MCNC: 3.4 G/DL (ref 3.4–5)
ALP SERPL-CCNC: 89 U/L (ref 40–150)
ALT SERPL W P-5'-P-CCNC: 41 U/L (ref 0–70)
ANION GAP SERPL CALCULATED.3IONS-SCNC: 5 MMOL/L (ref 3–14)
AST SERPL W P-5'-P-CCNC: 32 U/L (ref 0–45)
BILIRUB SERPL-MCNC: 0.3 MG/DL (ref 0.2–1.3)
BUN SERPL-MCNC: 20 MG/DL (ref 7–30)
CALCIUM SERPL-MCNC: 9.2 MG/DL (ref 8.5–10.1)
CHLORIDE SERPL-SCNC: 95 MMOL/L (ref 94–109)
CO2 SERPL-SCNC: 32 MMOL/L (ref 20–32)
CREAT SERPL-MCNC: 0.65 MG/DL (ref 0.66–1.25)
GFR SERPL CREATININE-BSD FRML MDRD: >90 ML/MIN/{1.73_M2}
GLUCOSE SERPL-MCNC: 81 MG/DL (ref 70–99)
POTASSIUM SERPL-SCNC: 4.9 MMOL/L (ref 3.4–5.3)
PROT SERPL-MCNC: 6.9 G/DL (ref 6.8–8.8)
SODIUM SERPL-SCNC: 132 MMOL/L (ref 133–144)

## 2020-11-09 PROCEDURE — 82306 VITAMIN D 25 HYDROXY: CPT | Performed by: PSYCHIATRY & NEUROLOGY

## 2020-11-09 PROCEDURE — 86704 HEP B CORE ANTIBODY TOTAL: CPT | Performed by: PSYCHIATRY & NEUROLOGY

## 2020-11-09 PROCEDURE — 80053 COMPREHEN METABOLIC PANEL: CPT | Performed by: PSYCHIATRY & NEUROLOGY

## 2020-11-09 PROCEDURE — 86706 HEP B SURFACE ANTIBODY: CPT | Performed by: PSYCHIATRY & NEUROLOGY

## 2020-11-09 NOTE — TELEPHONE ENCOUNTER
[facility] information:    Service Request ID: [id number]  Patient ID: [id number]    Nila Salinas MS RN Care Coordinator

## 2020-11-09 NOTE — TELEPHONE ENCOUNTER
Prior Authorization Infusion/Clinic Administered Request    Location: [facility]  Diagnosis and ICD: Relapsing Remitting Multiple Sclerosis, G35  Drug/Therapy: Ocrevus 300mg IV day 1 and day 15    Previously Tried and Failed Therapies: Aubagio and Chano     Date of provider note with supporting information: 10/28/20    Urgency (When is the patient scheduled?): ASAP please    Would you like to include any research articles? n/a        If yes please call [phone] for further instructions about sending that information

## 2020-11-10 LAB
DEPRECATED CALCIDIOL+CALCIFEROL SERPL-MC: 146 UG/L (ref 20–75)
HBV CORE AB SERPL QL IA: NONREACTIVE
HBV SURFACE AB SERPL IA-ACNC: 0 M[IU]/ML

## 2020-11-11 NOTE — TELEPHONE ENCOUNTER
Patient completed his baseline lab work:     Hepatitis B Core Yuly NR^Nonreactive Nonreactive     Hepatitis B Surface Antibody <8.00 m[IU]/mL 0.00     Comment: Nonreactive, No antibody detected when the value is less than 8.00 m[IU]/mL.     Dr. Guerra, is the patient cleared to start Ocrevus? Thank you.    Nila Salinas, MS RN Care Coordinator

## 2020-11-16 NOTE — TELEPHONE ENCOUNTER
Patient cleared to start Ocrevus per Dr. Guerra; Our infusion finance team also secured insurance coverage; I left [facility] for the patient's daughter advising this and the phone number to call and get scheduled.    Nlia Salinas, MS RN Care Coordinator

## 2020-11-23 ENCOUNTER — APPOINTMENT (OUTPATIENT)
Dept: GENERAL RADIOLOGY | Facility: CLINIC | Age: 65
DRG: 871 | End: 2020-11-23
Attending: EMERGENCY MEDICINE
Payer: COMMERCIAL

## 2020-11-23 ENCOUNTER — HOSPITAL ENCOUNTER (INPATIENT)
Facility: CLINIC | Age: 65
LOS: 4 days | Discharge: HOME | DRG: 871 | End: 2020-11-27
Attending: EMERGENCY MEDICINE | Admitting: INTERNAL MEDICINE
Payer: COMMERCIAL

## 2020-11-23 DIAGNOSIS — J43.1 PANLOBULAR EMPHYSEMA (H): ICD-10-CM

## 2020-11-23 DIAGNOSIS — G35 MULTIPLE SCLEROSIS (H): ICD-10-CM

## 2020-11-23 DIAGNOSIS — E44.0 MODERATE PROTEIN-CALORIE MALNUTRITION (H): ICD-10-CM

## 2020-11-23 DIAGNOSIS — F17.210 CIGARETTE SMOKER: ICD-10-CM

## 2020-11-23 DIAGNOSIS — Z20.822 COVID-19 RULED OUT: Primary | ICD-10-CM

## 2020-11-23 DIAGNOSIS — Z20.828 EXPOSURE TO SARS-ASSOCIATED CORONAVIRUS: ICD-10-CM

## 2020-11-23 DIAGNOSIS — J18.9 PNEUMONIA DUE TO INFECTIOUS ORGANISM, UNSPECIFIED LATERALITY, UNSPECIFIED PART OF LUNG: Primary | ICD-10-CM

## 2020-11-23 DIAGNOSIS — U07.1 PNEUMONIA DUE TO 2019 NOVEL CORONAVIRUS: ICD-10-CM

## 2020-11-23 DIAGNOSIS — J12.82 PNEUMONIA DUE TO 2019 NOVEL CORONAVIRUS: ICD-10-CM

## 2020-11-23 LAB
ALBUMIN SERPL-MCNC: 2.9 G/DL (ref 3.4–5)
ALP SERPL-CCNC: 79 U/L (ref 40–150)
ALT SERPL W P-5'-P-CCNC: 40 U/L (ref 0–70)
ANION GAP SERPL CALCULATED.3IONS-SCNC: 5 MMOL/L (ref 3–14)
AST SERPL W P-5'-P-CCNC: 28 U/L (ref 0–45)
BASE EXCESS BLDV CALC-SCNC: 3.8 MMOL/L
BASOPHILS # BLD AUTO: 0 10E9/L (ref 0–0.2)
BASOPHILS NFR BLD AUTO: 0.5 %
BILIRUB SERPL-MCNC: 0.2 MG/DL (ref 0.2–1.3)
BUN SERPL-MCNC: 24 MG/DL (ref 7–30)
CALCIUM SERPL-MCNC: 8.8 MG/DL (ref 8.5–10.1)
CHLORIDE SERPL-SCNC: 98 MMOL/L (ref 94–109)
CO2 SERPL-SCNC: 28 MMOL/L (ref 20–32)
CREAT SERPL-MCNC: 0.5 MG/DL (ref 0.66–1.25)
DIFFERENTIAL METHOD BLD: ABNORMAL
EOSINOPHIL # BLD AUTO: 0.2 10E9/L (ref 0–0.7)
EOSINOPHIL NFR BLD AUTO: 2.4 %
ERYTHROCYTE [DISTWIDTH] IN BLOOD BY AUTOMATED COUNT: 12.6 % (ref 10–15)
GFR SERPL CREATININE-BSD FRML MDRD: >90 ML/MIN/{1.73_M2}
GLUCOSE SERPL-MCNC: 109 MG/DL (ref 70–99)
HCO3 BLDV-SCNC: 28 MMOL/L (ref 21–28)
HCT VFR BLD AUTO: 38.2 % (ref 40–53)
HGB BLD-MCNC: 12.8 G/DL (ref 13.3–17.7)
IMM GRANULOCYTES # BLD: 0 10E9/L (ref 0–0.4)
IMM GRANULOCYTES NFR BLD: 0.4 %
LACTATE BLD-SCNC: 2.1 MMOL/L (ref 0.7–2)
LYMPHOCYTES # BLD AUTO: 3.8 10E9/L (ref 0.8–5.3)
LYMPHOCYTES NFR BLD AUTO: 47.8 %
MAGNESIUM SERPL-MCNC: 1.8 MG/DL (ref 1.6–2.3)
MCH RBC QN AUTO: 35.3 PG (ref 26.5–33)
MCHC RBC AUTO-ENTMCNC: 33.5 G/DL (ref 31.5–36.5)
MCV RBC AUTO: 105 FL (ref 78–100)
MONOCYTES # BLD AUTO: 1 10E9/L (ref 0–1.3)
MONOCYTES NFR BLD AUTO: 12.6 %
NEUTROPHILS # BLD AUTO: 2.9 10E9/L (ref 1.6–8.3)
NEUTROPHILS NFR BLD AUTO: 36.3 %
NRBC # BLD AUTO: 0 10*3/UL
NRBC BLD AUTO-RTO: 0 /100
O2/TOTAL GAS SETTING VFR VENT: ABNORMAL %
PCO2 BLDV: 42 MM HG (ref 40–50)
PH BLDV: 7.44 PH (ref 7.32–7.43)
PLATELET # BLD AUTO: 177 10E9/L (ref 150–450)
PLATELET # BLD EST: ABNORMAL 10*3/UL
PO2 BLDV: 59 MM HG (ref 25–47)
POTASSIUM SERPL-SCNC: 4.5 MMOL/L (ref 3.4–5.3)
PROT SERPL-MCNC: 6.4 G/DL (ref 6.8–8.8)
RBC # BLD AUTO: 3.63 10E12/L (ref 4.4–5.9)
SARS-COV-2 RNA SPEC QL NAA+PROBE: NORMAL
SODIUM SERPL-SCNC: 131 MMOL/L (ref 133–144)
SPECIMEN SOURCE: NORMAL
TROPONIN I SERPL-MCNC: <0.015 UG/L (ref 0–0.04)
WBC # BLD AUTO: 8 10E9/L (ref 4–11)

## 2020-11-23 PROCEDURE — 87040 BLOOD CULTURE FOR BACTERIA: CPT | Performed by: EMERGENCY MEDICINE

## 2020-11-23 PROCEDURE — 71045 X-RAY EXAM CHEST 1 VIEW: CPT

## 2020-11-23 PROCEDURE — 83605 ASSAY OF LACTIC ACID: CPT | Performed by: EMERGENCY MEDICINE

## 2020-11-23 PROCEDURE — 93010 ELECTROCARDIOGRAM REPORT: CPT | Performed by: EMERGENCY MEDICINE

## 2020-11-23 PROCEDURE — 83735 ASSAY OF MAGNESIUM: CPT | Performed by: EMERGENCY MEDICINE

## 2020-11-23 PROCEDURE — 84484 ASSAY OF TROPONIN QUANT: CPT | Performed by: EMERGENCY MEDICINE

## 2020-11-23 PROCEDURE — 85025 COMPLETE CBC W/AUTO DIFF WBC: CPT | Performed by: EMERGENCY MEDICINE

## 2020-11-23 PROCEDURE — 99221 1ST HOSP IP/OBS SF/LOW 40: CPT | Mod: AI | Performed by: INTERNAL MEDICINE

## 2020-11-23 PROCEDURE — 99285 EMERGENCY DEPT VISIT HI MDM: CPT | Mod: 25 | Performed by: EMERGENCY MEDICINE

## 2020-11-23 PROCEDURE — 99207 PR CDG-HISTORY COMP: MEETS EXP. PROBLEM FOCUSED-DOWN CODED LACK OF ROS: CPT | Performed by: INTERNAL MEDICINE

## 2020-11-23 PROCEDURE — C9803 HOPD COVID-19 SPEC COLLECT: HCPCS

## 2020-11-23 PROCEDURE — 80053 COMPREHEN METABOLIC PANEL: CPT | Performed by: EMERGENCY MEDICINE

## 2020-11-23 PROCEDURE — 250N000011 HC RX IP 250 OP 636: Performed by: EMERGENCY MEDICINE

## 2020-11-23 PROCEDURE — 71045 X-RAY EXAM CHEST 1 VIEW: CPT | Mod: 26 | Performed by: RADIOLOGY

## 2020-11-23 PROCEDURE — 93005 ELECTROCARDIOGRAM TRACING: CPT

## 2020-11-23 PROCEDURE — 96365 THER/PROPH/DIAG IV INF INIT: CPT

## 2020-11-23 PROCEDURE — 99285 EMERGENCY DEPT VISIT HI MDM: CPT | Mod: 25

## 2020-11-23 PROCEDURE — 250N000013 HC RX MED GY IP 250 OP 250 PS 637: Performed by: EMERGENCY MEDICINE

## 2020-11-23 PROCEDURE — 96375 TX/PRO/DX INJ NEW DRUG ADDON: CPT

## 2020-11-23 PROCEDURE — 82803 BLOOD GASES ANY COMBINATION: CPT | Performed by: EMERGENCY MEDICINE

## 2020-11-23 PROCEDURE — 258N000003 HC RX IP 258 OP 636: Performed by: EMERGENCY MEDICINE

## 2020-11-23 PROCEDURE — 120N000002 HC R&B MED SURG/OB UMMC

## 2020-11-23 PROCEDURE — U0003 INFECTIOUS AGENT DETECTION BY NUCLEIC ACID (DNA OR RNA); SEVERE ACUTE RESPIRATORY SYNDROME CORONAVIRUS 2 (SARS-COV-2) (CORONAVIRUS DISEASE [COVID-19]), AMPLIFIED PROBE TECHNIQUE, MAKING USE OF HIGH THROUGHPUT TECHNOLOGIES AS DESCRIBED BY CMS-2020-01-R: HCPCS | Performed by: EMERGENCY MEDICINE

## 2020-11-23 RX ORDER — ESCITALOPRAM OXALATE 20 MG/1
20 TABLET ORAL DAILY
Status: DISCONTINUED | OUTPATIENT
Start: 2020-11-24 | End: 2020-11-27 | Stop reason: HOSPADM

## 2020-11-23 RX ORDER — AZITHROMYCIN 250 MG/1
250 TABLET, FILM COATED ORAL EVERY 24 HOURS
Status: DISCONTINUED | OUTPATIENT
Start: 2020-11-24 | End: 2020-11-27 | Stop reason: HOSPADM

## 2020-11-23 RX ORDER — AZITHROMYCIN 250 MG/1
500 TABLET, FILM COATED ORAL ONCE
Status: COMPLETED | OUTPATIENT
Start: 2020-11-23 | End: 2020-11-23

## 2020-11-23 RX ORDER — CEFTRIAXONE 1 G/1
1 INJECTION, POWDER, FOR SOLUTION INTRAMUSCULAR; INTRAVENOUS EVERY 24 HOURS
Status: DISCONTINUED | OUTPATIENT
Start: 2020-11-24 | End: 2020-11-27 | Stop reason: HOSPADM

## 2020-11-23 RX ORDER — CEFTRIAXONE 2 G/1
2 INJECTION, POWDER, FOR SOLUTION INTRAMUSCULAR; INTRAVENOUS ONCE
Status: COMPLETED | OUTPATIENT
Start: 2020-11-23 | End: 2020-11-23

## 2020-11-23 RX ORDER — POLYETHYLENE GLYCOL 3350 17 G/17G
17 POWDER, FOR SOLUTION ORAL DAILY
Status: DISCONTINUED | OUTPATIENT
Start: 2020-11-24 | End: 2020-11-27 | Stop reason: HOSPADM

## 2020-11-23 RX ORDER — LIDOCAINE 40 MG/G
CREAM TOPICAL
Status: DISCONTINUED | OUTPATIENT
Start: 2020-11-23 | End: 2020-11-27 | Stop reason: HOSPADM

## 2020-11-23 RX ORDER — AMOXICILLIN 250 MG
1 CAPSULE ORAL 2 TIMES DAILY
Status: DISCONTINUED | OUTPATIENT
Start: 2020-11-24 | End: 2020-11-27 | Stop reason: HOSPADM

## 2020-11-23 RX ORDER — DEXAMETHASONE SODIUM PHOSPHATE 10 MG/ML
10 INJECTION, SOLUTION INTRAMUSCULAR; INTRAVENOUS ONCE
Status: COMPLETED | OUTPATIENT
Start: 2020-11-23 | End: 2020-11-23

## 2020-11-23 RX ORDER — OXYBUTYNIN CHLORIDE 5 MG/1
5 TABLET ORAL DAILY
Status: DISCONTINUED | OUTPATIENT
Start: 2020-11-24 | End: 2020-11-24

## 2020-11-23 RX ORDER — ALBUTEROL SULFATE 90 UG/1
2 AEROSOL, METERED RESPIRATORY (INHALATION) EVERY 4 HOURS PRN
Status: DISCONTINUED | OUTPATIENT
Start: 2020-11-23 | End: 2020-11-27 | Stop reason: HOSPADM

## 2020-11-23 RX ORDER — CLONAZEPAM 2 MG/1
2 TABLET ORAL
Status: DISCONTINUED | OUTPATIENT
Start: 2020-11-23 | End: 2020-11-27 | Stop reason: HOSPADM

## 2020-11-23 RX ORDER — AMOXICILLIN 250 MG
2 CAPSULE ORAL 2 TIMES DAILY
Status: DISCONTINUED | OUTPATIENT
Start: 2020-11-24 | End: 2020-11-27 | Stop reason: HOSPADM

## 2020-11-23 RX ORDER — ATORVASTATIN CALCIUM 10 MG/1
20 TABLET, FILM COATED ORAL DAILY
Status: DISCONTINUED | OUTPATIENT
Start: 2020-11-24 | End: 2020-11-27 | Stop reason: HOSPADM

## 2020-11-23 RX ORDER — PRAMIPEXOLE DIHYDROCHLORIDE 0.5 MG/1
0.5 TABLET ORAL
Status: DISCONTINUED | OUTPATIENT
Start: 2020-11-24 | End: 2020-11-27 | Stop reason: HOSPADM

## 2020-11-23 RX ADMIN — SODIUM CHLORIDE 1000 ML: 9 INJECTION, SOLUTION INTRAVENOUS at 20:30

## 2020-11-23 RX ADMIN — SODIUM CHLORIDE 1800 ML: 9 INJECTION, SOLUTION INTRAVENOUS at 22:04

## 2020-11-23 RX ADMIN — AZITHROMYCIN MONOHYDRATE 500 MG: 250 TABLET ORAL at 22:03

## 2020-11-23 RX ADMIN — CEFTRIAXONE SODIUM 2 G: 2 INJECTION, POWDER, FOR SOLUTION INTRAMUSCULAR; INTRAVENOUS at 22:04

## 2020-11-23 RX ADMIN — DEXAMETHASONE SODIUM PHOSPHATE 10 MG: 10 INJECTION, SOLUTION INTRAMUSCULAR; INTRAVENOUS at 22:03

## 2020-11-23 ASSESSMENT — MIFFLIN-ST. JEOR: SCORE: 1370.52

## 2020-11-24 ENCOUNTER — APPOINTMENT (OUTPATIENT)
Dept: PHYSICAL THERAPY | Facility: CLINIC | Age: 65
DRG: 871 | End: 2020-11-24
Attending: INTERNAL MEDICINE
Payer: COMMERCIAL

## 2020-11-24 ENCOUNTER — APPOINTMENT (OUTPATIENT)
Dept: OCCUPATIONAL THERAPY | Facility: CLINIC | Age: 65
DRG: 871 | End: 2020-11-24
Attending: INTERNAL MEDICINE
Payer: COMMERCIAL

## 2020-11-24 DIAGNOSIS — Z20.822 COVID-19 RULED OUT: Primary | ICD-10-CM

## 2020-11-24 LAB
ANION GAP SERPL CALCULATED.3IONS-SCNC: 4 MMOL/L (ref 3–14)
BUN SERPL-MCNC: 18 MG/DL (ref 7–30)
CALCIUM SERPL-MCNC: 9.2 MG/DL (ref 8.5–10.1)
CHLORIDE SERPL-SCNC: 112 MMOL/L (ref 94–109)
CO2 SERPL-SCNC: 28 MMOL/L (ref 20–32)
CREAT SERPL-MCNC: 0.46 MG/DL (ref 0.66–1.25)
ERYTHROCYTE [DISTWIDTH] IN BLOOD BY AUTOMATED COUNT: 12.7 % (ref 10–15)
FLUAV+FLUBV AG SPEC QL: NEGATIVE
FLUAV+FLUBV AG SPEC QL: NEGATIVE
GFR SERPL CREATININE-BSD FRML MDRD: >90 ML/MIN/{1.73_M2}
GLUCOSE SERPL-MCNC: 126 MG/DL (ref 70–99)
HCT VFR BLD AUTO: 40.6 % (ref 40–53)
HGB BLD-MCNC: 13.4 G/DL (ref 13.3–17.7)
INTERPRETATION ECG - MUSE: NORMAL
LABORATORY COMMENT REPORT: NORMAL
LACTATE BLD-SCNC: 1.1 MMOL/L (ref 0.7–2)
MCH RBC QN AUTO: 35.2 PG (ref 26.5–33)
MCHC RBC AUTO-ENTMCNC: 33 G/DL (ref 31.5–36.5)
MCV RBC AUTO: 107 FL (ref 78–100)
PLATELET # BLD AUTO: 186 10E9/L (ref 150–450)
POTASSIUM SERPL-SCNC: 4.7 MMOL/L (ref 3.4–5.3)
RBC # BLD AUTO: 3.81 10E12/L (ref 4.4–5.9)
RSV AG SPEC QL: NEGATIVE
SARS-COV-2 RNA SPEC QL NAA+PROBE: NEGATIVE
SODIUM SERPL-SCNC: 143 MMOL/L (ref 133–144)
SPECIMEN SOURCE: NORMAL
WBC # BLD AUTO: 4.9 10E9/L (ref 4–11)

## 2020-11-24 PROCEDURE — 87633 RESP VIRUS 12-25 TARGETS: CPT | Performed by: INTERNAL MEDICINE

## 2020-11-24 PROCEDURE — 83605 ASSAY OF LACTIC ACID: CPT | Performed by: STUDENT IN AN ORGANIZED HEALTH CARE EDUCATION/TRAINING PROGRAM

## 2020-11-24 PROCEDURE — 80048 BASIC METABOLIC PNL TOTAL CA: CPT | Performed by: STUDENT IN AN ORGANIZED HEALTH CARE EDUCATION/TRAINING PROGRAM

## 2020-11-24 PROCEDURE — 258N000003 HC RX IP 258 OP 636: Performed by: PHYSICIAN ASSISTANT

## 2020-11-24 PROCEDURE — 87804 INFLUENZA ASSAY W/OPTIC: CPT | Performed by: INTERNAL MEDICINE

## 2020-11-24 PROCEDURE — 87807 RSV ASSAY W/OPTIC: CPT | Performed by: INTERNAL MEDICINE

## 2020-11-24 PROCEDURE — 97116 GAIT TRAINING THERAPY: CPT | Mod: GP

## 2020-11-24 PROCEDURE — 97535 SELF CARE MNGMENT TRAINING: CPT | Mod: GO | Performed by: OCCUPATIONAL THERAPIST

## 2020-11-24 PROCEDURE — 97530 THERAPEUTIC ACTIVITIES: CPT | Mod: GP

## 2020-11-24 PROCEDURE — 36415 COLL VENOUS BLD VENIPUNCTURE: CPT | Performed by: PHYSICIAN ASSISTANT

## 2020-11-24 PROCEDURE — 87581 M.PNEUMON DNA AMP PROBE: CPT | Performed by: INTERNAL MEDICINE

## 2020-11-24 PROCEDURE — 97110 THERAPEUTIC EXERCISES: CPT | Mod: GO | Performed by: OCCUPATIONAL THERAPIST

## 2020-11-24 PROCEDURE — 87486 CHLMYD PNEUM DNA AMP PROBE: CPT | Performed by: INTERNAL MEDICINE

## 2020-11-24 PROCEDURE — 250N000011 HC RX IP 250 OP 636: Performed by: STUDENT IN AN ORGANIZED HEALTH CARE EDUCATION/TRAINING PROGRAM

## 2020-11-24 PROCEDURE — 87040 BLOOD CULTURE FOR BACTERIA: CPT | Performed by: PHYSICIAN ASSISTANT

## 2020-11-24 PROCEDURE — 85027 COMPLETE CBC AUTOMATED: CPT | Performed by: STUDENT IN AN ORGANIZED HEALTH CARE EDUCATION/TRAINING PROGRAM

## 2020-11-24 PROCEDURE — 36415 COLL VENOUS BLD VENIPUNCTURE: CPT | Performed by: STUDENT IN AN ORGANIZED HEALTH CARE EDUCATION/TRAINING PROGRAM

## 2020-11-24 PROCEDURE — 97161 PT EVAL LOW COMPLEX 20 MIN: CPT | Mod: GP

## 2020-11-24 PROCEDURE — 99232 SBSQ HOSP IP/OBS MODERATE 35: CPT | Performed by: INTERNAL MEDICINE

## 2020-11-24 PROCEDURE — 97165 OT EVAL LOW COMPLEX 30 MIN: CPT | Mod: GO | Performed by: OCCUPATIONAL THERAPIST

## 2020-11-24 PROCEDURE — 97110 THERAPEUTIC EXERCISES: CPT | Mod: GP

## 2020-11-24 PROCEDURE — 120N000002 HC R&B MED SURG/OB UMMC

## 2020-11-24 PROCEDURE — 250N000013 HC RX MED GY IP 250 OP 250 PS 637: Performed by: STUDENT IN AN ORGANIZED HEALTH CARE EDUCATION/TRAINING PROGRAM

## 2020-11-24 PROCEDURE — 97530 THERAPEUTIC ACTIVITIES: CPT | Mod: GO | Performed by: OCCUPATIONAL THERAPIST

## 2020-11-24 RX ORDER — ACETAMINOPHEN 325 MG/1
650 TABLET ORAL EVERY 4 HOURS PRN
Status: DISCONTINUED | OUTPATIENT
Start: 2020-11-24 | End: 2020-11-27 | Stop reason: HOSPADM

## 2020-11-24 RX ORDER — LIDOCAINE 4 G/G
1 PATCH TOPICAL
Status: DISCONTINUED | OUTPATIENT
Start: 2020-11-24 | End: 2020-11-27 | Stop reason: HOSPADM

## 2020-11-24 RX ADMIN — CEFTRIAXONE 1 G: 1 INJECTION, POWDER, FOR SOLUTION INTRAMUSCULAR; INTRAVENOUS at 21:45

## 2020-11-24 RX ADMIN — AZITHROMYCIN 250 MG: 250 TABLET, FILM COATED ORAL at 21:45

## 2020-11-24 RX ADMIN — ATORVASTATIN CALCIUM 20 MG: 10 TABLET, FILM COATED ORAL at 08:51

## 2020-11-24 RX ADMIN — PRAMIPEXOLE DIHYDROCHLORIDE 0.5 MG: 0.5 TABLET ORAL at 08:51

## 2020-11-24 RX ADMIN — VALPROIC ACID 500 MG: 250 SOLUTION ORAL at 14:01

## 2020-11-24 RX ADMIN — VALPROIC ACID 500 MG: 250 SOLUTION ORAL at 08:52

## 2020-11-24 RX ADMIN — ENOXAPARIN SODIUM 40 MG: 40 INJECTION SUBCUTANEOUS at 08:52

## 2020-11-24 RX ADMIN — ESCITALOPRAM OXALATE 20 MG: 20 TABLET ORAL at 08:51

## 2020-11-24 RX ADMIN — CLONAZEPAM 2 MG: 2 TABLET ORAL at 21:45

## 2020-11-24 RX ADMIN — ACETAMINOPHEN 650 MG: 325 TABLET, FILM COATED ORAL at 01:22

## 2020-11-24 RX ADMIN — LIDOCAINE 1 PATCH: 560 PATCH PERCUTANEOUS; TOPICAL; TRANSDERMAL at 08:51

## 2020-11-24 RX ADMIN — PRAMIPEXOLE DIHYDROCHLORIDE 0.5 MG: 0.5 TABLET ORAL at 16:12

## 2020-11-24 RX ADMIN — OXYBUTYNIN CHLORIDE 15 MG: 10 TABLET, EXTENDED RELEASE ORAL at 01:22

## 2020-11-24 RX ADMIN — OXYBUTYNIN CHLORIDE 15 MG: 10 TABLET, EXTENDED RELEASE ORAL at 21:45

## 2020-11-24 RX ADMIN — CLONAZEPAM 2 MG: 2 TABLET ORAL at 01:22

## 2020-11-24 RX ADMIN — DOCUSATE SODIUM 50 MG AND SENNOSIDES 8.6 MG 1 TABLET: 8.6; 5 TABLET, FILM COATED ORAL at 09:08

## 2020-11-24 RX ADMIN — ACETAMINOPHEN 650 MG: 325 TABLET, FILM COATED ORAL at 18:20

## 2020-11-24 RX ADMIN — ACETAMINOPHEN 650 MG: 325 TABLET, FILM COATED ORAL at 22:39

## 2020-11-24 RX ADMIN — SODIUM CHLORIDE, POTASSIUM CHLORIDE, SODIUM LACTATE AND CALCIUM CHLORIDE 500 ML: 600; 310; 30; 20 INJECTION, SOLUTION INTRAVENOUS at 20:57

## 2020-11-24 RX ADMIN — VALPROIC ACID 500 MG: 250 SOLUTION ORAL at 20:58

## 2020-11-24 RX ADMIN — Medication 1 MG: at 21:45

## 2020-11-24 ASSESSMENT — ACTIVITIES OF DAILY LIVING (ADL)
ADLS_ACUITY_SCORE: 20

## 2020-11-24 NOTE — PROGRESS NOTES
11/24/20 1100   Quick Adds   Type of Visit Initial Occupational Therapy Evaluation   Living Environment   People in home child(nabila), adult   Current Living Arrangements condominium   Home Accessibility stairs to enter home;stairs within home   Number of Stairs, Main Entrance   (12)   Stair Railings, Main Entrance railing on left side (ascending)   Number of Stairs, Within Home, Primary 6   Stair Railings, Within Home, Primary railing on left side (ascending)   Transportation Anticipated family or friend will provide   Self-Care   Usual Activity Tolerance fair   Current Activity Tolerance fair   Regular Exercise No   Equipment Currently Used at Home grab bar, tub/shower   Activity/Exercise/Self-Care Comment Pt is relatively sedate, does not often leave the house. Pt manages his own self cares however daughter takers of most if not all IADL.    Disability/Function   Hearing Difficulty or Deaf no   Wear Glasses or Blind yes   Vision Management glasses   Concentrating, Remembering or Making Decisions Difficulty yes   Concentration Management pt endorses difficulty with memory and focus.    Difficulty Communicating no   Difficulty Eating/Swallowing no   Walking or Climbing Stairs Difficulty yes   Walking or Climbing Stairs   (shuffling gait 2/2 MS)   Dressing/Bathing Difficulty no   Toileting no   Doing Errands Independently Difficulty (such as shopping) no   Fall history within last six months yes   Number of times patient has fallen within last six months 1   General Information   Referring Physician Chiquis Amador   Patient/Family Therapy Goal Statement (OT) return home    Additional Occupational Profile Info/Pertinent History of Current Problem Vincent Zuniga is a 65 year old male admitted on 11/23/2020. He has a history of MS and emphysema and is admitted for sepsis, with multiple day history of chills, myalgias, and cough with notable Covid exposure to someone in his home.   General Observations and Info pt  "with flat affect.    Cognitive Status Examination   Orientation Status orientation to person, place and time   Affect/Mental Status (Cognitive) WNL   Follows Commands WNL   Safety Deficit impulsivity;insight into deficits/self-awareness   Memory Deficit moderate deficit   Cognitive Status Comments further testing recommended.    Visual Perception   Impact of Vision Impairment on Function (Vision) no concerns.    Sensory   Sensory Quick Adds No deficits were identified   Range of Motion Comprehensive   Comment, General Range of Motion no concerns.    Strength Comprehensive (MMT)   General Manual Muscle Testing (MMT) Assessment other (see comments)   Comment, General Manual Muscle Testing (MMT) Assessment Pt overall deconditioned.    Coordination   Upper Extremity Coordination No deficits were identified   Coordination Comments pt with shuffling gait that he states \"I learned to walk that way from MS\".    Bed Mobility   Bed Mobility supine-sit   Supine-Sit [place] (Bed Mobility) modified independence   Transfers   Transfers bed-chair transfer   Transfer Skill: Bed to Chair/Chair to Bed   Bed-Chair [place] (Transfers) contact guard   Balance   Balance Assessment standing balance: dynamic   Standing Balance: Dynamic fair balance;poor balance   Balance Comments further assessment from PT pending.    Lower Body Dressing Assessment/Training   [place] Level (Lower Body Dressing) set up  (socks only. )   Instrumental Activities of Daily Living (IADL)   IADL Comments daughter assists with all IADL, cooking, cleaning, medication manamgent and community mobility.    Clinical Impression   Criteria for Skilled Therapeutic Interventions Met (OT) yes   OT Diagnosis decreased ADL I   Assessment of Occupational Performance 5 or more Performance Deficits   Identified Performance Deficits dressing, bathing, toileting, G/H, leisure.    Planned Therapy Interventions (OT) ADL retraining;strengthening;transfer training;home " "program guidelines;progressive activity/exercise;risk factor education   Clinical Decision Making Complexity (OT) low complexity   Therapy Frequency (OT) 5x/week   Predicted Duration of Therapy 2 weeks   Risks and Benefits of Treatment have been explained. Yes   Patient, Family & other staff in agreement with plan of care Yes   Comment-Clinical Impression Pt presents to OT  with general deconditioning, flat affect as well as cognitive delay all leading to decreased ADL I and particiaption in self cares. Pt to beneift from skilled OT intervention to address the above problem list.    OT Discharge Planning    OT Discharge Recommendation (DC Rec) Home with assist;Transitional Care Facility   OT Rationale for DC Rec pending progress in hospital pt may progress to home wiht assist    OT Brief overview of current status  Pt CGA with mobility in room today, SBA G/H at sink, LOB x2 with CGA to min assist for self correct.    [facility] [employer]-P[employer] TM \"6 Clicks\"   2016, Trustees of [facility], under license to [employer].  All rights reserved.   6 Clicks Short Forms Daily Activity Inpatient Short Form   [facility] AM-PAC  \"6 Clicks\" Daily Activity Inpatient Short Form   1. Putting on and taking off regular lower body clothing? 3 - A Little   2. Bathing (including washing, rinsing, drying)? 3 - A Little   3. Toileting, which includes using toilet, bedpan or urinal? 3 - A Little   4. Putting on and taking off regular upper body clothing? 4 - None   5. Taking care of personal grooming such as brushing teeth? 4 - None   6. Eating meals? 4 - None   Daily Activity Raw Score (Score out of 24.Lower scores equate to lower levels of function) 21     "

## 2020-11-24 NOTE — H&P
[facility]     History and Physical -[facility] Night Team service        Date of Admission:  11/23/2020    Assessment & Plan   Vincent Zuniga is a 65 year old male admitted on 11/23/2020. He has a history of MS and emphysema and is admitted for sepsis, with multiple day history of chills, myalgias, and cough with notable Covid exposure to someone in his home.    Sepsis  Concern for systemic viral infection  With Covid exposure and high community rate at this time is most probable, could also be attributable to influenza, RSV, or alternative respiratory virus.  With no change in sputum production, no hypercapnia, and no hypoxia, COPD exacerbation and or bacterial pneumonia seem unlikely at this time.  - recheck lactate  -Careful fluid resuscitation in suspected Covid pneumonia  -Covid test pending  -Continuous pulse oximetry, as needed nasal cannula  -Lovenox at DVT prophylaxis dosing  -If Covid test returns positive we will obtain Covid prognostic labs  - If Covid returns negative, would consider pursuing influenza, RSV, or respiratory viral panel while awaiting retest    Emphysema, mild COPD (FEV1 91%)  -PTA albuterol  -Hold PTA duonebs in the setting of Covid pandemic    Relapsing remitting MS   Urge incontinence  Malnutrition  - continue pta oxybutynin XR 15mg  (notably PCP increased oxybutyning this morning per daughter)  - pta pramipexole  - pta valproate  - Isosource tube feeds  - hold Vitamin D supplementation, notably Vitamin D level 146 (high) on 11/9/2020    Chronic Back Pain  - pta lidocaine patch  - APAP 650mg q4h prn  - hold meloxicam due to high risk of covid pneumonia    Depressive disorder - pta escitalopram, clonazepam    ASCVD Risk Reduction - pta atorvastatin       Diet:   Isosource 2 cans TID, can take PO intake with no dysphagia limitations (per daughter)  Fluids: PO intake, 30ml q4h free water in G tube  DVT Prophylaxis: Enoxaparin (Lovenox)  "SQ  Meeks Catheter: not present  Code Status: Full Code    Rule Out COVID-19 Handoff:  Vincent is NOT A LOW SUSPICION PUI (needs further investigation).    Follow these instructions:    If COVID test is positive -> continue isolation precautions    If 1st COVID test is negative -> continue isolation precautions    -  Order a repeat COVID test to be done 72 hours after the 1st test  -  Place \"PUI Isolation\" nurse communication order  -  Consider ID consult    If 2nd COVID test performed after 72 hours is negative -> consider discontinuing COVID-specific isolation precautions if clinical course atypical for COVID and/or an alternative diagnosis emerges       Disposition Plan   Expected discharge: 2 - 3 days, recommended to prior living arrangement once SIRS/Sepsis treated.  Entered: Shyla Novoa MD 11/23/2020, 11:35 PM       The patient's care was discussed with the Attending Physician, Dr. Woodruff.    Shyla Novoa MD  Pending sale to [facility] Team [facility]   Contact information available via [facility] Paging/Directory  Please see sign in/sign out for up to date coverage information  ______________________________________________________________________    Chief Complaint   Generalized weakness/myalgias/chills    History is obtained from the patient, electronic health record and emergency department physician    History of Present Illness   Vincent Zuniga is a 65 year old male who has a history of MS complicated by neurogenic bladder, requiring G-tube, with history of aspiration pneumonia, and mild COPD and is admitted for sepsis    Mr. Mantilla has been feeling unwell for several days noting myalgias, chills, mild productive cough with no change in yellow sputum production from baseline.  He notes he has been continuing to take his tube feeds but has minimal oral intake outside of that.  He was seen by neurology via telemedicine earlier today " with complaining of the symptoms they referred him to the emergency department for Covid test.  He also has notable family exposure of a granddaughter whom he lives with who was exposed to Covid, but does not have a positive test at this time.    He also notes right-sided rib pain for 4 days, he notes that he had a fall in his home approximately 2 weeks ago but pain did not develop until 4 days ago.  Denies pleuritic chest pain, notes pain is only present on palpation    ED Course:  -3 L LR  -Ceftriaxone and azithromycin  -Decadron 6 mg    Review of Systems    ROS: POSITIVE for chills, myalgias, fatigue  Denies chest pain or palpitations; denies dyspnea or difficulty breathing; denies nausea/vomiting/constipation diarrhea; denies dysuria or urinary retention (history from daughter notable for increased urinary incontinence for past few days).    Past Medical History    I have reviewed this patient's medical history and updated it with pertinent information if needed.   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  Past Surgical History:   Procedure Laterality Date     BACK SURGERY      Lumbar     SEPTOPLASTY  10/31/2012    Procedure: SEPTOPLASTY;  Septoplasty, turbinoplasty, enlargement of maxillary ostia;  Surgeon: Carolnia Robison MD;  Location: [facility] OR     spleen[  Age 18    spleenectomy     Social History   I have personally reviewed the social history with the patient  showing lives with his daughter who is a supportive caregiver for him, alcohol use, 1 pack/day smoker for many years, uses cannabis several times per week..    Family History   I have reviewed this patient's family history and updated it with pertinent information if needed.  Family History   Problem Relation Age of Onset     Cancer Father         Lung     Heart Disease Brother 35        Four heart attacks     Alcohol/Drug Brother      Psychotic Disorder Brother         Drug addiction     Unknown/Adopted Son      Unknown/Adopted Daughter      Unknown/Adopted Daughter      Unknown/Adopted Daughter      Unknown/Adopted Daughter        Prior to Admission Medications   Prior to Admission Medications   Prescriptions Last Dose Informant Patient Reported? Taking?   Cholecalciferol (VITAMIN D3) 50 MCG (2000 UT) TABS 11/23/2020 at Unknown time  No Yes   Sig: Take 2 tablets by mouth once daily   STARCH-MALTO DEXTRIN ([employer] INSTANT FOOD THICKENER) POWD 11/23/2020 at Unknown time  No Yes   Sig: Take 1 Units by mouth as needed (to thicken all thin liquids taken orally to prevent silent aspiration into lungs)   albuterol (PROVENTIL HFA) 108 (90 Base) MCG/ACT inhaler Past Week at Unknown time  Yes Yes   Sig: Inhale 2 puffs into the lungs every 4 hours as needed   atorvastatin (LIPITOR) 20 MG tablet 11/23/2020 at Unknown time  Yes Yes   Sig: Take 20 mg by mouth daily   clonazePAM (KLONOPIN) 2 MG tablet 11/22/2020 at Unknown time  No Yes   Sig: Take 1 tablet (2 mg) by mouth nightly as needed for anxiety   escitalopram (LEXAPRO) 20 MG tablet 11/23/2020 at Unknown time  No Yes   Sig: Take 1 tablet by mouth once daily   meloxicam (MOBIC) 15 MG tablet 11/23/2020 at Unknown time  Yes Yes   Sig: Take 15 mg by mouth daily   oxybutynin (DITROPAN) 5 MG tablet 11/23/2020 at Unknown time  Yes Yes   Sig: Take 5 mg by mouth daily   pramipexole (MIRAPEX) 0.5 MG tablet 11/23/2020 at Unknown time  Yes Yes   Sig: Take 0.5 mg by mouth 2 times daily    valproic acid (DEPAKENE) 250 MG/5ML syrup 11/23/2020 at Unknown time  No Yes   Sig: 10 mLs (500 mg) by Oral or G tube route 3 times daily   vitamin D2 (ERGOCALCIFEROL) 63140 units (1250 mcg) capsule 11/23/2020 at Unknown time  Yes Yes   Sig: Take 50,000 Units by mouth once a week      Facility-Administered Medications: None     Allergies   Allergies   Allergen Reactions     Methylprednisolone Other (See Comments)     Immune Globulin Rash       Physical Exam   Vital Signs: Temp: 97.6  F (36.4  C) Temp src: Oral BP: 98/70 Pulse: 53   Resp: 27 SpO2: 96 % O2 Device: None (Room air)    Weight: 129 lbs 0 oz    GENERAL: Lying in bed, no acute distress  HEENT: anicteric, moist mucous membranes, speech logical and coherent  CV: Regular rate and rhythm, no murmurs, capillary refill >2 seconds, no peripheral edema  RESPIRATORY: Speaking in short sentences with quiet voice, mildly tachypneic with no accessory muscle use, decreased air movement throughout lungs without wheeze  GI/: Well-healed G-tube in place, non distended, normal bowel sounds, soft abdomen without tenderness, rebound or guarding to palpation, light yellow urine in urinal at bedside  MSK/INTEGUMENT: Tender to palpation of right lower ribs, mild bruising present over this area  NEURO: speech logical, alert and oriented  PSYCH: mood congruent      Data   Data reviewed today: I reviewed all medications, new labs and imaging results over the last 24 hours. I personally reviewed the EKG tracing showing Normal sinus rhythm with low amplitude and the chest x-ray image(s) showing Very minor changes in hazy opacification of the left lower lobe with clear costophrenic angle and decreased inflation compared to previous chest x-rays.    Lactate 2.1   Hgb 12.8 (14.4 10/7/20) , RDW 12.6  Blood culture pending  Covid PCR pending

## 2020-11-24 NOTE — UTILIZATION REVIEW
Admission Status; Secondary Review Determination   11/23/2020  6:59 PM    Under the authority of the Utilization Management Committee, the utilization review process indicated a secondary review on the above patient. The review outcome is based on review of the medical records, discussions with staff, and applying clinical experience noted on the date of the review.     (x) Inpatient Status Appropriate - This patient's medical care is consistent with medical management for inpatient care and reasonable inpatient medical practice.     RATIONALE FOR DETERMINATION     65 year old male admitted on 11/23/2020. He has a history of MS and emphysema and is admitted for sepsis, with multiple day history of chills, myalgias, and cough with notable Covid exposure. With Covid exposure and high community rate at this time is most probable, could also be attributable to influenza, RSV, or alternative respiratory virus. Started on nebulizer treatments.  Chest xray showing infiltrates and started on IV antibiotics, Decadron and IV fluids.  Will need further monitoring of respiratory status, vital signs, hypoxia.     The severity of illness, intensity of service provided, expected LOS and risk for adverse outcome make the care complex, high risk and appropriate for hospital admission.     At the time of admission with the information available to the attending physician, more than 2 nights Hospital complex care was anticipated, based on patient risk of adverse outcome if treated as outpatient and complex care required.  Inpatient admission is appropriate based on the Medicare guidelines.  The information on this document is developed by the utilization review team in order for the business office to ensure compliance. This only denotes the appropriateness of proper admission status and does not reflect the quality of care rendered.   The definitions of Inpatient Status and Observation Status used in making the determination above  are those provided in the CMS Coverage Manual, Chapter 1 and Chapter 6, section 70.4.   Sincerely,   Milena Parker MD  Physician Advisor  [facility]

## 2020-11-24 NOTE — ED TRIAGE NOTES
"Triage Assessment & Note:    BP 94/63   Pulse 70   Temp 97.6  F (36.4  C) (Oral)   Resp 16   Ht 1.753 m (5' 9\")   Wt 58.5 kg (129 lb)   SpO2 97%   BMI 19.05 kg/m        Patient presents with: Pt comes to triage with reports of generalized weakness, body chills/aches, cough, dizziness, and rib pain. Pt went to PCP today. Pt's family member was exposed to COVID. No reports of fever, CP, or travel.     Home Treatments/Remedies: Home medication    Febrile / Afebrile: afebrile    Duration of C/o:  4 days and getting worse    Jerica Martinez RN  November 23, 2020        "

## 2020-11-24 NOTE — PROGRESS NOTES
11/24/20 1428   Quick Adds   Type of Visit Initial PT Evaluation   Living Environment   People in home child(nabila), adult  (daughter)   Current Living Arrangements condominium   Home Accessibility stairs to enter home;stairs within home   Number of Stairs, Main Entrance   (12)   Stair Railings, Main Entrance railing on left side (ascending)   Number of Stairs, Within Home, Primary 6   Stair Railings, Within Home, Primary railing on left side (ascending)   Transportation Anticipated family or friend will provide   Living Environment Comments pt lives with his daughter in a condo. reports she is available for assistance most of the time. he has a brother that can assist as well.    Self-Care   Usual Activity Tolerance fair   Current Activity Tolerance fair   Regular Exercise No   Equipment Currently Used at Home grab bar, tub/shower  (has FWW he does not use)   Activity/Exercise/Self-Care Comment pt reports limited mobility at baseline. reports he does his own cares however his daughter assist with most IADLs in the home.   Disability/Function   Hearing Difficulty or Deaf no   Wear Glasses or Blind yes   Vision Management glasses  (reading)   Concentrating, Remembering or Making Decisions Difficulty yes   Concentration Management difficulty with memory and concentrating   Difficulty Communicating no   Difficulty Eating/Swallowing no   Eating/Swallowing   (denies difficulty)   Walking or Climbing Stairs Difficulty yes   Walking or Climbing Stairs other (see comments)   Mobility Management shuffling gait 2/2 MS. denies need for assistance at baseline however limited distance noted   Dressing/Bathing Difficulty no   Toileting no   Doing Errands Independently Difficulty (such as shopping) yes   Errands Management daughters assists   Fall history within last six months yes   Number of times patient has fallen within last six months 1  (fell off garbage can while trying to put something on a briana)   Change in Functional  Status Since Onset of Current Illness/Injury yes   General Information   Onset of Illness/Injury or Date of Surgery 11/23/20   Referring Physician Chiquis Amador DO   Patient/Family Therapy Goals Statement (PT) to return home   Pertinent History of Current Problem (include personal factors and/or comorbidities that impact the POC) Vincent Zuniga is a 65 year old male admitted on 11/23/2020. He has a history of MS and emphysema and is admitted for sepsis, with multiple day history of chills, myalgias, and cough with notable Covid exposure to someone in his home.   Existing Precautions/Restrictions fall   General Observations activity: ambulate   Cognition   Orientation Status (Cognition) oriented x 3   Pain Assessment   Patient Currently in Pain No   Integumentary/Edema   Integumentary/Edema no deficits were identifed   Posture    Posture Forward head position;Protracted shoulders   Range of Motion (ROM)   ROM Comment limited ROM, slowed movement however demonstrates function AROM   Strength Comprehensive (MMT)   Comment, General Manual Muscle Testing (MMT) Assessment BLE 3+/5, limitd ROM and overall deconditioned   Bed Mobility   Comment (Bed Mobility) SBA supine <> sit   Transfers   Transfer Safety Comments SBA sit <> stand, CGA pivot to chair. denies need for UE support   Gait/Stairs (Locomotion)   Comment (Gait/Stairs) amb x 10' with CGA, no device. short and shuffled steps.    Balance   Balance Comments CGA, impaired balance with increased instability noted with turning   Sensory Examination   Sensory Perception Comments denies deficits   Clinical Impression   Criteria for Skilled Therapeutic Intervention yes, treatment indicated   PT Diagnosis (PT) impaired functional activity tolerance   Influenced by the following impairments weakness, fatigue, balance deficits   Functional limitations due to impairments below baseline tolerance and performance of functional mobiiltiy   Clinical Presentation  Stable/Uncomplicated   Clinical Presentation Rationale pt presentation, clinical reasoning   Clinical Decision Making (Complexity) low complexity   Therapy Frequency (PT) 5x/week   Predicted Duration of Therapy Intervention (days/wks) 1 week   Planned Therapy Interventions (PT) balance training;home exercise program;gait training;neuromuscular re-education;stair training;strengthening;transfer training   Risk & Benefits of therapy have been explained evaluation/treatment results reviewed;care plan/treatment goals reviewed;risks/benefits reviewed;participants included;patient   Clinical Impression Comments pt with impaired functional activity tolerance, below baseline and will benefit from skilled PT to progress activity tolerance adn safety with mobility   PT Discharge Planning    PT Discharge Recommendation (DC Rec) Transitional Care Facility;home with assist;home with home care physical therapy   PT Rationale for DC Rec currently recommend TCU as pt below baseline, pending confirmed level of assist and support at home and progression of activity tolerance may be appopriate for return to home.    PT Brief overview of current status  Ax1 with gait belt   Total Evaluation Time   Total Evaluation Time (Minutes) 7

## 2020-11-24 NOTE — PHARMACY-ADMISSION MEDICATION HISTORY
Pharmacy Admission Medication History    Admission medication history interview status for the 11/23/2020 admission is complete. See EPIC admission navigator for allergy information, prior to admission medications and immunization status.     Medication history interview source(s): Patient    Medication history resources (including written lists, pill bottles, clinic record): None    Medication history source reliability: Good    Primary pharmacy: [facility], [place], [place]. [phone]    Actions taken by pharmacist (provider contacted, medication changes, etc):None     Changes made to medication history:None    Additional medication history information: Meloxicam last fill date was in April 2020, however pt states he is still taking daily. Patient states he takes Ergocalciferol on Tuesdays. Could not find dispense history for it.    Medication reconciliation/reorder completed by provider prior to medication history? Yes    Time spent in this activity: 35 minutes    Prior to Admission medications    Medication Sig Last Dose Taking? Auth Provider   albuterol (PROVENTIL HFA) 108 (90 Base) MCG/ACT inhaler Inhale 2 puffs into the lungs every 4 hours as needed Past Week at Unknown time Yes Reported, Patient   atorvastatin (LIPITOR) 20 MG tablet Take 20 mg by mouth daily 11/23/2020 at Unknown time Yes Reported, Patient   Cholecalciferol (VITAMIN D3) 50 MCG (2000 UT) TABS Take 2 tablets by mouth once daily 11/23/2020 at Unknown time Yes Marielena Brooks APRN CNP   clonazePAM (KLONOPIN) 2 MG tablet Take 1 tablet (2 mg) by mouth nightly as needed for anxiety 11/22/2020 at Unknown time Yes Cassandra Guerra MD   escitalopram (LEXAPRO) 20 MG tablet Take 1 tablet by mouth once daily 11/23/2020 at Unknown time Yes Cassandra Guerra MD   meloxicam (MOBIC) 15 MG tablet Take 15 mg by mouth daily 11/23/2020 at Unknown time Yes Reported, Patient   oxybutynin (DITROPAN) 5 MG tablet Take 5 mg by mouth daily 11/23/2020 at Unknown time  Yes Reported, Patient   pramipexole (MIRAPEX) 0.5 MG tablet Take 0.5 mg by mouth 2 times daily 11/23/2020 at Unknown time Yes Reported, Patient   valproic acid (DEPAKENE) 250 MG/5ML syrup 10 mLs (500 mg) by Oral or G tube route 3 times daily 11/23/2020 at Unknown time Yes Ariane Moya MD   STARCH-MALTO DEXTRIN ([employer] INSTANT FOOD THICKENER) POWD Take 1 Units by mouth as needed (to thicken all thin liquids taken orally to prevent silent aspiration into lungs) Unknown at Unknown time  Jennyfer Plaza MD   vitamin D2 (ERGOCALCIFEROL) 07051 units (1250 mcg) capsule Take 50,000 Units by mouth once a week Unknown at Unknown time  Reported, Patient

## 2020-11-24 NOTE — PLAN OF CARE
Time: 1139-1332     Reason for admission: Progressive weakness, fatigue, cough x4 days -- COVID PUI    Pt arrived to unit at 2330 from [employer]. Oriented to room, belongings include clothing and shoes -- remain with pt    Vitals: VSS on RA ex soft BPs    Activity: Pt is weak requiring Ax1 when out of bed, pt reports independence w/ ADLs at baseline -- pt could benefit from PT/OT  Pain: C/o pain in lower back -- Tylenol given x1  Neuro: A&Ox4, slow speech, reports forgetfulness. Very flat and withdrawn affect -- pt answers questions with nodding/shaking of head and few words, RN was able to get additional information with more detailed questions and talking with daughterMichelle who lives with pt. Daughter reports pt will often deny he is in pain or needing help with things and believes her father is very depressed ever since his clinical decline and MS diagnosis -- may benefit from a psych consult  Cardiac: Bradycardic 50-60, HR regular   Respiratory: BENOIT, denies SOB at rest. WOB appears comfortable, continuous pulse ox satting mid 90s on RA. LS diminished throughout, infrequent congested cough. Mild tachypnea RR 20-30, more so with activity   GI/: PEG tube clamped, site WNL. Intermittently incontinent of urine (more frequently over past week per daughter), LBM 11/23 AM per pt  Diet: Regular diet -- no appetite. Tube feeds (2 cans isosource TID ordered) + 30mL q4h water flush  IV Access: L PIV SL  Skin: Pale, otherwise WNL  Labs/imaging: COVID result pending, plan to reswab if negative. Blood cx pending. Repeat LA 1.1     Plan: Possible PT/OT, psych consult? Continue infectious work up and keep monitoring closely

## 2020-11-24 NOTE — PROGRESS NOTES
[facility]     Medicine Progress Note - Hospitalist Service, Gold 8       Date of Admission:  11/23/2020  Assessment & Plan         Vincent Zuniga is a 65 year old male admitted on 11/23/2020. He has a history of MS and emphysema and was admitted for myalgias and cough in the setting of a known COVID-19 exposure. Initial COVID-19 test negative on 11/23/2020, determined not to be a low risk PUI with plans for repeat testing in 24 hours.      COVID-19 PUI - Not low risk   Presented with cough/myalgias in setting of known COVID exposure. DDx also includes influenza, RSV, alternate respiratory virus. CXR with atelectasis vs. Less likely consolidation. Low suspicion for COPD exacerbation given lack of productive cough, wheezing etc. He did receive decadron in ED at time of admission but is not hypoxic at this time.   -Ordered influenza, RSV, and respiratory panel   -Given co morbidities, will continue coverage for community acquired pneumonia with azithromycin and ceftriaxone   -Maintain isolation precautions; plan for repeat COVID test in 72 hours - 11/26        Emphysema, mild COPD (FEV1 91%)  Not in exacerbation.   -Continue PTA albuterol inhaler PRN      Relapsing remitting MS   Urge incontinence  Chronic Malnutrition s/p PEG tube   - continue pta oxybutynin XR 15mg  (notably PCP increased oxybutyning this morning per daughter)  - pta pramipexole  - pta valproate  - Isosource tube feeds. Nutrition consult placed for assistacne.   - hold Vitamin D supplementation, notably Vitamin D level 146 (high) on 11/9/2020     Chronic Back Pain  - pta lidocaine patch  - APAP 650mg q4h prn  - hold meloxicam while admitted       Depressive disorder - pta escitalopram, clonazepam     ASCVD Risk Reduction - pta atorvastatin     Diet: Regular Diet Adult  Adult Formula Bolus Feeding: TID Isosource 1.5; Route: Gastrostomy; 6; Can(s); Additional free water (mL): 30 q4h; Medication -  "Feeding Tube Flush Frequency: At least 15-30 mL water before and after medication administration and with tube clogging    DVT Prophylaxis: Enoxaparin (Lovenox) SQ  Meeks Catheter: not present  Code Status: Full Code      Vincent is NOT A LOW SUSPICION PUI (needs further investigation).    Follow these instructions:    If COVID test is positive -> continue isolation precautions    If 1st COVID test is negative -> continue isolation precautions    -  Order a repeat COVID test to be done 72 hours after the 1st test  -  Place \"PUI Isolation\" nurse communication order  -  Consider ID consult    If 2nd COVID test performed after 72 hours is negative -> consider discontinuing COVID-specific isolation precautions if clinical course atypical for COVID and/or an alternative diagnosis emerges         Disposition Plan   Expected discharge: 2 - 3 days, recommended to transitional care unit once safe disposition plan/ TCU bed available and COVID test pending.  Entered: Chiquis Amador DO 11/24/2020, 3:58 PM       The patient's care was discussed with the Bedside Nurse and Patient.    Chiquis Amador DO  Hospitalist Service, [address]   Contact information available via [facility] Paging/Directory  Please see sign in/sign out for up to date coverage information  ______________________________________________________________________    Interval History   Patient admitted overnight. COVID test sent, no other events reported.   This morning patient reports feeling fatigued with intermittent dry cough. Denies dyspnea, chest pain, fevers since admission. Feels he just wants to sleep.     Data reviewed today: I reviewed all medications, new labs and imaging results over the last 24 hours. I personally reviewed the chest x-ray image(s) showing atelectasis vs. opacity LLL, largely clear.    Physical Exam   Vital Signs: Temp: 97.5  F (36.4  C) Temp src: Oral BP: 105/65 Pulse: 65   Resp: 20 " SpO2: 95 % O2 Device: None (Room air)    Weight: 129 lbs 0 oz     Constitutional: lying in bed, appears tired, cooperative   Cardiovascular: regular rate and rhythm, normal S1 and S2, no murmurs, rubs, or gallops noted noted, no bilateral lower extremity edema   Respiratory: clear to auscultation bilaterally, no wheezing, rales, or rhonchi, normal work of breathing   GI: abdomen soft, non tender, non distended, bowel sounds present, PEG tube   Neuro: alert and oriented    Data   Recent Labs   Lab 11/24/20  0652 11/23/20 1955   WBC 4.9 8.0   HGB 13.4 12.8*   * 105*    177    131*   POTASSIUM 4.7 4.5   CHLORIDE 112* 98   CO2 28 28   BUN 18 24   CR 0.46* 0.50*   ANIONGAP 4 5   RIO 9.2 8.8   * 109*   ALBUMIN  --  2.9*   PROTTOTAL  --  6.4*   BILITOTAL  --  0.2   ALKPHOS  --  79   ALT  --  40   AST  --  28   TROPI  --  <0.015

## 2020-11-24 NOTE — ED PROVIDER NOTES
ED Provider Note  [facility]      History     Chief Complaint   Patient presents with     Generalized Weakness     Dizziness     Generalized Body Aches     Rib Pain     Shortness of Breath     HPI  Vincent Zuniag is a 65 year old male with a history of multiple sclerosis, malnutrition, s/p G tube, and COPD who presents to the Emergency Department with generalized weakness, myalgias, chills, cough, dizziness, and rib pain x4 days. Patient was seen by neurology today and COVID test was ordered. Patient reports a family member was exposed to COVID.  He denies syncope, chest pain, hemoptysis, productive cough, abdominal pain or vomiting/diarrhea/bloody stools, denies dysuria/hematuria, denies leg swelling/pain, denies history of DVT/PE.    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     Numbness and tingling     in legs     Shortness of breath      Past Surgical History:   Procedure Laterality Date     BACK SURGERY      Lumbar     SEPTOPLASTY  10/31/2012    Procedure: SEPTOPLASTY;  Septoplasty, turbinoplasty, enlargement of maxillary ostia;  Surgeon: Carolina Robison MD;  Location: [facility] OR     spleen[  Age 18    spleenectomy          albuterol (PROVENTIL HFA) 108 (90 Base) MCG/ACT inhaler       atorvastatin (LIPITOR) 20 MG tablet       Cholecalciferol (VITAMIN D3) 50 MCG (2000 UT) TABS       clonazePAM (KLONOPIN) 2 MG tablet       escitalopram (LEXAPRO) 20 MG tablet       meloxicam (MOBIC) 15 MG tablet       oxybutynin (DITROPAN) 5 MG tablet       pramipexole (MIRAPEX) 0.5 MG tablet       STARCH-MALTO DEXTRIN ([employer] INSTANT FOOD THICKENER) POWD       valproic acid (DEPAKENE)  "250 MG/5ML syrup       vitamin D2 (ERGOCALCIFEROL) 09335 units (1250 mcg) capsule      Allergies   Allergen Reactions     Methylprednisolone Other (See Comments)     Immune Globulin Rash     Family History  Family History   Problem Relation Age of Onset     Cancer Father         Lung     Heart Disease Brother 35        Four heart attacks     Alcohol/Drug Brother      Psychotic Disorder Brother         Drug addiction     Unknown/Adopted Son      Unknown/Adopted Daughter      Unknown/Adopted Daughter      Unknown/Adopted Daughter      Unknown/Adopted Daughter      Social History   Social History     Tobacco Use     Smoking status: Current Every Day Smoker     Packs/day: 1.00     Years: 30.00     Pack years: 30.00     Types: Cigarettes     Smokeless tobacco: Never Used   Substance Use Topics     Alcohol use: No     Drug use: Yes     Types: Marijuana      Past medical history, past surgical history, medications, allergies, family history, and social history were reviewed with the patient. No additional pertinent items.       Review of Systems  A complete review of systems was performed with pertinent positives and negatives noted in the HPI, and all other systems negative.    Physical Exam   BP: 94/63  Pulse: 70  Temp: 97.6  F (36.4  C)  Resp: 16  Height: 175.3 cm (5' 9\")  Weight: 58.5 kg (129 lb)  SpO2: 97 %  Physical Exam  Vitals signs and nursing note reviewed.   Constitutional:       General: He is not in acute distress.     Appearance: He is ill-appearing. He is not toxic-appearing.   HENT:      Head: Normocephalic.      Mouth/Throat:      Mouth: Mucous membranes are moist.   Eyes:      Extraocular Movements: Extraocular movements intact.      Pupils: Pupils are equal, round, and reactive to light.   Neck:      Musculoskeletal: Neck supple.      Vascular: No JVD.   Cardiovascular:      Rate and Rhythm: Normal rate and regular rhythm.      Pulses: Normal pulses.   Pulmonary:      Effort: Pulmonary effort is normal. " No tachypnea, bradypnea or accessory muscle usage.   Chest:      Chest wall: No tenderness.   Abdominal:      Palpations: Abdomen is soft.      Tenderness: There is no abdominal tenderness. There is no guarding.   Musculoskeletal:      Right lower leg: He exhibits no tenderness. No edema.      Left lower leg: He exhibits no tenderness. No edema.   Skin:     General: Skin is warm and dry.      Capillary Refill: Capillary refill takes less than 2 seconds.      Findings: No rash.   Neurological:      General: No focal deficit present.      Mental Status: He is alert and oriented to person, place, and time.   Psychiatric:         Mood and Affect: Mood normal.         Speech: Speech normal.         Behavior: Behavior is cooperative.         Cognition and Memory: Cognition normal.     4  ED Course     ED Course as of Nov 24 0115   Mon Nov 23, 2020 2114 WBC 8.0, HGB 12.8,    CBC with platelets differential(!)   2114 Mild hyponatremia at 131  Cr 0.50     Comprehensive metabolic panel(!)   2114 Troponin I ES: <0.015   2114 Lactic Acid(!): 2.1   2114 Ph Venous(!): 7.44   2114 PCO2 Venous: 42   2115 Findings:     Global left ventricular function appears intact.   Chambers do not appear dilated.   There is no evidence of free fluid within the pericardium.    POC US ECHO LIMITED   2115 IMPRESSION:   Streaky left basilar opacities, likely atelectasis, however cannot  exclude infection.      XR Chest Port 1 View   2207 IM admission graciously accepted at 2205.                 EKG Interpretation:      Interpreted by Dominik Ceballos MD  Time reviewed: 2225   Symptoms at time of EKG: Weakness  Rhythm: normal sinus   Rate: Normal  Axis: Normal   Ectopy: none  Conduction: normal  ST Segments/ T Waves: Non-specific ST-T wave changes  Q Waves: none  Comparison to prior: Unchanged    Clinical Impression: non-specific EKG    The Lactic acid level is elevated due to 2.1, at this time there is no sign of severe sepsis or septic  shock.  He has had one blood pressure less than SBP 90.  Plan to administer intravenous crystalloids and antibiotics, reassess, pending admission.    Treatment:  30ml/kg crystalloid bolus  Rocephin/Azithromycin  Decadron    Assessments & Plan (with Medical Decision Making)   This is a comorbid 65-year-old gentleman with multiple sclerosis, chronic malnutrition with G-tube dependency, and COPD,  presenting for evaluation of constellation of symptoms suspicious for Covid with known recent Covid positive exposure in his family.  Differential diagnosis includes but is not limited to Covid pneumonia, bacterial pneumonia, influenza, electrolyte derangement, sepsis.  History and physical exam are inconsistent with severe sepsis/septic shock.  Diagnostic evaluation will include screening infectious lab panel, COVID-19 nasal swab, plain chest radiography, point-of-care cardiac ultrasound.  Disposition pending results of diagnostic evaluation and response to therapeutic interventions.  Please refer to ED course for additional documentation of diagnostic evaluation and then.    I have reviewed the nursing notes. I have reviewed the findings, diagnosis, plan and need for follow up with the patient.    Final Diagnosis:  Suspect covid pneumonia with complex comorbidities    Disposition:  Admit to inpatient medicine service  --  Dominik Ceballos MD  [facility] EMERGENCY DEPARTMENT  11/23/2020     Dominik Ceballos MD  11/24/20 0121

## 2020-11-24 NOTE — PROGRESS NOTES
CLINICAL NUTRITION SERVICES - ASSESSMENT NOTE     Nutrition Prescription    RECOMMENDATIONS FOR MDs/PROVIDERS TO ORDER:  None at this time    Malnutrition Status:    Unable to determine due to no nutrition focused physical assessment    Recommendations already ordered by Registered Dietitian (RD):  Continue home TF regimen     Future/Additional Recommendations:  1. Monitor PO intake, tolerance of TF, and weight trends.     2. If during pt's stay he is unable to tolerate bolus feeds and needs continuous enteral nutrition recommend:   Goal Isosource 1.5 @ goal 65 ml/hr (1560 ml/day) to provide 2340 kcals (40 kcal/kg/day), 106 g PRO (1.8 g/kg/day), 1186 ml free H2O, 275 g CHO and 23 g Fiber daily.  - If during pt's stay he needs a fiber free formula recommend Nutren 1.5 @ 65 mL/hr.      REASON FOR ASSESSMENT  Vincent Zuniga is a/an 65 year old male assessed by the dietitian for Admission Nutrition Risk Screen for reduced oral intake over the last month and tube feeding or parenteral nutrition    PMH: Multiple sclerosis, malnutrition, s/p G tube, and COPD who presents to the Emergency Department with generalized weakness, myalgias, chills, cough, dizziness, and rib pain x4 days. COVID rule out.     Patient is currently considered a person under investigation for COVID-19.  Unable to obtain in-person nutrition history or nutrition focused physical assessment (NFPA) from patient as the number of staff going into rooms is restricted to limit exposure and to minimize use of PPE.    NUTRITION HISTORY  Per H&P: Mr. Mantilla has been feeling unwell for several days noting myalgias, chills, mild productive cough with no change in yellow sputum production from baseline.  He notes he has been continuing to take his tube feeds but has minimal oral intake outside of that.     Pt known to clinical nutrition service. Per chart review pt started taking Isosource 1.5 back in March 2020.     Spoke w/ Vincent via phone. He reports  "that he increased his amount of TF from 4 cartons to 6 cartons in September 2020 and that has been going well. He doesn't get very much free water and he isn't sure of the exact amount. Vincent reports eating very little over the past week.     CURRENT NUTRITION ORDERS  Diet: Regular    Nutrition Support: Isosource 1.5- bolus feeds 2 cans TID  Provides 6 cartons, 1500 mLs formula, 2250 kcal (38 kcal/kg), 102 g PRO (1.7 g/kg), 176 g CHO, and 1140 mL free water.   Free water: 30 mL q4 hours (total of 1320 mL free water from TF and water flushes)     Intake/Tolerance: Pt given 1 500 mL bolus so far today per I/O. Pt ate 1/2 an order of scrambled eggs today for lunch.      LABS  Labs reviewed  Cr 0.46 (L)  Vit D on 11/9/20- 146 (H)    MEDICATIONS  Medications reviewed  Senokot    ANTHROPOMETRICS  Height: 175.3 cm (5' 9\")  Most Recent Weight: 58.5 kg (129 lb)    IBW: 72.7 kg (80%)   BMI: Normal BMI  Weight History: Pt's weight has increased since March 2020 when he began using Isosource 1.5. Weight stable over the past 3 months.   Wt Readings from Last 7 Encounters:   11/23/20 58.5 kg (129 lb)   10/28/20 58.7 kg (129 lb 8 oz)   10/07/20 59 kg (130 lb)   08/03/20 58.1 kg (128 lb)   05/10/20 56 kg (123 lb 8 oz)   03/02/20 52.6 kg (116 lb)   05/14/19 58.5 kg (128 lb 14.4 oz)     Dosing Weight: 59 kg (actual)     ASSESSED NUTRITION NEEDS  Estimated Energy Needs: 2065- kcals/day (35 - 40 kcals/kg)  Justification: Maintenance, weight stable on current TF regimen   Estimated Protein Needs: 70-90 grams protein/day (1.2 - 1.5+ grams of pro/kg)  Justification: Repletion  Estimated Fluid Needs: 5281-0203 mL/day (25 - 30 mL/kg)   Justification: Minimum maintenance needs    PHYSICAL FINDINGS  Patient is currently considered a person under investigation for COVID-19.  Unable to obtain in-person nutrition history or nutrition focused physical assessment (NFPA) from patient as the number of staff going into rooms is restricted to limit " exposure and to minimize use of PPE.    MALNUTRITION  % Intake: Decreased intake does not meet criteria- TF meets 100% nutrition needs  % Weight Loss: None noted  Subcutaneous Fat Loss: Unable to assess  Muscle Loss: Unable to assess  Fluid Accumulation/Edema: Unable to assess  Malnutrition Diagnosis: Unable to determine due to no nutrition focused physical assessment    NUTRITION DIAGNOSIS  Inadequate oral intake related to minimal PO intake as evidenced by dependent on G-tube for 100% nutrition needs.     INTERVENTIONS  Implementation  Nutrition Education: Discussed current PO intake and enteral nutrition. Pt w/ no nutrition questions at this time.    Enteral Nutrition - Continue home regimen      Goals  Total avg nutritional intake to meet a minimum of 35 kcal/kg and 1.2 g PRO/kg daily (per dosing wt 59 kg).     Monitoring/Evaluation  Progress toward goals will be monitored and evaluated per protocol.    Araseli Dunn RD, LD  5A [phone])/7B RD pager [phone]

## 2020-11-25 ENCOUNTER — APPOINTMENT (OUTPATIENT)
Dept: OCCUPATIONAL THERAPY | Facility: CLINIC | Age: 65
DRG: 871 | End: 2020-11-25
Payer: COMMERCIAL

## 2020-11-25 LAB
ANION GAP SERPL CALCULATED.3IONS-SCNC: 2 MMOL/L (ref 3–14)
BASOPHILS # BLD AUTO: 0 10E9/L (ref 0–0.2)
BASOPHILS NFR BLD AUTO: 0.2 %
BUN SERPL-MCNC: 16 MG/DL (ref 7–30)
C PNEUM DNA SPEC QL NAA+PROBE: NOT DETECTED
CALCIUM SERPL-MCNC: 9.1 MG/DL (ref 8.5–10.1)
CHLORIDE SERPL-SCNC: 111 MMOL/L (ref 94–109)
CO2 SERPL-SCNC: 28 MMOL/L (ref 20–32)
CREAT SERPL-MCNC: 0.51 MG/DL (ref 0.66–1.25)
DIFFERENTIAL METHOD BLD: ABNORMAL
EOSINOPHIL # BLD AUTO: 0 10E9/L (ref 0–0.7)
EOSINOPHIL NFR BLD AUTO: 0.1 %
ERYTHROCYTE [DISTWIDTH] IN BLOOD BY AUTOMATED COUNT: 13.1 % (ref 10–15)
FLUAV H1 2009 PAND RNA SPEC QL NAA+PROBE: NOT DETECTED
FLUAV H1 RNA SPEC QL NAA+PROBE: NOT DETECTED
FLUAV H3 RNA SPEC QL NAA+PROBE: NOT DETECTED
FLUAV RNA SPEC QL NAA+PROBE: NOT DETECTED
FLUBV RNA SPEC QL NAA+PROBE: NOT DETECTED
GFR SERPL CREATININE-BSD FRML MDRD: >90 ML/MIN/{1.73_M2}
GLUCOSE SERPL-MCNC: 83 MG/DL (ref 70–99)
GRAM STN SPEC: NORMAL
HADV DNA SPEC QL NAA+PROBE: NOT DETECTED
HCOV PNL SPEC NAA+PROBE: NOT DETECTED
HCT VFR BLD AUTO: 37.4 % (ref 40–53)
HGB BLD-MCNC: 12.6 G/DL (ref 13.3–17.7)
HMPV RNA SPEC QL NAA+PROBE: NOT DETECTED
HPIV1 RNA SPEC QL NAA+PROBE: NOT DETECTED
HPIV2 RNA SPEC QL NAA+PROBE: NOT DETECTED
HPIV3 RNA SPEC QL NAA+PROBE: NOT DETECTED
HPIV4 RNA SPEC QL NAA+PROBE: NOT DETECTED
IMM GRANULOCYTES # BLD: 0.1 10E9/L (ref 0–0.4)
IMM GRANULOCYTES NFR BLD: 0.3 %
LYMPHOCYTES # BLD AUTO: 5.3 10E9/L (ref 0.8–5.3)
LYMPHOCYTES NFR BLD AUTO: 33.3 %
Lab: NORMAL
M PNEUMO DNA SPEC QL NAA+PROBE: NOT DETECTED
MCH RBC QN AUTO: 35.8 PG (ref 26.5–33)
MCHC RBC AUTO-ENTMCNC: 33.7 G/DL (ref 31.5–36.5)
MCV RBC AUTO: 106 FL (ref 78–100)
MICROBIOLOGIST REVIEW: NORMAL
MONOCYTES # BLD AUTO: 2.1 10E9/L (ref 0–1.3)
MONOCYTES NFR BLD AUTO: 12.8 %
NEUTROPHILS # BLD AUTO: 8.6 10E9/L (ref 1.6–8.3)
NEUTROPHILS NFR BLD AUTO: 53.3 %
NRBC # BLD AUTO: 0 10*3/UL
NRBC BLD AUTO-RTO: 0 /100
PLATELET # BLD AUTO: 168 10E9/L (ref 150–450)
PLATELET # BLD EST: ABNORMAL 10*3/UL
POTASSIUM SERPL-SCNC: 4.4 MMOL/L (ref 3.4–5.3)
RBC # BLD AUTO: 3.52 10E12/L (ref 4.4–5.9)
RSV RNA SPEC QL NAA+PROBE: NOT DETECTED
RSV RNA SPEC QL NAA+PROBE: NOT DETECTED
RV+EV RNA SPEC QL NAA+PROBE: NOT DETECTED
SODIUM SERPL-SCNC: 142 MMOL/L (ref 133–144)
SPECIMEN SOURCE: NORMAL
WBC # BLD AUTO: 16 10E9/L (ref 4–11)

## 2020-11-25 PROCEDURE — 250N000011 HC RX IP 250 OP 636: Performed by: STUDENT IN AN ORGANIZED HEALTH CARE EDUCATION/TRAINING PROGRAM

## 2020-11-25 PROCEDURE — 250N000013 HC RX MED GY IP 250 OP 250 PS 637: Performed by: STUDENT IN AN ORGANIZED HEALTH CARE EDUCATION/TRAINING PROGRAM

## 2020-11-25 PROCEDURE — 97110 THERAPEUTIC EXERCISES: CPT | Mod: GO

## 2020-11-25 PROCEDURE — 93010 ELECTROCARDIOGRAM REPORT: CPT | Performed by: INTERNAL MEDICINE

## 2020-11-25 PROCEDURE — 120N000002 HC R&B MED SURG/OB UMMC

## 2020-11-25 PROCEDURE — 80048 BASIC METABOLIC PNL TOTAL CA: CPT | Performed by: INTERNAL MEDICINE

## 2020-11-25 PROCEDURE — 87070 CULTURE OTHR SPECIMN AEROBIC: CPT | Performed by: PHYSICIAN ASSISTANT

## 2020-11-25 PROCEDURE — 97530 THERAPEUTIC ACTIVITIES: CPT | Mod: GO

## 2020-11-25 PROCEDURE — 87205 SMEAR GRAM STAIN: CPT | Performed by: PHYSICIAN ASSISTANT

## 2020-11-25 PROCEDURE — 93005 ELECTROCARDIOGRAM TRACING: CPT

## 2020-11-25 PROCEDURE — 99232 SBSQ HOSP IP/OBS MODERATE 35: CPT | Performed by: INTERNAL MEDICINE

## 2020-11-25 PROCEDURE — 36415 COLL VENOUS BLD VENIPUNCTURE: CPT | Performed by: INTERNAL MEDICINE

## 2020-11-25 PROCEDURE — 85025 COMPLETE CBC W/AUTO DIFF WBC: CPT | Performed by: INTERNAL MEDICINE

## 2020-11-25 RX ADMIN — VALPROIC ACID 500 MG: 250 SOLUTION ORAL at 13:49

## 2020-11-25 RX ADMIN — CEFTRIAXONE 1 G: 1 INJECTION, POWDER, FOR SOLUTION INTRAMUSCULAR; INTRAVENOUS at 22:12

## 2020-11-25 RX ADMIN — DOCUSATE SODIUM 50 MG AND SENNOSIDES 8.6 MG 1 TABLET: 8.6; 5 TABLET, FILM COATED ORAL at 08:58

## 2020-11-25 RX ADMIN — LIDOCAINE 1 PATCH: 560 PATCH PERCUTANEOUS; TOPICAL; TRANSDERMAL at 08:58

## 2020-11-25 RX ADMIN — PRAMIPEXOLE DIHYDROCHLORIDE 0.5 MG: 0.5 TABLET ORAL at 15:17

## 2020-11-25 RX ADMIN — ENOXAPARIN SODIUM 40 MG: 40 INJECTION SUBCUTANEOUS at 08:58

## 2020-11-25 RX ADMIN — VALPROIC ACID 500 MG: 250 SOLUTION ORAL at 20:15

## 2020-11-25 RX ADMIN — AZITHROMYCIN 250 MG: 250 TABLET, FILM COATED ORAL at 22:12

## 2020-11-25 RX ADMIN — DOCUSATE SODIUM 50 MG AND SENNOSIDES 8.6 MG 1 TABLET: 8.6; 5 TABLET, FILM COATED ORAL at 20:14

## 2020-11-25 RX ADMIN — OXYBUTYNIN CHLORIDE 15 MG: 10 TABLET, EXTENDED RELEASE ORAL at 22:12

## 2020-11-25 RX ADMIN — ESCITALOPRAM OXALATE 20 MG: 20 TABLET ORAL at 08:58

## 2020-11-25 RX ADMIN — VALPROIC ACID 500 MG: 250 SOLUTION ORAL at 08:58

## 2020-11-25 RX ADMIN — PRAMIPEXOLE DIHYDROCHLORIDE 0.5 MG: 0.5 TABLET ORAL at 08:58

## 2020-11-25 RX ADMIN — ACETAMINOPHEN 650 MG: 325 TABLET, FILM COATED ORAL at 11:37

## 2020-11-25 RX ADMIN — ATORVASTATIN CALCIUM 20 MG: 10 TABLET, FILM COATED ORAL at 08:58

## 2020-11-25 ASSESSMENT — ACTIVITIES OF DAILY LIVING (ADL)
ADLS_ACUITY_SCORE: 20

## 2020-11-25 NOTE — PROVIDER NOTIFICATION
"Provider notified HR 40-45. EKG completed. Pt reported dizziness this am, but is currently denying dizziness. Tele order placed.     /65 (BP Location: Right arm)   Pulse (!) 41   Temp 97  F (36.1  C) (Oral)   Resp 20   Ht 1.753 m (5' 9\")   Wt 58.5 kg (129 lb)   SpO2 96%   BMI 19.05 kg/m      "

## 2020-11-25 NOTE — PLAN OF CARE
"Time 1808-1385     Reason for admission: PUI, pneumonia    Vitals: VSS on RA    BP 92/55 (BP Location: Right arm)   Pulse 70   Temp 100.5  F (38.1  C) (Oral)   Resp 20   Ht 1.753 m (5' 9\")   Wt 58.5 kg (129 lb)   SpO2 95%   BMI 19.05 kg/m      Activity: A1/SBA  Pain: C/o low back pain, lido patch applied  Neuro: A&Ox4. Withdrawn. Tremor BUE  Cardiac: bradycardia  Respiratory: WDL on RA  GI/: voiding spontaneously, some urgency/stress incontinence. No BM on shift.   Diet: Reg  Lines: PIV  Skin: generalized bruising  Labs: Cr 0.46.   New this shift: Worked with therapy this am. Chair/bed alarm in place, though pt is calling appropriately. Covid negative. RVP and Influenza sent. Ambulating in room. Spiked temp 100.4, provider notified, tylenol given, BC ordered. BPs soft, pt reports dizziness; provider notified. Daughter updated.     Of note, daughter says that pt is not fully honest with nursing about how he is feeling because he \"wants to get out of here.\" Daughter states that he does not normally complain of dizziness other than when he's in an MS flare. This is different from pt report that he feels dizzy every night.     Plan: Plan to reswab at 72 hours: 11/26 @ 2000      Continue to monitor and follow POC  "

## 2020-11-25 NOTE — PLAN OF CARE
Time: 1900-0730     Reason for admission: Pneumonia, COVID PUI    Vitals: Afebrile, VSS on RA ex soft BPs     Activity: Ax1 w/ GB + FWW  Pain: C/o chronic lower back pain -- Tylenol given at bedtime  Neuro: A&Ox4, withdrawn affect  Cardiac: Bradycardic 44-60, HR regular    Respiratory: BENOIT, denies SOB at rest. LS clear in upper lobes, fine crackles in bilateral bases, infrequent congested cough  GI/: Intermittent urge incontinence, LBM 11/24 per pt -- refused scheduled stool softener  Diet: Regular diet -- no appetite. PEG tube clamped -- bolus tube fed TID  IV Access: L PIV SL + q24h rocephin  Skin: Pale, otherwise WNL  Labs/imaging: Influenza and RSV NEGATIVE. Blood cx x2 pending    Shift highlights: 500mL LR bolus given at start of shift for dizziness. Bradycardic this AM in mid 40s (44-48) -- crosscover notified, no orders given.    Plan: PT/OT following. Reswab for COVID 11/26. Continue IV and PO abx

## 2020-11-25 NOTE — CONSULTS
Care Management Initial Consult and Discharge Planning Note    General Information  Assessment completed with: Patient,    Type of CM/SW Visit: Offer D/C Planning  Primary Care Provider verified and updated as needed: Yes   Readmission within the last 30 days: no previous admission in last 30 days     Reason for Consult: discharge planning  Advance Care Planning:     none on file       Communication Assessment  Patient's communication style:  spoken language (English or Bilingual)    Hearing Difficulty or Deaf: no   Wear Glasses or Blind: yes    Cognitive (auto populated data):  Cognitive/Neuro/Behavioral: .WDL except  Level of Consciousness: lethargic  Arousal Level: opens eyes spontaneously  Orientation: oriented x 4  Mood/Behavior: hypoactive (quiet, withdrawn)  Best Language: 0 - No aphasia  Speech: slow    Living Environment:   People in home: child(nabila), adult  (daughter Michelle)  Current living Arrangements: condominium      Able to return to prior arrangements: yes       Family/Social Support:  Care provided by: self;child(nabila)(pt reports daughter helps w/most IADLs in the home)  Provides care for: no one(pt reports limited mobility baseline, does own cares mostly)  Other (specify)(adult daughter Michelle)          Description of Support System: Supportive;Involved         Current Resources:   Skilled Home Care Services:  none  Community Resources: DME  Equipment currently used at home: grab bar, tub/shower;walker, rolling(reports not using FWW)  Supplies currently used at home: Enteral Nutrition & Supplies([facility])       Additional Information:    Spoke with Dr. Amador this morning regarding patients poc; potential to dc home today vs tomorrow 2/2 if patient is feeling better and poss reswab for covid (first test negative) tomorrow. PT recommending TCU vs home pending progress.    Spoke with patient by phone to inquire if he is agreeable to rehab placement if it is recommended by therapy  "evaluations. Patient stated he is not agreeable to placement; \"would rather do it at home (referring to home care).\"      Patient reports no home care in the past. Provided patient with Medicare Compare list for Home Care. Discussed associated Medicare star ratings to assist with choice for referrals/discharge planning Yes. Education was given to pt that star ratings are updated/maintained by Medicare and can be reviewed by visiting www.medicare.gov Yes. Waiting on patient choices for referrals.        Home Care referral order:  RN skilled nursing visit 2/2 acute hospitalization,  home safety evaluation.  RN to assess vital signs and weight, respiratory and cardiac status, pain level and activity tolerance, hydration, nutrition and bowel status.  RN to complete weekly medication management and set-up, please involve family/primary caregiver in med education.     to evaluate and link client to essential resources within the community.   to provide support and assistance in an effort to increase clients safety, physical, and social well-being within her home, in an effort to stay in home and maximize independence.     Physical Therapy to evaluate and treat  Occupational Therapy to evaluate and treat      Addendum @ 1210: RNCC provided notified by BERNARDA Torres that patients daughter reporting to her that patient can not come home until he goes to rehab and gets stronger first. To enable rehab referral patient will need second covid reswab, not due until tomorrow. Care Coordination Staff will continue to follow for needs.      Sheree Barroso RN, BSN, PHN  Care Coordinator  [facility]  Direct phone: [phone]  Pager: [phone]    To contact the on-call Weekend Care Coordination Team please page [phone]        "

## 2020-11-25 NOTE — PROVIDER NOTIFICATION
Gold crosscover notified of pt's bradycardia 44-48, checked manually as well, HR is regular. Pt c/o dizziness when up to bathroom, otherwise asymptomatic. HR runs in the 50s normally.

## 2020-11-25 NOTE — PLAN OF CARE
"Time 1018-8093     Reason for admission: PUI, pneumonia     Vitals: VSS on RA     BP 93/57 (BP Location: Right arm)   Pulse 63   Temp 98.8  F (37.1  C) (Oral)   Resp 18   Ht 1.753 m (5' 9\")   Wt 58.5 kg (129 lb)   SpO2 94%   BMI 19.05 kg/m       Activity: A1  Pain: C/o low back pain, lido patch applied  Neuro: A&Ox4. Withdrawn. Tremor BUE  Cardiac: bradycardia  Respiratory: WDL on RA  GI/: voiding spontaneously, some urgency/stress incontinence. No BM on shift.   Diet: Reg  Lines: PIV  Skin: generalized bruising  Labs: WBC 16.0. Cr 0.51.   New this shift: Marked bradycardia this am; provider aware. Pt stated to RN that he did not feel dizzy, however reported to provider and daughter that he did. Throughout the day HR increased to 60-75bpm. Pt withdrawn, hypoactive. Previously, pt had been declining TCU. However, after discussion with his daughter, pt is now agreeable to TCU. SW working on placement.      Plan: Plan to reswab at 72 hours: 11/26 @ 2000. TCU at discharge.       Continue to monitor and follow POC  "

## 2020-11-25 NOTE — PROGRESS NOTES
Care Management Follow Up    Length of Stay (days): 2  Expected Discharge Date: 11/27/20     Concerns to be Addressed: discharge planning     Patient plan of care discussed at interdisciplinary rounds: Yes    Anticipated Discharge Disposition: Transitional Care     Anticipated Discharge Services:  Therapies  Anticipated Discharge DME:  TBD    Patient/family educated on Medicare website which has current facility and service quality ratings: yes-  List provided to pt  Education Provided on the Discharge Plan:  yes  Patient/Family in Agreement with the Plan: yes    Referrals Placed by CM/SW:  Pt identified the following TCU preferences:    PENDING:  [facility]  [address] [place] [address]  [phone]  -E-referral sent.  SW called, they anticipate openings on Friday.  They will take a look at the referral and get back to SW.     [facility]  [address] [place] [address]  [phone]  -E-referral sent.  SW called, they are currently full and are not sure when they will have an opening next.  They asked SW to f/u on Friday.    DISCONTINUED:  [facility]  [address] [place], [place] [address] [phone]  -SW called to double check, still only taking COVID+ patients.    [facility]  [address] [place] [address]  [phone]  -E-referral sent.  SW called, they do not have any openings until next week.      [facility]  [address] [place] [address]  [phone]  -E-referral sent.  SW called, they do not have any openings until Monday.     Private pay costs discussed: Not applicable    Additional Information:  BIENVENIDO notified that pt is now agreeable to TCU, may be ready to discharge on Friday.  Second COVID reswab planned for tomorrow.      BIENVENIDO spoke with pt via the telephone and confirmed he is in agreement with TCU placement.  BIENVENIDO gave pt's RN a  list of TCUs to give to pt.  SW called pt later in the day, he identified the above TCU preferences.  Referrals sent.  His daughter will transport him at discharge.  Will need IMM and PAS.    RHONDA Brady, [facility]  Phone:  [phone]   Pager:  [phone]

## 2020-11-26 LAB
ANION GAP SERPL CALCULATED.3IONS-SCNC: 3 MMOL/L (ref 3–14)
BUN SERPL-MCNC: 17 MG/DL (ref 7–30)
CALCIUM SERPL-MCNC: 9.1 MG/DL (ref 8.5–10.1)
CHLORIDE SERPL-SCNC: 108 MMOL/L (ref 94–109)
CO2 SERPL-SCNC: 30 MMOL/L (ref 20–32)
CREAT SERPL-MCNC: 0.53 MG/DL (ref 0.66–1.25)
ERYTHROCYTE [DISTWIDTH] IN BLOOD BY AUTOMATED COUNT: 13.2 % (ref 10–15)
GFR SERPL CREATININE-BSD FRML MDRD: >90 ML/MIN/{1.73_M2}
GLUCOSE SERPL-MCNC: 82 MG/DL (ref 70–99)
HCT VFR BLD AUTO: 39.8 % (ref 40–53)
HGB BLD-MCNC: 13.5 G/DL (ref 13.3–17.7)
INTERPRETATION ECG - MUSE: NORMAL
MCH RBC QN AUTO: 36.2 PG (ref 26.5–33)
MCHC RBC AUTO-ENTMCNC: 33.9 G/DL (ref 31.5–36.5)
MCV RBC AUTO: 107 FL (ref 78–100)
PLATELET # BLD AUTO: 182 10E9/L (ref 150–450)
POTASSIUM SERPL-SCNC: 4.4 MMOL/L (ref 3.4–5.3)
RBC # BLD AUTO: 3.73 10E12/L (ref 4.4–5.9)
SARS-COV-2 RNA SPEC QL NAA+PROBE: NORMAL
SODIUM SERPL-SCNC: 142 MMOL/L (ref 133–144)
SPECIMEN SOURCE: NORMAL
WBC # BLD AUTO: 12.8 10E9/L (ref 4–11)

## 2020-11-26 PROCEDURE — 36415 COLL VENOUS BLD VENIPUNCTURE: CPT | Performed by: INTERNAL MEDICINE

## 2020-11-26 PROCEDURE — 250N000011 HC RX IP 250 OP 636: Performed by: STUDENT IN AN ORGANIZED HEALTH CARE EDUCATION/TRAINING PROGRAM

## 2020-11-26 PROCEDURE — 999N000128 HC STATISTIC PERIPHERAL IV START W/O US GUIDANCE

## 2020-11-26 PROCEDURE — 85027 COMPLETE CBC AUTOMATED: CPT | Performed by: INTERNAL MEDICINE

## 2020-11-26 PROCEDURE — 120N000002 HC R&B MED SURG/OB UMMC

## 2020-11-26 PROCEDURE — 250N000013 HC RX MED GY IP 250 OP 250 PS 637: Performed by: STUDENT IN AN ORGANIZED HEALTH CARE EDUCATION/TRAINING PROGRAM

## 2020-11-26 PROCEDURE — U0003 INFECTIOUS AGENT DETECTION BY NUCLEIC ACID (DNA OR RNA); SEVERE ACUTE RESPIRATORY SYNDROME CORONAVIRUS 2 (SARS-COV-2) (CORONAVIRUS DISEASE [COVID-19]), AMPLIFIED PROBE TECHNIQUE, MAKING USE OF HIGH THROUGHPUT TECHNOLOGIES AS DESCRIBED BY CMS-2020-01-R: HCPCS | Performed by: INTERNAL MEDICINE

## 2020-11-26 PROCEDURE — 99232 SBSQ HOSP IP/OBS MODERATE 35: CPT | Performed by: INTERNAL MEDICINE

## 2020-11-26 PROCEDURE — 80048 BASIC METABOLIC PNL TOTAL CA: CPT | Performed by: INTERNAL MEDICINE

## 2020-11-26 RX ADMIN — VALPROIC ACID 500 MG: 250 SOLUTION ORAL at 14:10

## 2020-11-26 RX ADMIN — PRAMIPEXOLE DIHYDROCHLORIDE 0.5 MG: 0.5 TABLET ORAL at 15:39

## 2020-11-26 RX ADMIN — CEFTRIAXONE 1 G: 1 INJECTION, POWDER, FOR SOLUTION INTRAMUSCULAR; INTRAVENOUS at 21:28

## 2020-11-26 RX ADMIN — LIDOCAINE 1 PATCH: 560 PATCH PERCUTANEOUS; TOPICAL; TRANSDERMAL at 08:27

## 2020-11-26 RX ADMIN — POLYETHYLENE GLYCOL 3350 17 G: 17 POWDER, FOR SOLUTION ORAL at 08:27

## 2020-11-26 RX ADMIN — AZITHROMYCIN 250 MG: 250 TABLET, FILM COATED ORAL at 21:28

## 2020-11-26 RX ADMIN — ACETAMINOPHEN 650 MG: 325 TABLET, FILM COATED ORAL at 20:34

## 2020-11-26 RX ADMIN — ENOXAPARIN SODIUM 40 MG: 40 INJECTION SUBCUTANEOUS at 08:27

## 2020-11-26 RX ADMIN — PRAMIPEXOLE DIHYDROCHLORIDE 0.5 MG: 0.5 TABLET ORAL at 08:27

## 2020-11-26 RX ADMIN — VALPROIC ACID 500 MG: 250 SOLUTION ORAL at 20:34

## 2020-11-26 RX ADMIN — OXYBUTYNIN CHLORIDE 15 MG: 10 TABLET, EXTENDED RELEASE ORAL at 21:28

## 2020-11-26 RX ADMIN — DOCUSATE SODIUM 50 MG AND SENNOSIDES 8.6 MG 2 TABLET: 8.6; 5 TABLET, FILM COATED ORAL at 08:31

## 2020-11-26 RX ADMIN — Medication 1 MG: at 21:28

## 2020-11-26 RX ADMIN — CLONAZEPAM 2 MG: 2 TABLET ORAL at 21:28

## 2020-11-26 RX ADMIN — VALPROIC ACID 500 MG: 250 SOLUTION ORAL at 08:28

## 2020-11-26 RX ADMIN — ESCITALOPRAM OXALATE 20 MG: 20 TABLET ORAL at 08:28

## 2020-11-26 RX ADMIN — ATORVASTATIN CALCIUM 20 MG: 10 TABLET, FILM COATED ORAL at 08:28

## 2020-11-26 ASSESSMENT — ACTIVITIES OF DAILY LIVING (ADL)
ADLS_ACUITY_SCORE: 20
ADLS_ACUITY_SCORE: 20
ADLS_ACUITY_SCORE: 15
ADLS_ACUITY_SCORE: 20
ADLS_ACUITY_SCORE: 16
ADLS_ACUITY_SCORE: 15

## 2020-11-26 NOTE — PLAN OF CARE
Afebrile, VSS on RA.  Alert and oriented x4.  Tele sinus puma.  Denies pain or nausea.  Tolerating regular diet and bolus TF.  Voiding spontaneously into commode.  No BM this shift.  PIV SL.  Up to the bathroom with walker and SBA.  Awaiting TCU placement.  COVID reswab 11/26 @ 2000.  Continue plan of care.

## 2020-11-26 NOTE — PLAN OF CARE
Time: 2300-0730    Reason for admission: PNA    A&Ox4. Flat affect. VSS on RA, ex. Bradycardia & soft BP. Pt denies pain. Some BENOIT noted. Voids spontaneously. Last BM 11/24/20.     Plan: SW following. Discharge to TCU, awaiting placement. COVID reswab 11/26 @ 2000. Will continue to monitor.

## 2020-11-26 NOTE — PROGRESS NOTES
[facility]     Medicine Progress Note - Hospitalist Service, Gold 8       Date of Admission:  11/23/2020  Assessment & Plan       Vincent Zuniga is a 65 year old male admitted on 11/23/2020. He has a history of MS and emphysema and was admitted for myalgias and cough in the setting of a known COVID-19 exposure. Initial COVID-19 test negative on 11/23/2020, determined not to be a low risk PUI with plans for repeat testing in 72 hours (11/26 pm).       COVID-19 PUI - Not low risk   Likely Sepsis   Presented with cough/myalgias/fevers in setting of known COVID exposure.  CXR with atelectasis vs. Less likely consolidation. Low suspicion for COPD exacerbation given lack of productive cough, wheezing etc. He did receive decadron in ED at time of admission but is not hypoxic at this time.   Initial COVID test negative on 11/23 but will retest due to high suspicion. Influenza, RSV, and repiratory panel negative.  As patient has developed productive cough, if second COVID test negative I will presume his entire presentation to be more likely due to community acquired pneumonia.   -Given co morbidities, will continue coverage for community acquired pneumonia with azithromycin and ceftriaxone for 5 days total (11/23-11/27)  -Maintain isolation precautions; plan for repeat COVID test in 72 hours - 11/26  at 2000  -Encourage incentive spirometry use      Leukocytosis  WBC count up to 16 -->13.5 from 4.6. This is likely response to dexamethasone received in ED on 11/23 vs. Sign of new/worsening infection  -Continue to trend, continue antibiotics as above     Sinus Bradycardia  HR down to 40s, EKG showed sinus bradycardia. Looking back in chart, it is not unusual for him to have HR in 50s (prior EKGs) Patient with report of dizziness with exertion since admission, remainder of vitals stable. Orthostatic checked on 11/25, negative. May be related to sepsis/acute illness as above.    -Continue to monitor, no interventions required at this time.       Emphysema, mild COPD (FEV1 91%)  Not in exacerbation.   -Continue PTA albuterol inhaler PRN      Relapsing remitting MS   Does not seem to be in a flare at this time   Urge incontinence  Chronic Malnutrition s/p PEG tube   - continue pta oxybutynin XR 15mg  (notably PCP increased oxybutyning this morning per daughter)  - pta pramipexole  - pta valproate  - Isosource tube feeds. Nutrition consulted, appreciate assistance.   - hold Vitamin D supplementation, notably Vitamin D level 146 (high) on 11/9/2020     Chronic Back Pain  - pta lidocaine patch  - APAP 650mg q4h prn  - hold meloxicam while admitted       Depressive disorder - pta escitalopram, clonazepam     ASCVD Risk Reduction - pta atorvastatin           Diet: Regular Diet Adult  Adult Formula Bolus Feeding: TID Isosource 1.5; Route: Gastrostomy; 6; Can(s); Additional free water (mL): 30 q4h; Medication - Feeding Tube Flush Frequency: At least 15-30 mL water before and after medication administration and with tube clogging    DVT Prophylaxis: Enoxaparin (Lovenox) SQ  Meeks Catheter: not present  Code Status: Full Code           Disposition Plan   Expected discharge: Tomorrow, recommended to transitional care unit once safe disposition plan/ TCU bed available and SIRS/Sepsis treated. Of note, I would like PT to re-assess him prior to discharge as per nursing he is ambulating nearly independently, he may actually be close to baseline.   Entered: Chiquis Amador DO 11/26/2020, 2:21 PM       The patient's care was discussed with the Bedside Nurse, Patient and Patient's Family.    Chiquis Amador DO  Hospitalist Service, [address]   Contact information available via [facility] Paging/Directory  Please see sign in/sign out for up to date coverage information  ______________________________________________________________________    Interval History    No overnight events reported. Patient continues to have productive cough, but denies dyspnea, chest pain. No further dizziness. He fells well overall and is agreeable to going to TCU when available.     Data reviewed today: I reviewed all medications, new labs and imaging results over the last 24 hours. I personally reviewed no images or EKG's today.    Physical Exam   Vital Signs: Temp: 98.3  F (36.8  C) Temp src: Oral BP: 108/58 Pulse: 52   Resp: 16 SpO2: 95 % O2 Device: None (Room air)    Weight: 129 lbs 0 oz  Constitutional: seated in bed, pleasant and cooperative   Cardiovascular: regular rate and rhythm, normal S1 and S2, no murmurs, rubs, or gallops noted noted, no bilateral lower extremity edema   Respiratory: clear to auscultation bilaterally, no wheezing, rales, or rhonchi, normal work of breathing   GI: abdomen soft, non tender, non distended, bowel sounds present, PEG tube site clean and dry, without erythema or drainage  Neuro: alert and oriented    Data   Recent Labs   Lab 11/26/20  0553 11/25/20  0619 11/24/20  0652 11/23/20 1955   WBC 12.8* 16.0* 4.9 8.0   HGB 13.5 12.6* 13.4 12.8*   * 106* 107* 105*    168 186 177    142 143 131*   POTASSIUM 4.4 4.4 4.7 4.5   CHLORIDE 108 111* 112* 98   CO2 30 28 28 28   BUN 17 16 18 24   CR 0.53* 0.51* 0.46* 0.50*   ANIONGAP 3 2* 4 5   RIO 9.1 9.1 9.2 8.8   GLC 82 83 126* 109*   ALBUMIN  --   --   --  2.9*   PROTTOTAL  --   --   --  6.4*   BILITOTAL  --   --   --  0.2   ALKPHOS  --   --   --  79   ALT  --   --   --  40   AST  --   --   --  28   TROPI  --   --   --  <0.015

## 2020-11-26 NOTE — PLAN OF CARE
Time 9795-2093     Reason for admission: PUI, pneumonia     Vitals: VSS on RA  Activity: SBA with GB and walker  Pain: Denies pain.   Neuro: A&Ox4. Withdrawn. Flat affect.Tremor BUE  Cardiac: Bradycardia 50's-60's  Respiratory: WDL on RA  GI/: Voiding spontaneously, some urgency/stress incontinence. No BM on shift.   Diet: Reg, Bolus Tube feeds  Lines: PIV  Skin: Generalized bruising  Labs: Sputum results pending  New this shift:      Plan:   -Plan to reswab at 72 hours: 11/26 @ 2000.   -TCU at discharge. Pt is now agreeable to TCU. SW working on placement.       Continue to monitor and follow POC

## 2020-11-26 NOTE — PROGRESS NOTES
Care Management Follow Up    Length of Stay (days): 3    Expected Discharge Date: 11/27/20     Concerns to be Addressed: discharge planning     Patient plan of care discussed at interdisciplinary rounds:     Anticipated Discharge Disposition: Transitional Care     Anticipated Discharge Services: Therapies  Anticipated Discharge DME:TBD    Patient/family educated on Medicare website which has current facility and service quality ratings: yes list provided to patient by other SW on 11/25/2020  Education Provided on the Discharge Plan:    Patient/Family in Agreement with the Plan: yes    Referrals Placed by CM/SW:  Additional Referrals on 11/26/2020:  [address] [address]  Phone: [phone]  Fax: [phone]  E-referral sent at 10:47. VM left with admissions. No one in today d/t holiday. Please follow up on Friday (11/27/20).      PENDING:  [facility]  [address] [place] [address]  [phone]  No one in admissions today (11/26/2020). Left VM with admissions and they asked that SW follow up on Friday, 11/27/20.  -E-referral sent.  SW called, they anticipate openings on Friday.  They will take a look at the referral and get back to SW.      [facility]  [address] [place], [place] [address] [phone]  -E-referral sent.  SW called, they are currently full and are not sure when they will have an opening next.  They asked SW to f/u on Friday.      DISCONTINUED:  [facility]  [address] [place], [place] [address] [phone]  -SW called to double check, still only taking COVID+ patients.     [facility]  [address] [place], [place] [address] [phone]  -E-referral sent.  SW called, they do not have any openings until next week.       [facility]  [address] [place] [address]  [phone]  -E-referral sent.  SW called, they do not have any openings until Monday.      Private pay costs discussed: Not applicable    Additional Information:  SW followed up with [facility] and was notified no one was in admissions today or knew any updates on referral or if beds were available. SW was advised to call back on Friday (11/27/20).    SW spoke with patient and updated on him on status of TCUs (no beds available, pending ones) and asked if he additional TCU recs he would like referrals sent too. He identified [facility] for referral to be sent and would like to look at the list further. Referral faxed.     SW to follow up with TCUs about bed availability and acceptance.    RHONDA Pearce, [facility]  Social Work Services/Care Management, Casual staff  [facility]      For weekend and holiday social work needs, contact information below and avail on [employer]:  4A, 4C, 4E, 5A, 5B pager [phone]  6A, 6B, 6C, 6D  pager [phone]  7A, 7B, 7C, 7D, 5C pager [phone]  on-call/after hours pager [phone]    weekend RN care coordinator pager [phone] (ID: [id number])  (home d/c with needs incl home care, assisted living facility returns, durable medical equip, IV antibiotics)

## 2020-11-26 NOTE — PLAN OF CARE
1530 to 1930:VSS, except HR 55,RA alert and oriented x 4,denies pain and nausea,reported poor appetite but tolerated well tube feeding ,pt received two cans of food,pt. is up with SBA with walker ,uses call light appropriately,pt has steady gait,BUE tremors noted,pt. denied numbness and tingling.Plan for COVID-19 swab tonight at 8 PM. Will continue to monitor.

## 2020-11-27 ENCOUNTER — APPOINTMENT (OUTPATIENT)
Dept: PHYSICAL THERAPY | Facility: CLINIC | Age: 65
DRG: 871 | End: 2020-11-27
Payer: COMMERCIAL

## 2020-11-27 VITALS
TEMPERATURE: 97.5 F | SYSTOLIC BLOOD PRESSURE: 109 MMHG | RESPIRATION RATE: 20 BRPM | BODY MASS INDEX: 19.11 KG/M2 | HEIGHT: 69 IN | DIASTOLIC BLOOD PRESSURE: 65 MMHG | WEIGHT: 129 LBS | OXYGEN SATURATION: 98 % | HEART RATE: 66 BPM

## 2020-11-27 LAB
ANION GAP SERPL CALCULATED.3IONS-SCNC: 3 MMOL/L (ref 3–14)
BUN SERPL-MCNC: 19 MG/DL (ref 7–30)
CALCIUM SERPL-MCNC: 9.4 MG/DL (ref 8.5–10.1)
CHLORIDE SERPL-SCNC: 105 MMOL/L (ref 94–109)
CO2 SERPL-SCNC: 30 MMOL/L (ref 20–32)
CREAT SERPL-MCNC: 0.57 MG/DL (ref 0.66–1.25)
ERYTHROCYTE [DISTWIDTH] IN BLOOD BY AUTOMATED COUNT: 13.1 % (ref 10–15)
GFR SERPL CREATININE-BSD FRML MDRD: >90 ML/MIN/{1.73_M2}
GLUCOSE SERPL-MCNC: 77 MG/DL (ref 70–99)
HCT VFR BLD AUTO: 40.5 % (ref 40–53)
HGB BLD-MCNC: 13.8 G/DL (ref 13.3–17.7)
LABORATORY COMMENT REPORT: NORMAL
MCH RBC QN AUTO: 36.3 PG (ref 26.5–33)
MCHC RBC AUTO-ENTMCNC: 34.1 G/DL (ref 31.5–36.5)
MCV RBC AUTO: 107 FL (ref 78–100)
PLATELET # BLD AUTO: 178 10E9/L (ref 150–450)
POTASSIUM SERPL-SCNC: 4.4 MMOL/L (ref 3.4–5.3)
RBC # BLD AUTO: 3.8 10E12/L (ref 4.4–5.9)
SARS-COV-2 RNA SPEC QL NAA+PROBE: NEGATIVE
SODIUM SERPL-SCNC: 138 MMOL/L (ref 133–144)
SPECIMEN SOURCE: NORMAL
WBC # BLD AUTO: 10 10E9/L (ref 4–11)

## 2020-11-27 PROCEDURE — 97116 GAIT TRAINING THERAPY: CPT | Mod: GP

## 2020-11-27 PROCEDURE — 99239 HOSP IP/OBS DSCHRG MGMT >30: CPT | Performed by: INTERNAL MEDICINE

## 2020-11-27 PROCEDURE — 97530 THERAPEUTIC ACTIVITIES: CPT | Mod: GP

## 2020-11-27 PROCEDURE — 85027 COMPLETE CBC AUTOMATED: CPT | Performed by: INTERNAL MEDICINE

## 2020-11-27 PROCEDURE — 250N000013 HC RX MED GY IP 250 OP 250 PS 637: Performed by: STUDENT IN AN ORGANIZED HEALTH CARE EDUCATION/TRAINING PROGRAM

## 2020-11-27 PROCEDURE — 250N000011 HC RX IP 250 OP 636: Performed by: STUDENT IN AN ORGANIZED HEALTH CARE EDUCATION/TRAINING PROGRAM

## 2020-11-27 PROCEDURE — 36415 COLL VENOUS BLD VENIPUNCTURE: CPT | Performed by: INTERNAL MEDICINE

## 2020-11-27 PROCEDURE — 97112 NEUROMUSCULAR REEDUCATION: CPT | Mod: GP

## 2020-11-27 PROCEDURE — 80048 BASIC METABOLIC PNL TOTAL CA: CPT | Performed by: INTERNAL MEDICINE

## 2020-11-27 RX ORDER — CEFDINIR 300 MG/1
300 CAPSULE ORAL 2 TIMES DAILY
Qty: 2 CAPSULE | Refills: 0 | Status: SHIPPED | OUTPATIENT
Start: 2020-11-27 | End: 2020-12-17

## 2020-11-27 RX ORDER — AZITHROMYCIN 250 MG/1
250 TABLET, FILM COATED ORAL EVERY 24 HOURS
Qty: 1 TABLET | Refills: 0 | Status: SHIPPED | OUTPATIENT
Start: 2020-11-27 | End: 2020-12-17

## 2020-11-27 RX ADMIN — ATORVASTATIN CALCIUM 20 MG: 10 TABLET, FILM COATED ORAL at 08:34

## 2020-11-27 RX ADMIN — LIDOCAINE 1 PATCH: 560 PATCH PERCUTANEOUS; TOPICAL; TRANSDERMAL at 08:33

## 2020-11-27 RX ADMIN — VALPROIC ACID 500 MG: 250 SOLUTION ORAL at 08:34

## 2020-11-27 RX ADMIN — ENOXAPARIN SODIUM 40 MG: 40 INJECTION SUBCUTANEOUS at 08:34

## 2020-11-27 RX ADMIN — PRAMIPEXOLE DIHYDROCHLORIDE 0.5 MG: 0.5 TABLET ORAL at 08:34

## 2020-11-27 RX ADMIN — ESCITALOPRAM OXALATE 20 MG: 20 TABLET ORAL at 08:34

## 2020-11-27 ASSESSMENT — ACTIVITIES OF DAILY LIVING (ADL)
ADLS_ACUITY_SCORE: 16
ADLS_ACUITY_SCORE: 19
ADLS_ACUITY_SCORE: 15
ADLS_ACUITY_SCORE: 16

## 2020-11-27 NOTE — DISCHARGE SUMMARY
Patient discharging home with daughter. PIV and tele removed prior to discharge. Reviewed discharge paperwork at bedside. Patient to  discharge medications at his local [facility] pharmacy. Helped patient pack his belongings. His daughter will provide him with a ride home. Taken downstairs to lobby by RN in wheelchair.    Nikole Hay RN on 11/27/2020 at 12:12 PM

## 2020-11-27 NOTE — PLAN OF CARE
Time: 1900-0730     Reason for admission: Pneumonia, COVID PUI     Vitals: Afebrile, VSS on RA ex bradycardia and soft BPs (baseline)     Activity: Ax1 w/ FWW  Pain: C/o chronic lower back pain -- Tylenol given at bedtime  Neuro: A&Ox4, withdrawn affect  Cardiac: Tele sinus puma -- runs 44-60   Respiratory: Denies SOB and BENOIT. LS clear, congested cough improving and much less frequent  GI/: Urinary urgency, no incontinent episodes the last few shifts. LBM 11/26 per pt -- refused scheduled stool softener  Diet: Regular diet -- no appetite. PEG tube clamped -- bolus tube fed TID + q4h water flushes  IV Access: R PIV SL  Skin: WNL ex generalized bruising     Shift highlights: COVID reswab collected -- result pending. PRN klonopin and melatonin given at bedtime. Slept well overnight, pt reports overall feeling much better than on admission     Plan: Last day of PO azithromycin and IV rocephin. Possible discharge today to home or TCU pending PT reassessment and family decision

## 2020-11-27 NOTE — PLAN OF CARE
Occupational Therapy Discharge Summary    Reason for therapy discharge:    Discharged to home.    Progress towards therapy goal(s). See goals on Care Plan in [facility] electronic health record for goal details.  Goals partially met.  Barriers to achieving goals:   discharge from facility.    Therapy recommendation(s):    No further therapy is recommended. Pt progressed and ok'd to discharge home per PT with assist of daugther.

## 2020-11-27 NOTE — DISCHARGE INSTRUCTIONS
IMM reviewed with patient by phone at 1153am on Friday 11/27/2020 by RN Care Coordinator Sheree Barrsoo            '

## 2020-11-27 NOTE — PROGRESS NOTES
Care Management Discharge Note    Discharge Date: 11/27/20       Discharge Disposition: Home w/daughter    Discharge Services:  none recommended and patient denies need for services    Discharge DME:  none new needed    Discharge Transportation: family or friend will provide; daughter    Education Provided on the Discharge Plan:  to patient yes    Persons Notified of Discharge Plans: Dr Amador spoke to patients daughter, RNCC spoke to patient afterwards    Patient/Family in Agreement with the Plan: yes    Handoff Referral Completed: No    Additional Information:  IMM completed by phone, copy added to dc VS, no further RNCC needs.       Sheree Barroso RN, BSN, PHN  Care Coordinator  [facility]  Direct phone: [phone]  Pager: [phone]    To contact the on-call Weekend Care Coordination Team please page [phone]

## 2020-11-28 ENCOUNTER — PATIENT OUTREACH (OUTPATIENT)
Dept: CARE COORDINATION | Facility: CLINIC | Age: 65
End: 2020-11-28

## 2020-11-28 LAB
BACTERIA SPEC CULT: NORMAL
SPECIMEN SOURCE: NORMAL

## 2020-11-28 NOTE — DISCHARGE SUMMARY
[facility]   Hospitalist Discharge Summary      Date of Admission:  11/23/2020  Date of Discharge:  11/27/2020 12:25 PM  Discharging Provider: Chiquis Amador DO  Discharge Team: Hospitalist Service, Gold 8    Discharge Diagnoses   Likely Sepsis  COVID PUI - COVID negative   Community Acquired Pneumonia   Leukocytosis   Sinus Bradycardia   Emphysema  Relapsing remitting MS   Chronic Back Pain   Depression    Follow-ups Needed After Discharge   Follow-up Appointments     Adult [facility] Follow-up and recommended labs and tests      Follow up with primary care provider, KEITH PRIETO, within 7 days for   hospital follow- up.  No follow up labs or test are needed.      Appointments on [place] and/or [facility] (with [facility] or [facility]   provider or service). Call [phone] if you haven't heard regarding   these appointments within 7 days of discharge.             Unresulted Labs Ordered in the Past 30 Days of this Admission     Date and Time Order Name Status Description    11/25/2020 1521 Sputum Culture Aerobic Bacterial Preliminary     11/24/2020 1810 Blood culture Preliminary     11/24/2020 1810 Blood culture Preliminary     11/23/2020 1935 Blood culture Preliminary       These results will be followed up by PCP, Hospitalist     Discharge Disposition   Discharged to home  Condition at discharge: Stable    Hospital Course         Vincent Zuniga is a 65 year old male admitted on 11/23/2020. He has a history of MS and emphysema and was admitted for myalgias and cough in the setting of a known COVID-19 exposure. Initial COVID-19 test negative on 11/23/2020, determined not to be a low risk PUI with plans for repeat testing in 72 hours (11/26 pm).       COVID-19 PUI - Not low risk - with two negative COVID-19 tests   Likely Sepsis   Community Acquired Pneumonia   Presented with productive cough/myalgias/fevers in setting of known COVID exposure.  CXR with atelectasis  vs. Less likely consolidation. Initial COVID test negative on 11/23 but he was retested due to high suspicion. Influenza, RSV, and repiratory panel negative.  Second COVID test also negative. Presentation believed to be most likely 2/2 community acquired pneumonia as he did improve with antibiotics Ceftriaxone and Azithromycin.   -Of note, I did contact the patient's daughter Michelle Zuniga by phone on 11/27 evening to notify her of the second negative COVID-19 test.   -One more day of antibiotics after discharge to complete course for CAP: cefdinir 300 mg BID and azithromycin 250mg daily for 1 day.     Leukocytosis - resolved   WBC count 4.6 --> 16 -->13.5 --> 10.0 . This is likely response to dexamethasone received in ED on 11/23 vs 2/2 infection as above.     Sinus Bradycardia - Improved   HR down to 40s, EKG showed sinus bradycardia. Looking back in chart, it is not unusual for him to have HR in 50s (prior EKGs) Patient with report of dizziness with exertion since admission, remainder of vitals stable. Orthostatic checked on 11/25, negative. May be related to sepsis/acute illness as above. HR improved to 60s by time of discharge.   -No interventions required      Emphysema, mild COPD (FEV1 91%)  No signs of exacerbation during this admission.    -Continue PTA albuterol inhaler PRN      Relapsing remitting MS   No signs of flare this admission.   Urge incontinence  Chronic Malnutrition s/p PEG tube   - continue pta oxybutynin XR 15mg  (notably PCP increased oxybutynin just prior to admission)  - pta pramipexole  - pta valproate  - Isosource tube feeds. Nutrition consulted, appreciate assistance.    - Chronic malnutrition - non-severe, related to chronic illness >3 months, with subcutaneous fat loss and muscle loss     Chronic Back Pain  - pta lidocaine patch  - APAP 650mg q4h prn  - Meloxicam held while admitted, can resume at discharge      Depressive disorder - pta escitalopram, clonazepam     ASCVD Risk  Reduction - pta atorvastatin      Consultations This Hospital Stay   PHYSICAL THERAPY ADULT IP CONSULT  OCCUPATIONAL THERAPY ADULT IP CONSULT  MEDICATION HISTORY IP PHARMACY CONSULT  NUTRITION SERVICES ADULT IP CONSULT  CARE MANAGEMENT / SOCIAL WORK IP CONSULT  VASCULAR ACCESS CARE ADULT IP CONSULT    Code Status   Prior    Time Spent on this Encounter   IChiquis DO, personally saw the patient today and spent greater than 30 minutes discharging this patient.       Chiquis Amador DO  [facility] [address] [address]  Phone: [phone]  ______________________________________________________________________    Physical Exam   Vital Signs: Temp: 97.5  F (36.4  C) Temp src: Oral BP: 109/65 Pulse: 66   Resp: 20 SpO2: 98 % O2 Device: None (Room air)    Weight: 129 lbs 0 oz  Constitutional: seated in bed, pleasant and cooperative   Cardiovascular: regular rate and rhythm, normal S1 and S2, no murmurs, rubs, or gallops noted noted, no bilateral lower extremity edema   Respiratory: clear to auscultation bilaterally, no wheezing, rales, or rhonchi, normal work of breathing   GI: abdomen soft, non tender, non distended, bowel sounds present, PEG tube site clean and dry, without erythema or drainage  Neuro: alert and oriented       Primary Care Physician   KEITH PRIETO    Discharge Orders      COVID-19 [facility] Referral      Reason for your hospital stay    You were hospitalized for cough and body aches. You were found to have a pneumonia. You improved with antibiotics. You had one negative COVID-19 test, but due to your exposure, another test was sent. The test result was not back before you were discharged. You will be contacted with this test result, but should quarantine until the result is back.     Contact provider    Contact your primary care provider if If increased trouble breathing, new arm/leg swelling, dizziness/passing out, falls, bleeding that doesn't stop, or  uncontrolled pain.     Adult [facility] Follow-up and recommended labs and tests    Follow up with primary care provider, KEITH PRIETO, within 7 days for hospital follow- up.  No follow up labs or test are needed.      Appointments on [place] and/or [facility] (with [facility] or [facility] provider or service). Call [phone] if you haven't heard regarding these appointments within 7 days of discharge.     Discharge - Quarantine/Isolation Instruction    Date of symptom onset:   11/20/2020  Date of first positive test:  Pending   If you tested positive COVID-19 and show symptoms (fever, cough, body aches or trouble breathing):        Stay home and away from others (self-isolate) until: 11/30/2020        At least 10 days have passed since your symptoms started. AND...        You've had no fever-and no medicine that reduces fever-for 3 full days (72 hours). AND...         Your other symptoms have resolved (gotten better).  If you tested positive for COVID-19 but don't show symptoms:       Stay home and away from others (self-isolate) until at least 10 days have passed since the date of your first positive COVID-19 test.     Activity    Your activity upon discharge: activity as tolerated     When to contact your care team    Call your primary doctor if you have any of the following:  increased shortness of breath, new fevers, worsening cough.     Diet    Follow this diet upon discharge: Orders Placed This Encounter      Adult Formula Bolus Feeding: TID Isosource 1.5; Route: Gastrostomy; 6; Can(s); Additional free water (mL): 30 q4h; Medication - Feeding Tube Flush Frequency: At least 15-30 mL water before and after medication administration and with tube clogging      Regular Diet Adult       Significant Results and Procedures   Most Recent 3 CBC's:  Recent Labs   Lab Test 11/27/20  0650 11/26/20  0553 11/25/20  0619   WBC 10.0 12.8* 16.0*   HGB 13.8 13.5 12.6*   * 107* 106*    182 168     Most Recent 3  BMP's:  Recent Labs   Lab Test 11/27/20  0650 11/26/20  0553 11/25/20  0619    142 142   POTASSIUM 4.4 4.4 4.4   CHLORIDE 105 108 111*   CO2 30 30 28   BUN 19 17 16   CR 0.57* 0.53* 0.51*   ANIONGAP 3 3 2*   RIO 9.4 9.1 9.1   GLC 77 82 83       Discharge Medications   Discharge Medication List as of 11/27/2020 11:53 AM      START taking these medications    Details   azithromycin (ZITHROMAX) 250 MG tablet Take 1 tablet (250 mg) by mouth every 24 hours, Disp-1 tablet, R-0, E-Prescribe      cefdinir (OMNICEF) 300 MG capsule Take 1 capsule (300 mg) by mouth 2 times daily, Disp-2 capsule, R-0, E-Prescribe         CONTINUE these medications which have NOT CHANGED    Details   albuterol (PROVENTIL HFA) 108 (90 Base) MCG/ACT inhaler Inhale 2 puffs into the lungs every 4 hours as needed, Historical      atorvastatin (LIPITOR) 20 MG tablet Take 20 mg by mouth daily, Historical      Cholecalciferol (VITAMIN D3) 50 MCG (2000 UT) TABS Take 2 tablets by mouth once daily, Disp-180 tablet, R-0, E-Prescribe      clonazePAM (KLONOPIN) 2 MG tablet Take 1 tablet (2 mg) by mouth nightly as needed for anxiety, Disp-30 tablet, R-3, E-Prescribe      escitalopram (LEXAPRO) 20 MG tablet Take 1 tablet by mouth once daily, Disp-90 tablet, R-1, E-Prescribe      meloxicam (MOBIC) 15 MG tablet Take 15 mg by mouth daily, Historical      oxybutynin (DITROPAN) 5 MG tablet Take 5 mg by mouth daily, Historical      pramipexole (MIRAPEX) 0.5 MG tablet Take 0.5 mg by mouth 2 times daily, Historical      valproic acid (DEPAKENE) 250 MG/5ML syrup 10 mLs (500 mg) by Oral or G tube route 3 times daily, Disp-900 mL,R-3, Local Print      STARCH-MALTO DEXTRIN ([employer] INSTANT FOOD THICKENER) POWD Take 1 Units by mouth as needed (to thicken all thin liquids taken orally to prevent silent aspiration into lungs), Disp-1020 g, R-3, Local Print      vitamin D2 (ERGOCALCIFEROL) 06380 units (1250 mcg) capsule Take 50,000 Units by mouth once a week, Historical            Allergies   Allergies   Allergen Reactions     Methylprednisolone Other (See Comments)     Immune Globulin Rash

## 2020-11-29 LAB
BACTERIA SPEC CULT: NO GROWTH
SPECIMEN SOURCE: NORMAL

## 2020-11-30 LAB
BACTERIA SPEC CULT: NO GROWTH
BACTERIA SPEC CULT: NO GROWTH
SPECIMEN SOURCE: NORMAL
SPECIMEN SOURCE: NORMAL

## 2020-11-30 NOTE — PLAN OF CARE
Physical Therapy Discharge Summary    Reason for therapy discharge:    Discharged to home with home therapy.    Progress towards therapy goal(s). See goals on Care Plan in [facility] electronic health record for goal details.  Goals partially met.  Barriers to achieving goals:   discharge from facility.    Therapy recommendation(s):    Continued therapy is recommended.  Rationale/Recommendations:  to progress activity tolerance.

## 2020-11-30 NOTE — PROGRESS NOTES
[facility]: Post-Discharge Note  SITUATION                                                      Admission:    Admission Date: 11/23/20   Reason for Admission: Likely Sepsis  Discharge:   Discharge Date: 11/27/20  Discharge Diagnosis: Likely Sepsis  Discharge Service: Hospitalist    BACKGROUND                                                      Vincent Zuniga is a 65 year old male admitted on 11/23/2020. He has a history of MS and emphysema and was admitted for myalgias and cough in the setting of a known COVID-19 exposure. Initial COVID-19 test negative on 11/23/2020, determined not to be a low risk PUI with plans for repeat testing in 72 hours (11/26 pm).      ASSESSMENT      Discharge Assessment  Patient reports symptoms are: Improved  Does the patient have all of their medications?: Yes  Does patient know what their new medications are for?: Yes  Does patient have a follow-up appointment scheduled?: Yes  Does patient have any other questions or concerns?: No    Post-op  Did the patient have surgery or a procedure: No  Fever: No  Chills: No  Eating & Drinking: eating and drinking without complaints/concerns  PO Intake: regular diet([place] formula bolus feeds)  Bowel Function: normal  Urinary Status: voiding without complaint/concerns    PLAN                                                      Outpatient Plan:      Follow up with primary care provider, KEITH PRIETO, within 7 days for hospital follow- up.  No follow up labs or test are needed.      Future Appointments   Date Time Provider Department Center   12/3/2020 11:00 AM [facility]   12/17/2020 11:00 AM [facility]           Ramona Camara, [facility]

## 2020-12-03 ENCOUNTER — TELEPHONE (OUTPATIENT)
Dept: NEUROLOGY | Facility: CLINIC | Age: 65
End: 2020-12-03

## 2020-12-03 ENCOUNTER — DOCUMENTATION ONLY (OUTPATIENT)
Dept: NEUROLOGY | Facility: CLINIC | Age: 65
End: 2020-12-03

## 2020-12-03 DIAGNOSIS — G35 MULTIPLE SCLEROSIS (H): Primary | ICD-10-CM

## 2020-12-03 NOTE — TELEPHONE ENCOUNTER
Call placed to NP Marielena Brooks nuerology on behalf of . Patient scheduled for first dose of Ocrevus today but was recently discharged 11/27/20 from hospital for pneumonia. Per Marielena Brooks, NP infusion to be postponed for 2 weeks. Patient's daughter Michelle contacted to inform of above. Patient will need CBC prior to infusion in 2 weeks (order entered by provider).

## 2020-12-13 DIAGNOSIS — Z20.822 COVID-19 RULED OUT: ICD-10-CM

## 2020-12-13 LAB
SARS-COV-2 RNA SPEC QL NAA+PROBE: NORMAL
SPECIMEN SOURCE: NORMAL

## 2020-12-13 PROCEDURE — U0003 INFECTIOUS AGENT DETECTION BY NUCLEIC ACID (DNA OR RNA); SEVERE ACUTE RESPIRATORY SYNDROME CORONAVIRUS 2 (SARS-COV-2) (CORONAVIRUS DISEASE [COVID-19]), AMPLIFIED PROBE TECHNIQUE, MAKING USE OF HIGH THROUGHPUT TECHNOLOGIES AS DESCRIBED BY CMS-2020-01-R: HCPCS | Performed by: NURSE PRACTITIONER

## 2020-12-14 LAB
LABORATORY COMMENT REPORT: NORMAL
SARS-COV-2 RNA SPEC QL NAA+PROBE: NEGATIVE
SPECIMEN SOURCE: NORMAL

## 2020-12-17 ENCOUNTER — INFUSION THERAPY VISIT (OUTPATIENT)
Dept: INFUSION THERAPY | Facility: CLINIC | Age: 65
End: 2020-12-17
Attending: PSYCHIATRY & NEUROLOGY
Payer: COMMERCIAL

## 2020-12-17 VITALS
DIASTOLIC BLOOD PRESSURE: 66 MMHG | SYSTOLIC BLOOD PRESSURE: 103 MMHG | TEMPERATURE: 98.6 F | HEART RATE: 73 BPM | OXYGEN SATURATION: 96 %

## 2020-12-17 DIAGNOSIS — G35 MULTIPLE SCLEROSIS (H): Primary | ICD-10-CM

## 2020-12-17 LAB
BASOPHILS # BLD AUTO: 0 10E9/L (ref 0–0.2)
BASOPHILS NFR BLD AUTO: 0.3 %
DIFFERENTIAL METHOD BLD: ABNORMAL
EOSINOPHIL # BLD AUTO: 0.1 10E9/L (ref 0–0.7)
EOSINOPHIL NFR BLD AUTO: 1.3 %
ERYTHROCYTE [DISTWIDTH] IN BLOOD BY AUTOMATED COUNT: 13.3 % (ref 10–15)
HCT VFR BLD AUTO: 40 % (ref 40–53)
HGB BLD-MCNC: 13.7 G/DL (ref 13.3–17.7)
IMM GRANULOCYTES # BLD: 0 10E9/L (ref 0–0.4)
IMM GRANULOCYTES NFR BLD: 0.4 %
LYMPHOCYTES # BLD AUTO: 3.1 10E9/L (ref 0.8–5.3)
LYMPHOCYTES NFR BLD AUTO: 33.5 %
MCH RBC QN AUTO: 36.3 PG (ref 26.5–33)
MCHC RBC AUTO-ENTMCNC: 34.3 G/DL (ref 31.5–36.5)
MCV RBC AUTO: 106 FL (ref 78–100)
MONOCYTES # BLD AUTO: 1.2 10E9/L (ref 0–1.3)
MONOCYTES NFR BLD AUTO: 12.7 %
NEUTROPHILS # BLD AUTO: 4.8 10E9/L (ref 1.6–8.3)
NEUTROPHILS NFR BLD AUTO: 51.8 %
NRBC # BLD AUTO: 0 10*3/UL
NRBC BLD AUTO-RTO: 0 /100
PLATELET # BLD AUTO: 276 10E9/L (ref 150–450)
RBC # BLD AUTO: 3.77 10E12/L (ref 4.4–5.9)
WBC # BLD AUTO: 9.3 10E9/L (ref 4–11)

## 2020-12-17 PROCEDURE — 85025 COMPLETE CBC W/AUTO DIFF WBC: CPT | Performed by: NURSE PRACTITIONER

## 2020-12-17 PROCEDURE — 250N000011 HC RX IP 250 OP 636: Performed by: PSYCHIATRY & NEUROLOGY

## 2020-12-17 PROCEDURE — 96366 THER/PROPH/DIAG IV INF ADDON: CPT

## 2020-12-17 PROCEDURE — 96375 TX/PRO/DX INJ NEW DRUG ADDON: CPT

## 2020-12-17 PROCEDURE — 96365 THER/PROPH/DIAG IV INF INIT: CPT

## 2020-12-17 PROCEDURE — 250N000013 HC RX MED GY IP 250 OP 250 PS 637: Performed by: PSYCHIATRY & NEUROLOGY

## 2020-12-17 PROCEDURE — 258N000003 HC RX IP 258 OP 636: Performed by: PSYCHIATRY & NEUROLOGY

## 2020-12-17 RX ORDER — DIPHENHYDRAMINE HCL 25 MG
50 CAPSULE ORAL ONCE
Status: COMPLETED | OUTPATIENT
Start: 2020-12-17 | End: 2020-12-17

## 2020-12-17 RX ORDER — HEPARIN SODIUM,PORCINE 10 UNIT/ML
5 VIAL (ML) INTRAVENOUS
Status: CANCELLED | OUTPATIENT
Start: 2020-12-31

## 2020-12-17 RX ORDER — ACETAMINOPHEN 325 MG/1
650 TABLET ORAL ONCE
Status: CANCELLED | OUTPATIENT
Start: 2020-12-31

## 2020-12-17 RX ORDER — DIPHENHYDRAMINE HCL 25 MG
50 CAPSULE ORAL ONCE
Status: CANCELLED | OUTPATIENT
Start: 2020-12-31

## 2020-12-17 RX ORDER — HEPARIN SODIUM (PORCINE) LOCK FLUSH IV SOLN 100 UNIT/ML 100 UNIT/ML
5 SOLUTION INTRAVENOUS
Status: CANCELLED | OUTPATIENT
Start: 2020-12-31

## 2020-12-17 RX ORDER — ACETAMINOPHEN 325 MG/1
650 TABLET ORAL ONCE
Status: COMPLETED | OUTPATIENT
Start: 2020-12-17 | End: 2020-12-17

## 2020-12-17 RX ORDER — METHYLPREDNISOLONE SODIUM SUCCINATE 125 MG/2ML
125 INJECTION, POWDER, LYOPHILIZED, FOR SOLUTION INTRAMUSCULAR; INTRAVENOUS ONCE
Status: CANCELLED | OUTPATIENT
Start: 2020-12-31

## 2020-12-17 RX ORDER — METHYLPREDNISOLONE SODIUM SUCCINATE 125 MG/2ML
125 INJECTION, POWDER, LYOPHILIZED, FOR SOLUTION INTRAMUSCULAR; INTRAVENOUS ONCE
Status: COMPLETED | OUTPATIENT
Start: 2020-12-17 | End: 2020-12-17

## 2020-12-17 RX ADMIN — DIPHENHYDRAMINE HYDROCHLORIDE 50 MG: 25 CAPSULE ORAL at 12:00

## 2020-12-17 RX ADMIN — OCRELIZUMAB 300 MG: 300 INJECTION INTRAVENOUS at 12:48

## 2020-12-17 RX ADMIN — METHYLPREDNISOLONE SODIUM SUCCINATE 125 MG: 125 INJECTION, POWDER, FOR SOLUTION INTRAMUSCULAR; INTRAVENOUS at 12:01

## 2020-12-17 RX ADMIN — ACETAMINOPHEN 650 MG: 325 TABLET ORAL at 11:58

## 2020-12-17 ASSESSMENT — PAIN SCALES - GENERAL: PAINLEVEL: NO PAIN (0)

## 2020-12-17 NOTE — LETTER
12/17/2020         RE: Vincent Zuniga  [address]  [place] [address]        Dear Colleague,    Thank you for referring your patient, Vincent Zuniga, to the [facility] TREATMENT [facility]. Please see a copy of my visit note below.    Nursing Note  Vincent Zuniga presents today to [facility] for: Ocrevus, first dose  During today's [facility] appointment, orders from Dr. ONEAL Guerra were completed.  Frequency: once.  Dose to repeated in 2 weeks  Progress note:  Patient identification verified by name and date of birth.  Assessment completed.  Vitals recorded in Doc Flowsheets.  Patient was provided with education regarding medication/procedure and possible side effects.  Patient verbalized understanding.     present during visit today: Not Applicable.    Treatment Conditions: ~~~ NOTE: If the patient answers yes to any of the questions below, hold the infusion and contact ordering provider or on-call provider.  `  1. Have you recently had an elevated temperature, fever, chills, productive cough, coughing for 3 weeks or longer or hemoptysis, abnormal vital signs, night sweats,  chest pain or have you noticed a decrease in your appetite, unexplained weight loss or fatigue? No  2. Do you have any open wounds or new incisions? No  3. Do you have any recent or upcoming hospitalizations, surgeries or dental procedures? Yes, patient guess about 1 month ago.  Provider aware and we are ok to go ahead with today's infusion  4. Do you currently have or recently have had any signs of illness or infection or are you on any antibiotics? Yes, 2 different antibiotics listed  Therapy completed.  Provider aware.  OK to go ahead with today's infusion  5. Have you had any new, sudden or worsening abdominal pain? No  6. Have you or anyone in your household received a live vaccination in the past 4 weeks? Please note:  No  live vaccines while on biologic/chemotherapy until 6 months after the last treatment.  Patient can receive the flu vaccine (shot only) and the pneumovax.  It is optimal for the patient to get these vaccines mid cycle, but they can be given at any time as long as it is not on the day of the infusion. No  7. Have you recently been diagnosed with any new nervous system diseases (ie. Multiple sclerosis, Guillain [place], seizures, neurological changes) or cancer diagnosis? No  8. Are you on any form of radiation or chemotherapy? No  9. Are you pregnant or breast feeding or do you have plans of pregnancy in the future? No  10. Have you been having any signs of worsening depression or suicidal ideations?  (benlysta only) No  11. Have there been any other new onset medical symptoms? No      Premedications: administered per order. and Confirmed with Marielena Brooks CNP, that 125mg solumedrol ok for premed.  Patient has had trouble with high dose steroids in the past-unable to sleep, etc    Drug Waste Record: No    Infusion length and rate:  infusion starts at 30 ml/hr, then increased by 30 ml/hr every 30 minutes to final rate of 180 ml/hr.    Labs: were drawn per orders.  and WBC reviewed with MS RN    Vascular access: peripheral IV placed today.    Post Infusion Assessment:  Patient tolerated infusion without incident.  Patient observed for 60 minutes post Ocrevus infusion, per protocol.  Blood return noted pre and post infusion.  Site patent and intact, free from redness, edema or discomfort.  No evidence of extravasations.  Access discontinued per protocol.  Biologic Infusion Post Education: Call the triage nurse at your clinic or seek medical attention if you have chills and/or temperature greater than or equal to 100.5, uncontrolled nausea/vomiting, diarrhea, constipation, dizziness, shortness of breath, chest pain, heart palpitations, weakness or any other new or concerning symptoms, questions or concerns.  You cannot have  any live virus vaccines prior to or during treatment or up to 6 months post infusion.  If you have an upcoming surgery, medical procedure or dental procedure during treatment, this should be discussed with your ordering physician and your surgeon/dentist.  If you are having any concerning symptom, if you are unsure if you should get your next infusion or wish to speak to a provider before your next infusion, please call your care coordinator or triage nurse at your clinic to notify them so we can adequately serve you.     Discharge Plan:   Follow up plan of care with: ongoing infusions at [facility]., ordering provider as scheduled., after visit summary given to patient and patient's daughter, Michelle called and updated.  Discharge instructions were reviewed with patient and his daughter, via phone    Patient discharged from [facility] in stable condition.    Jessica Ellington                  Again, thank you for allowing me to participate in the care of your patient.        Sincerely,        [facility]

## 2020-12-17 NOTE — PATIENT INSTRUCTIONS
Patient Education     Ocrelizumab injection  Brand Name: OCREVUS  What is this medicine?  OCRELIZUMAB (ok re MANOHAR ue mab) treats multiple sclerosis. It helps to decrease the number of multiple sclerosis relapses. It is not a cure.  How should I use this medicine?  This medicine is for infusion into a vein. It is given by a health care professional in a hospital or clinic setting.  Talk to your pediatrician regarding the use of this medicine in children. Special care may be needed.  What side effects may I notice from receiving this medicine?  Side effects that you should report to your doctor or health care professional as soon as possible:    allergic reactions like skin rash, itching or hives, swelling of the face, lips, or tongue    breathing problems    facial flushing    fast, irregular heartbeat    lump or soreness in the breast    signs and symptoms of herpes such as cold sore, shingles, or genital sores    signs and symptoms of infection like fever or chills, cough, sore throat, pain or trouble passing urine    signs and symptoms of low blood pressure like dizziness; feeling faint or lightheaded, falls; unusually weak or tired    signs and symptoms of progressive multifocal leukoencephalopathy (PML) like changes in vision; clumsiness; confusion; personality changes; weakness on one side of the body    swelling of the ankles, feet, hands  Side effects that usually do not require medical attention (report these to your doctor or health care professional if they continue or are bothersome):    back pain    depressed mood    diarrhea    pain, redness, or irritation at site where injected  What may interact with this medicine?      alemtuzumab    daclizumab    dimethyl fumarate    fingolimod    glatiramer    interferon beta    live virus vaccines    mitoxantrone    natalizumab    peginterferon beta    rituximab    steroid medicines like prednisone or cortisone    teriflunomide  What if I miss a dose?  Keep  appointments for follow-up doses as directed. It is important not to miss your dose. Call your doctor or health care professional if you are unable to keep an appointment.  Where should I keep my medicine?  This drug is given in a hospital or clinic and will not be stored at home.  What should I tell my health care provider before I take this medicine?  They need to know if you have any of these conditions:    cancer    hepatitis B infection    other infection (especially a virus infection such as chickenpox, cold sores, or herpes)    an unusual or allergic reaction to ocrelizumab, other medicines, foods, dyes or preservatives    pregnant or trying to get pregnant    breast-feeding  What should I watch for while using this medicine?  Tell your doctor or healthcare professional if your symptoms do not start to get better or if they get worse.  This medicine can cause serious allergic reactions. To reduce your risk you may need to take medicine before treatment with this medicine. Take your medicine as directed.  Women should inform their doctor if they wish to become pregnant or think they might be pregnant. There is a potential for serious side effects to an unborn child. Talk to your health care professional or pharmacist for more information. Female patients should use effective birth control methods while receiving this medicine and for 6 months after the last dose.  Call your doctor or health care professional for advice if you get a fever, chills or sore throat, or other symptoms of a cold or flu. Do not treat yourself. This drug decreases your body's ability to fight infections. Try to avoid being around people who are sick.  If you have a hepatitis B infection or a history of a hepatitis B infection, talk to your doctor. The symptoms of hepatitis B may get worse if you take this medicine.  In some patients, this medicine may cause a serious brain infection that may cause death. If you have any problems  seeing, thinking, speaking, walking, or standing, tell your doctor right away. If you cannot reach your doctor, urgently seek other source of medical care.  This medicine can decrease the response to a vaccine. If you need to get vaccinated, tell your healthcare professional if you have received this medicine. Extra booster doses may be needed. Talk to your doctor to see if a different vaccination schedule is needed.  Talk to your doctor about your risk of cancer. You may be more at risk for certain types of cancers if you take this medicine.  NOTE:This sheet is a summary. It may not cover all possible information. If you have questions about this medicine, talk to your doctor, pharmacist, or health care provider. Copyright  2020 [employer]

## 2020-12-17 NOTE — PROGRESS NOTES
Nursing Note  Vincent Zuniga [facility] for: Ocrevus, first dose  During [facility]'s [facility] appointment, orders from Dr. ONEAL Guerra were completed.  Frequency: once.  Dose to repeated in 2 weeks  Progress note:  Patient identification verified by name and date of birth.  Assessment completed.  Vitals recorded in Doc Flowsheets.  Patient was provided with education regarding medication/procedure and possible side effects.  Patient verbalized understanding.     present during visit today: Not Applicable.    Treatment Conditions: ~~~ NOTE: If the patient answers yes to any of the questions below, hold the infusion and contact ordering provider or on-call provider.  `  1. Have you recently had an elevated temperature, fever, chills, productive cough, coughing for 3 weeks or longer or hemoptysis, abnormal vital signs, night sweats,  chest pain or have you noticed a decrease in your appetite, unexplained weight loss or fatigue? No  2. Do you have any open wounds or new incisions? No  3. Do you have any recent or upcoming hospitalizations, surgeries or dental procedures? Yes, patient guess about 1 month ago.  Provider aware and we are ok to go ahead with today's infusion  4. Do you currently have or recently have had any signs of illness or infection or are you on any antibiotics? Yes, 2 different antibiotics listed  Therapy completed.  Provider aware.  OK to go ahead with today's infusion  5. Have you had any new, sudden or worsening abdominal pain? No  6. Have you or anyone in your household received a live vaccination in the past 4 weeks? Please note:  No live vaccines while on biologic/chemotherapy until 6 months after the last treatment.  Patient can receive the flu vaccine (shot only) and the pneumovax.  It is optimal for the patient to get these vaccines mid cycle, but they can be given at any time as long as it is not on the  day of the infusion. No  7. Have you recently been diagnosed with any new nervous system diseases (ie. Multiple sclerosis, Guillain [place], seizures, neurological changes) or cancer diagnosis? No  8. Are you on any form of radiation or chemotherapy? No  9. Are you pregnant or breast feeding or do you have plans of pregnancy in the future? No  10. Have you been having any signs of worsening depression or suicidal ideations?  (benlysta only) No  11. Have there been any other new onset medical symptoms? No      Premedications: administered per order. and Confirmed with Marielena Brooks CNP, that 125mg solumedrol ok for premed.  Patient has had trouble with high dose steroids in the past-unable to sleep, etc    Drug Waste Record: No    Infusion length and rate:  infusion starts at 30 ml/hr, then increased by 30 ml/hr every 30 minutes to final rate of 180 ml/hr.    Labs: were drawn per orders.  and WBC reviewed with MS RN    Vascular access: peripheral IV placed today.    Post Infusion Assessment:  Patient tolerated infusion without incident.  Patient observed for 60 minutes post Ocrevus infusion, per protocol.  Blood return noted pre and post infusion.  Site patent and intact, free from redness, edema or discomfort.  No evidence of extravasations.  Access discontinued per protocol.  Biologic Infusion Post Education: Call the triage nurse at your clinic or seek medical attention if you have chills and/or temperature greater than or equal to 100.5, uncontrolled nausea/vomiting, diarrhea, constipation, dizziness, shortness of breath, chest pain, heart palpitations, weakness or any other new or concerning symptoms, questions or concerns.  You cannot have any live virus vaccines prior to or during treatment or up to 6 months post infusion.  If you have an upcoming surgery, medical procedure or dental procedure during treatment, this should be discussed with your ordering physician and your surgeon/dentist.  If you are having any  concerning symptom, if you are unsure if you should get your next infusion or wish to speak to a provider before your next infusion, please call your care coordinator or triage nurse at your clinic to notify them so we can adequately serve you.     Discharge Plan:   Follow up plan of care with: ongoing infusions at [facility]., ordering provider as scheduled., after visit summary given to patient and patient's daughter, Michelle called and updated.  Discharge instructions were reviewed with patient and his daughter, via phone    Patient discharged from [facility] in stable condition.    Jessica Ellington

## 2020-12-31 ENCOUNTER — INFUSION THERAPY VISIT (OUTPATIENT)
Dept: INFUSION THERAPY | Facility: CLINIC | Age: 65
End: 2020-12-31
Attending: PSYCHIATRY & NEUROLOGY
Payer: COMMERCIAL

## 2020-12-31 VITALS
SYSTOLIC BLOOD PRESSURE: 96 MMHG | HEART RATE: 67 BPM | TEMPERATURE: 98.1 F | OXYGEN SATURATION: 96 % | DIASTOLIC BLOOD PRESSURE: 63 MMHG | RESPIRATION RATE: 16 BRPM | BODY MASS INDEX: 21.44 KG/M2 | WEIGHT: 145.2 LBS

## 2020-12-31 DIAGNOSIS — G35 MULTIPLE SCLEROSIS (H): Primary | ICD-10-CM

## 2020-12-31 PROCEDURE — 96366 THER/PROPH/DIAG IV INF ADDON: CPT

## 2020-12-31 PROCEDURE — 250N000011 HC RX IP 250 OP 636: Performed by: PSYCHIATRY & NEUROLOGY

## 2020-12-31 PROCEDURE — 96375 TX/PRO/DX INJ NEW DRUG ADDON: CPT

## 2020-12-31 PROCEDURE — 250N000013 HC RX MED GY IP 250 OP 250 PS 637: Performed by: PSYCHIATRY & NEUROLOGY

## 2020-12-31 PROCEDURE — 96365 THER/PROPH/DIAG IV INF INIT: CPT

## 2020-12-31 PROCEDURE — 258N000003 HC RX IP 258 OP 636: Performed by: PSYCHIATRY & NEUROLOGY

## 2020-12-31 RX ORDER — ACETAMINOPHEN 325 MG/1
650 TABLET ORAL ONCE
Status: CANCELLED | OUTPATIENT
Start: 2020-12-31

## 2020-12-31 RX ORDER — DIPHENHYDRAMINE HCL 25 MG
50 CAPSULE ORAL ONCE
Status: COMPLETED | OUTPATIENT
Start: 2020-12-31 | End: 2020-12-31

## 2020-12-31 RX ORDER — METHYLPREDNISOLONE SODIUM SUCCINATE 125 MG/2ML
125 INJECTION, POWDER, LYOPHILIZED, FOR SOLUTION INTRAMUSCULAR; INTRAVENOUS ONCE
Status: COMPLETED | OUTPATIENT
Start: 2020-12-31 | End: 2020-12-31

## 2020-12-31 RX ORDER — ACETAMINOPHEN 325 MG/1
650 TABLET ORAL ONCE
Status: COMPLETED | OUTPATIENT
Start: 2020-12-31 | End: 2020-12-31

## 2020-12-31 RX ORDER — DIPHENHYDRAMINE HCL 25 MG
50 CAPSULE ORAL ONCE
Status: CANCELLED | OUTPATIENT
Start: 2020-12-31

## 2020-12-31 RX ORDER — HEPARIN SODIUM,PORCINE 10 UNIT/ML
5 VIAL (ML) INTRAVENOUS
Status: CANCELLED | OUTPATIENT
Start: 2020-12-31

## 2020-12-31 RX ORDER — HEPARIN SODIUM (PORCINE) LOCK FLUSH IV SOLN 100 UNIT/ML 100 UNIT/ML
5 SOLUTION INTRAVENOUS
Status: CANCELLED | OUTPATIENT
Start: 2020-12-31

## 2020-12-31 RX ORDER — METHYLPREDNISOLONE SODIUM SUCCINATE 125 MG/2ML
125 INJECTION, POWDER, LYOPHILIZED, FOR SOLUTION INTRAMUSCULAR; INTRAVENOUS ONCE
Status: CANCELLED | OUTPATIENT
Start: 2020-12-31

## 2020-12-31 RX ADMIN — ACETAMINOPHEN 650 MG: 325 TABLET ORAL at 11:06

## 2020-12-31 RX ADMIN — DIPHENHYDRAMINE HYDROCHLORIDE 50 MG: 25 CAPSULE ORAL at 11:06

## 2020-12-31 RX ADMIN — OCRELIZUMAB 300 MG: 300 INJECTION INTRAVENOUS at 11:35

## 2020-12-31 RX ADMIN — METHYLPREDNISOLONE SODIUM SUCCINATE 125 MG: 125 INJECTION, POWDER, FOR SOLUTION INTRAMUSCULAR; INTRAVENOUS at 11:08

## 2020-12-31 NOTE — LETTER
12/31/2020         RE: Vincent Zuniga  [address]  [place] [address]        Dear Colleague,    Thank you for referring your patient, Vincent Zuniga, to the [facility]. Please see a copy of my visit note below.    Nursing Note  Vincent Zuniga [facility] for:   Chief Complaint   Patient presents with     Infusion     IV Ocrevus     During [facility] appointment, orders from Dr. Cassandra Guerra were completed.  Frequency: every 2 weeks x2, dose 2 of 2    Progress note:  Patient identification verified by name and date of birth.  Assessment completed.  Vitals recorded in Doc Flowsheets.  Patient was provided with education regarding medication/procedure and possible side effects.  Patient verbalized understanding.     present during visit today: Not Applicable.    Treatment Conditions: ~~~ NOTE: If the patient answers yes to any of the questions below, hold the infusion and contact ordering provider or on-call provider.    1. Have you recently had an elevated temperature, fever, chills, productive cough, coughing for 3 weeks or longer or hemoptysis, abnormal vital signs, night sweats,  chest pain or have you noticed a decrease in your appetite, unexplained weight loss or fatigue? No  2. Do you have any open wounds or new incisions? No  3. Do you have any recent or upcoming hospitalizations, surgeries or dental procedures? No  4. Do you currently have or recently have had any signs of illness or infection or are you on any antibiotics? No  5. Have you had any new, sudden or worsening abdominal pain? No  6. Have you or anyone in your household received a live vaccination in the past 4 weeks? Please note:  No live vaccines while on biologic/chemotherapy until 6 months after the last treatment.  Patient can receive the flu vaccine (shot only) and the pneumovax.  It  is optimal for the patient to get these vaccines mid cycle, but they can be given at any time as long as it is not on the day of the infusion. No  7. Have you recently been diagnosed with any new nervous system diseases (ie. Multiple sclerosis, Guillain [place], seizures, neurological changes) or cancer diagnosis? No  8. Are you on any form of radiation or chemotherapy? No  9. Are you pregnant or breast feeding or do you have plans of pregnancy in the future? No  10. Have you been having any signs of worsening depression or suicidal ideations?  (benlysta only) No  11. Have there been any other new onset medical symptoms? No    Premedications: administered per order. Patient tolerated Solu-Medrol well last time     Drug Waste Record: No    Infusion length and rate:  infusion starts at 30 ml/hr, then increased by 30 ml/hr every 30 minutes to final rate of 180 ml/hr. +1 hour observation.     Labs: were not ordered for this appointment.    Vascular access: peripheral IV placed today.    Is the next appt scheduled? No,therapy plan completed.     Post Infusion Assessment:  Patient tolerated infusion without incident.  Patient observed for 60 minutes post Ocrevus per protocol.  Blood return noted pre and post infusion.  Site patent and intact, free from redness, edema or discomfort.  No evidence of extravasations.  Access discontinued per protocol.     POST-INFUSION OF BIOLOGICAL MEDICATION:  Reviewed with patient.  Given biologic medication or medication hand-out. Inform patient if any fever, chills or signs of infection, new symptoms, abdominal pain, heart palpitations, shortness of breath, reaction, weakness, neurological changes, seek medical attention immediately and should not receive infusions. No live virus vaccines prior to or during treatment or up to 6 months post infusion. If the patient has an upcoming procedure or surgery, this should be discussed with the rheumatologist and surgeon or provider.    Discharge  "Plan:   Follow up plan of care with: ordering provider.  Discharge instructions were reviewed with patient.  Patient/representative verbalized understanding of discharge instructions and all questions answered.  [facility] in stable condition.    Administrations This Visit     acetaminophen (TYLENOL) tablet 650 mg     Admin Date  12/31/2020 Action  Given Dose  650 mg Route  Oral Administered By  Ciera Gomez, RN          diphenhydrAMINE (BENADRYL) capsule 50 mg     Admin Date  12/31/2020 Action  Given Dose  50 mg Route  Oral Administered By  Ciera Gomez RN          methylPREDNISolone sodium succinate (solu-MEDROL) injection 125 mg     Admin Date  12/31/2020 Action  Given Dose  125 mg Route  Intravenous Administered By  Ciera Gomez RN          ocrelizumab (OCREVUS) 300 mg in sodium chloride 0.9 % 250 mL infusion     Admin Date  12/31/2020 Action  New Bag Dose  300 mg Route  Intravenous Administered By  Ciera Gomez, BERNARDA              Vital signs:  Temp: 98.1  F (36.7  C) Temp src: Oral BP: 109/71 Pulse: 64   Resp: 18 SpO2: 96 % O2 Device: None (Room air)     Weight: 65.9 kg (145 lb 3.2 oz)  Estimated body mass index is 21.44 kg/m  as calculated from the following:    Height as of 11/23/20: 1.753 m (5' 9\").    Weight as of this encounter: 65.9 kg (145 lb 3.2 oz).                Again, thank you for allowing me to participate in the care of your patient.        Sincerely,        [facility]    "

## 2020-12-31 NOTE — PATIENT INSTRUCTIONS
Dear Vincent Zuniga    Thank you for choosing [facility] ([facility]) for your infusion.  The following information is a summary of our appointment as well as important reminders.      EDUCATION POST BIOLOGICAL/CHEMOTHERAPY INFUSION  Call the triage nurse at your clinic or seek medical attention if you have chills and/or temperature greater than or equal to 100.5, uncontrolled nausea/vomiting, diarrhea, constipation, dizziness, shortness of breath, chest pain, heart palpitations, weakness or any other new or concerning symptoms, questions or concerns.  You can not have any live virus vaccines prior to or during treatment or up to 6 months post infusion.  If you have an upcoming surgery, medical procedure or dental procedure during treatment, this should be discussed with your ordering physician and your surgeon/dentist.  If you are having any concerning symptom, if you are unsure if you should get your next infusion or wish to speak to a provider before your next infusion, please call your care coordinator or triage nurse at your clinic to notify them so we can adequately serve you.    We look forward in seeing you on your next appointment here at [facility] ([facility]).  Please don t hesitate to call us at [phone] to reschedule any of your appointments or to speak with one of the [facility] registered nurses.  It was a pleasure taking care of you today.    Sincerely,    [facility]  [facility]  [address] [place] [address]  [id number]  Phone:  [phone]

## 2020-12-31 NOTE — PROGRESS NOTES
Nursing Note  Vincent Zuniga presents [facility] for:   Chief Complaint   Patient presents with     Infusion     IV Ocrevus     During [facility] appointment, orders from Dr. Cassandra Guerra were completed.  Frequency: every 2 weeks x2, dose 2 of 2    Progress note:  Patient identification verified by name and date of birth.  Assessment completed.  Vitals recorded in Doc Flowsheets.  Patient was provided with education regarding medication/procedure and possible side effects.  Patient verbalized understanding.     present during visit today: Not Applicable.    Treatment Conditions: ~~~ NOTE: If the patient answers yes to any of the questions below, hold the infusion and contact ordering provider or on-call provider.    1. Have you recently had an elevated temperature, fever, chills, productive cough, coughing for 3 weeks or longer or hemoptysis, abnormal vital signs, night sweats,  chest pain or have you noticed a decrease in your appetite, unexplained weight loss or fatigue? No  2. Do you have any open wounds or new incisions? No  3. Do you have any recent or upcoming hospitalizations, surgeries or dental procedures? No  4. Do you currently have or recently have had any signs of illness or infection or are you on any antibiotics? No  5. Have you had any new, sudden or worsening abdominal pain? No  6. Have you or anyone in your household received a live vaccination in the past 4 weeks? Please note:  No live vaccines while on biologic/chemotherapy until 6 months after the last treatment.  Patient can receive the flu vaccine (shot only) and the pneumovax.  It is optimal for the patient to get these vaccines mid cycle, but they can be given at any time as long as it is not on the day of the infusion. No  7. Have you recently been diagnosed with any new nervous system diseases (ie. Multiple sclerosis, Guillain [place], seizures,  neurological changes) or cancer diagnosis? No  8. Are you on any form of radiation or chemotherapy? No  9. Are you pregnant or breast feeding or do you have plans of pregnancy in the future? No  10. Have you been having any signs of worsening depression or suicidal ideations?  (benlysta only) No  11. Have there been any other new onset medical symptoms? No    Premedications: administered per order. Patient tolerated Solu-Medrol well last time     Drug Waste Record: No    Infusion length and rate:  infusion starts at 30 ml/hr, then increased by 30 ml/hr every 30 minutes to final rate of 180 ml/hr. +1 hour observation.     Labs: were not ordered for this appointment.    Vascular access: peripheral IV placed today.    Is the next appt scheduled? No,therapy plan completed.     Post Infusion Assessment:  Patient tolerated infusion without incident.  Patient observed for 60 minutes post Ocrevus per protocol.  Blood return noted pre and post infusion.  Site patent and intact, free from redness, edema or discomfort.  No evidence of extravasations.  Access discontinued per protocol.     POST-INFUSION OF BIOLOGICAL MEDICATION:  Reviewed with patient.  Given biologic medication or medication hand-out. Inform patient if any fever, chills or signs of infection, new symptoms, abdominal pain, heart palpitations, shortness of breath, reaction, weakness, neurological changes, seek medical attention immediately and should not receive infusions. No live virus vaccines prior to or during treatment or up to 6 months post infusion. If the patient has an upcoming procedure or surgery, this should be discussed with the rheumatologist and surgeon or provider.    Discharge Plan:   Follow up plan of care with: ordering provider.  Discharge instructions were reviewed with patient.  Patient/representative verbalized understanding of discharge instructions and all questions answered.  [facility] in [facility] condition.    Administrations This Visit     acetaminophen (TYLENOL) tablet 650 mg     Admin Date  12/31/2020 Action  Given Dose  650 mg Route  Oral Administered By  Ciera Gomez RN          diphenhydrAMINE (BENADRYL) capsule 50 mg     Admin Date  12/31/2020 Action  Given Dose  50 mg Route  Oral Administered By  Ciera Gomez, BERNARDA          methylPREDNISolone sodium succinate (solu-MEDROL) injection 125 mg     Admin Date  12/31/2020 Action  Given Dose  125 mg Route  Intravenous Administered By  Ciera Gomez RN          ocrelizumab (OCREVUS) 300 mg in sodium chloride 0.9 % 250 mL infusion     Admin Date  12/31/2020 Action  New Bag Dose  300 mg Route  Intravenous Administered By  Ciera Gomez, BERNARDA              Vital signs:  Temp: 98.1  F (36.7  C) Temp src: Oral BP: 109/71 Pulse: 64   Resp: 18 SpO2: 96 % O2 Device: None (Room air)     Weight: 65.9 kg (145 lb 3.2 oz)  Estimated body mass index is 21.44 kg/m  as calculated from the following:    Height as of 11/23/20: 1.753 m (5' 9\").    Weight as of this encounter: 65.9 kg (145 lb 3.2 oz).            "

## 2021-02-03 ENCOUNTER — OFFICE VISIT (OUTPATIENT)
Dept: NEUROLOGY | Facility: CLINIC | Age: 66
End: 2021-02-03
Attending: PSYCHIATRY & NEUROLOGY
Payer: MEDICARE

## 2021-02-03 VITALS
HEIGHT: 69 IN | OXYGEN SATURATION: 96 % | WEIGHT: 161.1 LBS | SYSTOLIC BLOOD PRESSURE: 116 MMHG | DIASTOLIC BLOOD PRESSURE: 80 MMHG | BODY MASS INDEX: 23.86 KG/M2 | HEART RATE: 78 BPM

## 2021-02-03 DIAGNOSIS — G35 MULTIPLE SCLEROSIS (H): ICD-10-CM

## 2021-02-03 PROCEDURE — G0463 HOSPITAL OUTPT CLINIC VISIT: HCPCS

## 2021-02-03 PROCEDURE — 99215 OFFICE O/P EST HI 40 MIN: CPT | Performed by: PSYCHIATRY & NEUROLOGY

## 2021-02-03 RX ORDER — CLONAZEPAM 1 MG/1
1 TABLET ORAL
Qty: 90 TABLET | Refills: 3 | Status: SHIPPED | OUTPATIENT
Start: 2021-02-03 | End: 2021-08-12

## 2021-02-03 ASSESSMENT — PAIN SCALES - GENERAL: PAINLEVEL: NO PAIN (0)

## 2021-02-03 ASSESSMENT — MIFFLIN-ST. JEOR: SCORE: 1511.12

## 2021-02-03 NOTE — PROGRESS NOTES
THE [facility]  FOLLOW UP VISIT           PRINCIPAL NEUROLOGIC DIAGNOSIS: Multiple Sclerosis        HISTORY OF ILLNESS:    This is a follow visit for this 66 year old right handed genetic male  With a history of MS . Who was last seen on  10-28.   At that time the patient was recommended to start Ocrevus. Since last visit he underwent first 2 infusions of Ocrevus in December and had no side effects. His daughter Michelle who lives with him has made a lot of progress with decreasing the amount of THC smoking to 1/2. He is also not drinking caffeinated coffee all day and is sleeping better but still waking up some. He was stated on Mirtazapine 7.5 mg at bedtime and gained 30 Lbs, [facility]. His daughter also got him an ice fishing house and he is happy about it.    In early December he became SOB and he was admitted to the hospital with dehydration and pneumonia, COVID negative.    Current Symptoms:  1. Balance issues  2. CI (improved)      Current Outpatient Prescriptions:  Current Outpatient Medications   Medication     albuterol (PROVENTIL HFA) 108 (90 Base) MCG/ACT inhaler     atorvastatin (LIPITOR) 20 MG tablet     Cholecalciferol (VITAMIN D) 50 MCG (2000 UT) CAPS     clonazePAM (KLONOPIN) 2 MG tablet     escitalopram (LEXAPRO) 20 MG tablet     meloxicam (MOBIC) 15 MG tablet     oxybutynin (DITROPAN) 5 MG tablet     pramipexole (MIRAPEX) 0.5 MG tablet     valproic acid (DEPAKENE) 250 MG/5ML syrup     vitamin D2 (ERGOCALCIFEROL) 48039 units (1250 mcg) capsule     STARCH-MALTO DEXTRIN ([employer] INSTANT FOOD THICKENER) POWD     No current facility-administered medications for this visit.           ALLERGIES       Allergies   Allergen Reactions     Methylprednisolone Other (See Comments)     Immune Globulin Rash           REVIEW OF SYSTEMS:    Comprehensive review of systems otherwise was negative, including constitutional, head and neck, cardiovascular,  pulmonary, gastrointestinal, endocrine, urologic, reproductive, rheumatic, hematologic, immunologic, dermatologic, and psychiatric.    Nutritional concerns: None  Driving issues: None   Safety concerns regarding living situations and safety at home: None  Risk of falls: None  Pain: None    PHYSICAL EXAM:    Hair, skin, nails, and joints were normal. Neck was supple without Lhermitte's phenomenon.  There was no percussion tenderness over the spine.     The patient was alert and oriented to person, place, and time with normal language, attention and concentration, recent and remote memory, praxis, and intellectual function. Affect was normal. The patient did not appear depressed.    Visual acuity:  OD 20/20  OS 20/20    Correction: without    Visual fields were full to confrontation.   Pupils were 3 mm and briskly reactive OU without a relative afferent pupillary defect.  Funduscopic examination was deferred  Extraocular movements: Intact without AMAURI  Facial sensation is normal. Normal strength of the muscles of mastication:   Muscles of facial expression were normal  Hearing was normal. Gag reflex and palatal movements were normal. Sternocleidomastoid and trapezius power were normal. Tongue movements were normal. There was no dysarthria.    Motor Examination:   There was no pronator drift.       Motor    Upper      Right Left   Shoulder Abduction 5 5   Elbow Flexion 5 5   Elbow Extension 5 5   Wrist Extension 5 5   Digit Extension 5 5   Digit Flexion 5 5   APB 5 5   Tone 0 0   Lower       Right Left   Hip Flexion 5 5   Knee Extension 5 5   Knee Flexion 5 5   Foot Dorsiflexion 5 5   Foot Plantar Flexion 5 5   EH 5 5   Toe Flexion 5 5   Tone 0 0               Reflexes:     Reflexes       Right  Left   Biceps 1  1   Triceps 1  1   Brachioradialis 1  1   Patellar  1  1   Achilles 1  1   Babinski down  down         Coordination:     Right Left   RRM Normal Normal   STEPHENIE Normal Normal   FTN Normal slow Normal   RRM Normal  Normal   HKS Normal Normal         Sensory examination:    Light touch:  Intact in all extremities      Coordination and Gait R hand parkinsonian tremor        Gait Parkinsonian   Right Left   Romberg Normal  Heel Normal Normal   Tandem AbNormal  Toe Normal Normal                   QUANTITATIVE SCORES:    Visual: 0-Normal  Brainstem: 0-Normal  Pyramidal: 1-Signs only  Cerebellar: 2- mild ataxia and/or moderate station ataxia (Romberg) and / or tandem walking not possible  Sensory: 0-Normal  Bladder/Bowel: 2<-2moderate urinary hesitancy/retention and / or moderate urinary urgency/incontinence and /or moderate bowel disfunction  Cerebral: 2-Patient and / or significant other report mild changes in mentation. Examples include: impaired ability to follow a rapid course of association and in surveying complex matters; impaired judgement in certain demanding situations; capable of handling routine daily activities, but unable to tolerate additional stressors; intermittently symptomatic even to normal levels of stress; reduced performance; tendency toward negligence due to obliviousness or fatigue.  Ambulatory: 0-Unrestricted    EDSS: 3.0- moderate disability in one FS (one FS grade 3, others 0 or 1) though fully ambulatory; or mild disability in three or four FS (three / four FS grade 2, others 0 or 1) though fully ambulatory            ASSESSMENT:    Relapsing remitting multiple sclerosis status post first and second infusion of Ocrevus in late December without any significant complications or side effects.  Currently doing well, is that he has gained weight, reduce THC use by 50%, started exercising, it also decreased use of caffeine by about 90% and started to do ice fishing as a hobby which he seems to enjoy.  His balance is still an issue especially at night when he wakes up in the middle of the night after taking clonazepam 2 mg.  He was recently started on mirtazapine 7.5 therefore he may not need the high dose of  clonazepam.    PLAN:    The patient will be seen again in 6 months at that point we will obtain a CD19 count, if there is evidence of B-cell reconstitution then we will proceed with Covid vaccination.  The patient and his daughter understand that he should not undergo the vaccine until his B cells that are reconstituting.  He will also undergo an MRI of the brain cervical and thoracic spine with and without contrast to confirm disease stability from the radiological standpoint.  We will decrease his clonazepam to 1 mg instead of 2 to avoid any balance issues and potential falls.  And he will continue his current management.    Obtain CBC, CMP and CD19 count prior to next visit with MRIs.    Finally I will follow the patient up in 36 month(s) as long as the patient is doing well. I instructed the patient to call or mychart my office with any concerns or questions.    I spent 45 minutes in this visit, with >50% direct patient time spent counseling about prognosis, treatment options, and coordination of care.     My recommendations will be communicated back to the patient's physician(s) by mail.  Follow-up is expected to be with me.      Cassandra Guerra MD  Chief, Multiple Sclerosis Division  Department of Neurology  [facility]  Clinical Surgery C

## 2021-02-03 NOTE — LETTER
2/3/2021       RE: Vincent Zuniga  [address]  [place] [address]     Dear Colleague,    Thank you for referring your patient, Vincent Zuniga, to the [facility] MULT[facility]LE [facility]OS[facility] CLINIC [place] at [facility]. Please see a copy of my visit note below.    THE [facility] MULTIPLE SCLEROSIS CLINIC  FOLLOW UP VISIT           PRINCIPAL NEUROLOGIC DIAGNOSIS: Multiple Sclerosis        HISTORY OF ILLNESS:    This is a follow visit for this 66 year old right handed genetic male  With a history of MS . Who was last seen on  10-28.   At that time the patient was recommended to start Ocrevus. Since last visit he underwent first 2 infusions of Ocrevus in December and had no side effects. His daughter Michelle who lives with him has made a lot of progress with decreasing the amount of THC smoking to 1/2. He is also not drinking caffeinated coffee all day and is sleeping better but still waking up some. He was stated on Mirtazapine 7.5 mg at bedtime and gained 30 Lbs, [facility]. His daughter also got him an ice fishing house and he is happy about it.    In early December he became SOB and he was admitted to the hospital with dehydration and pneumonia, COVID negative.    Current Symptoms:  1. Balance issues  2. CI (improved)      Current Outpatient Prescriptions:  Current Outpatient Medications   Medication     albuterol (PROVENTIL HFA) 108 (90 Base) MCG/ACT inhaler     atorvastatin (LIPITOR) 20 MG tablet     Cholecalciferol (VITAMIN D) 50 MCG (2000 UT) CAPS     clonazePAM (KLONOPIN) 2 MG tablet     escitalopram (LEXAPRO) 20 MG tablet     meloxicam (MOBIC) 15 MG tablet     oxybutynin (DITROPAN) 5 MG tablet     pramipexole (MIRAPEX) 0.5 MG tablet     valproic acid (DEPAKENE) 250 MG/5ML syrup     vitamin D2 (ERGOCALCIFEROL) 56376 units (1250 mcg) capsule     STARCH-MALTO DEXTRIN ([employer] INSTANT FOOD THICKENER) POWD     No current  facility-administered medications for this visit.           ALLERGIES       Allergies   Allergen Reactions     Methylprednisolone Other (See Comments)     Immune Globulin Rash           REVIEW OF SYSTEMS:    Comprehensive review of systems otherwise was negative, including constitutional, head and neck, cardiovascular, pulmonary, gastrointestinal, endocrine, urologic, reproductive, rheumatic, hematologic, immunologic, dermatologic, and psychiatric.    Nutritional concerns: None  Driving issues: None   Safety concerns regarding living situations and safety at home: None  Risk of falls: None  Pain: None    PHYSICAL EXAM:    Hair, skin, nails, and joints were normal. Neck was supple without Lhermitte's phenomenon.  There was no percussion tenderness over the spine.     The patient was alert and oriented to person, place, and time with normal language, attention and concentration, recent and remote memory, praxis, and intellectual function. Affect was normal. The patient did not appear depressed.    Visual acuity:  OD 20/20  OS 20/20    Correction: without    Visual fields were full to confrontation.   Pupils were 3 mm and briskly reactive OU without a relative afferent pupillary defect.  Funduscopic examination was deferred  Extraocular movements: Intact without AMAURI  Facial sensation is normal. Normal strength of the muscles of mastication:   Muscles of facial expression were normal  Hearing was normal. Gag reflex and palatal movements were normal. Sternocleidomastoid and trapezius power were normal. Tongue movements were normal. There was no dysarthria.    Motor Examination:   There was no pronator drift.       Motor    Upper      Right Left   Shoulder Abduction 5 5   Elbow Flexion 5 5   Elbow Extension 5 5   Wrist Extension 5 5   Digit Extension 5 5   Digit Flexion 5 5   APB 5 5   Tone 0 0   Lower       Right Left   Hip Flexion 5 5   Knee Extension 5 5   Knee Flexion 5 5   Foot Dorsiflexion 5 5   Foot Plantar Flexion  5 5   EH 5 5   Toe Flexion 5 5   Tone 0 0               Reflexes:     Reflexes       Right  Left   Biceps 1  1   Triceps 1  1   Brachioradialis 1  1   Patellar  1  1   Achilles 1  1   Babinski down  down         Coordination:     Right Left   RRM Normal Normal   STEPHENIE Normal Normal   FTN Normal slow Normal   RRM Normal Normal   HKS Normal Normal         Sensory examination:    Light touch:  Intact in all extremities      Coordination and Gait R hand parkinsonian tremor        Gait Parkinsonian   Right Left   Romberg Normal  Heel Normal Normal   Tandem AbNormal  Toe Normal Normal                   QUANTITATIVE SCORES:    Visual: 0-Normal  Brainstem: 0-Normal  Pyramidal: 1-Signs only  Cerebellar: 2- mild ataxia and/or moderate station ataxia (Romberg) and / or tandem walking not possible  Sensory: 0-Normal  Bladder/Bowel: 2<-2moderate urinary hesitancy/retention and / or moderate urinary urgency/incontinence and /or moderate bowel disfunction  Cerebral: 2-Patient and / or significant other report mild changes in mentation. Examples include: impaired ability to follow a rapid course of association and in surveying complex matters; impaired judgement in certain demanding situations; capable of handling routine daily activities, but unable to tolerate additional stressors; intermittently symptomatic even to normal levels of stress; reduced performance; tendency toward negligence due to obliviousness or fatigue.  Ambulatory: 0-Unrestricted    EDSS: 3.0- moderate disability in one FS (one FS grade 3, others 0 or 1) though fully ambulatory; or mild disability in three or four FS (three / four FS grade 2, others 0 or 1) though fully ambulatory            ASSESSMENT:    Relapsing remitting multiple sclerosis status post first and second infusion of Ocrevus in late December without any significant complications or side effects.  Currently doing well, is that he has gained weight, reduce THC use by 50%, started exercising, it  also decreased use of caffeine by about 90% and started to do ice fishing as a hobby which he seems to enjoy.  His balance is still an issue especially at night when he wakes up in the middle of the night after taking clonazepam 2 mg.  He was recently started on mirtazapine 7.5 therefore he may not need the high dose of clonazepam.    PLAN:    The patient will be seen again in 6 months at that point we will obtain a CD19 count, if there is evidence of B-cell reconstitution then we will proceed with Covid vaccination.  The patient and his daughter understand that he should not undergo the vaccine until his B cells that are reconstituting.  He will also undergo an MRI of the brain cervical and thoracic spine with and without contrast to confirm disease stability from the radiological standpoint.  We will decrease his clonazepam to 1 mg instead of 2 to avoid any balance issues and potential falls.  And he will continue his current management.    Obtain CBC, CMP and CD19 count prior to next visit with MRIs.    Finally I will follow the patient up in 36 month(s) as long as the patient is doing well. I instructed the patient to call or mychart my office with any concerns or questions.    I spent 45 minutes in this visit, with >50% direct patient time spent counseling about prognosis, treatment options, and coordination of care.     My recommendations will be communicated back to the patient's physician(s) by mail.  Follow-up is expected to be with me.      Cassandra Guerra MD  Chief, Multiple Sclerosis Division  Department of Neurology  [facility]

## 2021-03-31 ENCOUNTER — HOSPITAL ENCOUNTER (EMERGENCY)
Facility: CLINIC | Age: 66
Discharge: HOME | End: 2021-03-31
Attending: EMERGENCY MEDICINE | Admitting: EMERGENCY MEDICINE
Payer: MEDICARE

## 2021-03-31 ENCOUNTER — APPOINTMENT (OUTPATIENT)
Dept: MRI IMAGING | Facility: CLINIC | Age: 66
End: 2021-03-31
Attending: EMERGENCY MEDICINE
Payer: MEDICARE

## 2021-03-31 ENCOUNTER — NURSE TRIAGE (OUTPATIENT)
Dept: NURSING | Facility: CLINIC | Age: 66
End: 2021-03-31

## 2021-03-31 VITALS
BODY MASS INDEX: 23.7 KG/M2 | HEIGHT: 69 IN | TEMPERATURE: 97.8 F | RESPIRATION RATE: 18 BRPM | SYSTOLIC BLOOD PRESSURE: 122 MMHG | HEART RATE: 50 BPM | DIASTOLIC BLOOD PRESSURE: 89 MMHG | OXYGEN SATURATION: 97 % | WEIGHT: 160 LBS

## 2021-03-31 DIAGNOSIS — R53.83 FATIGUE, UNSPECIFIED TYPE: ICD-10-CM

## 2021-03-31 LAB
ALBUMIN SERPL-MCNC: 3.6 G/DL (ref 3.4–5)
ALBUMIN UR-MCNC: NEGATIVE MG/DL
ALP SERPL-CCNC: 74 U/L (ref 40–150)
ALT SERPL W P-5'-P-CCNC: 39 U/L (ref 0–70)
ANION GAP SERPL CALCULATED.3IONS-SCNC: 7 MMOL/L (ref 3–14)
APPEARANCE UR: CLEAR
AST SERPL W P-5'-P-CCNC: 33 U/L (ref 0–45)
BACTERIA #/AREA URNS HPF: ABNORMAL /HPF
BASOPHILS # BLD AUTO: 0 10E9/L (ref 0–0.2)
BASOPHILS NFR BLD AUTO: 0.4 %
BILIRUB SERPL-MCNC: 0.2 MG/DL (ref 0.2–1.3)
BILIRUB UR QL STRIP: NEGATIVE
BUN SERPL-MCNC: 15 MG/DL (ref 7–30)
CALCIUM SERPL-MCNC: 9.3 MG/DL (ref 8.5–10.1)
CHLORIDE SERPL-SCNC: 104 MMOL/L (ref 94–109)
CO2 SERPL-SCNC: 26 MMOL/L (ref 20–32)
COLOR UR AUTO: ABNORMAL
CREAT SERPL-MCNC: 0.67 MG/DL (ref 0.66–1.25)
DIFFERENTIAL METHOD BLD: ABNORMAL
EOSINOPHIL # BLD AUTO: 0.1 10E9/L (ref 0–0.7)
EOSINOPHIL NFR BLD AUTO: 1.6 %
ERYTHROCYTE [DISTWIDTH] IN BLOOD BY AUTOMATED COUNT: 12.2 % (ref 10–15)
GFR SERPL CREATININE-BSD FRML MDRD: >90 ML/MIN/{1.73_M2}
GLUCOSE SERPL-MCNC: 84 MG/DL (ref 70–99)
GLUCOSE UR STRIP-MCNC: NEGATIVE MG/DL
HCT VFR BLD AUTO: 42.1 % (ref 40–53)
HGB BLD-MCNC: 14.3 G/DL (ref 13.3–17.7)
HGB UR QL STRIP: NEGATIVE
IMM GRANULOCYTES # BLD: 0 10E9/L (ref 0–0.4)
IMM GRANULOCYTES NFR BLD: 0.3 %
INTERPRETATION ECG - MUSE: NORMAL
KETONES UR STRIP-MCNC: 20 MG/DL
LABORATORY COMMENT REPORT: NORMAL
LEUKOCYTE ESTERASE UR QL STRIP: NEGATIVE
LYMPHOCYTES # BLD AUTO: 2.3 10E9/L (ref 0.8–5.3)
LYMPHOCYTES NFR BLD AUTO: 28.3 %
MCH RBC QN AUTO: 34 PG (ref 26.5–33)
MCHC RBC AUTO-ENTMCNC: 34 G/DL (ref 31.5–36.5)
MCV RBC AUTO: 100 FL (ref 78–100)
MONOCYTES # BLD AUTO: 1.2 10E9/L (ref 0–1.3)
MONOCYTES NFR BLD AUTO: 15 %
MUCOUS THREADS #/AREA URNS LPF: PRESENT /LPF
NEUTROPHILS # BLD AUTO: 4.4 10E9/L (ref 1.6–8.3)
NEUTROPHILS NFR BLD AUTO: 54.4 %
NITRATE UR QL: NEGATIVE
NRBC # BLD AUTO: 0 10*3/UL
NRBC BLD AUTO-RTO: 0 /100
PH UR STRIP: 6.5 PH (ref 5–7)
PLATELET # BLD AUTO: 241 10E9/L (ref 150–450)
POTASSIUM SERPL-SCNC: 4.3 MMOL/L (ref 3.4–5.3)
PROT SERPL-MCNC: 7.2 G/DL (ref 6.8–8.8)
RBC # BLD AUTO: 4.2 10E12/L (ref 4.4–5.9)
RBC #/AREA URNS AUTO: 2 /HPF (ref 0–2)
SARS-COV-2 RNA RESP QL NAA+PROBE: NEGATIVE
SODIUM SERPL-SCNC: 137 MMOL/L (ref 133–144)
SOURCE: ABNORMAL
SP GR UR STRIP: 1.02 (ref 1–1.03)
SPECIMEN SOURCE: NORMAL
SQUAMOUS #/AREA URNS AUTO: 0 /HPF (ref 0–1)
UROBILINOGEN UR STRIP-MCNC: NORMAL MG/DL (ref 0–2)
VALPROATE SERPL-MCNC: 54 MG/L (ref 50–100)
WBC # BLD AUTO: 8 10E9/L (ref 4–11)
WBC #/AREA URNS AUTO: <1 /HPF (ref 0–5)

## 2021-03-31 PROCEDURE — U0005 INFEC AGEN DETEC AMPLI PROBE: HCPCS | Performed by: STUDENT IN AN ORGANIZED HEALTH CARE EDUCATION/TRAINING PROGRAM

## 2021-03-31 PROCEDURE — 96360 HYDRATION IV INFUSION INIT: CPT | Mod: 59 | Performed by: EMERGENCY MEDICINE

## 2021-03-31 PROCEDURE — C9803 HOPD COVID-19 SPEC COLLECT: HCPCS | Performed by: EMERGENCY MEDICINE

## 2021-03-31 PROCEDURE — 85025 COMPLETE CBC W/AUTO DIFF WBC: CPT | Performed by: EMERGENCY MEDICINE

## 2021-03-31 PROCEDURE — 99285 EMERGENCY DEPT VISIT HI MDM: CPT | Mod: 25 | Performed by: EMERGENCY MEDICINE

## 2021-03-31 PROCEDURE — 255N000002 HC RX 255 OP 636: Performed by: EMERGENCY MEDICINE

## 2021-03-31 PROCEDURE — A9585 GADOBUTROL INJECTION: HCPCS | Performed by: EMERGENCY MEDICINE

## 2021-03-31 PROCEDURE — 80053 COMPREHEN METABOLIC PANEL: CPT | Performed by: EMERGENCY MEDICINE

## 2021-03-31 PROCEDURE — U0003 INFECTIOUS AGENT DETECTION BY NUCLEIC ACID (DNA OR RNA); SEVERE ACUTE RESPIRATORY SYNDROME CORONAVIRUS 2 (SARS-COV-2) (CORONAVIRUS DISEASE [COVID-19]), AMPLIFIED PROBE TECHNIQUE, MAKING USE OF HIGH THROUGHPUT TECHNOLOGIES AS DESCRIBED BY CMS-2020-01-R: HCPCS | Performed by: STUDENT IN AN ORGANIZED HEALTH CARE EDUCATION/TRAINING PROGRAM

## 2021-03-31 PROCEDURE — 99207 PR NO BILLABLE SERVICE THIS VISIT: CPT | Performed by: PSYCHIATRY & NEUROLOGY

## 2021-03-31 PROCEDURE — 70553 MRI BRAIN STEM W/O & W/DYE: CPT | Mod: 26 | Performed by: RADIOLOGY

## 2021-03-31 PROCEDURE — 250N000013 HC RX MED GY IP 250 OP 250 PS 637: Performed by: EMERGENCY MEDICINE

## 2021-03-31 PROCEDURE — 87086 URINE CULTURE/COLONY COUNT: CPT | Performed by: EMERGENCY MEDICINE

## 2021-03-31 PROCEDURE — 93005 ELECTROCARDIOGRAM TRACING: CPT | Performed by: EMERGENCY MEDICINE

## 2021-03-31 PROCEDURE — 80164 ASSAY DIPROPYLACETIC ACD TOT: CPT | Performed by: EMERGENCY MEDICINE

## 2021-03-31 PROCEDURE — 81001 URINALYSIS AUTO W/SCOPE: CPT | Performed by: EMERGENCY MEDICINE

## 2021-03-31 PROCEDURE — 96361 HYDRATE IV INFUSION ADD-ON: CPT | Performed by: EMERGENCY MEDICINE

## 2021-03-31 PROCEDURE — 99285 EMERGENCY DEPT VISIT HI MDM: CPT | Performed by: EMERGENCY MEDICINE

## 2021-03-31 PROCEDURE — G1004 CDSM NDSC: HCPCS | Mod: GC | Performed by: RADIOLOGY

## 2021-03-31 PROCEDURE — 70553 MRI BRAIN STEM W/O & W/DYE: CPT | Mod: MG

## 2021-03-31 PROCEDURE — 258N000003 HC RX IP 258 OP 636: Performed by: EMERGENCY MEDICINE

## 2021-03-31 RX ORDER — GADOBUTROL 604.72 MG/ML
7.5 INJECTION INTRAVENOUS ONCE
Status: COMPLETED | OUTPATIENT
Start: 2021-03-31 | End: 2021-03-31

## 2021-03-31 RX ORDER — SODIUM CHLORIDE 9 MG/ML
INJECTION, SOLUTION INTRAVENOUS CONTINUOUS
Status: DISCONTINUED | OUTPATIENT
Start: 2021-03-31 | End: 2021-03-31 | Stop reason: HOSPADM

## 2021-03-31 RX ADMIN — GADOBUTROL 7 ML: 604.72 INJECTION INTRAVENOUS at 19:02

## 2021-03-31 RX ADMIN — SODIUM CHLORIDE: 9 INJECTION, SOLUTION INTRAVENOUS at 19:24

## 2021-03-31 RX ADMIN — SODIUM CHLORIDE 1000 ML: 9 INJECTION, SOLUTION INTRAVENOUS at 16:52

## 2021-03-31 RX ADMIN — VALPROIC ACID 500 MG: 250 SOLUTION ORAL at 19:18

## 2021-03-31 ASSESSMENT — ENCOUNTER SYMPTOMS
CONFUSION: 0
ARTHRALGIAS: 0
DIFFICULTY URINATING: 0
DIZZINESS: 1
WEAKNESS: 1
LIGHT-HEADEDNESS: 1
SHORTNESS OF BREATH: 0
FEVER: 0
ABDOMINAL PAIN: 0
FATIGUE: 1
EYE REDNESS: 0
COLOR CHANGE: 0
HEADACHES: 1
NECK STIFFNESS: 0

## 2021-03-31 ASSESSMENT — MIFFLIN-ST. JEOR: SCORE: 1506.14

## 2021-03-31 NOTE — ED TRIAGE NOTES
Daughter noticed to day he would stare off and not answer questions. States he is dizzy. Slow to respond and isn't giving correct answer to question. Michelle has clarified answers. Spoke with neurology team and directed to ED.

## 2021-03-31 NOTE — CONSULTS
"[facility]  Neurology Consultation    Patient Name:  Vincent Zuniga  MRN:  [id number]    :  1955  Date of Service:  2021  Primary care provider:  Marcus Mccain      Neurology consultation service was asked to see Vincent Zuniga by Dr. Joe to evaluate for staring spells, decreased responsiveness, as well as dizziness    History of Present Illness:   66 year old male h/o multiple sclerosis and PINA virus positive treated with ocrelizumab who presents with new onset of staring off into space, not answering questions and dizziness.  History was provided by the patient as well as his daughter with whom he lives who is available by telephone.  She describes that he has been complaining of knee pain while walking over the past 3 days, and any physical complaint is very uncharacteristic for him.  It was earlier this morning when she found him staring off into space and she asked him what was wrong and he described \"I just do not feel well\".  The patient himself complains of some dizziness.  Both the patient and his daughter describe that his prior flare, which was his first presentation with multiple sclerosis, also presented with a similar clinical picture-he described dizziness and generalized malaise.    While being evaluated, the patient is noted to have a right sided resting tremor.  He describes this has been present also for about a year and a half, at the same time he was diagnosed with MS.  He describes that food does not taste as well as it usually did, that he cannot taste it at all.  He denies any restlessness in his sleep, any injuries resulting from sleep, and he has regular bowel movements without any issue of constipation.  He denies any cramping of his hands, toes, feet, and he denies any falls.  He does describe occasional visual images at the periphery that he is not able to make out clearly, though he denies any clearly form visual " hallucinations of children or animals.    Chart review:  Patient was last seen by his MS neurologist, Dr. Guerra, 2/3/2021.  His exam was normal from a mental status perspective, he had no cranial nerve deficit, had normal motor exam, sensory exam, and gait.  He had his first ocrelizumab infusion December 2020.    ROS  A 10-point ROS was performed as per HPI. 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     MS (multiple sclerosis) ([facility])      Numbness and tingling     in legs     Pneumonia 11/23/2020     Shortness of breath      Past Surgical History:   Procedure Laterality Date     BACK SURGERY      Lumbar     SEPTOPLASTY  10/31/2012    Procedure: SEPTOPLASTY;  Septoplasty, turbinoplasty, enlargement of maxillary ostia;  Surgeon: Carolina Robison MD;  Location: [facility] OR     spleen[  Age 18    spleenectomy       Medications   (Not in a hospital admission)      Allergies  Allergies   Allergen Reactions     Methylprednisolone Other (See Comments)     Immune Globulin Rash       Social History  Social History     Tobacco Use     Smoking status: Current Every Day Smoker     Packs/day: 1.00     Years: 30.00     Pack years: 30.00     Types: Cigarettes     Smokeless tobacco: Never Used   Substance Use Topics     Alcohol use: No       Family History    Family History   Problem Relation Age of Onset     Cancer Father         Lung     Heart Disease Brother 35        Four heart attacks     Alcohol/Drug Brother      Psychotic Disorder Brother         Drug addiction     Unknown/Adopted Son      Unknown/Adopted Daughter      Unknown/Adopted Daughter      Unknown/Adopted Daughter   "    Unknown/Adopted Daughter          Physical Examination   Vitals: /88   Pulse (!) 45   Temp 97.8  F (36.6  C) (Oral)   Resp 16   Ht 1.753 m (5' 9\")   Wt 72.6 kg (160 lb)   SpO2 96%   BMI 23.63 kg/m    General: Adult patient, lying in bed, NAD  HEENT: NC/AT, no icterus, op pink and moist  Cardiac: RRR  Chest: non-labored on RA  Abdomen: S/NT/ND  Extremities: Warm, no edema  Skin: No rash or lesion   Psych: Mood pleasant, affect congruent  Neuro:  Mental status: Awake, alert, attentive, oriented to self, time, place, and circumstance. Language is fluent and coherent with intact comprehension of complex commands, naming and repetition.  Cranial nerves: VFF, PERRL, conjugate gaze, EOMI, facial sensation intact, face symmetric, shoulder shrug strong, tongue/uvula midline, no dysarthria.   Motor: Normal bulk and tone. No abnormal movements. 5/5 strength in 4/4 extremities.   There is a right handed resting tremor 3 to 4 Hz.  The tremor is elicited when he gets up to walk, as well as when thinking about more stressful questions during the examination.  He does have a reemergent postural tremor, and does not have any action tremor with his upper extremities.  (Tone not examined- pulled out of room for an emergency)  Reflexes: 2+ reflexes and symmetric biceps, brachioradialis, patellae, and achilles. Negative Sipmson, no clonus, toes down-going.  Sensory: Intact to light touch throughout.  He does sense the tuning fork at his great toes bilaterally, though only for 5 to 6 seconds on each side.  Coordination: FNF and HS without ataxia or dysmetria. Rapid alternating movements intact.   Gait: Normal width, stride length.  The room is small to assess for his ability to turn.  He does have decreased arm swing on the right and tremor of the right hand when walking.    Investigations   MRI Brain w and w/o contrast 3/31/21  Impression:   Between 15 and 20 foci of T2-hyperintensity within the cerebral white  matter " consistent with the clinical suspicion of demyelinating  disease. No new lesion or active demyelination.    UA with few bacteria and positive for ketones  COVID-19 pending    Impression  Mr Zuniga is a 66-year-old man with a diagnosis of MS who is on ocrelizumab who presented with a couple of nonspecific concerns.  His daughter has described a 2 or 3 days of joint pain in the knees when walking, and the patient himself describes generalized malaise as well as dizziness.  We did get an MRI brain with and without contrast to ensure that he does not have an MS flare, especially since he described this as the way he originally presented, and thankfully his MRI brain was negative for any new lesions and negative for any areas of contrast-enhancement.  His exam was also reassuring and nonfocal for any new areas of vision, motor or sensory loss.  I do question whether any active infection, respiratory or urinary, could create the similar symptoms of generalized malaise, that could be magnified due to his underlying neurologic disease.    His exam was interesting for what appears to be a parkinsonian tremor of his right hand.  This was present at rest, worsened when he was attempting to answer more challenging questions, and was also apparent when he was walking.  He also had a resting/reemergent tremor of the right hand without an action tremor.  Unfortunately I was not able to assess for tone during this visit to comment on whether he had this other sign that would be consistent with parkinsonism.  He does have a couple of other signs that could be consistent with Parkinson disease including a loss of sense of taste, and some poorly formed, peripheral visual hallucinations.  He has not had any falls however, and does not need any urgent assessment for this parkinsonism, though can discuss it with Dr. Guerra at his next visit.    #Multiple sclerosis, without active flare  #Generalized malaise  #Resting tremor suspicious for  parkinsonism    Recommendations  -MRI brain with and without contrast (completed)  -Awaiting urine culture  -Awaiting COVID-19 swab  -Follow-up with Dr. Guerra in the MS clinic, as well as PCP if his generalized malaise is persistent    Thank you for involving Neurology in the care of Vincent Zuniga.  Please do not hesitate to call with questions/concerns (consult pager [phone]).      Patient was discussed with Dr. Villalobos.    Timothy Manzano MD  Neurology PGY-4  [phone]

## 2021-03-31 NOTE — TELEPHONE ENCOUNTER
"Daughter Michelle calls with Vincent.  The past couple of days has been experiencing constant dizziness,  speech is slow and movement, which he is trying to avoid because feels so fatigued, is very slow.  See triage note below  Michelle will be taking him to the ED.    Additional Information    Negative: Shock suspected (e.g., cold/pale/clammy skin, too weak to stand, low BP, rapid pulse)    Negative: Difficult to awaken or acting confused (e.g., disoriented, slurred speech)    Negative: Fainted, and still feels dizzy afterwards    Negative: Severe difficulty breathing (e.g., struggling for each breath, speaks in single words)    Negative: Overdose (accidental or intentional) of medications    Negative: New neurologic deficit that is present now: * Weakness of the face, arm, or leg on one side of the body * Numbness of the face, arm, or leg on one side of the body * Loss of speech or garbled speech    Negative: Heart beating < 50 beats per minute OR > 140 beats per minute    Negative: Sounds like a life-threatening emergency to the triager    Negative: Chest pain    Negative: Rectal bleeding, bloody stool, or tarry-black stool    Negative: Vomiting is the main symptom    Negative: Diarrhea is the main symptom    Negative: Headache is the main symptom    Negative: Heat exhaustion suspected (i.e., dehydration from heat exposure)    Negative: Patient states that he/she is having an anxiety/panic attack    Patient sounds very sick or weak to the triager    Answer Assessment - Initial Assessment Questions  1. DESCRIPTION: \"Describe your dizziness.\"      dizziness  2. LIGHTHEADED: \"Do you feel lightheaded?\" (e.g., somewhat faint, woozy, weak upon standing)      Denies  3. VERTIGO: \"Do you feel like either you or the room is spinning or tilting?\" (i.e. vertigo)      denies  4. SEVERITY: \"How bad is it?\"  \"Do you feel like you are going to faint?\" \"Can you stand and walk?\"    - MILD - walking normally    - MODERATE - " "interferes with normal activities (e.g., work, school)     - SEVERE - unable to stand, requires support to walk, feels like passing out now.       Moderate-severe  5. ONSET:  \"When did the dizziness begin?\"      A couple of days ago  6. AGGRAVATING FACTORS: \"Does anything make it worse?\" (e.g., standing, change in head position)      Is the same is not position dependent  7. HEART RATE: \"Can you tell me your heart rate?\" \"How many beats in 15 seconds?\"  (Note: not all patients can do this)        Heart rate 63 O2 sat 97%  8. CAUSE: \"What do you think is causing the dizziness?\"      Doesn't know  9. RECURRENT SYMPTOM: \"Have you had dizziness before?\" If so, ask: \"When was the last time?\" \"What happened that time?\"      Has not had before  10. OTHER SYMPTOMS: \"Do you have any other symptoms?\" (e.g., fever, chest pain, vomiting, diarrhea, bleeding)        Denies fever, chest pain, nausea/vomiting, changes in bowel.  Speech is slower and movement when he gets up to walk is slower. Denies headache, blurreed vision, hearing changes, other than intermittent ringing in ears  11. PREGNANCY: \"Is there any chance you are pregnant?\" \"When was your last menstrual period?\"        N/A    Protocols used: DIZZINESS-A-OH      "

## 2021-03-31 NOTE — ED PROVIDER NOTES
"    [place] EMERGENCY DEPARTMENT ([facility])  3/31/21  History     Chief Complaint   Patient presents with     Altered Mental Status     Dizziness     Hx MS     The history is provided by the patient and medical records.     Vincent Zuniga is a 66 year old male with a past medical history significant for MS, encelopathy, emphysema, and asthma who presents to the Emergency Department for evaluation of fatigue, and lightheadedness. Patient reports this has been ongoing for 5 days. He reports this has been constant. He states nothing makes this worse. Patient reports he has been eating and drinking ok. No fevers. Patient also endorses pain in the bilateral knees, and headache. Patient reports his headache started earlier today. He reports his knee pain has been ongoing for awhile. Patient reports his daughter grew concerned and wanted him to come in.      Per the patient's daughter, over the last couple of days the patient has been more tired. He has not wanted to do much in terms of activities. She reports this is baseline with his progressing MS flares. She reports that the patient seemed to be \"staring off\" when she had gotten home from a workout earlier today. She reports the patient has been confused over the past couple of days as well.     I have reviewed the Medications, Allergies, Past Medical and Surgical History, and Social History in the [facility] system.  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 Additional pertinent items: None    FAMILY HISTORY:   Family History   Problem Relation Age of Onset     Cancer Father         Lung     Heart Disease Brother 35        Four heart attacks     Alcohol/Drug Brother      Psychotic Disorder Brother         Drug addiction     Unknown/Adopted Son      Unknown/Adopted Daughter      Unknown/Adopted Daughter      Unknown/Adopted Daughter      Unknown/Adopted Daughter        SOCIAL HISTORY:   Social History     Tobacco Use     Smoking status: Current Every Day Smoker     Packs/day: 1.00     Years: 30.00     Pack years: 30.00     Types: Cigarettes     Smokeless tobacco: Never Used   Substance Use Topics     Alcohol use: No     Social history was reviewed with the patient. Additional pertinent items: None      Discharge Medication List as of 3/31/2021  8:43 PM      CONTINUE these medications which have NOT CHANGED    Details   albuterol (PROVENTIL HFA) 108 (90 Base) MCG/ACT inhaler Inhale 2 puffs into the lungs every 4 hours as needed, Historical      Cholecalciferol (VITAMIN D) 50 MCG (2000 UT) CAPS Take 1 capsule every other day, Disp-180 capsule, R-0, E-Prescribe      clonazePAM (KLONOPIN) 1 MG tablet Take 1 tablet (1 mg) by mouth nightly as needed for anxiety, Disp-90 tablet, R-3, E-Prescribe      escitalopram (LEXAPRO) 20 MG tablet Take 1 tablet by mouth once daily, Disp-90 tablet, R-1, E-Prescribe      oxybutynin (DITROPAN) 5 MG tablet Take 5 mg by mouth daily, Historical      valproic acid (DEPAKENE) 250 MG/5ML syrup 10 mLs (500 mg) by Oral or G tube route 3 times daily, Disp-900 mL,R-3, Local Print      vitamin D2  "(ERGOCALCIFEROL) 03077 units (1250 mcg) capsule Take 50,000 Units by mouth once a week, Historical                Allergies   Allergen Reactions     Methylprednisolone Other (See Comments)     Immune Globulin Rash        Review of Systems   Constitutional: Positive for fatigue. Negative for fever.   HENT: Negative for congestion.    Eyes: Negative for redness.   Respiratory: Negative for shortness of breath.    Cardiovascular: Negative for chest pain.   Gastrointestinal: Negative for abdominal pain.   Genitourinary: Negative for difficulty urinating.   Musculoskeletal: Negative for arthralgias and neck stiffness.   Skin: Negative for color change.   Neurological: Positive for dizziness, weakness, light-headedness and headaches.   Psychiatric/Behavioral: Negative for confusion.   All other systems reviewed and are negative.      Physical Exam   BP: 96/65  Pulse: 61  Temp: 97.8  F (36.6  C)  Resp: 16  Height: 175.3 cm (5' 9\")  Weight: 72.6 kg (160 lb)  SpO2: 97 %      Physical Exam  Vitals signs and nursing note reviewed.   Constitutional:       General: He is not in acute distress.     Appearance: He is well-developed. He is not ill-appearing, toxic-appearing or diaphoretic.      Comments: Patient is awake and alert, he answers questions and follows commands appropriately.  He is protecting his airway without difficulty and seems to be mentating normally.  At times, he does seem slow to answer questions and seems disinterested throughout the interview.   HENT:      Head: Normocephalic and atraumatic.      Mouth/Throat:      Lips: Pink.      Mouth: Mucous membranes are moist.      Pharynx: Oropharynx is clear. No oropharyngeal exudate.   Eyes:      General: Lids are normal. No visual field deficit or scleral icterus.     Extraocular Movements: Extraocular movements intact.      Right eye: No nystagmus.      Left eye: No nystagmus.      Conjunctiva/sclera: Conjunctivae normal.      Pupils: Pupils are equal, round, and " reactive to light.   Neck:      Musculoskeletal: Normal range of motion and neck supple. No erythema or neck rigidity.      Thyroid: No thyromegaly.      Vascular: No JVD.      Trachea: No tracheal deviation.   Cardiovascular:      Rate and Rhythm: Normal rate and regular rhythm.      Pulses: Normal pulses.      Heart sounds: Normal heart sounds. No murmur. No friction rub. No gallop.    Pulmonary:      Effort: Pulmonary effort is normal. No respiratory distress.      Breath sounds: Normal breath sounds.   Abdominal:      General: Bowel sounds are normal. There is no distension.      Palpations: Abdomen is soft. There is no mass.      Tenderness: There is no abdominal tenderness. There is no guarding or rebound.   Musculoskeletal: Normal range of motion.         General: No tenderness.      Right lower leg: No edema.      Left lower leg: No edema.   Lymphadenopathy:      Cervical: No cervical adenopathy.   Skin:     General: Skin is warm and dry.      Capillary Refill: Capillary refill takes less than 2 seconds.      Coloration: Skin is not pale.      Findings: No erythema or rash.   Neurological:      Mental Status: He is alert and oriented to person, place, and time.      Cranial Nerves: No cranial nerve deficit, dysarthria or facial asymmetry.      Sensory: No sensory deficit.      Motor: Motor function is intact. No weakness.      Comments: No aphasia noted   Psychiatric:         Mood and Affect: Mood and affect normal.         Speech: Speech normal.         Behavior: Behavior normal.         ED Course   4:30 PM  The patient was seen and examined by Cade Joe MD in Room ED29.        Procedures               Results for orders placed or performed during the hospital encounter of 03/31/21 (from the past 24 hour(s))   CBC with platelets differential   Result Value Ref Range    WBC 8.0 4.0 - 11.0 10e9/L    RBC Count 4.20 (L) 4.4 - 5.9 10e12/L    Hemoglobin 14.3 13.3 - 17.7 g/dL    Hematocrit 42.1 40.0 - 53.0 %      78 - 100 fl    MCH 34.0 (H) 26.5 - 33.0 pg    MCHC 34.0 31.5 - 36.5 g/dL    RDW 12.2 10.0 - 15.0 %    Platelet Count 241 150 - 450 10e9/L    Diff Method Automated Method     % Neutrophils 54.4 %    % Lymphocytes 28.3 %    % Monocytes 15.0 %    % Eosinophils 1.6 %    % Basophils 0.4 %    % Immature Granulocytes 0.3 %    Nucleated RBCs 0 0 /100    Absolute Neutrophil 4.4 1.6 - 8.3 10e9/L    Absolute Lymphocytes 2.3 0.8 - 5.3 10e9/L    Absolute Monocytes 1.2 0.0 - 1.3 10e9/L    Absolute Eosinophils 0.1 0.0 - 0.7 10e9/L    Absolute Basophils 0.0 0.0 - 0.2 10e9/L    Abs Immature Granulocytes 0.0 0 - 0.4 10e9/L    Absolute Nucleated RBC 0.0    Comprehensive metabolic panel   Result Value Ref Range    Sodium 137 133 - 144 mmol/L    Potassium 4.3 3.4 - 5.3 mmol/L    Chloride 104 94 - 109 mmol/L    Carbon Dioxide 26 20 - 32 mmol/L    Anion Gap 7 3 - 14 mmol/L    Glucose 84 70 - 99 mg/dL    Urea Nitrogen 15 7 - 30 mg/dL    Creatinine 0.67 0.66 - 1.25 mg/dL    GFR Estimate >90 >60 mL/min/[1.73_m2]    GFR Estimate If Black >90 >60 mL/min/[1.73_m2]    Calcium 9.3 8.5 - 10.1 mg/dL    Bilirubin Total 0.2 0.2 - 1.3 mg/dL    Albumin 3.6 3.4 - 5.0 g/dL    Protein Total 7.2 6.8 - 8.8 g/dL    Alkaline Phosphatase 74 40 - 150 U/L    ALT 39 0 - 70 U/L    AST 33 0 - 45 U/L   Valproic acid (Depakote level)   Result Value Ref Range    Valproic Acid Level 54 50 - 100 mg/L   EKG 12-lead, tracing only   Result Value Ref Range    Interpretation ECG Click View Image link to view waveform and result    UA with Microscopic   Result Value Ref Range    Color Urine Light Yellow     Appearance Urine Clear     Glucose Urine Negative NEG^Negative mg/dL    Bilirubin Urine Negative NEG^Negative    Ketones Urine 20 (A) NEG^Negative mg/dL    Specific Gravity Urine 1.017 1.003 - 1.035    Blood Urine Negative NEG^Negative    pH Urine 6.5 5.0 - 7.0 pH    Protein Albumin Urine Negative NEG^Negative mg/dL    Urobilinogen mg/dL Normal 0.0 - 2.0 mg/dL     Nitrite Urine Negative NEG^Negative    Leukocyte Esterase Urine Negative NEG^Negative    Source Midstream Urine     WBC Urine <1 0 - 5 /HPF    RBC Urine 2 0 - 2 /HPF    Bacteria Urine Few (A) NEG^Negative /HPF    Squamous Epithelial /HPF Urine 0 0 - 1 /HPF    Mucous Urine Present (A) NEG^Negative /LPF   Urine Culture    Specimen: Urine clean catch; Midstream Urine   Result Value Ref Range    Specimen Description Midstream Urine     Special Requests Specimen received in preservative     Culture Micro PENDING    Asymptomatic SARS-CoV-2 COVID-19 Virus (Coronavirus) by PCR    Specimen: Nasopharyngeal   Result Value Ref Range    SARS-CoV-2 Virus Specimen Source Nasopharyngeal     SARS-CoV-2 PCR Result NEGATIVE     SARS-CoV-2 PCR Comment       Testing was performed using the Xpert Xpress SARS-CoV-2 Assay on the Cepheid Gene-Xpert   Instrument Systems. Additional information about this Emergency Use Authorization (EUA)   assay can be found via the Lab Guide.     MR Brain w/o & w Contrast    Narrative    Brain MR without and with contrast     History: Multiple sclerosis, new event; On ocrelizumab therapy.   ICD-10: First ocrelizumab dose December 20, 2020.  Comparison: none     Technique:   Brain MR: Sagittal thin FLAIR and axial T1-weighted and FLAIR images  were obtained without intravenous contrast. Following intravenous  gadolinium-based contrast administration, axial diffusion,  susceptibility, T2-weighted, T1-weighted, and coronal T1-weighted  images were obtained.    Contrast: 7.5 mL gadolinium IV     Findings: No mass lesion, midline shift, or abnormal fluid collection.  Multiple foci of T2 hyperintensity within bilateral cerebral white  matter, including the bilateral frontal lobes and the left parietal  lobe. The largest lesion is in the right centrum semiovale and  superior frontal white matter. This lesion is also T1 hypointense.  Moderate diffuse cerebral atrophy. Following the administration of  intravenous  contrast, there are no foci of abnormal contrast  enhancement noted. Compared to the previous study, there has been no  significant interval change.    There are no significant abnormalities in the visualized portions of  the mastoid air cells or paranasal sinuses.      Impression    Impression:   Between 15 and 20 foci of T2-hyperintensity within the cerebral white  matter consistent with the clinical suspicion of demyelinating  disease. No new lesion or active demyelination.    I have personally reviewed the examination and initial interpretation  and I agree with the findings.    MYAH ROBBINS MD     Medications   0.9% sodium chloride BOLUS (0 mLs Intravenous Stopped 3/31/21 1830)   gadobutrol (GADAVIST) injection 7.5 mL (7 mLs Intravenous Given 3/31/21 1902)             Assessments & Plan (with Medical Decision Making)   This patient presented to the emergency department complaints of fatigue and lightheadedness.  I talking with his daughter again seems 1 major concerns is that he is having an MS flare. That is typically how he has presented with his flares. Here he is afebrile and in no acute distress.  He was somewhat bradycardic but this appears to be sinus bradycardia and appears consistent with past 12 lead EKGs. Urinalysis suggest an aspect of dehydration and poor oral intake with mild ketones.  No evidence of acute infection, clinically or on urinalysis.  He does not have an elevated white blood cell count or indirect signs of infection.  Covid test is negative.  No significant electrolyte or metabolic abnormalities are noted.  Valproic acid level was sent but is pending at this time.  I did consult neurology at this time and they had us order an MRI which came back showing no significant acute changes.  No new lesions or active demyelination noted. At this point in time I have spoken with neurology again and they are comfortable with patient being discharged home and to follow-up in the MS clinic.  I  spoke with patient's daughter again and reviewed findings and suggestions and she is comfortable this plan.  Patient to will be discharged home with his daughter in good condition.    This part of the medical record was transcribed by Terrence Robertson Medical Scribe.     I have reviewed the nursing notes.    I have reviewed the findings, diagnosis, plan and need for follow up with the patient.    Discharge Medication List as of 3/31/2021  8:43 PM          Final diagnoses:   Fatigue, unspecified type   I, Mio Burden, am serving as a trained medical scribe to document services personally performed by Karel Joe MD, based on the provider's statements to me.      I, Karel Joe MD, was physically present and have reviewed and verified the accuracy of this note documented by Mio Burden.     3/31/2021   [facility] EMERGENCY DEPARTMENT     Karel Joe MD  04/01/21 0954

## 2021-04-01 LAB
BACTERIA SPEC CULT: NO GROWTH
Lab: NORMAL
SPECIMEN SOURCE: NORMAL

## 2021-04-01 NOTE — RESULT ENCOUNTER NOTE
[facility] Emergency Dept scharge antibiotic (if prescribed): None  No changes in treatment per [facility] ED Lab Result Urine culture protocol.

## 2021-04-01 NOTE — DISCHARGE INSTRUCTIONS
Please make an appointment to follow up with your multiple sclerosis specialist as soon as possible.  Depakote/Depakene level is still pending, they will follow up on this and adjust dose if needed.    Stay well-hydrated by drinking plenty of fluids and eat regular meals.    Return to the emergency department for fevers, worsening symptoms or any other concerns.

## 2021-05-09 DIAGNOSIS — G35 MULTIPLE SCLEROSIS (H): ICD-10-CM

## 2021-05-11 RX ORDER — CLONAZEPAM 2 MG/1
TABLET ORAL
Qty: 30 TABLET | Refills: 0 | OUTPATIENT
Start: 2021-05-11

## 2021-05-11 NOTE — TELEPHONE ENCOUNTER
Received a refill request for clonazepam from the patient's pharmacy; This medication was refilled in February with additional refills on the prescription; Rx denied and advised pharmacy to use prescription on file.    Nila Salinas MS RN Care Coordinator

## 2021-05-13 NOTE — TELEPHONE ENCOUNTER
Received note from pharmacy stating they never got Rx on 2/3/2021. Please send new RX    Thank you  Deanna Anderson MA

## 2021-05-20 NOTE — TELEPHONE ENCOUNTER
I called the pharmacy and spoke with the pharmacist. Per their system and [place] [facility]ption monitoring system, patient last filled Clonopin 2 mg 30 tablets on 4/5/2021 with direction to take 1 tab at night as needed.     Reviewing prior notes from Dr. Guerra on 2/3/2021, we will decrease his clonazepam to 1 mg instead of 2 to avoid any balance issues and potential falls. Discussed this with pharmacist who will update the prescription.     I called patient's daughter and updated her about this.

## 2021-06-17 DIAGNOSIS — E55.9 VITAMIN D DEFICIENCY: ICD-10-CM

## 2021-06-18 RX ORDER — MULTIVIT-MIN/IRON/FOLIC ACID/K 18-600-40
CAPSULE ORAL
Qty: 180 CAPSULE | Refills: 0 | Status: SHIPPED | OUTPATIENT
Start: 2021-06-18 | End: 2021-09-16

## 2021-06-18 NOTE — TELEPHONE ENCOUNTER
Received refill request for Vitamin D from [facility]; Patient was last seen in February and has follow up appointment in July with Dr. Guerra. Refilled per [facility] refill protocol.    Araseli Huerta RN

## 2021-07-09 ENCOUNTER — ANCILLARY PROCEDURE (OUTPATIENT)
Dept: MRI IMAGING | Facility: CLINIC | Age: 66
End: 2021-07-09
Attending: PSYCHIATRY & NEUROLOGY
Payer: MEDICARE

## 2021-07-09 DIAGNOSIS — G35 MULTIPLE SCLEROSIS (H): ICD-10-CM

## 2021-07-09 PROCEDURE — G1004 CDSM NDSC: HCPCS | Mod: GC | Performed by: RADIOLOGY

## 2021-07-09 PROCEDURE — 72156 MRI NECK SPINE W/O & W/DYE: CPT | Mod: MG | Performed by: RADIOLOGY

## 2021-07-09 PROCEDURE — A9585 GADOBUTROL INJECTION: HCPCS | Performed by: RADIOLOGY

## 2021-07-09 PROCEDURE — 70553 MRI BRAIN STEM W/O & W/DYE: CPT | Mod: MG | Performed by: STUDENT IN AN ORGANIZED HEALTH CARE EDUCATION/TRAINING PROGRAM

## 2021-07-09 PROCEDURE — 72157 MRI CHEST SPINE W/O & W/DYE: CPT | Mod: MG | Performed by: RADIOLOGY

## 2021-07-09 PROCEDURE — G1004 CDSM NDSC: HCPCS | Performed by: STUDENT IN AN ORGANIZED HEALTH CARE EDUCATION/TRAINING PROGRAM

## 2021-07-09 RX ORDER — GADOBUTROL 604.72 MG/ML
7.5 INJECTION INTRAVENOUS ONCE
Status: COMPLETED | OUTPATIENT
Start: 2021-07-09 | End: 2021-07-09

## 2021-07-09 RX ADMIN — GADOBUTROL 7.5 ML: 604.72 INJECTION INTRAVENOUS at 11:52

## 2021-07-09 NOTE — DISCHARGE INSTRUCTIONS
MRI Contrast Discharge Instructions    The IV contrast you received today will pass out of your body in your  urine. This will happen in the next 24 hours. You will not feel this process.  Your urine will not change color.    Drink at least 4 extra glasses of water or juice today (unless your doctor  has restricted your fluids). This reduces the stress on your kidneys.  You may take your regular medicines.    If you are on dialysis: It is best to have dialysis today.    If you have a reaction: Most reactions happen right away. If you have  any new symptoms after leaving the hospital (such as hives or swelling),  call your hospital at the correct number below. Or call your family doctor.  If you have breathing distress or wheezing, call 911.    Special instructions: ***    I have read and understand the above information.    Signature:______________________________________ Date:___________    Staff:__________________________________________ Date:___________     Time:__________    [facility] Departments:    ___[place]: [phone]  ___[facility]: [phone]  ___[place]: [phone] ___[facility]: [phone]  ___[facility]: [phone]  ___[facility]: [phone]  ___[phone]  ___[facility]: [phone]  ___[phone]

## 2021-07-09 NOTE — DISCHARGE INSTRUCTIONS
MRI Contrast Discharge Instructions    The IV contrast you received today will pass out of your body in your  urine. This will happen in the next 24 hours. You will not feel this process.  Your urine will not change color.    Drink at least 4 extra glasses of water or juice today (unless your doctor  has restricted your fluids). This reduces the stress on your kidneys.  You may take your regular medicines.    If you are on dialysis: It is best to have dialysis today.    If you have a reaction: Most reactions happen right away. If you have  any new symptoms after leaving the hospital (such as hives or swelling),  call your hospital at the correct number below. Or call your family doctor.  If you have breathing distress or wheezing, call 911.    Special instructions: ***    I have read and understand the above information.    Signature:______________________________________ Date:___________    Staff:__________________________________________ Date:___________     Time:__________    [facility] Departments:    ___[place]: [phone]  ___[facility]: [phone]  ___[place]: [phone] ___[facility]: [phone]  ___[facility]: [phone]  ___[facility]: [phone]  ___[phone]  ___[facility]: [phone]  ___[phone]

## 2021-07-09 NOTE — DISCHARGE INSTRUCTIONS
MRI Contrast Discharge Instructions    The IV contrast you received today will pass out of your body in your  urine. This will happen in the next 24 hours. You will not feel this process.  Your urine will not change color.    Drink at least 4 extra glasses of water or juice today (unless your doctor  has restricted your fluids). This reduces the stress on your kidneys.  You may take your regular medicines.    If you are on dialysis: It is best to have dialysis today.    If you have a reaction: Most reactions happen right away. If you have  any new symptoms after leaving the hospital (such as hives or swelling),  call your hospital at the correct number below. Or call your family doctor.  If you have breathing distress or wheezing, call 911.    Special instructions: ***    I have read and understand the above information.    Signature:______________________________________ Date:___________    Staff:__________________________________________ Date:___________     Time:__________    [place] [facility] Departments:    ___[place]: [phone]  ___[facility]: [phone]  ___[place]: [phone] ___[facility]: [phone]  ___[facility]: [phone]  ___[facility]: [phone]  ___[phone]  ___[facility]: [phone]  ___[phone]

## 2021-07-14 ENCOUNTER — OFFICE VISIT (OUTPATIENT)
Dept: NEUROLOGY | Facility: CLINIC | Age: 66
End: 2021-07-14
Attending: PSYCHIATRY & NEUROLOGY
Payer: MEDICARE

## 2021-07-14 VITALS
DIASTOLIC BLOOD PRESSURE: 74 MMHG | HEART RATE: 61 BPM | WEIGHT: 145.8 LBS | BODY MASS INDEX: 21.53 KG/M2 | SYSTOLIC BLOOD PRESSURE: 107 MMHG | OXYGEN SATURATION: 98 %

## 2021-07-14 DIAGNOSIS — G35 MS (MULTIPLE SCLEROSIS) (H): Primary | ICD-10-CM

## 2021-07-14 DIAGNOSIS — R41.89 COGNITIVE IMPAIRMENT: ICD-10-CM

## 2021-07-14 DIAGNOSIS — F12.20 ADDICTION, MARIJUANA (H): ICD-10-CM

## 2021-07-14 PROCEDURE — 99213 OFFICE O/P EST LOW 20 MIN: CPT | Mod: GC | Performed by: PSYCHIATRY & NEUROLOGY

## 2021-07-14 ASSESSMENT — PATIENT HEALTH QUESTIONNAIRE - PHQ9: SUM OF ALL RESPONSES TO PHQ QUESTIONS 1-9: 0

## 2021-07-14 ASSESSMENT — PAIN SCALES - GENERAL: PAINLEVEL: NO PAIN (0)

## 2021-07-14 NOTE — LETTER
"7/14/2021       RE: Vincent Zuniga  [address]  [place] [address]     Dear Colleague,    Thank you for referring your patient, Vincent Zuniga, to the [facility] [place] at [facility]. Please see a copy of my visit note below.    Neurology Clinic Visit    Reason: relapsing remitting multiple sclerosis       7/14/2021  Source of information: Patient and chart review      History of Present Symptom:  Vincent Zuniga is a 66 year old male with a PMH significant for RRM MS last seen 2/2021 who was started on Ocrevus 12/2020. He is accompanied but his daughter who lives with him, Michelle. They feel he has been doing quite well since starting the Ocrevus. He is due for his next infusion. He got his first Covid vx and is waiting for the second one which is scheduled on Friday 7/16/21.         Fishing daily with his brother. This keeps him active and his main exercise. They go out in a boat.     Daughter states his thinking seem a little better, more talkative and interactive. She states he noticed a problem with the car but did not mention anything for a long time. She manages his medications and finances.     He reports tremor R>L in his hands which is becoming more and more bothersome. It is present at rest and seems to worsen with anxiety. He has never tried medication for this. Denies family history. Reports he does have difficult drinking from a cup and spills frequently as well as eating with a spoon.  He is right handed.     Energy level is \"fair.\"     The patient's medical, surgical, social, and family history were personally reviewed with the patient.  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     MS (multiple sclerosis) (H)      Numbness and tingling     in legs     Pneumonia 11/23/2020     Shortness of breath       Past Surgical History:   Procedure Laterality Date     BACK SURGERY      Lumbar     SEPTOPLASTY  10/31/2012    Procedure: SEPTOPLASTY;  Septoplasty, turbinoplasty, enlargement of maxillary ostia;  Surgeon: Carolina Robison MD;  Location: [facility] OR     spleen[  Age 18    spleenectomy     Social History     Tobacco Use     Smoking status: Current Every Day Smoker     Packs/day: 1.00     Years: 30.00     Pack years: 30.00     Types: Cigarettes     Smokeless tobacco: Never Used   Substance Use Topics     Alcohol use: No     Drug use: Yes     Types: Marijuana     Family History   Problem Relation Age of Onset     Cancer Father         Lung     Heart Disease Brother 35        Four heart attacks     Alcohol/Drug Brother      Psychotic Disorder Brother         Drug addiction     Unknown/Adopted Son      Unknown/Adopted Daughter      Unknown/Adopted Daughter      Unknown/Adopted Daughter      Unknown/Adopted Daughter      Current Outpatient Medications   Medication     albuterol (PROVENTIL HFA) 108 (90 Base) MCG/ACT inhaler     Cholecalciferol (VITAMIN D) 50 MCG (2000 UT) CAPS     clonazePAM (KLONOPIN) 1 MG tablet     escitalopram (LEXAPRO) 20 MG tablet     oxybutynin (DITROPAN) 5 MG tablet     valproic acid (DEPAKENE) 250 MG/5ML syrup     vitamin D2 (ERGOCALCIFEROL) 39491 units (1250 mcg) capsule     No current facility-administered medications for this visit.     Allergies   Allergen Reactions     Methylprednisolone Other (See Comments)     Immune Globulin Rash         Review of Systems:  14-point review of systems was completed and is in the scanned health questionnaire from this visit. The pertinent positives and negatives are in the HPI.    Physical Examination   Vitals: /74 (BP Location: Left arm, Patient Position: Chair, Cuff  Size: Adult Regular)   Pulse 61   Wt 66.1 kg (145 lb 12.8 oz)   SpO2 98%   BMI 21.53 kg/m    General: Patient lying in bed, NAD  HEENT: NC/AT, no icterus, moist mucous membranes  Cardiac: RRR  Chest: non-labored on RA  Abdomen: S/NT/ND  Extremities: Warm, no edema  Skin: No rash or lesion   Psych: Affect appropriate for situation   Neuro:  Mental status: Awake, alert, attentive, oriented. Language is fluent with intact comprehension of complex commands.  Cranial nerves: PERRL, conjugate gaze, EOMI, visual fields intact, face symmetric, shoulder shrug strong, tongue protrusion/uvula midline, no dysarthria.   Motor: Normal muscle bulk and tone. Resting tremor/end point tremor. Improves with distraction. Right hand > L hand. 5/5 strength in 4/4 extremities.   Reflexes: 2+ reflexic and symmetric biceps, brachioradialis, patellae, and present achilles. No clonus, toes down-going.  Sensory: Intact to light touch  Coordination: FNF without ataxia or dysmetria slower on the right compared to left.   Gait: Normal width, stride length, turn, with symmetric arm swing. Difficulty with tandem walk intact.    Laboratory:  All laboratory data reviewed    Imaging:  MRI brain 7/9/21  Impression:   Stable chronic demyelinating disease without any new lesion or active  demyelination since 3/31/2021.    Impression:      1. No definite demyelinating disease in the cervical or thoracic  spinal cord.  2. Mild degenerative disease in the cervical and thoracic spine.    Impression:      1. No definite demyelinating disease in the cervical or thoracic  spinal cord.  2. Mild degenerative disease in the cervical and thoracic spine.       Assessment/Plan:  Vincent Zuniga is a 66 year old male relapsing remitting multiple sclerosis who has done well on Ocrevus since 12/2020. He has no new lesions on imaging and no new clinical symptoms this visit concerning for active flare. He is due for infusion but we will have to wait for 2 weeks after  his 2nd covid vaccination which is 7/16.   -ocrevus (to schedule after 7/30)  -CD-19 today   -we discussed if his B cells are not reconstituted he may need to get ab testing because the vaccine may not be effective   -repeat MRI in 1 year   -follow up 6 months   -discussed cutting back on marijuana use, he is still smoking very heavily     Tremor  -appears to have essential tremor significantly worsened by anxiety   -discussed cutting back on caffeine use     Patient seen and discussed with Dr. Guerra.   I have reviewed the plan with the patient, who is in agreement.      Araseli Keller DO  Neurology PGY-4   [id number]    I saw and evaluated the patient with the resident and agree with the assessment and plan.     Cassandra Guerra MD  Chief, Multiple Sclerosis Division  Department of Neurology  [facility]      Again, thank you for allowing me to participate in the care of your patient.      Sincerely,    Cassandra Guerra MD

## 2021-07-14 NOTE — PROGRESS NOTES
"Neurology Clinic Visit    Reason: relapsing remitting multiple sclerosis       7/14/2021  Source of information: Patient and chart review      History of Present Symptom:  Vincent Zuniga is a 66 year old male with a PMH significant for RRM MS last seen 2/2021 who was started on Ocrevus 12/2020. He is accompanied but his daughter who lives with him, Michelle. They feel he has been doing quite well since starting the Ocrevus. He is due for his next infusion. He got his first Covid vx and is waiting for the second one which is scheduled on Friday 7/16/21.         Fishing daily with his brother. This keeps him active and his main exercise. They go out in a boat.     Daughter states his thinking seem a little better, more talkative and interactive. She states he noticed a problem with the car but did not mention anything for a long time. She manages his medications and finances.     He reports tremor R>L in his hands which is becoming more and more bothersome. It is present at rest and seems to worsen with anxiety. He has never tried medication for this. Denies family history. Reports he does have difficult drinking from a cup and spills frequently as well as eating with a spoon.  He is right handed.     Energy level is \"fair.\"     The patient's medical, surgical, social, and family history were personally reviewed with the patient.  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     MS (multiple sclerosis) (H)      Numbness and tingling     in legs     Pneumonia 11/23/2020     Shortness of breath       Past Surgical History:   Procedure Laterality Date     BACK SURGERY      Lumbar     SEPTOPLASTY  10/31/2012    Procedure: " SEPTOPLASTY;  Septoplasty, turbinoplasty, enlargement of maxillary ostia;  Surgeon: Carolina Robison MD;  Location: [facility] OR     spleen[  Age 18    spleenectomy     Social History     Tobacco Use     Smoking status: Current Every Day Smoker     Packs/day: 1.00     Years: 30.00     Pack years: 30.00     Types: Cigarettes     Smokeless tobacco: Never Used   Substance Use Topics     Alcohol use: No     Drug use: Yes     Types: Marijuana     Family History   Problem Relation Age of Onset     Cancer Father         Lung     Heart Disease Brother 35        Four heart attacks     Alcohol/Drug Brother      Psychotic Disorder Brother         Drug addiction     Unknown/Adopted Son      Unknown/Adopted Daughter      Unknown/Adopted Daughter      Unknown/Adopted Daughter      Unknown/Adopted Daughter      Current Outpatient Medications   Medication     albuterol (PROVENTIL HFA) 108 (90 Base) MCG/ACT inhaler     Cholecalciferol (VITAMIN D) 50 MCG (2000 UT) CAPS     clonazePAM (KLONOPIN) 1 MG tablet     escitalopram (LEXAPRO) 20 MG tablet     oxybutynin (DITROPAN) 5 MG tablet     valproic acid (DEPAKENE) 250 MG/5ML syrup     vitamin D2 (ERGOCALCIFEROL) 90645 units (1250 mcg) capsule     No current facility-administered medications for this visit.     Allergies   Allergen Reactions     Methylprednisolone Other (See Comments)     Immune Globulin Rash         Review of Systems:  14-point review of systems was completed and is in the scanned health questionnaire from this visit. The pertinent positives and negatives are in the HPI.    Physical Examination   Vitals: /74 (BP Location: Left arm, Patient Position: Chair, Cuff Size: Adult Regular)   Pulse 61   Wt 66.1 kg (145 lb 12.8 oz)   SpO2 98%   BMI 21.53 kg/m    General: Patient lying in bed, NAD  HEENT: NC/AT, no icterus, moist mucous membranes  Cardiac: RRR  Chest: non-labored on RA  Abdomen: S/NT/ND  Extremities: Warm, no edema  Skin: No rash or lesion   Psych: Affect  appropriate for situation   Neuro:  Mental status: Awake, alert, attentive, oriented. Language is fluent with intact comprehension of complex commands.  Cranial nerves: PERRL, conjugate gaze, EOMI, visual fields intact, face symmetric, shoulder shrug strong, tongue protrusion/uvula midline, no dysarthria.   Motor: Normal muscle bulk and tone. Resting tremor/end point tremor. Improves with distraction. Right hand > L hand. 5/5 strength in 4/4 extremities.   Reflexes: 2+ reflexic and symmetric biceps, brachioradialis, patellae, and present achilles. No clonus, toes down-going.  Sensory: Intact to light touch  Coordination: FNF without ataxia or dysmetria slower on the right compared to left.   Gait: Normal width, stride length, turn, with symmetric arm swing. Difficulty with tandem walk intact.    Laboratory:  All laboratory data reviewed    Imaging:  MRI brain 7/9/21  Impression:   Stable chronic demyelinating disease without any new lesion or active  demyelination since 3/31/2021.    Impression:      1. No definite demyelinating disease in the cervical or thoracic  spinal cord.  2. Mild degenerative disease in the cervical and thoracic spine.    Impression:      1. No definite demyelinating disease in the cervical or thoracic  spinal cord.  2. Mild degenerative disease in the cervical and thoracic spine.       Assessment/Plan:  Vincent Zuniga is a 66 year old male relapsing remitting multiple sclerosis who has done well on Ocrevus since 12/2020. He has no new lesions on imaging and no new clinical symptoms this visit concerning for active flare. He is due for infusion but we will have to wait for 2 weeks after his 2nd covid vaccination which is 7/16.   -ocrevus (to schedule after 7/30)  -CD-19 today   -we discussed if his B cells are not reconstituted he may need to get ab testing because the vaccine may not be effective   -repeat MRI in 1 year   -follow up 6 months   -discussed cutting back on marijuana use, he is  still smoking very heavily     Tremor  -appears to have essential tremor significantly worsened by anxiety   -discussed cutting back on caffeine use     Patient seen and discussed with Dr. Guerra.   I have reviewed the plan with the patient, who is in agreement.      Araseli Keller DO  Neurology PGY-4   [id number]    I saw and evaluated the patient with the resident and agree with the assessment and plan.     Cassandra Guerra MD  Chief, Multiple Sclerosis Division  Department of Neurology  [facility]

## 2021-07-16 ENCOUNTER — LAB (OUTPATIENT)
Dept: LAB | Facility: CLINIC | Age: 66
End: 2021-07-16
Attending: PSYCHIATRY & NEUROLOGY
Payer: MEDICARE

## 2021-07-16 DIAGNOSIS — G35 MULTIPLE SCLEROSIS (H): ICD-10-CM

## 2021-07-16 LAB
ALBUMIN SERPL-MCNC: 3.6 G/DL (ref 3.4–5)
ALP SERPL-CCNC: 100 U/L (ref 40–150)
ALT SERPL W P-5'-P-CCNC: 31 U/L (ref 0–70)
ANION GAP SERPL CALCULATED.3IONS-SCNC: 5 MMOL/L (ref 3–14)
AST SERPL W P-5'-P-CCNC: 25 U/L (ref 0–45)
BASOPHILS # BLD AUTO: 0 10E3/UL (ref 0–0.2)
BASOPHILS NFR BLD AUTO: 0 %
BILIRUB SERPL-MCNC: 0.4 MG/DL (ref 0.2–1.3)
BUN SERPL-MCNC: 14 MG/DL (ref 7–30)
CALCIUM SERPL-MCNC: 9.1 MG/DL (ref 8.5–10.1)
CD19 CELLS # BLD: 39 CELLS/UL (ref 107–698)
CD19 CELLS NFR BLD: 2 % (ref 6–27)
CHLORIDE BLD-SCNC: 109 MMOL/L (ref 94–109)
CO2 SERPL-SCNC: 27 MMOL/L (ref 20–32)
CREAT SERPL-MCNC: 0.64 MG/DL (ref 0.66–1.25)
EOSINOPHIL # BLD AUTO: 0.2 10E3/UL (ref 0–0.7)
EOSINOPHIL NFR BLD AUTO: 3 %
ERYTHROCYTE [DISTWIDTH] IN BLOOD BY AUTOMATED COUNT: 13.8 % (ref 10–15)
GFR SERPL CREATININE-BSD FRML MDRD: >90 ML/MIN/1.73M2
GLUCOSE BLD-MCNC: 91 MG/DL (ref 70–99)
HCT VFR BLD AUTO: 38.7 % (ref 40–53)
HGB BLD-MCNC: 13.6 G/DL (ref 13.3–17.7)
IMM GRANULOCYTES # BLD: 0 10E3/UL
IMM GRANULOCYTES NFR BLD: 0 %
LYMPHOCYTES # BLD AUTO: 2.1 10E3/UL (ref 0.8–5.3)
LYMPHOCYTES NFR BLD AUTO: 30 %
MCH RBC QN AUTO: 34.7 PG (ref 26.5–33)
MCHC RBC AUTO-ENTMCNC: 35.1 G/DL (ref 31.5–36.5)
MCV RBC AUTO: 99 FL (ref 78–100)
MONOCYTES # BLD AUTO: 0.9 10E3/UL (ref 0–1.3)
MONOCYTES NFR BLD AUTO: 13 %
NEUTROPHILS # BLD AUTO: 3.6 10E3/UL (ref 1.6–8.3)
NEUTROPHILS NFR BLD AUTO: 54 %
NRBC # BLD AUTO: 0 10E3/UL
NRBC BLD AUTO-RTO: 0 /100
PLATELET # BLD AUTO: 277 10E3/UL (ref 150–450)
POTASSIUM BLD-SCNC: 4.2 MMOL/L (ref 3.4–5.3)
PROT SERPL-MCNC: 7.4 G/DL (ref 6.8–8.8)
RBC # BLD AUTO: 3.92 10E6/UL (ref 4.4–5.9)
SODIUM SERPL-SCNC: 141 MMOL/L (ref 133–144)
WBC # BLD AUTO: 6.8 10E3/UL (ref 4–11)

## 2021-07-16 PROCEDURE — 86355 B CELLS TOTAL COUNT: CPT | Performed by: PSYCHIATRY & NEUROLOGY

## 2021-07-16 PROCEDURE — 85025 COMPLETE CBC W/AUTO DIFF WBC: CPT | Performed by: PATHOLOGY

## 2021-07-16 PROCEDURE — 80053 COMPREHEN METABOLIC PANEL: CPT | Performed by: PATHOLOGY

## 2021-07-16 PROCEDURE — 36415 COLL VENOUS BLD VENIPUNCTURE: CPT | Performed by: PATHOLOGY

## 2021-08-12 DIAGNOSIS — G35 MULTIPLE SCLEROSIS (H): ICD-10-CM

## 2021-08-12 RX ORDER — CLONAZEPAM 1 MG/1
TABLET ORAL
Qty: 30 TABLET | Refills: 0 | Status: SHIPPED | OUTPATIENT
Start: 2021-08-12 | End: 2021-09-16

## 2021-08-12 RX ORDER — ESCITALOPRAM OXALATE 20 MG/1
TABLET ORAL
Qty: 90 TABLET | Refills: 0 | Status: SHIPPED | OUTPATIENT
Start: 2021-08-12 | End: 2021-12-16

## 2021-08-12 NOTE — TELEPHONE ENCOUNTER
Received refill request for Lexapro from [facility]; Patient was last seen in July and has no follow up appointment scheduled. Refilled per MS refill protocol.    Araseli Huerta RN

## 2021-08-12 NOTE — TELEPHONE ENCOUNTER
Patient requesting refill of their clonazepam; Patient was last seen in July and has no follow up appointment scheduled. Pended rx to Dr. Guerra for signature and will send electronically to the pharmacy once signed.    Araseli Huerta RN

## 2021-08-24 ENCOUNTER — TELEPHONE (OUTPATIENT)
Dept: NEUROLOGY | Facility: CLINIC | Age: 66
End: 2021-08-24

## 2021-08-24 NOTE — TELEPHONE ENCOUNTER
Prior Authorization Infusion/Clinic Administered Request    Location: [facility]  Diagnosis and ICD:Relapsing remitting multiple sclerosis, G35  Drug/Therapy: Ocrevus. 600 mg    Previously Tried and Failed Therapies:     Date of provider note with supporting information: 07/14/2021    Urgency (When is the patient scheduled?):     Would you like to include any research articles?         If yes please call [phone] for further instructions about sending that information

## 2021-08-24 NOTE — TELEPHONE ENCOUNTER
Patient due for next Ocrevus infusion.  Therapy plan routed to Dr. Guerra for review and signature.  PA will be generated in separate encounter.    Araseli Huerta RN

## 2021-08-25 RX ORDER — ALBUTEROL SULFATE 0.83 MG/ML
2.5 SOLUTION RESPIRATORY (INHALATION)
Status: CANCELLED | OUTPATIENT
Start: 2021-08-25

## 2021-08-25 RX ORDER — DIPHENHYDRAMINE HYDROCHLORIDE 50 MG/ML
50 INJECTION INTRAMUSCULAR; INTRAVENOUS
Status: CANCELLED
Start: 2021-08-25

## 2021-08-25 RX ORDER — ACETAMINOPHEN 325 MG/1
650 TABLET ORAL ONCE
Status: CANCELLED | OUTPATIENT
Start: 2021-08-25

## 2021-08-25 RX ORDER — HEPARIN SODIUM (PORCINE) LOCK FLUSH IV SOLN 100 UNIT/ML 100 UNIT/ML
5 SOLUTION INTRAVENOUS
Status: CANCELLED | OUTPATIENT
Start: 2021-08-25

## 2021-08-25 RX ORDER — METHYLPREDNISOLONE SODIUM SUCCINATE 125 MG/2ML
125 INJECTION, POWDER, LYOPHILIZED, FOR SOLUTION INTRAMUSCULAR; INTRAVENOUS ONCE
Status: CANCELLED | OUTPATIENT
Start: 2021-08-25

## 2021-08-25 RX ORDER — DIPHENHYDRAMINE HCL 25 MG
50 CAPSULE ORAL ONCE
Status: CANCELLED | OUTPATIENT
Start: 2021-08-25

## 2021-08-25 RX ORDER — MEPERIDINE HYDROCHLORIDE 25 MG/ML
25 INJECTION INTRAMUSCULAR; INTRAVENOUS; SUBCUTANEOUS EVERY 30 MIN PRN
Status: CANCELLED | OUTPATIENT
Start: 2021-08-25

## 2021-08-25 RX ORDER — NALOXONE HYDROCHLORIDE 0.4 MG/ML
0.2 INJECTION, SOLUTION INTRAMUSCULAR; INTRAVENOUS; SUBCUTANEOUS
Status: CANCELLED | OUTPATIENT
Start: 2021-08-25

## 2021-08-25 RX ORDER — METHYLPREDNISOLONE SODIUM SUCCINATE 125 MG/2ML
125 INJECTION, POWDER, LYOPHILIZED, FOR SOLUTION INTRAMUSCULAR; INTRAVENOUS
Status: CANCELLED
Start: 2021-08-25

## 2021-08-25 RX ORDER — HEPARIN SODIUM,PORCINE 10 UNIT/ML
5 VIAL (ML) INTRAVENOUS
Status: CANCELLED | OUTPATIENT
Start: 2021-08-25

## 2021-08-25 RX ORDER — ALBUTEROL SULFATE 90 UG/1
1-2 AEROSOL, METERED RESPIRATORY (INHALATION)
Status: CANCELLED
Start: 2021-08-25

## 2021-08-25 RX ORDER — EPINEPHRINE 1 MG/ML
0.3 INJECTION, SOLUTION, CONCENTRATE INTRAVENOUS EVERY 5 MIN PRN
Status: CANCELLED | OUTPATIENT
Start: 2021-08-25

## 2021-09-01 DIAGNOSIS — Z20.822 ENCOUNTER FOR LABORATORY TESTING FOR COVID-19 VIRUS: Primary | ICD-10-CM

## 2021-09-01 NOTE — TELEPHONE ENCOUNTER
PA approved.  Called Vincent and spoke with his daughter and informed her that Vincent can schedule his infusion at this time.      Araseli Huerta RN

## 2021-09-15 DIAGNOSIS — G35 MULTIPLE SCLEROSIS (H): ICD-10-CM

## 2021-09-15 DIAGNOSIS — E55.9 VITAMIN D DEFICIENCY: ICD-10-CM

## 2021-09-16 RX ORDER — CLONAZEPAM 1 MG/1
TABLET ORAL
Qty: 30 TABLET | Refills: 0 | Status: SHIPPED | OUTPATIENT
Start: 2021-09-16 | End: 2021-10-19

## 2021-09-16 RX ORDER — MULTIVIT-MIN/IRON/FOLIC ACID/K 18-600-40
CAPSULE ORAL
Qty: 180 CAPSULE | Refills: 0 | Status: SHIPPED | OUTPATIENT
Start: 2021-09-16 | End: 2022-01-13

## 2021-09-16 NOTE — TELEPHONE ENCOUNTER
Received refill request for Vitamin D from [facility]; Patient was last seen in July and has no follow up appointment. Refilled per MS refill protocol.    Araseli Huerta RN

## 2021-09-23 ENCOUNTER — LAB (OUTPATIENT)
Dept: LAB | Facility: CLINIC | Age: 66
End: 2021-09-23
Attending: PSYCHIATRY & NEUROLOGY
Payer: MEDICARE

## 2021-09-23 DIAGNOSIS — Z20.822 ENCOUNTER FOR LABORATORY TESTING FOR COVID-19 VIRUS: ICD-10-CM

## 2021-09-23 PROCEDURE — U0003 INFECTIOUS AGENT DETECTION BY NUCLEIC ACID (DNA OR RNA); SEVERE ACUTE RESPIRATORY SYNDROME CORONAVIRUS 2 (SARS-COV-2) (CORONAVIRUS DISEASE [COVID-19]), AMPLIFIED PROBE TECHNIQUE, MAKING USE OF HIGH THROUGHPUT TECHNOLOGIES AS DESCRIBED BY CMS-2020-01-R: HCPCS

## 2021-09-23 PROCEDURE — U0005 INFEC AGEN DETEC AMPLI PROBE: HCPCS

## 2021-09-24 LAB — SARS-COV-2 RNA RESP QL NAA+PROBE: NEGATIVE

## 2021-09-27 ENCOUNTER — INFUSION THERAPY VISIT (OUTPATIENT)
Dept: INFUSION THERAPY | Facility: CLINIC | Age: 66
End: 2021-09-27
Attending: PSYCHIATRY & NEUROLOGY
Payer: MEDICARE

## 2021-09-27 VITALS
WEIGHT: 145.1 LBS | TEMPERATURE: 97.5 F | HEART RATE: 59 BPM | OXYGEN SATURATION: 98 % | RESPIRATION RATE: 16 BRPM | BODY MASS INDEX: 21.43 KG/M2 | DIASTOLIC BLOOD PRESSURE: 76 MMHG | SYSTOLIC BLOOD PRESSURE: 119 MMHG

## 2021-09-27 DIAGNOSIS — G35 MULTIPLE SCLEROSIS (H): Primary | ICD-10-CM

## 2021-09-27 PROCEDURE — 96375 TX/PRO/DX INJ NEW DRUG ADDON: CPT

## 2021-09-27 PROCEDURE — 258N000003 HC RX IP 258 OP 636: Performed by: PSYCHIATRY & NEUROLOGY

## 2021-09-27 PROCEDURE — 250N000013 HC RX MED GY IP 250 OP 250 PS 637: Performed by: PSYCHIATRY & NEUROLOGY

## 2021-09-27 PROCEDURE — 250N000011 HC RX IP 250 OP 636: Performed by: PSYCHIATRY & NEUROLOGY

## 2021-09-27 PROCEDURE — 96365 THER/PROPH/DIAG IV INF INIT: CPT

## 2021-09-27 RX ORDER — ALBUTEROL SULFATE 0.83 MG/ML
2.5 SOLUTION RESPIRATORY (INHALATION)
Status: CANCELLED | OUTPATIENT
Start: 2022-03-26

## 2021-09-27 RX ORDER — HEPARIN SODIUM,PORCINE 10 UNIT/ML
5 VIAL (ML) INTRAVENOUS
Status: CANCELLED | OUTPATIENT
Start: 2022-03-26

## 2021-09-27 RX ORDER — METHYLPREDNISOLONE SODIUM SUCCINATE 125 MG/2ML
125 INJECTION, POWDER, LYOPHILIZED, FOR SOLUTION INTRAMUSCULAR; INTRAVENOUS ONCE
Status: CANCELLED | OUTPATIENT
Start: 2022-03-26

## 2021-09-27 RX ORDER — ACETAMINOPHEN 325 MG/1
650 TABLET ORAL ONCE
Status: CANCELLED | OUTPATIENT
Start: 2022-03-26

## 2021-09-27 RX ORDER — ACETAMINOPHEN 325 MG/1
650 TABLET ORAL ONCE
Status: COMPLETED | OUTPATIENT
Start: 2021-09-27 | End: 2021-09-27

## 2021-09-27 RX ORDER — HEPARIN SODIUM,PORCINE 10 UNIT/ML
5 VIAL (ML) INTRAVENOUS
Status: DISCONTINUED | OUTPATIENT
Start: 2021-09-27 | End: 2021-09-27 | Stop reason: HOSPADM

## 2021-09-27 RX ORDER — HEPARIN SODIUM (PORCINE) LOCK FLUSH IV SOLN 100 UNIT/ML 100 UNIT/ML
5 SOLUTION INTRAVENOUS
Status: DISCONTINUED | OUTPATIENT
Start: 2021-09-27 | End: 2021-09-27 | Stop reason: HOSPADM

## 2021-09-27 RX ORDER — ALBUTEROL SULFATE 90 UG/1
1-2 AEROSOL, METERED RESPIRATORY (INHALATION)
Status: CANCELLED
Start: 2022-03-26

## 2021-09-27 RX ORDER — EPINEPHRINE 1 MG/ML
0.3 INJECTION, SOLUTION INTRAMUSCULAR; SUBCUTANEOUS EVERY 5 MIN PRN
Status: CANCELLED | OUTPATIENT
Start: 2022-03-26

## 2021-09-27 RX ORDER — DIPHENHYDRAMINE HYDROCHLORIDE 50 MG/ML
50 INJECTION INTRAMUSCULAR; INTRAVENOUS
Status: CANCELLED
Start: 2022-03-26

## 2021-09-27 RX ORDER — METHYLPREDNISOLONE SODIUM SUCCINATE 125 MG/2ML
125 INJECTION, POWDER, LYOPHILIZED, FOR SOLUTION INTRAMUSCULAR; INTRAVENOUS ONCE
Status: COMPLETED | OUTPATIENT
Start: 2021-09-27 | End: 2021-09-27

## 2021-09-27 RX ORDER — NALOXONE HYDROCHLORIDE 0.4 MG/ML
0.2 INJECTION, SOLUTION INTRAMUSCULAR; INTRAVENOUS; SUBCUTANEOUS
Status: CANCELLED | OUTPATIENT
Start: 2022-03-26

## 2021-09-27 RX ORDER — MEPERIDINE HYDROCHLORIDE 25 MG/ML
25 INJECTION INTRAMUSCULAR; INTRAVENOUS; SUBCUTANEOUS EVERY 30 MIN PRN
Status: CANCELLED | OUTPATIENT
Start: 2022-03-26

## 2021-09-27 RX ORDER — METHYLPREDNISOLONE SODIUM SUCCINATE 125 MG/2ML
125 INJECTION, POWDER, LYOPHILIZED, FOR SOLUTION INTRAMUSCULAR; INTRAVENOUS
Status: CANCELLED
Start: 2022-03-26

## 2021-09-27 RX ORDER — DIPHENHYDRAMINE HCL 25 MG
50 CAPSULE ORAL ONCE
Status: COMPLETED | OUTPATIENT
Start: 2021-09-27 | End: 2021-09-27

## 2021-09-27 RX ORDER — DIPHENHYDRAMINE HCL 25 MG
50 CAPSULE ORAL ONCE
Status: CANCELLED | OUTPATIENT
Start: 2022-03-26

## 2021-09-27 RX ORDER — HEPARIN SODIUM (PORCINE) LOCK FLUSH IV SOLN 100 UNIT/ML 100 UNIT/ML
5 SOLUTION INTRAVENOUS
Status: CANCELLED | OUTPATIENT
Start: 2022-03-26

## 2021-09-27 RX ADMIN — METHYLPREDNISOLONE SODIUM SUCCINATE 125 MG: 125 INJECTION, POWDER, FOR SOLUTION INTRAMUSCULAR; INTRAVENOUS at 10:21

## 2021-09-27 RX ADMIN — DIPHENHYDRAMINE HYDROCHLORIDE 50 MG: 25 CAPSULE ORAL at 10:14

## 2021-09-27 RX ADMIN — ACETAMINOPHEN 650 MG: 325 TABLET ORAL at 10:14

## 2021-09-27 RX ADMIN — OCRELIZUMAB 600 MG: 300 INJECTION INTRAVENOUS at 10:49

## 2021-09-27 ASSESSMENT — PAIN SCALES - GENERAL: PAINLEVEL: NO PAIN (0)

## 2021-09-27 NOTE — LETTER
9/27/2021         RE: Vincent Zuniga  [address]  [place] [address]        Dear Colleague,    Thank you for referring your patient, Vincent Zuniga, to the [facility]. Please see a copy of my visit note below.    Infusion Nursing Note:  Vincent Zuniga presents today for Ocrevus.    Patient seen by provider today: No   present during visit today: Not Applicable.    Note:   -Premedications: Tylenol, Benadryl, Solu-Medrol  -Pt has tolerated 2 doses of Ocrevus, so eligible for increased rates. Ocrevus started at 100 mL/hr for 15 min, increased to 200 mL/hr for 15 min, increased to 250 mL/hr for 30 min, then increased to 300 mL/hr for the remainder of infusion.     Intravenous Access:  Peripheral IV placed.    Treatment Conditions:  Biological Infusion Checklist:  ~~~ NOTE: If the patient answers yes to any of the questions below, hold the infusion and contact ordering provider or on-call provider.    1. Have you recently had an elevated temperature, fever, chills, productive cough, coughing for 3 weeks or longer or hemoptysis, abnormal vital signs, night sweats,  chest pain or have you noticed a decrease in your appetite, unexplained weight loss or fatigue? No  2. Do you have any open wounds or new incisions? No  3. Do you have any recent or upcoming hospitalizations, surgeries or dental procedures? No  4. Do you currently have or recently have had any signs of illness or infection or are you on any antibiotics? No  5. Have you had any new, sudden or worsening abdominal pain? No  6. Have you or anyone in your household received a live vaccination in the past 4 weeks? Please note:  No live vaccines while on biologic/chemotherapy until 6 months after the last treatment.  Patient can receive the flu vaccine (shot only) and the pneumovax.  It is optimal for the patient to get these vaccines mid cycle, but they can be given at any time as long as it is not on  the day of the infusion. No  7. Have you recently been diagnosed with any new nervous system diseases (ie. Multiple sclerosis, Guillain [place], seizures, neurological changes) or cancer diagnosis? No  8. Are you on any form of radiation or chemotherapy? No  9. Are you pregnant or breast feeding or do you have plans of pregnancy in the future? N/A  10. Have you been having any signs of worsening depression or suicidal ideations?  (benlysta only) N/A  11. Have there been any other new onset medical symptoms? No    Post Infusion Assessment:  Patient tolerated infusion without incident.  Patient observed for 60 minutes post infusion per protocol.  Blood return noted pre and post infusion.  Site patent and intact, free from redness, edema or discomfort.  No evidence of extravasations.  Access discontinued per protocol.  Biologic Infusion Post Education: Call the triage nurse at your clinic or seek medical attention if you have chills and/or temperature greater than or equal to 100.5, uncontrolled nausea/vomiting, diarrhea, constipation, dizziness, shortness of breath, chest pain, heart palpitations, weakness or any other new or concerning symptoms, questions or concerns.  You cannot have any live virus vaccines prior to or during treatment or up to 6 months post infusion.  If you have an upcoming surgery, medical procedure or dental procedure during treatment, this should be discussed with your ordering physician and your surgeon/dentist.  If you are having any concerning symptom, if you are unsure if you should get your next infusion or wish to speak to a provider before your next infusion, please call your care coordinator or triage nurse at your clinic to notify them so we can adequately serve you.       Discharge Plan:   AVS to patient via [employer].  Patient will return TBD, message sent to  for next appointment.   Patient discharged in stable condition accompanied by: self.  Departure Mode:  Ambulatory.    Mónica Arciniega RN    /76   Pulse 59   Temp 97.5  F (36.4  C)   Resp 16   Wt 65.8 kg (145 lb 1.6 oz)   SpO2 98%   BMI 21.43 kg/m      Administrations This Visit     acetaminophen (TYLENOL) tablet 650 mg     Admin Date  09/27/2021 Action  Given Dose  650 mg Route  Oral Administered By  Mónica Arciniega RN          diphenhydrAMINE (BENADRYL) capsule 50 mg     Admin Date  09/27/2021 Action  Given Dose  50 mg Route  Oral Administered By  Mónica Arciniega RN          methylPREDNISolone sodium succinate (solu-MEDROL) injection 125 mg     Admin Date  09/27/2021 Action  Given Dose  125 mg Route  Intravenous Administered By  Mónica Arciniega RN          ocrelizumab (OCREVUS) 600 mg in sodium chloride 0.9 % 500 mL infusion     Admin Date  09/27/2021 Action  New Bag Dose  600 mg Route  Intravenous Administered By  Mónica Arciniega RN                          Again, thank you for allowing me to participate in the care of your patient.        Sincerely,        [facility]

## 2021-09-27 NOTE — PROGRESS NOTES
Infusion Nursing Note:  Vincent Zuniga presents today for Ocrevus.    Patient seen by provider today: No   present during visit today: Not Applicable.    Note:   -Premedications: Tylenol, Benadryl, Solu-Medrol  -Pt has tolerated 2 doses of Ocrevus, so eligible for increased rates. Ocrevus started at 100 mL/hr for 15 min, increased to 200 mL/hr for 15 min, increased to 250 mL/hr for 30 min, then increased to 300 mL/hr for the remainder of infusion.     Intravenous Access:  Peripheral IV placed.    Treatment Conditions:  Biological Infusion Checklist:  ~~~ NOTE: If the patient answers yes to any of the questions below, hold the infusion and contact ordering provider or on-call provider.    1. Have you recently had an elevated temperature, fever, chills, productive cough, coughing for 3 weeks or longer or hemoptysis, abnormal vital signs, night sweats,  chest pain or have you noticed a decrease in your appetite, unexplained weight loss or fatigue? No  2. Do you have any open wounds or new incisions? No  3. Do you have any recent or upcoming hospitalizations, surgeries or dental procedures? No  4. Do you currently have or recently have had any signs of illness or infection or are you on any antibiotics? No  5. Have you had any new, sudden or worsening abdominal pain? No  6. Have you or anyone in your household received a live vaccination in the past 4 weeks? Please note:  No live vaccines while on biologic/chemotherapy until 6 months after the last treatment.  Patient can receive the flu vaccine (shot only) and the pneumovax.  It is optimal for the patient to get these vaccines mid cycle, but they can be given at any time as long as it is not on the day of the infusion. No  7. Have you recently been diagnosed with any new nervous system diseases (ie. Multiple sclerosis, Guillain [place], seizures, neurological changes) or cancer diagnosis? No  8. Are you on any form of radiation or chemotherapy? No  9. Are you  pregnant or breast feeding or do you have plans of pregnancy in the future? N/A  10. Have you been having any signs of worsening depression or suicidal ideations?  (benlysta only) N/A  11. Have there been any other new onset medical symptoms? No    Post Infusion Assessment:  Patient tolerated infusion without incident.  Patient observed for 60 minutes post infusion per protocol.  Blood return noted pre and post infusion.  Site patent and intact, free from redness, edema or discomfort.  No evidence of extravasations.  Access discontinued per protocol.  Biologic Infusion Post Education: Call the triage nurse at your clinic or seek medical attention if you have chills and/or temperature greater than or equal to 100.5, uncontrolled nausea/vomiting, diarrhea, constipation, dizziness, shortness of breath, chest pain, heart palpitations, weakness or any other new or concerning symptoms, questions or concerns.  You cannot have any live virus vaccines prior to or during treatment or up to 6 months post infusion.  If you have an upcoming surgery, medical procedure or dental procedure during treatment, this should be discussed with your ordering physician and your surgeon/dentist.  If you are having any concerning symptom, if you are unsure if you should get your next infusion or wish to speak to a provider before your next infusion, please call your care coordinator or triage nurse at your clinic to notify them so we can adequately serve you.       Discharge Plan:   AVS to patient via [employer].  Patient will return TBD, message sent to  for next appointment.   Patient discharged in stable condition accompanied by: self.  Departure Mode: Ambulatory.    Mónica Arciniega RN    /76   Pulse 59   Temp 97.5  F (36.4  C)   Resp 16   Wt 65.8 kg (145 lb 1.6 oz)   SpO2 98%   BMI 21.43 kg/m      Administrations This Visit     acetaminophen (TYLENOL) tablet 650 mg     Admin Date  09/27/2021 Action  Given Dose  650  mg Route  Oral Administered By  Mónica Arciniega RN          diphenhydrAMINE (BENADRYL) capsule 50 mg     Admin Date  09/27/2021 Action  Given Dose  50 mg Route  Oral Administered By  Mónica Arciniega RN          methylPREDNISolone sodium succinate (solu-MEDROL) injection 125 mg     Admin Date  09/27/2021 Action  Given Dose  125 mg Route  Intravenous Administered By  Mónica Arciniega RN          ocrelizumab (OCREVUS) 600 mg in sodium chloride 0.9 % 500 mL infusion     Admin Date  09/27/2021 Action  New Bag Dose  600 mg Route  Intravenous Administered By  Mónica Arciniega, RN

## 2021-09-27 NOTE — PATIENT INSTRUCTIONS
Dear Vincent Zuniga    Thank you for choosing [facility] ([facility]) for your infusion.  The following information is a summary of our appointment as well as important reminders.      We look forward in seeing you on your next appointment here at [facility] ([facility]).  Please don t hesitate to call us at [phone] to reschedule any of your appointments or to speak with one of the [facility] registered nurses.  It was a pleasure taking care of you today.    Sincerely,    [facility]  [facility]  [address] [place] [address]  [id number]  Phone:  [phone]  Patient Education     Ocrelizumab Injection 30 mg/mL  Uses  For multiple sclerosis.  Instructions  This is an IV medicine. It is given through a sterile tube directly into the vein by a healthcare provider.  This medicine is given gradually through the IV line.  Always inspect the medicine before using.  Check the medicine before each use. If the liquid medicine has any particles in it, appears discolored, or if the vial appears damaged, do not use it.  Do not shake the medicine before using.  Keep this medicine in the refrigerator. Do not freeze.  Protect medicine from light.  This medicine should be given by a trained health care provider.  You must be monitored by a healthcare professional for at least 1 hour after each dose is given.  It is important that you keep taking each dose of this medicine on time even if you are feeling well.  If you miss a dose, contact your doctor for instructions.  Please tell your doctor and pharmacist about all the medicines you take. Include both prescription and over-the-counter medicines. Also tell them about any vitamins, herbal medicines, or anything else you take for your health.  If your symptoms do not improve or they worsen while on this medicine, contact your doctor.  Your doctor  may prescribe other medications to reduce side effects. Follow instructions carefully.  Talk to your doctor before taking other medicines, including aspirins and ibuprofen containing products. Speak to your doctor about which medicines are safe to use while you are on this medicine.  Do not suddenly stop taking this medicine. Check with your doctor before stopping.  It is very important that you follow your doctor's instructions for all blood tests.  It is very important that you keep all appointments for medical exams and tests while on this medicine.  Do not take the medicine more than once during 24 hours.  Cautions  Tell your doctor and pharmacist if you ever had an allergic reaction to a medicine. Symptoms of an allergic reaction can include trouble breathing, skin rash, itching, swelling, or severe dizziness.  Your doctor should check you for hepatitis before you start this medicine and while you are using it. Tell your doctor if you are being treated for hepatitis or any other infection.  Some patients on this medicine have developed severe, life-threatening infections. Please speak with your doctor about the risks and benefits of using this medicine.  Some patients taking this medicine have experienced serious side effects. Please speak with your doctor to understand the risks and benefits associated with this medicine.  This medicine may increase the risk of some types of cancer. Please speak with your doctor about the risks and benefits of using this medicine.  This medicine may cause dizziness or fainting, especially after exercising or in hot weather. Be very careful when standing or sitting up quickly.  Your ability to stay alert or to react quickly may be impaired by this medicine. Do not drive or operate machinery until you know how this medicine will affect you.  Please check with your doctor before drinking alcohol while on this medicine.  If you drink more than a few alcoholic beverages each day,  ask your doctor whether you should be on this medicine.  If possible, avoid using with marijuana or other medicines that can cause dizziness or drowsiness. These include allergy/cold products, muscle relaxers, sleep aids, and pain relievers.  Call the doctor if there are any signs of confusion or unusual changes in behavior.  This medicine may reduce your body's ability to fight infections. Try to avoid contact with people with colds, flu or other infections.  Contact your doctor if you develop any signs of a new infection such as fever, cough, sore throat, or chills.  Wash your hands often and avoid close contact with people with infections such as colds and flu.  Speak with your health care provider before receiving any vaccinations.  Tell the doctor or pharmacist if you are pregnant, planning to be pregnant, or breastfeeding.  Do not use this medicine if you are pregnant. If you become pregnant while on this medicine, contact your doctor immediately.  This medicine can hurt a new baby in the womb. If you become pregnant while on this medicine, tell your doctor immediately. Your doctor may switch you to a different medicine.  Women must use reliable forms of birth control while taking this medicine and for 6 months after stopping to prevent pregnancy.  Ask your pharmacist if this medicine can interact with any of your other medicines. Be sure to tell them about all the medicines you take.  Please tell all your doctors and dentists that you are on this medicine before they provide care.  Do not start or stop any other medicines without first speaking to your doctor or pharmacist.  This medicine can cause serious side effects in some patients. Important information from the U.S. Food and Drug Administration (FDA) is available from your pharmacist. Please review it carefully with your pharmacist to understand the risks associated with this medicine.  Side Effects  The following is a list of some common side effects  from this medicine. Please speak with your doctor about what you should do if you experience these or other side effects.    coughing    reaction at the area of the injection (pain, redness, swelling)    stuffy nose  Call your doctor or get medical help right away if you notice any of these more serious side effects:    severe allergic reaction    loss of balance    breast lump or discharge    breast pain or swelling    difficulty concentrating    confusion    cough that does not go away    dizziness    fainting    feeling of heat or flushing    fever or chills    severe or persistent headache    fast or irregular heart beats    itching    memory problems or loss    muscle weakness    skin irritation such as redness, itching, rash, or burning    seizures    shortness of breath    difficulty speaking    unusual or unexplained tiredness or weakness    unsteadiness while walking    upper respiratory infection    blurring or changes of vision  A few people may have an allergic reactions to this medicine. Symptoms can include difficulty breathing, skin rash, itching, swelling, or severe dizziness. If you notice any of these symptoms, seek medical help quickly.  Extra  Please speak with your doctor, nurse, or pharmacist if you have any questions about this medicine.  [email or web address]  IMPORTANT NOTE: This document tells you briefly how to take your medicine, but it does not tell you all there is to know about it.Your doctor or pharmacist may give you other documents about your medicine. Please talk to them if you have any questions.Always follow their advice. There is a more complete description of this medicine available in English.Scan this code on your smartphone or tablet or use the web address below. You can also ask your pharmacist for a printout. If you have any questions, please ask your pharmacist.     [employer].

## 2021-10-01 ENCOUNTER — TELEPHONE (OUTPATIENT)
Dept: NEUROLOGY | Facility: CLINIC | Age: 66
End: 2021-10-01

## 2021-10-01 NOTE — TELEPHONE ENCOUNTER
Patient's daughter called on-call neurology pager late last evening stating he had an Ocrevus infusion on Monday.  Yesterday he started to develop headache and body aches.  She does not think is any fevers.  Daughter who is close contact was diagnosed with Covid last week.  She is wondering if it is an infusion reaction.  She denies any shortness of breath for patient.    Discussed that I think it would be unlikely be an infusion reaction greater than 48 hours after the infusion.  Concern is that this represents some sort of viral syndrome, potentially Covid given exposure.  If developing high fever or any shortness of breath go to the emergency room immediately, otherwise would contact PCP to try to arrange either a same-day visit or Covid testing.    Vinayak Villalobos, DO  Neurology

## 2021-10-15 ENCOUNTER — PATIENT OUTREACH (OUTPATIENT)
Dept: CARE COORDINATION | Facility: CLINIC | Age: 66
End: 2021-10-15

## 2021-10-15 NOTE — PROGRESS NOTES
Social Work Intervention  [facility]    Data/Intervention:    Patient Name:  Vincent Zuniga  /Age:  1955 (66 year old)    Visit Type: telephone  Referral Source: Araseli Huerta RN  Reason for Referral:  Request for info re day program/respite    Collaborated With:    -Michelle, Pt's dtr    Psychosocial Information/Concerns:  Michelle has been living with her Father for the past few years. She is providing care and supervision to him and doesn't feel like she can return to work due to need to supervision and safety needs. She reports that he does little to care for himself. He'll urinate on himself and not clean up. He sometimes won't flush his feeding tube. He doesn't talk much, only to say yes or no. He smokes marijuana frequently throughout the day. He is impulsive now and has poor judgement.  They live in a house owned by her grandmother.   [facility]  Michelle indicated that she is aware that when he turns 67, he will be eligible for Medical Assistance and a program for services at home.   She wonders about having him go to a day program.    Intervention/Education/Resources Provided:  Reviewed their situation. Pt currently is over income for MA and he would be also after he turns 67. He has only Medicare insurance and has co-pays for clinic visits. He could either apply for a medicare advantage plan or the program [employer] to help with the Medicare co-pays. Provided the phone # for the Sr [employer]Inkage Line to discuss further.   Encouraged her to contact [facility] and request a SW to determine if he is eligible for any VA services including an adult day program.   Discussed the MS achievement center as a possible option when he has coverage under a waiver program. He should be eligible for the Alternative Care program (waiver) when he turns 67 in January. That would make him also eligible for PCA and other services to support  him at home. If she wanted to apply now for MA and the CADI waiver he would have about about an $800/month spend down. (he would have to pay the first $800 of his services).  Will email Michelle with above resources.    Assessment/Plan:  Encouraged [facility] contact and eventual alternative care program. He doesn't have savings to pay for any services and Michelle is providing care and supervision but needs respite. Will plan to f/u with her and encouraged her to contact me if any further questions or concerns.    Provided patient/family with contact information and availability.    RHONDA Harvey, [facility]    [facility] [place]  [phone]

## 2021-10-18 DIAGNOSIS — G35 MULTIPLE SCLEROSIS (H): ICD-10-CM

## 2021-10-19 RX ORDER — CLONAZEPAM 1 MG/1
TABLET ORAL
Qty: 30 TABLET | Refills: 5 | Status: SHIPPED | OUTPATIENT
Start: 2021-10-19 | End: 2022-05-06

## 2021-10-24 ENCOUNTER — HEALTH MAINTENANCE LETTER (OUTPATIENT)
Age: 66
End: 2021-10-24

## 2021-12-14 DIAGNOSIS — G35 MULTIPLE SCLEROSIS (H): ICD-10-CM

## 2021-12-16 RX ORDER — ESCITALOPRAM OXALATE 20 MG/1
TABLET ORAL
Qty: 90 TABLET | Refills: 3 | Status: SHIPPED | OUTPATIENT
Start: 2021-12-16

## 2022-01-13 DIAGNOSIS — E55.9 VITAMIN D DEFICIENCY: ICD-10-CM

## 2022-01-13 RX ORDER — MULTIVIT-MIN/IRON/FOLIC ACID/K 18-600-40
CAPSULE ORAL
Qty: 180 CAPSULE | Refills: 0 | Status: SHIPPED | OUTPATIENT
Start: 2022-01-13 | End: 2022-04-12

## 2022-01-13 NOTE — TELEPHONE ENCOUNTER
Received refill request for vitamin from [facility]; Patient was last seen in July and has follow up appointment in January with TRANG Guerra. Refilled per [facility] refill protocol.    Araseli Huerta RN

## 2022-02-13 ENCOUNTER — HEALTH MAINTENANCE LETTER (OUTPATIENT)
Age: 67
End: 2022-02-13

## 2022-02-14 NOTE — PROGRESS NOTES
Chief complaint:   Chief Complaint   Patient presents with   • Follow-up   • Other     work is becoming difficult with mandatory OT       Vitals:  Visit Vitals  /74   Pulse 67   Resp 18   Ht 5' 10\" (1.778 m)   Wt 93.4 kg (206 lb)   SpO2 98%   BMI 29.56 kg/m²       HISTORY OF PRESENT ILLNESS     Flakita Coto is a 28 year old M who PTC due to back pain. 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   He states that his pain continues and has not changed since his initial accident. He is taking Tylenol/Ibuprofen and using Ice. He notes that the ice, ibuprofen, and his tizanidine improve the pain. He states that he does have ample of his muscle relaxer left as he tries to use this sparingly.   Denies any shoulder pain currently.       Other significant problems:  Patient Active Problem List    Diagnosis Date Noted   • Mild major depression (CMS/[facility]) 07/25/2019     Priority: Low   • Substance abuse (CMS/[facility]) 07/25/2019     Priority: Low   • Opioid use disorder, severe, dependence (CMS/[facility]) 09/26/2017     Priority: Low   • Depression 03/30/2017     Priority: Low   • Anxiety disorder 03/27/2013     Priority: Low       PAST MEDICAL, FAMILY AND SOCIAL HISTORY     Medications:  Current Outpatient Medications   Medication   • buPROPion XL (WELLBUTRIN XL) 150 MG 24 hr tablet   • tizanidine (ZANAFLEX) 6 MG capsule   • sertraline (ZOLOFT) 100 MG tablet   • busPIRone (BUSPAR) 10 MG tablet   • Phenylephrine HCl (AFRIN ALLERGY NA)   • Omega-3 Fatty Acids (FISH OIL PO)   • Multiple Vitamins-Minerals (MULTIVITAMIN PO)   • methaDONE (DOLOPHINE) 5 MG/5ML solution     No current facility-administered medications for this visit.       Allergies:  ALLERGIES:  No Known Allergies    Past Medical  History/Surgeries:  Past Medical History:   Diagnosis Date   • Anxiety    • Depression    • Headache(784.0)    • Substance abuse (CMS/[facility])        Past Surgical  Talked to infusion center on Dr. Guerra's behalf. Patient is scheduled for his first Ocrevus infusion today. He was admitted to the hospital from 11/23- 11/27 for Community acquired pneumonia and was treated with Ceftriaxone and Azithromycin.    Will postpone the initial Ocrevus infusion to 2 weeks due to this recent infection. Discussed this with infusion center staff who will call the patient and update. Will also check CBC with diff prior to his next infusion.           Addendum at 1623 pm:  I got the message from Infusion scheduling staff that patient's insurance is changing next year and he/ daughter wants to complete both infusions prior to 12/31 if possible. Discussed this with Dr. Guerra. She is okay with patient getting the first Ocrevus split dose next week as long as patient doesn't have any s/s of Pneumonia. Then second split dose 2 weeks after.     I called patient's daughter, Michelle lackey 2 at [phone], but she did not answer. I sent a message to Infusion center staff.       Addendum on 12/4 at 0932 am:    I called and talked to Michelle. She will call the infusion center to schedule the first dose sometime next week. Per daughter, patient has been doing well since hospital discharge.    History:   Procedure Laterality Date   • Umbilical hernia repair         Family History:  Family History   Problem Relation Age of Onset   • Anxiety disorder Mother    • Heart disease Maternal Grandfather    • Bipolar disorder Maternal Grandfather    • Schizophrenia Maternal Grandfather    • Bipolar disorder Maternal Uncle    • Schizophrenia Maternal Uncle        Social History:  Social History     Tobacco Use   • Smoking status: Former Smoker     Packs/day: 0.25     Years: 6.00     Pack years: 1.50     Types: Cigarettes     Start date: 2012     Quit date: 7/3/2020     Years since quittin.6   • Smokeless tobacco: Never Used   Substance Use Topics   • Alcohol use: No       REVIEW OF SYSTEMS     Review of Systems   Constitutional: Negative for chills and fever.   HENT: Negative for congestion and rhinorrhea.    Respiratory: Negative for cough and shortness of breath.    Cardiovascular: Negative for chest pain.   Gastrointestinal: Negative for nausea and vomiting.   Musculoskeletal: Positive for arthralgias and back pain. Negative for gait problem, joint swelling and myalgias.   Neurological: Negative for weakness and numbness.       PHYSICAL EXAM     Physical Exam  Constitutional:       General: He is not in acute distress.     Appearance: Normal appearance.   HENT:      Neck: Normal range of motion. No rigidity or tenderness.   Musculoskeletal:         General: No swelling.      Comments: Patient has tenderness to palpation about the T8 spinous process. There is noted paraspinous muscle tenderness on the L at the same level. Patient has increased kyphosis about the thoracis spine as well which is likely postural/positional.   Neurological:      Mental Status: He is alert.         ASSESSMENT/PLAN     Chronic midline thoracic back pain  - SERVICE TO PHYSICAL THERAPY  - XR THORACIC SPINE 3 VW INCL SWIMMERS; Future    Compression fracture of T8 vertebra with routine healing, subsequent encounter  - SERVICE  TO PHYSICAL THERAPY  - XR THORACIC SPINE 3 VW INCL SWIMMERS; Future  - Discussed with patient that pending PT evaluation, will do work restrictions based on those findings.     Cristine Holliday MD  PGY3

## 2022-03-13 ENCOUNTER — TELEPHONE (OUTPATIENT)
Dept: NEUROLOGY | Facility: CLINIC | Age: 67
End: 2022-03-13
Payer: MEDICARE

## 2022-03-13 RX ORDER — MEPERIDINE HYDROCHLORIDE 25 MG/ML
25 INJECTION INTRAMUSCULAR; INTRAVENOUS; SUBCUTANEOUS EVERY 30 MIN PRN
Status: CANCELLED | OUTPATIENT
Start: 2022-03-13

## 2022-03-13 RX ORDER — HEPARIN SODIUM (PORCINE) LOCK FLUSH IV SOLN 100 UNIT/ML 100 UNIT/ML
5 SOLUTION INTRAVENOUS
Status: CANCELLED | OUTPATIENT
Start: 2022-03-13

## 2022-03-13 RX ORDER — ALBUTEROL SULFATE 90 UG/1
1-2 AEROSOL, METERED RESPIRATORY (INHALATION)
Status: CANCELLED
Start: 2022-03-13

## 2022-03-13 RX ORDER — NALOXONE HYDROCHLORIDE 0.4 MG/ML
0.2 INJECTION, SOLUTION INTRAMUSCULAR; INTRAVENOUS; SUBCUTANEOUS
Status: CANCELLED | OUTPATIENT
Start: 2022-03-13

## 2022-03-13 RX ORDER — HEPARIN SODIUM,PORCINE 10 UNIT/ML
5 VIAL (ML) INTRAVENOUS
Status: CANCELLED | OUTPATIENT
Start: 2022-03-13

## 2022-03-13 RX ORDER — METHYLPREDNISOLONE SODIUM SUCCINATE 125 MG/2ML
125 INJECTION, POWDER, LYOPHILIZED, FOR SOLUTION INTRAMUSCULAR; INTRAVENOUS
Status: CANCELLED
Start: 2022-03-13

## 2022-03-13 RX ORDER — DIPHENHYDRAMINE HCL 25 MG
50 CAPSULE ORAL ONCE
Status: CANCELLED | OUTPATIENT
Start: 2022-03-13

## 2022-03-13 RX ORDER — ACETAMINOPHEN 325 MG/1
650 TABLET ORAL ONCE
Status: CANCELLED | OUTPATIENT
Start: 2022-03-13

## 2022-03-13 RX ORDER — METHYLPREDNISOLONE SODIUM SUCCINATE 125 MG/2ML
125 INJECTION, POWDER, LYOPHILIZED, FOR SOLUTION INTRAMUSCULAR; INTRAVENOUS ONCE
Status: CANCELLED | OUTPATIENT
Start: 2022-03-13

## 2022-03-13 RX ORDER — EPINEPHRINE 1 MG/ML
0.3 INJECTION, SOLUTION, CONCENTRATE INTRAVENOUS EVERY 5 MIN PRN
Status: CANCELLED | OUTPATIENT
Start: 2022-03-13

## 2022-03-13 RX ORDER — DIPHENHYDRAMINE HYDROCHLORIDE 50 MG/ML
50 INJECTION INTRAMUSCULAR; INTRAVENOUS
Status: CANCELLED
Start: 2022-03-13

## 2022-03-13 RX ORDER — ALBUTEROL SULFATE 0.83 MG/ML
2.5 SOLUTION RESPIRATORY (INHALATION)
Status: CANCELLED | OUTPATIENT
Start: 2022-03-13

## 2022-03-13 NOTE — TELEPHONE ENCOUNTER
Former pt of Dr Guerra's with appointment with Dr Morillo in May. Scheduled for next Ocrevus infusion 3/28. Ocrevus therapy plan routed to Dr Morillo for review and signature.    Esperanza Peña RN

## 2022-03-14 NOTE — TELEPHONE ENCOUNTER
Authorized ongoing maintenance ocrelizumab infusions for this established patient of our clinic, previously followed by my former colleague, Dr. Cassandra Guerra.

## 2022-03-28 ENCOUNTER — INFUSION THERAPY VISIT (OUTPATIENT)
Dept: INFUSION THERAPY | Facility: CLINIC | Age: 67
End: 2022-03-28
Attending: PSYCHIATRY & NEUROLOGY
Payer: MEDICARE

## 2022-03-28 VITALS
RESPIRATION RATE: 16 BRPM | DIASTOLIC BLOOD PRESSURE: 66 MMHG | HEART RATE: 73 BPM | TEMPERATURE: 97.8 F | SYSTOLIC BLOOD PRESSURE: 100 MMHG | OXYGEN SATURATION: 95 %

## 2022-03-28 DIAGNOSIS — G35 MULTIPLE SCLEROSIS (H): Primary | ICD-10-CM

## 2022-03-28 PROCEDURE — 96366 THER/PROPH/DIAG IV INF ADDON: CPT

## 2022-03-28 PROCEDURE — 96365 THER/PROPH/DIAG IV INF INIT: CPT

## 2022-03-28 PROCEDURE — 250N000013 HC RX MED GY IP 250 OP 250 PS 637: Performed by: PSYCHIATRY & NEUROLOGY

## 2022-03-28 PROCEDURE — 96375 TX/PRO/DX INJ NEW DRUG ADDON: CPT

## 2022-03-28 PROCEDURE — 250N000011 HC RX IP 250 OP 636: Performed by: PSYCHIATRY & NEUROLOGY

## 2022-03-28 PROCEDURE — 258N000003 HC RX IP 258 OP 636: Performed by: PSYCHIATRY & NEUROLOGY

## 2022-03-28 RX ORDER — METHYLPREDNISOLONE SODIUM SUCCINATE 125 MG/2ML
125 INJECTION, POWDER, LYOPHILIZED, FOR SOLUTION INTRAMUSCULAR; INTRAVENOUS
Status: CANCELLED
Start: 2022-09-24

## 2022-03-28 RX ORDER — DIPHENHYDRAMINE HCL 25 MG
50 CAPSULE ORAL ONCE
Status: CANCELLED | OUTPATIENT
Start: 2022-09-24

## 2022-03-28 RX ORDER — ALBUTEROL SULFATE 90 UG/1
1-2 AEROSOL, METERED RESPIRATORY (INHALATION)
Status: CANCELLED
Start: 2022-09-24

## 2022-03-28 RX ORDER — ALBUTEROL SULFATE 0.83 MG/ML
2.5 SOLUTION RESPIRATORY (INHALATION)
Status: CANCELLED | OUTPATIENT
Start: 2022-09-24

## 2022-03-28 RX ORDER — METHYLPREDNISOLONE SODIUM SUCCINATE 125 MG/2ML
125 INJECTION, POWDER, LYOPHILIZED, FOR SOLUTION INTRAMUSCULAR; INTRAVENOUS ONCE
Status: CANCELLED | OUTPATIENT
Start: 2022-09-24

## 2022-03-28 RX ORDER — DIPHENHYDRAMINE HYDROCHLORIDE 50 MG/ML
50 INJECTION INTRAMUSCULAR; INTRAVENOUS
Status: CANCELLED
Start: 2022-09-24

## 2022-03-28 RX ORDER — HEPARIN SODIUM (PORCINE) LOCK FLUSH IV SOLN 100 UNIT/ML 100 UNIT/ML
5 SOLUTION INTRAVENOUS
Status: CANCELLED | OUTPATIENT
Start: 2022-09-24

## 2022-03-28 RX ORDER — MEPERIDINE HYDROCHLORIDE 25 MG/ML
25 INJECTION INTRAMUSCULAR; INTRAVENOUS; SUBCUTANEOUS EVERY 30 MIN PRN
Status: CANCELLED | OUTPATIENT
Start: 2022-09-24

## 2022-03-28 RX ORDER — ACETAMINOPHEN 325 MG/1
650 TABLET ORAL ONCE
Status: CANCELLED | OUTPATIENT
Start: 2022-09-24

## 2022-03-28 RX ORDER — METHYLPREDNISOLONE SODIUM SUCCINATE 125 MG/2ML
125 INJECTION, POWDER, LYOPHILIZED, FOR SOLUTION INTRAMUSCULAR; INTRAVENOUS ONCE
Status: COMPLETED | OUTPATIENT
Start: 2022-03-28 | End: 2022-03-28

## 2022-03-28 RX ORDER — NALOXONE HYDROCHLORIDE 0.4 MG/ML
0.2 INJECTION, SOLUTION INTRAMUSCULAR; INTRAVENOUS; SUBCUTANEOUS
Status: CANCELLED | OUTPATIENT
Start: 2022-09-24

## 2022-03-28 RX ORDER — EPINEPHRINE 1 MG/ML
0.3 INJECTION, SOLUTION INTRAMUSCULAR; SUBCUTANEOUS EVERY 5 MIN PRN
Status: CANCELLED | OUTPATIENT
Start: 2022-09-24

## 2022-03-28 RX ORDER — DIPHENHYDRAMINE HCL 25 MG
50 CAPSULE ORAL ONCE
Status: COMPLETED | OUTPATIENT
Start: 2022-03-28 | End: 2022-03-28

## 2022-03-28 RX ORDER — ACETAMINOPHEN 325 MG/1
650 TABLET ORAL ONCE
Status: COMPLETED | OUTPATIENT
Start: 2022-03-28 | End: 2022-03-28

## 2022-03-28 RX ORDER — HEPARIN SODIUM,PORCINE 10 UNIT/ML
5 VIAL (ML) INTRAVENOUS
Status: CANCELLED | OUTPATIENT
Start: 2022-09-24

## 2022-03-28 RX ADMIN — METHYLPREDNISOLONE SODIUM SUCCINATE 125 MG: 125 INJECTION, POWDER, FOR SOLUTION INTRAMUSCULAR; INTRAVENOUS at 08:18

## 2022-03-28 RX ADMIN — DIPHENHYDRAMINE HYDROCHLORIDE 50 MG: 25 CAPSULE ORAL at 08:17

## 2022-03-28 RX ADMIN — OCRELIZUMAB 600 MG: 300 INJECTION INTRAVENOUS at 08:30

## 2022-03-28 RX ADMIN — ACETAMINOPHEN 650 MG: 325 TABLET ORAL at 08:17

## 2022-03-28 ASSESSMENT — PAIN SCALES - GENERAL: PAINLEVEL: MODERATE PAIN (5)

## 2022-03-28 NOTE — PROGRESS NOTES
Nursing Note  Vincent Zuniga presents [facility] for:   Chief Complaint   Patient presents with     Infusion     ocrevus     During today's [facility] appointment, orders from  were completed.  Frequency: every 6 months     Progress note:  Patient identification verified by name and date of birth.  Assessment completed.  Vitals recorded in Doc Flowsheets.  Patient was provided with education regarding medication/procedure and possible side effects.  Patient verbalized understanding.     present during visit today: Not Applicable.    Treatment Conditions: ~~~ NOTE: If the patient answers yes to any of the questions below, hold the infusion and contact ordering provider or on-call provider.    1. Have you recently had an elevated temperature, fever, chills, productive cough, coughing for 3 weeks or longer or hemoptysis, abnormal vital signs, night sweats,  chest pain or have you noticed a decrease in your appetite, unexplained weight loss or fatigue? No  2. Do you have any open wounds or new incisions? No  3. Do you have any recent or upcoming hospitalizations, surgeries or dental procedures? No  4. Do you currently have or recently have had any signs of illness or infection or are you on any antibiotics? No  5. Have you had any new, sudden or worsening abdominal pain? No  6. Have you or anyone in your household received a live vaccination in the past 4 weeks? Please note:  No live vaccines while on biologic/chemotherapy until 6 months after the last treatment.  Patient can receive the flu vaccine (shot only) and the pneumovax.  It is optimal for the patient to get these vaccines mid cycle, but they can be given at any time as long as it is not on the day of the infusion. No  7. Have you recently been diagnosed with any new nervous system diseases (ie. Multiple sclerosis, Guillain [place], seizures, neurological changes) or  cancer diagnosis? No  8. Are you on any form of radiation or chemotherapy? No  9. Are you pregnant or breast feeding or do you have plans of pregnancy in the future? N/A  10. Have you been having any signs of worsening depression or suicidal ideations?  (benlysta only) N/A  11. Have there been any other new onset medical symptoms? No      Premedications: administered per order.    Drug Waste Record: No    Infusion length and rate:  infusion given over approximately 2 hours  infusion starts at 100 ml/hr X 15 minutes, then increased to 200 ml/hr for 15 minutes, then increased to 250ml/hr for 30 minutes, then increased to final rate of 300 ml/hr for the remainder of the infusion.    Labs: were not ordered for this appointment.    Vascular access: peripheral IV placed today.    Is the next appt scheduled? Message sent to scheduling   Asymptomatic COVID test completed? no    Post Infusion Assessment:  Patient tolerated infusion without incident.  Patient observed for 60 minutes post ocrevus per protocol.  Blood return noted pre and post infusion.  Site patent and intact, free from redness, edema or discomfort.  No evidence of extravasations.  Access discontinued per protocol.     Discharge Plan:   Follow up plan of care with: ongoing infusions at [facility].  Discharge instructions were reviewed with patient.  Patient/representative verbalized understanding of discharge instructions and all questions answered.  Patient discharged from [facility] in stable condition.    Susana Fernández RN       Administrations This Visit     acetaminophen (TYLENOL) tablet 650 mg     Admin Date  03/28/2022 Action  Given Dose  650 mg Route  Oral Administered By  Susana Fernández RN          diphenhydrAMINE (BENADRYL) capsule 50 mg     Admin Date  03/28/2022 Action  Given Dose  50 mg Route  Oral Administered By  Susana Fernández RN          methylPREDNISolone sodium succinate  (solu-MEDROL) injection 125 mg     Admin Date  03/28/2022 Action  Given Dose  125 mg Route  Intravenous Administered By  Susana Fernández, BERNARDA          ocrelizumab (OCREVUS) 600 mg in sodium chloride 0.9 % 500 mL infusion     Admin Date  03/28/2022 Action  New Bag Dose  600 mg Route  Intravenous Administered By  Susana Fernández, RN                BP 99/62   Pulse 71   Temp 97.8  F (36.6  C) (Oral)   Resp 16   SpO2 95%

## 2022-03-28 NOTE — PATIENT INSTRUCTIONS
Dear Vincent Zuniga    Thank you for choosing [facility] ([facility]) for your infusion.  The following information is a summary of our appointment as well as important reminders.      Patient Education     Ocrelizumab  Uses  For multiple sclerosis.  Instructions  This is an IV medicine. It is given through a sterile tube directly into the vein by a healthcare provider.  This medicine is given gradually through the IV line.  Always inspect the medicine before using.  Check the medicine before each use. If the liquid medicine has any particles in it, appears discolored, or if the vial appears damaged, do not use it.  Do not shake the medicine before using.  Keep this medicine in the refrigerator. Do not freeze.  Protect medicine from light.  This medicine should be given by a trained health care provider.  You must be monitored by a healthcare professional for at least 1 hour after each dose is given.  It is important that you keep taking each dose of this medicine on time even if you are feeling well.  If you miss a dose, contact your doctor for instructions.  Please tell your doctor and pharmacist about all the medicines you take. Include both prescription and over-the-counter medicines. Also tell them about any vitamins, herbal medicines, or anything else you take for your health.  If your symptoms do not improve or they worsen while on this medicine, contact your doctor.  Your doctor may prescribe other medications to reduce side effects. Follow instructions carefully.  Talk to your doctor before taking other medicines, including aspirins and ibuprofen containing products. Speak to your doctor about which medicines are safe to use while you are on this medicine.  Do not suddenly stop taking this medicine. Check with your doctor before stopping.  It is very important that you follow your doctor's instructions for all blood tests.  It is very important that  you keep all appointments for medical exams and tests while on this medicine.  Do not take the medicine more than once during 24 hours.  Cautions  Tell your doctor and pharmacist if you ever had an allergic reaction to a medicine. Symptoms of an allergic reaction can include trouble breathing, skin rash, itching, swelling, or severe dizziness.  Your doctor should check you for hepatitis before you start this medicine and while you are using it. Tell your doctor if you are being treated for hepatitis or any other infection.  Some patients on this medicine have developed severe, life-threatening infections. Please speak with your doctor about the risks and benefits of using this medicine.  Some patients taking this medicine have experienced serious side effects. Please speak with your doctor to understand the risks and benefits associated with this medicine.  This medicine may increase the risk of some types of cancer. Please speak with your doctor about the risks and benefits of using this medicine.  This medicine may cause dizziness or fainting, especially after exercising or in hot weather. Be very careful when standing or sitting up quickly.  Your ability to stay alert or to react quickly may be impaired by this medicine. Do not drive or operate machinery until you know how this medicine will affect you.  Please check with your doctor before drinking alcohol while on this medicine.  If you drink more than a few alcoholic beverages each day, ask your doctor whether you should be on this medicine.  If possible, avoid using with marijuana or other medicines that can cause dizziness or drowsiness. These include allergy/cold products, muscle relaxers, sleep aids, and pain relievers.  Call the doctor if there are any signs of confusion or unusual changes in behavior.  This medicine may reduce your body's ability to fight infections. Try to avoid contact with people with colds, flu or other infections.  Contact your  doctor if you develop any signs of a new infection such as fever, cough, sore throat, or chills.  Wash your hands often and avoid close contact with people with infections such as colds and flu.  Speak with your health care provider before receiving any vaccinations.  Tell the doctor or pharmacist if you are pregnant, planning to be pregnant, or breastfeeding.  Do not use this medicine if you are pregnant. If you become pregnant while on this medicine, contact your doctor immediately.  This medicine can hurt a new baby in the womb. If you become pregnant while on this medicine, tell your doctor immediately. Your doctor may switch you to a different medicine.  Women must use reliable forms of birth control while taking this medicine and for 6 months after stopping to prevent pregnancy.  Ask your pharmacist if this medicine can interact with any of your other medicines. Be sure to tell them about all the medicines you take.  Please tell all your doctors and dentists that you are on this medicine before they provide care.  Do not start or stop any other medicines without first speaking to your doctor or pharmacist.  This medicine can cause serious side effects in some patients. Important information from the [place] Food and Drug Administration (FDA) is available from your pharmacist. Please review it carefully with your pharmacist to understand the risks associated with this medicine.  Side Effects  The following is a list of some common side effects from this medicine. Please speak with your doctor about what you should do if you experience these or other side effects.    coughing    reaction at the area of the injection (pain, redness, swelling)    stuffy nose  Call your doctor or get medical help right away if you notice any of these more serious side effects:    severe allergic reaction    loss of balance    breast lump or discharge    breast pain or swelling    difficulty concentrating    confusion    cough that  does not go away    dizziness    fainting    feeling of heat or flushing    fever or chills    severe or persistent headache    fast or irregular heart beats    itching    memory problems or loss    muscle weakness    skin irritation such as redness, itching, rash, or burning    seizures    shortness of breath    difficulty speaking    unusual or unexplained tiredness or weakness    unsteadiness while walking    upper respiratory infection    blurring or changes of vision  A few people may have an allergic reactions to this medicine. Symptoms can include difficulty breathing, skin rash, itching, swelling, or severe dizziness. If you notice any of these symptoms, seek medical help quickly.  Extra  Please speak with your doctor, nurse, or pharmacist if you have any questions about this medicine.  [email or web address]  IMPORTANT NOTE: This document tells you briefly how to take your medicine, but it does not tell you all there is to know about it.Your doctor or pharmacist may give you other documents about your medicine. Please talk to them if you have any questions.Always follow their advice. There is a more complete description of this medicine available in English.Scan this code on your smartphone or tablet or use the web address below. You can also ask your pharmacist for a printout. If you have any questions, please ask your pharmacist.     2021 [employer].         EDUCATION POST BIOLOGICAL/CHEMOTHERAPY INFUSION  Call the triage nurse at your clinic or seek medical attention if you have chills and/or temperature greater than or equal to 100.5, uncontrolled nausea/vomiting, diarrhea, constipation, dizziness, shortness of breath, chest pain, heart palpitations, weakness or any other new or concerning symptoms, questions or concerns.  You can not have any live virus vaccines prior to or during treatment or up to 6 months post infusion.  If you have an upcoming surgery, medical  procedure or dental procedure during treatment, this should be discussed with your ordering physician and your surgeon/dentist.  If you are having any concerning symptom, if you are unsure if you should get your next infusion or wish to speak to a provider before your next infusion, please call your care coordinator or triage nurse at your clinic to notify them so we can adequately serve you.      We look forward in seeing you on your next appointment here at [facility] ([facility]).  Please don t hesitate to call us at [phone] to reschedule any of your appointments or to speak with one of the [facility] registered nurses.  It was a pleasure taking care of you today.    Sincerely,    [facility]  [facility]  [address] [place] [address]  [address]  Phone:  [phone]

## 2022-03-28 NOTE — LETTER
3/28/2022         RE: Vincent Zuniga  [address]  [place] [address]        Dear Colleague,    Thank you for referring your patient, Vincent Zuniga, to the [facility]. Please see a copy of my visit note below.    Nursing Note  Vincent Zuniga presents [facility] for:   Chief Complaint   Patient presents with     Infusion     ocrevus     During [facility]'s [facility] appointment, orders from  were completed.  Frequency: every 6 months     Progress note:  Patient identification verified by name and date of birth.  Assessment completed.  Vitals recorded in Doc Flowsheets.  Patient was provided with education regarding medication/procedure and possible side effects.  Patient verbalized understanding.     present during visit today: Not Applicable.    Treatment Conditions: ~~~ NOTE: If the patient answers yes to any of the questions below, hold the infusion and contact ordering provider or on-call provider.    1. Have you recently had an elevated temperature, fever, chills, productive cough, coughing for 3 weeks or longer or hemoptysis, abnormal vital signs, night sweats,  chest pain or have you noticed a decrease in your appetite, unexplained weight loss or fatigue? No  2. Do you have any open wounds or new incisions? No  3. Do you have any recent or upcoming hospitalizations, surgeries or dental procedures? No  4. Do you currently have or recently have had any signs of illness or infection or are you on any antibiotics? No  5. Have you had any new, sudden or worsening abdominal pain? No  6. Have you or anyone in your household received a live vaccination in the past 4 weeks? Please note:  No live vaccines while on biologic/chemotherapy until 6 months after the last treatment.  Patient can receive the flu vaccine (shot only) and the pneumovax.  It is optimal for the patient to  get these vaccines mid cycle, but they can be given at any time as long as it is not on the day of the infusion. No  7. Have you recently been diagnosed with any new nervous system diseases (ie. Multiple sclerosis, Guillain [place], seizures, neurological changes) or cancer diagnosis? No  8. Are you on any form of radiation or chemotherapy? No  9. Are you pregnant or breast feeding or do you have plans of pregnancy in the future? N/A  10. Have you been having any signs of worsening depression or suicidal ideations?  (benlysta only) N/A  11. Have there been any other new onset medical symptoms? No      Premedications: administered per order.    Drug Waste Record: No    Infusion length and rate:  infusion given over approximately 2 hours  infusion starts at 100 ml/hr X 15 minutes, then increased to 200 ml/hr for 15 minutes, then increased to 250ml/hr for 30 minutes, then increased to final rate of 300 ml/hr for the remainder of the infusion.    Labs: were not ordered for this appointment.    Vascular access: peripheral IV placed today.    Is the next appt scheduled? Message sent to scheduling   Asymptomatic COVID test completed? no    Post Infusion Assessment:  Patient tolerated infusion without incident.  Patient observed for 60 minutes post ocrevus per protocol.  Blood return noted pre and post infusion.  Site patent and intact, free from redness, edema or discomfort.  No evidence of extravasations.  Access discontinued per protocol.     Discharge Plan:   Follow up plan of care with: ongoing infusions at [facility].  Discharge instructions were reviewed with patient.  Patient/representative verbalized understanding of discharge instructions and all questions answered.  Patient discharged from [facility] in stable condition.    Susana Fernández RN       Administrations This Visit     acetaminophen (TYLENOL) tablet 650 mg     Admin Date  03/28/2022 Action  Given  Dose  650 mg Route  Oral Administered By  Susana Fernández, BERNARDA          diphenhydrAMINE (BENADRYL) capsule 50 mg     Admin Date  03/28/2022 Action  Given Dose  50 mg Route  Oral Administered By  Susana Fernández, BERNARDA          methylPREDNISolone sodium succinate (solu-MEDROL) injection 125 mg     Admin Date  03/28/2022 Action  Given Dose  125 mg Route  Intravenous Administered By  Susana Fernández, BERNARDA          ocrelizumab (OCREVUS) 600 mg in sodium chloride 0.9 % 500 mL infusion     Admin Date  03/28/2022 Action  New Bag Dose  600 mg Route  Intravenous Administered By  Susana Fernández, BERNARDA                BP 99/62   Pulse 71   Temp 97.8  F (36.6  C) (Oral)   Resp 16   SpO2 95%         Again, thank you for allowing me to participate in the care of your patient.        Sincerely,        [facility] [place]

## 2022-04-12 DIAGNOSIS — E55.9 VITAMIN D DEFICIENCY: ICD-10-CM

## 2022-04-12 RX ORDER — MULTIVIT-MIN/IRON/FOLIC ACID/K 18-600-40
CAPSULE ORAL
Qty: 180 CAPSULE | Refills: 1 | Status: SHIPPED | OUTPATIENT
Start: 2022-04-12 | End: 2022-11-09

## 2022-04-12 NOTE — TELEPHONE ENCOUNTER
Received refill request for Vitamin D from [facility]; Previously prescribed by Dr Guerra. Patient was last seen in July by Dr Guerra and has follow up appointment in May with Dr Morillo. Refill request routed to Dr Morillo.    Esperanza Peña RN

## 2022-05-05 ENCOUNTER — OFFICE VISIT (OUTPATIENT)
Dept: NEUROLOGY | Facility: CLINIC | Age: 67
End: 2022-05-05
Attending: PSYCHIATRY & NEUROLOGY
Payer: MEDICARE

## 2022-05-05 VITALS
OXYGEN SATURATION: 99 % | BODY MASS INDEX: 19.61 KG/M2 | SYSTOLIC BLOOD PRESSURE: 117 MMHG | WEIGHT: 132.8 LBS | HEART RATE: 70 BPM | DIASTOLIC BLOOD PRESSURE: 71 MMHG

## 2022-05-05 DIAGNOSIS — G20.C PARKINSONIAN TREMOR (H): ICD-10-CM

## 2022-05-05 DIAGNOSIS — G35 MS (MULTIPLE SCLEROSIS) (H): Primary | ICD-10-CM

## 2022-05-05 DIAGNOSIS — G35 MULTIPLE SCLEROSIS (H): ICD-10-CM

## 2022-05-05 DIAGNOSIS — G47.00 INSOMNIA, UNSPECIFIED TYPE: ICD-10-CM

## 2022-05-05 DIAGNOSIS — E55.9 VITAMIN D DEFICIENCY: ICD-10-CM

## 2022-05-05 DIAGNOSIS — F32.A DEPRESSION, UNSPECIFIED DEPRESSION TYPE: ICD-10-CM

## 2022-05-05 PROCEDURE — G0463 HOSPITAL OUTPT CLINIC VISIT: HCPCS

## 2022-05-05 PROCEDURE — 99215 OFFICE O/P EST HI 40 MIN: CPT | Performed by: PSYCHIATRY & NEUROLOGY

## 2022-05-05 RX ORDER — AMITRIPTYLINE HYDROCHLORIDE 10 MG/1
10 TABLET ORAL AT BEDTIME
Qty: 30 TABLET | Refills: 5 | Status: SHIPPED | OUTPATIENT
Start: 2022-05-05 | End: 2022-12-04

## 2022-05-05 ASSESSMENT — PATIENT HEALTH QUESTIONNAIRE - PHQ9: SUM OF ALL RESPONSES TO PHQ QUESTIONS 1-9: 0

## 2022-05-05 ASSESSMENT — PAIN SCALES - GENERAL: PAINLEVEL: NO PAIN (0)

## 2022-05-05 NOTE — PATIENT INSTRUCTIONS
We are going to try adding a low dose of a medication called amitriptyline at 10 mg at bedtime to see if this helps you sleep and helps with mood    2. Proceed with Ocrevus infusion in September as scheduled    3. Blood tests on the day of the infusion    4. MRI scan of the brain and cervical spine in 6 months and return to clinic after

## 2022-05-05 NOTE — Clinical Note
"5/5/2022       RE: Vincent Zuniga  [address] [facility] [address]     Dear Colleague,    Thank you for referring your patient, Vincent Zuniga, to the [facility] [place] at [facility]. Please see a copy of my visit note below.         Date of service: May 5, 2022    Referral source: Established patient of Dr. Cassandra Guerra    Chief complaint: \"Multiple sclerosis.\"    History of the Present Illness: Mr. Vincent Zuniga is a 67-year-old right-handed man who is evaluated in the Multiple Sclerosis Clinic today for the purpose of establishing care with me after Dr. Guerra's departure.    The patient's history is as documented in the chart.  Initial onset of neurologic symptoms was in January 2019 when he presented to the hospital at [facility] with left-sided weakness and slurred speech.  Brain MRI imaging at that time demonstrated multiple enhancing lesions.  A CSF examination demonstrated 61 white blood cells per microliter as well as 5 oligoclonal bands in the CSF that were not present in serum.  The patient was treated with high-dose steroids on suspicion of \"ADEM.\" A follow-up MRI in April 2019 demonstrated a new MRI lesion, and he was given a diagnosis of multiple sclerosis by an outside provider and started on disease-modifying therapy with teriflunomide.  He was seen in follow-up here by my colleague, Dr. Timothy Doe, in May 2019. Dr. Doe concurred with the continued use of teriflunomide at that time.    The patient was subsequently admitted to the [facility] in March 2020 with a primary complaint of \"progressive functional decline\" including gait instability, incoordination and weight loss.  MRI imaging was initially read as unchanged, but Dr. Guerra felt that there was new enhancing signal abnormality in the medulla.  Disease-modifying therapy was changed to siponimod, but the patient " "was intolerant of that medication.  Most recently, he has been treated with ocrelizumab beginning in late December 2020, and remains on that medication up until the present time; the most recent infusion was given on 03/28/2022.  Follow-up imaging was most recently performed in July 2021, and was stable.    I obtained history today from the patient as well as collateral information from his brother who accompanied him to the visit today and his daughter, who joins by telephone.  In general, they feel that he is substantially improved over the last 18 months on ocrelizumab.  His brother indicates that the patient is mentally \"sharper\" and that his situational awareness has improved.  His daughter does have ongoing concern about depressive symptoms, insomnia, and excessive daily use of marijuana.      For sleep, he is currently taking clonazepam 1 mg at bedtime.  Trazodone made him excessively drowsy the next day.  For mood symptoms, he is currently on duloxetine 20 mg twice daily, which is being prescribed by his primary care provider.    Past Medical History:   1.  Chronic obstructive pulmonary disease.  2.  Gastroesophageal reflux disease.  3.  Polysubstance use.    Medications:  1.  Clonazepam 1 mg p.o. at bedtime.  2.  Oxybutynin extended release 15 mg daily.  3.  Duloxetine 20 mg p.o. b.i.d.    Family History: There is no family history of multiple sclerosis of which they are aware.    Social History:The patient smokes 1 pack of cigarettes per day.  As above, he uses marijuana essentially on a daily basis.  He denies alcohol consumption currently.    PHYSICAL EXAMINATION:    VITAL SIGNS:  Blood pressure 117/71; pulse 70; oxygen saturation 99%; weight 60.2 kg    NEUROLOGIC EXAMINATION:  CRANIAL NERVES:  Visual fields are full to confrontation.  Extraocular movements are intact with no internuclear ophthalmoplegia.  Facial strength is normal.  Palate elevation and tongue protrusion are normal.  POWER:  Strength is " within normal limits in proximal and distal muscles in the upper and lower limbs throughout.  REFLEXES:  Reflexes are roughly symmetric and within normal limits in the arms and legs.  MOTOR/CEREBELLAR:  The patient is somewhat hyperkinetic in general, with some of these movements appearing distractible and likely related to anxiety.  However, he does appear to have a superimposed pill-rolling tremor of the right hand that is accentuated with ambulation.  He does not have prominent rigidity on passive range of motion.  Finger-to-nose testing is intact.  The amplitude and rate of rapid alternating movements are not significantly impaired.  GAIT:  The patient is able to ambulate independently on a flat, level surface with no gross loss of postural stability.  Arm swing is somewhat reduced bilaterally.    Assessment/plan:     1.  Multiple sclerosis  The presentation here was quite atypical with onset of very active inflammatory disease at an age considerably later than that typically seen in relapsing multiple sclerosis. The highly inflammatory initial CSF profile was also unusual for MS, and led to initial suspicion about possible acute disseminated encephalomyelitis.  However, the patient did appear to have ongoing radiographic disease activity and, per report, his condition has stabilized considerably since he was placed on a CD20 monoclonal antibody.  I do think it is appropriate for him to continue this.  He will proceed with the next ocrelizumab infusion as scheduled in September.  Prior to the infusion, I will check routine laboratory studies for monitoring of this medication to include complete blood counts with differential, hepatic panel, CD19 count and total antibody (IgG) level.  I will also check a vitamin D level and advise him on supplementation with vitamin D as needed to maintain his level within the goal range of 60-80 mcg per liter.      I would like to see the patient back for review in 6 months with  MRI scans of the brain and cervical spine to be performed prior to that visit.    2.  Depression  3.  Insomnia  I am going to try adding a low dose of amitriptyline at 10 mg at bedtime to see if this is helpful for the patient's difficulty with sleep.  Per report, his use of marijuana appears excessive, and I would advise that he moderate his consumption in this regard.  The patient's daughter is concerned that his depression and lack of initiative are resulting in lack of attention to self care, including intermittent failure to take in adequate hydration through his gastrostomy tube.  We will continue to keep a close eye on his weight and nutritional status over time.    4.  Parkinsonian tremor  The patient does appear to have a pill-rolling type tremor without other obvious cardinal features of parkinsonism.  Whether this is related to damage from previous inflammatory brain lesions or potentially related to a superimposed neurodegenerative condition is not entirely clear at this time.  At present, I do not think that a trial of dopamine replacement is indicated, but we will keep an eye on this issue over time as well.    I spent a total of 52 minutes on patient care activities related to this encounter on the date of service, including time spent in reviewing the chart, obtaining history and examination (including collateral history from the patient's brother and daughter in light of his cognitive impairment), in counseling, and in coordination of care.    Dominik Morillo MD        D: 2022   T: 2022   MT: heri    Name:     LATRICE ROSADO  MRN:      [id number]        Account:      [id number]   :      1955           Service Date: 2022       Document: [id number]      Again, thank you for allowing me to participate in the care of your patient.      Sincerely,    Dominik Morillo MD

## 2022-05-05 NOTE — LETTER
"5/5/2022      RE: Vincent Zuniga  [address] [facility] [address]     Referral source: Established patient of Dr. Cassandra Guerra    Chief complaint: \"Multiple sclerosis.\"    History of the Present Illness: Mr. Vincent Zuniga is a 67-year-old right-handed man who is evaluated in the Multiple Sclerosis Clinic today for the purpose of establishing care with me after Dr. Guerra's departure.    The patient's history is as documented in the chart.  Initial onset of neurologic symptoms was in January 2019 when he presented to the [facility] with left-sided weakness and slurred speech.  Brain MRI imaging at that time demonstrated multiple enhancing lesions.  A CSF examination demonstrated 61 white blood cells per microliter as well as 5 oligoclonal bands in the CSF that were not present in serum.  The patient was treated with high-dose steroids on suspicion of \"ADEM.\" A follow-up MRI in April 2019 demonstrated a new MRI lesion, and he was given a diagnosis of multiple sclerosis by an outside provider and started on disease-modifying therapy with teriflunomide.  He was seen in follow-up here by my colleague, Dr. Timothy Doe, in May 2019. Dr. Doe concurred with the continued use of teriflunomide at that time.    The patient was subsequently admitted to the [facility] in March 2020 with a primary complaint of \"progressive functional decline\" including gait instability, incoordination and weight loss.  MRI imaging was initially read as unchanged, but Dr. Guerra felt that there was new enhancing signal abnormality in the medulla.  Disease-modifying therapy was changed to siponimod, but the patient was intolerant of that medication.  Most recently, he has been treated with ocrelizumab beginning in late December 2020, and remains on that medication up until the present time; the most recent infusion was given on 03/28/2022.  Follow-up imaging was most recently performed in " "July 2021, and was stable.    I obtained history today from the patient as well as collateral information from his brother who accompanied him to the visit today and his daughter, who joins by telephone.  In general, they feel that he is substantially improved over the last 18 months on ocrelizumab.  His brother indicates that the patient is mentally \"sharper\" and that his situational awareness has improved.  His daughter does have ongoing concern about depressive symptoms, insomnia, and excessive daily use of marijuana.      For sleep, he is currently taking clonazepam 1 mg at bedtime.  Trazodone made him excessively drowsy the next day.  For mood symptoms, he is currently on duloxetine 20 mg twice daily, which is being prescribed by his primary care provider.    Past Medical History:   1.  Chronic obstructive pulmonary disease.  2.  Gastroesophageal reflux disease.  3.  Polysubstance use.    Medications:  1.  Clonazepam 1 mg p.o. at bedtime.  2.  Oxybutynin extended release 15 mg daily.  3.  Duloxetine 20 mg p.o. b.i.d.    Family History: There is no family history of multiple sclerosis of which they are aware.    Social History:The patient smokes 1 pack of cigarettes per day.  As above, he uses marijuana essentially on a daily basis.  He denies alcohol consumption currently.    PHYSICAL EXAMINATION:    VITAL SIGNS:  Blood pressure 117/71; pulse 70; oxygen saturation 99%; weight 60.2 kg    NEUROLOGIC EXAMINATION:  CRANIAL NERVES:  Visual fields are full to confrontation.  Extraocular movements are intact with no internuclear ophthalmoplegia.  Facial strength is normal.  Palate elevation and tongue protrusion are normal.  POWER:  Strength is within normal limits in proximal and distal muscles in the upper and lower limbs throughout.  REFLEXES:  Reflexes are roughly symmetric and within normal limits in the arms and legs.  MOTOR/CEREBELLAR:  The patient is somewhat hyperkinetic in general, with some of these " movements appearing distractible and likely related to anxiety.  However, he does appear to have a superimposed pill-rolling tremor of the right hand that is accentuated with ambulation.  He does not have prominent rigidity on passive range of motion.  Finger-to-nose testing is intact.  The amplitude and rate of rapid alternating movements are not significantly impaired.  GAIT:  The patient is able to ambulate independently on a flat, level surface with no gross loss of postural stability.  Arm swing is somewhat reduced bilaterally.    Assessment/plan:     1.  Multiple sclerosis  The presentation here was quite atypical with onset of very active inflammatory disease at an age considerably later than that typically seen in relapsing multiple sclerosis. The highly inflammatory initial CSF profile was also unusual for MS, and led to initial suspicion about possible acute disseminated encephalomyelitis.  However, the patient did appear to have ongoing radiographic disease activity and, per report, his condition has stabilized considerably since he was placed on a CD20 monoclonal antibody.  I do think it is appropriate for him to continue this.  He will proceed with the next ocrelizumab infusion as scheduled in September.  Prior to the infusion, I will check routine laboratory studies for monitoring of this medication to include complete blood counts with differential, hepatic panel, CD19 count and total antibody (IgG) level.  I will also check a vitamin D level and advise him on supplementation with vitamin D as needed to maintain his level within the goal range of 60-80 mcg per liter.      I would like to see the patient back for review in 6 months with MRI scans of the brain and cervical spine to be performed prior to that visit.    2.  Depression  3.  Insomnia  I am going to try adding a low dose of amitriptyline at 10 mg at bedtime to see if this is helpful for the patient's difficulty with sleep.  Per report, his  use of marijuana appears excessive, and I would advise that he moderate his consumption in this regard.  The patient's daughter is concerned that his depression and lack of initiative are resulting in lack of attention to self care, including intermittent failure to take in adequate hydration through his gastrostomy tube.  We will continue to keep a close eye on his weight and nutritional status over time.    4.  Parkinsonian tremor  The patient does appear to have a pill-rolling type tremor without other obvious cardinal features of parkinsonism.  Whether this is related to damage from previous inflammatory brain lesions or potentially related to a superimposed neurodegenerative condition is not entirely clear at this time.  At present, I do not think that a trial of dopamine replacement is indicated, but we will keep an eye on this issue over time as well.    I spent a total of 52 minutes on patient care activities related to this encounter on the date of service, including time spent in reviewing the chart, obtaining history and examination (including collateral history from the patient's brother and daughter in light of his cognitive impairment), in counseling, and in coordination of care.    Dominik Morillo MD   of Neurology  [facility]    Cc:  Marcus Mccain MD (PCP)  Patient

## 2022-05-06 RX ORDER — CLONAZEPAM 1 MG/1
TABLET ORAL
Qty: 30 TABLET | Refills: 0 | Status: SHIPPED | OUTPATIENT
Start: 2022-05-06 | End: 2022-06-10

## 2022-05-06 NOTE — TELEPHONE ENCOUNTER
Received refill request for clonazepam from [facility]; Previously prescribed by Dr Guerra. Patient was last seen May 5 by Dr Morillo and does not yet have follow-up appointment scheduled with Dr Morillo. Rx request pended to Dr Morillo, and will send message to clinic coordinators to help pt schedule MRIs and follow-up.    Esperanza Peña RN

## 2022-05-09 NOTE — PROGRESS NOTES
"Date of service: May 5, 2022    Referral source: Established patient of Dr. Cassandra Guerra    Chief complaint: \"Multiple sclerosis.\"    History of the Present Illness: Mr. Vincent Zuniga is a 67-year-old right-handed man who is evaluated in the Multiple Sclerosis Clinic today for the purpose of establishing care with me after Dr. Guerra's departure.    The patient's history is as documented in the chart.  Initial onset of neurologic symptoms was in January 2019 when he presented to the [facility] with left-sided weakness and slurred speech.  Brain MRI imaging at that time demonstrated multiple enhancing lesions.  A CSF examination demonstrated 61 white blood cells per microliter as well as 5 oligoclonal bands in the CSF that were not present in serum.  The patient was treated with high-dose steroids on suspicion of \"ADEM.\" A follow-up MRI in April 2019 demonstrated a new MRI lesion, and he was given a diagnosis of multiple sclerosis by an outside provider and started on disease-modifying therapy with teriflunomide.  He was seen in follow-up here by my colleague, Dr. Timothy Doe, in May 2019. Dr. Doe concurred with the continued use of teriflunomide at that time.    The patient was subsequently admitted to the [facility] in March 2020 with a primary complaint of \"progressive functional decline\" including gait instability, incoordination and weight loss.  MRI imaging was initially read as unchanged, but Dr. Guerra felt that there was new enhancing signal abnormality in the medulla.  Disease-modifying therapy was changed to siponimod, but the patient was intolerant of that medication.  Most recently, he has been treated with ocrelizumab beginning in late December 2020, and remains on that medication up until the present time; the most recent infusion was given on 03/28/2022.  Follow-up imaging was most recently performed in July 2021, and was stable.    I obtained " "history today from the patient as well as collateral information from his brother who accompanied him to the visit today and his daughter, who joins by telephone.  In general, they feel that he is substantially improved over the last 18 months on ocrelizumab.  His brother indicates that the patient is mentally \"sharper\" and that his situational awareness has improved.  His daughter does have ongoing concern about depressive symptoms, insomnia, and excessive daily use of marijuana.      For sleep, he is currently taking clonazepam 1 mg at bedtime.  Trazodone made him excessively drowsy the next day.  For mood symptoms, he is currently on duloxetine 20 mg twice daily, which is being prescribed by his primary care provider.    Past Medical History:   1.  Chronic obstructive pulmonary disease.  2.  Gastroesophageal reflux disease.  3.  Polysubstance use.    Medications:  1.  Clonazepam 1 mg p.o. at bedtime.  2.  Oxybutynin extended release 15 mg daily.  3.  Duloxetine 20 mg p.o. b.i.d.    Family History: There is no family history of multiple sclerosis of which they are aware.    Social History:The patient smokes 1 pack of cigarettes per day.  As above, he uses marijuana essentially on a daily basis.  He denies alcohol consumption currently.    PHYSICAL EXAMINATION:    VITAL SIGNS:  Blood pressure 117/71; pulse 70; oxygen saturation 99%; weight 60.2 kg    NEUROLOGIC EXAMINATION:  CRANIAL NERVES:  Visual fields are full to confrontation.  Extraocular movements are intact with no internuclear ophthalmoplegia.  Facial strength is normal.  Palate elevation and tongue protrusion are normal.  POWER:  Strength is within normal limits in proximal and distal muscles in the upper and lower limbs throughout.  REFLEXES:  Reflexes are roughly symmetric and within normal limits in the arms and legs.  MOTOR/CEREBELLAR:  The patient is somewhat hyperkinetic in general, with some of these movements appearing distractible and likely " related to anxiety.  However, he does appear to have a superimposed pill-rolling tremor of the right hand that is accentuated with ambulation.  He does not have prominent rigidity on passive range of motion.  Finger-to-nose testing is intact.  The amplitude and rate of rapid alternating movements are not significantly impaired.  GAIT:  The patient is able to ambulate independently on a flat, level surface with no gross loss of postural stability.  Arm swing is somewhat reduced bilaterally.    Assessment/plan:     1.  Multiple sclerosis  The presentation here was quite atypical with onset of very active inflammatory disease at an age considerably later than that typically seen in relapsing multiple sclerosis. The highly inflammatory initial CSF profile was also unusual for MS, and led to initial suspicion about possible acute disseminated encephalomyelitis.  However, the patient did appear to have ongoing radiographic disease activity and, per report, his condition has stabilized considerably since he was placed on a CD20 monoclonal antibody.  I do think it is appropriate for him to continue this.  He will proceed with the next ocrelizumab infusion as scheduled in September.  Prior to the infusion, I will check routine laboratory studies for monitoring of this medication to include complete blood counts with differential, hepatic panel, CD19 count and total antibody (IgG) level.  I will also check a vitamin D level and advise him on supplementation with vitamin D as needed to maintain his level within the goal range of 60-80 mcg per liter.      I would like to see the patient back for review in 6 months with MRI scans of the brain and cervical spine to be performed prior to that visit.    2.  Depression  3.  Insomnia  I am going to try adding a low dose of amitriptyline at 10 mg at bedtime to see if this is helpful for the patient's difficulty with sleep.  Per report, his use of marijuana appears excessive, and I  would advise that he moderate his consumption in this regard.  The patient's daughter is concerned that his depression and lack of initiative are resulting in lack of attention to self care, including intermittent failure to take in adequate hydration through his gastrostomy tube.  We will continue to keep a close eye on his weight and nutritional status over time.    4.  Parkinsonian tremor  The patient does appear to have a pill-rolling type tremor without other obvious cardinal features of parkinsonism.  Whether this is related to damage from previous inflammatory brain lesions or potentially related to a superimposed neurodegenerative condition is not entirely clear at this time.  At present, I do not think that a trial of dopamine replacement is indicated, but we will keep an eye on this issue over time as well.    I spent a total of 52 minutes on patient care activities related to this encounter on the date of service, including time spent in reviewing the chart, obtaining history and examination (including collateral history from the patient's brother and daughter in light of his cognitive impairment), in counseling, and in coordination of care.    Dominik Morillo MD        D: 2022   T: 2022   MT: heri    Name:     LATRICE ROSADO  MRN:      [id number]        Account:      [id number]   :      1955           Service Date: 2022       Document: [id number]

## 2022-06-10 DIAGNOSIS — G35 MULTIPLE SCLEROSIS (H): ICD-10-CM

## 2022-06-10 RX ORDER — CLONAZEPAM 1 MG/1
TABLET ORAL
Qty: 30 TABLET | Refills: 5 | Status: SHIPPED | OUTPATIENT
Start: 2022-06-10 | End: 2023-01-06

## 2022-06-10 NOTE — TELEPHONE ENCOUNTER
Patient requesting refill of their clonazepam; Patient was last seen in May and has follow up appointment in November with Dr Morillo. Pended rx to Dr Morillo for signature and will send electronically to the pharmacy once signed.    Esperanza Peña RN

## 2022-08-06 DIAGNOSIS — G35 MULTIPLE SCLEROSIS (H): ICD-10-CM

## 2022-08-08 RX ORDER — CLONAZEPAM 1 MG/1
TABLET ORAL
Qty: 30 TABLET | Refills: 0 | OUTPATIENT
Start: 2022-08-08

## 2022-08-22 ENCOUNTER — DOCUMENTATION ONLY (OUTPATIENT)
Dept: NEUROLOGY | Facility: CLINIC | Age: 67
End: 2022-08-22

## 2022-08-22 NOTE — PROGRESS NOTES
Pt due for next Ocrevus infusion with target date 9/28. Pt is scheduled for 9/26. Order is current.    Esperanza Peña RN

## 2022-09-26 ENCOUNTER — INFUSION THERAPY VISIT (OUTPATIENT)
Dept: INFUSION THERAPY | Facility: CLINIC | Age: 67
End: 2022-09-26
Attending: PSYCHIATRY & NEUROLOGY
Payer: MEDICARE

## 2022-09-26 VITALS
DIASTOLIC BLOOD PRESSURE: 64 MMHG | BODY MASS INDEX: 19.73 KG/M2 | HEART RATE: 58 BPM | TEMPERATURE: 98.2 F | OXYGEN SATURATION: 94 % | SYSTOLIC BLOOD PRESSURE: 101 MMHG | WEIGHT: 133.6 LBS | RESPIRATION RATE: 16 BRPM

## 2022-09-26 DIAGNOSIS — G35 MS (MULTIPLE SCLEROSIS) (H): ICD-10-CM

## 2022-09-26 DIAGNOSIS — E55.9 VITAMIN D DEFICIENCY: ICD-10-CM

## 2022-09-26 DIAGNOSIS — G35 MULTIPLE SCLEROSIS (H): Primary | ICD-10-CM

## 2022-09-26 LAB
ALBUMIN SERPL BCG-MCNC: 4.4 G/DL (ref 3.5–5.2)
ALP SERPL-CCNC: 81 U/L (ref 40–129)
ALT SERPL W P-5'-P-CCNC: 33 U/L (ref 10–50)
AST SERPL W P-5'-P-CCNC: 31 U/L (ref 10–50)
BASOPHILS # BLD AUTO: 0 10E3/UL (ref 0–0.2)
BASOPHILS NFR BLD AUTO: 0 %
BILIRUB DIRECT SERPL-MCNC: <0.2 MG/DL (ref 0–0.3)
BILIRUB SERPL-MCNC: 0.2 MG/DL
CD19 CELLS # BLD: 3 CELLS/UL (ref 107–698)
CD19 CELLS NFR BLD: <1 % (ref 6–27)
DEPRECATED CALCIDIOL+CALCIFEROL SERPL-MC: 96 UG/L (ref 20–75)
EOSINOPHIL # BLD AUTO: 0.5 10E3/UL (ref 0–0.7)
EOSINOPHIL NFR BLD AUTO: 6 %
ERYTHROCYTE [DISTWIDTH] IN BLOOD BY AUTOMATED COUNT: 12.7 % (ref 10–15)
HCT VFR BLD AUTO: 39.4 % (ref 40–53)
HGB BLD-MCNC: 13.7 G/DL (ref 13.3–17.7)
IMM GRANULOCYTES # BLD: 0 10E3/UL
IMM GRANULOCYTES NFR BLD: 0 %
LYMPHOCYTES # BLD AUTO: 2 10E3/UL (ref 0.8–5.3)
LYMPHOCYTES NFR BLD AUTO: 22 %
MCH RBC QN AUTO: 34.7 PG (ref 26.5–33)
MCHC RBC AUTO-ENTMCNC: 34.8 G/DL (ref 31.5–36.5)
MCV RBC AUTO: 100 FL (ref 78–100)
MONOCYTES # BLD AUTO: 1.4 10E3/UL (ref 0–1.3)
MONOCYTES NFR BLD AUTO: 15 %
NEUTROPHILS # BLD AUTO: 5 10E3/UL (ref 1.6–8.3)
NEUTROPHILS NFR BLD AUTO: 57 %
NRBC # BLD AUTO: 0 10E3/UL
NRBC BLD AUTO-RTO: 0 /100
PLATELET # BLD AUTO: 199 10E3/UL (ref 150–450)
PROT SERPL-MCNC: 6.9 G/DL (ref 6.4–8.3)
RBC # BLD AUTO: 3.95 10E6/UL (ref 4.4–5.9)
WBC # BLD AUTO: 8.8 10E3/UL (ref 4–11)

## 2022-09-26 PROCEDURE — 250N000011 HC RX IP 250 OP 636: Performed by: PSYCHIATRY & NEUROLOGY

## 2022-09-26 PROCEDURE — 85004 AUTOMATED DIFF WBC COUNT: CPT

## 2022-09-26 PROCEDURE — 82306 VITAMIN D 25 HYDROXY: CPT

## 2022-09-26 PROCEDURE — 80076 HEPATIC FUNCTION PANEL: CPT

## 2022-09-26 PROCEDURE — 258N000003 HC RX IP 258 OP 636: Performed by: PSYCHIATRY & NEUROLOGY

## 2022-09-26 PROCEDURE — 36415 COLL VENOUS BLD VENIPUNCTURE: CPT

## 2022-09-26 PROCEDURE — 86355 B CELLS TOTAL COUNT: CPT

## 2022-09-26 PROCEDURE — 96366 THER/PROPH/DIAG IV INF ADDON: CPT

## 2022-09-26 PROCEDURE — 96365 THER/PROPH/DIAG IV INF INIT: CPT

## 2022-09-26 PROCEDURE — 82784 ASSAY IGA/IGD/IGG/IGM EACH: CPT

## 2022-09-26 PROCEDURE — 96375 TX/PRO/DX INJ NEW DRUG ADDON: CPT

## 2022-09-26 PROCEDURE — 250N000013 HC RX MED GY IP 250 OP 250 PS 637: Performed by: PSYCHIATRY & NEUROLOGY

## 2022-09-26 RX ORDER — NALOXONE HYDROCHLORIDE 0.4 MG/ML
0.2 INJECTION, SOLUTION INTRAMUSCULAR; INTRAVENOUS; SUBCUTANEOUS
Status: CANCELLED | OUTPATIENT
Start: 2023-03-23

## 2022-09-26 RX ORDER — ACETAMINOPHEN 325 MG/1
650 TABLET ORAL ONCE
Status: CANCELLED | OUTPATIENT
Start: 2023-03-23

## 2022-09-26 RX ORDER — METHYLPREDNISOLONE SODIUM SUCCINATE 125 MG/2ML
125 INJECTION, POWDER, LYOPHILIZED, FOR SOLUTION INTRAMUSCULAR; INTRAVENOUS ONCE
Status: COMPLETED | OUTPATIENT
Start: 2022-09-26 | End: 2022-09-26

## 2022-09-26 RX ORDER — ALBUTEROL SULFATE 0.83 MG/ML
2.5 SOLUTION RESPIRATORY (INHALATION)
Status: CANCELLED | OUTPATIENT
Start: 2023-03-23

## 2022-09-26 RX ORDER — DIPHENHYDRAMINE HCL 25 MG
50 CAPSULE ORAL ONCE
Status: COMPLETED | OUTPATIENT
Start: 2022-09-26 | End: 2022-09-26

## 2022-09-26 RX ORDER — ALBUTEROL SULFATE 90 UG/1
1-2 AEROSOL, METERED RESPIRATORY (INHALATION)
Status: CANCELLED
Start: 2023-03-23

## 2022-09-26 RX ORDER — DIPHENHYDRAMINE HCL 25 MG
50 CAPSULE ORAL ONCE
Status: CANCELLED | OUTPATIENT
Start: 2023-03-23

## 2022-09-26 RX ORDER — METHYLPREDNISOLONE SODIUM SUCCINATE 125 MG/2ML
125 INJECTION, POWDER, LYOPHILIZED, FOR SOLUTION INTRAMUSCULAR; INTRAVENOUS ONCE
Status: CANCELLED | OUTPATIENT
Start: 2023-03-23

## 2022-09-26 RX ORDER — EPINEPHRINE 1 MG/ML
0.3 INJECTION, SOLUTION INTRAMUSCULAR; SUBCUTANEOUS EVERY 5 MIN PRN
Status: CANCELLED | OUTPATIENT
Start: 2023-03-23

## 2022-09-26 RX ORDER — METHYLPREDNISOLONE SODIUM SUCCINATE 125 MG/2ML
125 INJECTION, POWDER, LYOPHILIZED, FOR SOLUTION INTRAMUSCULAR; INTRAVENOUS
Status: CANCELLED
Start: 2023-03-23

## 2022-09-26 RX ORDER — HEPARIN SODIUM,PORCINE 10 UNIT/ML
5 VIAL (ML) INTRAVENOUS
Status: CANCELLED | OUTPATIENT
Start: 2023-03-23

## 2022-09-26 RX ORDER — MEPERIDINE HYDROCHLORIDE 25 MG/ML
25 INJECTION INTRAMUSCULAR; INTRAVENOUS; SUBCUTANEOUS EVERY 30 MIN PRN
Status: CANCELLED | OUTPATIENT
Start: 2023-03-23

## 2022-09-26 RX ORDER — ACETAMINOPHEN 325 MG/1
650 TABLET ORAL ONCE
Status: COMPLETED | OUTPATIENT
Start: 2022-09-26 | End: 2022-09-26

## 2022-09-26 RX ORDER — DIPHENHYDRAMINE HYDROCHLORIDE 50 MG/ML
50 INJECTION INTRAMUSCULAR; INTRAVENOUS
Status: CANCELLED
Start: 2023-03-23

## 2022-09-26 RX ORDER — HEPARIN SODIUM (PORCINE) LOCK FLUSH IV SOLN 100 UNIT/ML 100 UNIT/ML
5 SOLUTION INTRAVENOUS
Status: CANCELLED | OUTPATIENT
Start: 2023-03-23

## 2022-09-26 RX ADMIN — METHYLPREDNISOLONE SODIUM SUCCINATE 125 MG: 125 INJECTION, POWDER, FOR SOLUTION INTRAMUSCULAR; INTRAVENOUS at 08:37

## 2022-09-26 RX ADMIN — OCRELIZUMAB 600 MG: 300 INJECTION INTRAVENOUS at 08:57

## 2022-09-26 RX ADMIN — ACETAMINOPHEN 650 MG: 325 TABLET ORAL at 08:37

## 2022-09-26 RX ADMIN — DIPHENHYDRAMINE HYDROCHLORIDE 50 MG: 25 CAPSULE ORAL at 08:37

## 2022-09-26 NOTE — PROGRESS NOTES
Infusion Nursing Note:  Vincent Zuniga presents today for Ocrevus q6mo.    Patient seen by provider today: No   present during visit today: Not Applicable.    Note:   -Premedications: Tylenol, Benadryl, Solu-Medrol  -Ocrevus given over approximately 2 hours  infusion starts at 100 ml/hr X 15 minutes, then increased to 200 ml/hr for 15 minutes, then increased to 250ml/hr for 30 minutes, then increased to final rate of 300 ml/hr for the remainder of the infusion.  -1 hr observation    Intravenous Access:  Labs drawn without difficulty.  Peripheral IV placed.    Treatment Conditions:  Biological Infusion Checklist:  ~~~ NOTE: If the patient answers yes to any of the questions below, hold the infusion and contact ordering provider or on-call provider.    1. Have you recently had an elevated temperature, fever, chills, productive cough, coughing for 3 weeks or longer or hemoptysis, abnormal vital signs, night sweats,  chest pain or have you noticed a decrease in your appetite, unexplained weight loss or fatigue? No  2. Do you have any open wounds or new incisions? No  3. Do you have any recent or upcoming hospitalizations, surgeries or dental procedures? No  4. Do you currently have or recently have had any signs of illness or infection or are you on any antibiotics? No  5. Have you had any new, sudden or worsening abdominal pain? No  6. Have you or anyone in your household received a live vaccination in the past 4 weeks? Please note:  No live vaccines while on biologic/chemotherapy until 6 months after the last treatment.  Patient can receive the flu vaccine (shot only) and the pneumovax.  It is optimal for the patient to get these vaccines mid cycle, but they can be given at any time as long as it is not on the day of the infusion. No  7. Have you recently been diagnosed with any new nervous system diseases (ie. Multiple sclerosis, Guillain [place], seizures, neurological changes) or cancer diagnosis?  No  8. Are you on any form of radiation or chemotherapy? No  9. Are you pregnant or breast feeding or do you have plans of pregnancy in the future? No  10. Have you been having any signs of worsening depression or suicidal ideations?  (benlysta only) No  11. Have there been any other new onset medical symptoms? No    Post Infusion Assessment:  Site patent and intact, free from redness, edema or discomfort.  No evidence of extravasations.  Access discontinued per protocol.  Biologic Infusion Post Education: Call the triage nurse at your clinic or seek medical attention if you have chills and/or temperature greater than or equal to 100.5, uncontrolled nausea/vomiting, diarrhea, constipation, dizziness, shortness of breath, chest pain, heart palpitations, weakness or any other new or concerning symptoms, questions or concerns.  You cannot have any live virus vaccines prior to or during treatment or up to 6 months post infusion.  If you have an upcoming surgery, medical procedure or dental procedure during treatment, this should be discussed with your ordering physician and your surgeon/dentist.  If you are having any concerning symptom, if you are unsure if you should get your next infusion or wish to speak to a provider before your next infusion, please call your care coordinator or triage nurse at your clinic to notify them so we can adequately serve you.     Discharge Plan:   AVS to patient via MYCHART.  Patient will return in 6 mo for next appointment. Pt will schedule after appointment with Dr. Morillo in November  Patient discharged in stable condition accompanied by: self.  Departure Mode: Ambulatory.    Mónica Arciniega RN    BP 97/68   Pulse 66   Temp 98.2  F (36.8  C) (Oral)   Resp 16   Wt 60.6 kg (133 lb 9.6 oz)   SpO2 94%   BMI 19.73 kg/m      Administrations This Visit     acetaminophen (TYLENOL) tablet 650 mg     Admin Date  09/26/2022 Action  Given Dose  650 mg Route  Oral Administered  By  Mónica Arciniega, RN          diphenhydrAMINE (BENADRYL) capsule 50 mg     Admin Date  09/26/2022 Action  Given Dose  50 mg Route  Oral Administered By  Mónica Arciniega RN          methylPREDNISolone sodium succinate (solu-MEDROL) injection 125 mg     Admin Date  09/26/2022 Action  Given Dose  125 mg Route  Intravenous Administered By  Mónica Arciniega, BERNARDA          ocrelizumab (OCREVUS) 600 mg in sodium chloride 0.9 % 500 mL infusion     Admin Date  09/26/2022 Action  New Bag Dose  600 mg Route  Intravenous Administered By  Mónica Arciniega, RN

## 2022-09-26 NOTE — PATIENT INSTRUCTIONS
Dear Vincent Zuniga    Thank you for choosing [facility] ([facility]) for your infusion.  The following information is a summary of our appointment as well as important reminders.      We look forward in seeing you on your next appointment here at [facility] ([facility]).  Please don t hesitate to call us at [phone] to reschedule any of your appointments or to speak with one of the [facility] registered nurses.  It was a pleasure taking care of you today.    Sincerely,    [facility]  [facility]  [address] [place] [address]  [id number]  Phone:  [phone]

## 2022-09-26 NOTE — LETTER
9/26/2022         RE: Vincent Zuniga  [address]  [place] [address]        Dear Colleague,    Thank you for referring your patient, Vincent Zuniga, to the [facility]. Please see a copy of my visit note below.    Infusion Nursing Note:  Vincent Zuniga presents today for Ocrevus q6mo.    Patient seen by provider today: No   present during visit today: Not Applicable.    Note:   -Premedications: Tylenol, Benadryl, Solu-Medrol  -Ocrevus given over approximately 2 hours  infusion starts at 100 ml/hr X 15 minutes, then increased to 200 ml/hr for 15 minutes, then increased to 250ml/hr for 30 minutes, then increased to final rate of 300 ml/hr for the remainder of the infusion.  -1 hr observation    Intravenous Access:  Labs drawn without difficulty.  Peripheral IV placed.    Treatment Conditions:  Biological Infusion Checklist:  ~~~ NOTE: If the patient answers yes to any of the questions below, hold the infusion and contact ordering provider or on-call provider.    1. Have you recently had an elevated temperature, fever, chills, productive cough, coughing for 3 weeks or longer or hemoptysis, abnormal vital signs, night sweats,  chest pain or have you noticed a decrease in your appetite, unexplained weight loss or fatigue? No  2. Do you have any open wounds or new incisions? No  3. Do you have any recent or upcoming hospitalizations, surgeries or dental procedures? No  4. Do you currently have or recently have had any signs of illness or infection or are you on any antibiotics? No  5. Have you had any new, sudden or worsening abdominal pain? No  6. Have you or anyone in your household received a live vaccination in the past 4 weeks? Please note:  No live vaccines while on biologic/chemotherapy until 6 months after the last treatment.  Patient can receive the flu vaccine (shot only) and the pneumovax.  It is optimal for the patient to get these vaccines mid  cycle, but they can be given at any time as long as it is not on the day of the infusion. No  7. Have you recently been diagnosed with any new nervous system diseases (ie. Multiple sclerosis, Guillain [place], seizures, neurological changes) or cancer diagnosis? No  8. Are you on any form of radiation or chemotherapy? No  9. Are you pregnant or breast feeding or do you have plans of pregnancy in the future? No  10. Have you been having any signs of worsening depression or suicidal ideations?  (benlysta only) No  11. Have there been any other new onset medical symptoms? No    Post Infusion Assessment:  Site patent and intact, free from redness, edema or discomfort.  No evidence of extravasations.  Access discontinued per protocol.  Biologic Infusion Post Education: Call the triage nurse at your clinic or seek medical attention if you have chills and/or temperature greater than or equal to 100.5, uncontrolled nausea/vomiting, diarrhea, constipation, dizziness, shortness of breath, chest pain, heart palpitations, weakness or any other new or concerning symptoms, questions or concerns.  You cannot have any live virus vaccines prior to or during treatment or up to 6 months post infusion.  If you have an upcoming surgery, medical procedure or dental procedure during treatment, this should be discussed with your ordering physician and your surgeon/dentist.  If you are having any concerning symptom, if you are unsure if you should get your next infusion or wish to speak to a provider before your next infusion, please call your care coordinator or triage nurse at your clinic to notify them so we can adequately serve you.     Discharge Plan:   AVS to patient via [employer].  Patient will return in 6 mo for next appointment. Pt will schedule after appointment with Dr. Morillo in November  Patient discharged in stable condition accompanied by: self.  Departure Mode: Ambulatory.    Mónica Arciniega RN    BP 97/68   Pulse 66    Temp 98.2  F (36.8  C) (Oral)   Resp 16   Wt 60.6 kg (133 lb 9.6 oz)   SpO2 94%   BMI 19.73 kg/m      Administrations This Visit     acetaminophen (TYLENOL) tablet 650 mg     Admin Date  09/26/2022 Action  Given Dose  650 mg Route  Oral Administered By  Mónica Arciniega RN          diphenhydrAMINE (BENADRYL) capsule 50 mg     Admin Date  09/26/2022 Action  Given Dose  50 mg Route  Oral Administered By  Mónica Arciniega RN          methylPREDNISolone sodium succinate (solu-MEDROL) injection 125 mg     Admin Date  09/26/2022 Action  Given Dose  125 mg Route  Intravenous Administered By  Mónica Arciniega RN          ocrelizumab (OCREVUS) 600 mg in sodium chloride 0.9 % 500 mL infusion     Admin Date  09/26/2022 Action  New Bag Dose  600 mg Route  Intravenous Administered By  Mónica Arciniega RN                                  Again, thank you for allowing me to participate in the care of your patient.        Sincerely,        [facility]

## 2022-09-27 LAB — IGG SERPL-MCNC: 639 MG/DL (ref 610–1616)

## 2022-10-15 ENCOUNTER — HEALTH MAINTENANCE LETTER (OUTPATIENT)
Age: 67
End: 2022-10-15

## 2022-11-09 DIAGNOSIS — E55.9 VITAMIN D DEFICIENCY: ICD-10-CM

## 2022-11-09 NOTE — TELEPHONE ENCOUNTER
[facility]ill [facility]; Patient was last seen on 5/5/2022 and has follow up appointment on 11/10/2022 with Ilia. Pended to MS pool for review/approval    Deanna Anderson MA

## 2022-11-10 NOTE — TELEPHONE ENCOUNTER
Vitamin D level 96 on 9/26. Dr Morillo, please renew prescription if appropriate, or adjust. Pt has follow-up appointment with you today 11/10.    Esperanza Peña RN

## 2022-11-15 RX ORDER — MULTIVIT-MIN/IRON/FOLIC ACID/K 18-600-40
1 CAPSULE ORAL DAILY
Qty: 90 CAPSULE | Refills: 3 | Status: SHIPPED | OUTPATIENT
Start: 2022-11-15 | End: 2023-10-30

## 2022-11-15 NOTE — TELEPHONE ENCOUNTER
Spoke with pt's daughter and informed her of reduced dose and need to reschedule MRIs and follow-up. She will call to schedule. I have also sent message to clinic coordinators.    Esperanza Peña RN

## 2022-11-15 NOTE — TELEPHONE ENCOUNTER
Dose reduced to 2000 units daily.     Patient did not appear for appointment last week and needs to reschedule.

## 2022-12-01 ENCOUNTER — OFFICE VISIT (OUTPATIENT)
Dept: NEUROLOGY | Facility: CLINIC | Age: 67
End: 2022-12-01
Attending: PSYCHIATRY & NEUROLOGY
Payer: MEDICARE

## 2022-12-01 ENCOUNTER — TELEPHONE (OUTPATIENT)
Dept: NEUROLOGY | Facility: CLINIC | Age: 67
End: 2022-12-01

## 2022-12-01 VITALS
WEIGHT: 135 LBS | HEART RATE: 70 BPM | HEIGHT: 69 IN | BODY MASS INDEX: 19.99 KG/M2 | DIASTOLIC BLOOD PRESSURE: 79 MMHG | SYSTOLIC BLOOD PRESSURE: 121 MMHG | OXYGEN SATURATION: 97 %

## 2022-12-01 DIAGNOSIS — E55.9 VITAMIN D DEFICIENCY: ICD-10-CM

## 2022-12-01 DIAGNOSIS — G47.00 INSOMNIA, UNSPECIFIED TYPE: ICD-10-CM

## 2022-12-01 DIAGNOSIS — G35 MS (MULTIPLE SCLEROSIS) (H): Primary | ICD-10-CM

## 2022-12-01 PROCEDURE — G0463 HOSPITAL OUTPT CLINIC VISIT: HCPCS

## 2022-12-01 PROCEDURE — 99214 OFFICE O/P EST MOD 30 MIN: CPT | Mod: GC | Performed by: PSYCHIATRY & NEUROLOGY

## 2022-12-01 RX ORDER — OLANZAPINE 2.5 MG/1
2.5 TABLET, FILM COATED ORAL
COMMUNITY
Start: 2021-12-16

## 2022-12-01 RX ORDER — METHYLPREDNISOLONE SODIUM SUCCINATE 125 MG/2ML
125 INJECTION, POWDER, LYOPHILIZED, FOR SOLUTION INTRAMUSCULAR; INTRAVENOUS ONCE
Status: CANCELLED | OUTPATIENT
Start: 2022-12-01

## 2022-12-01 RX ORDER — DIPHENHYDRAMINE HYDROCHLORIDE 50 MG/ML
50 INJECTION INTRAMUSCULAR; INTRAVENOUS
Status: CANCELLED
Start: 2022-12-01

## 2022-12-01 RX ORDER — DIPHENHYDRAMINE HCL 25 MG
50 CAPSULE ORAL ONCE
Status: CANCELLED | OUTPATIENT
Start: 2022-12-01

## 2022-12-01 RX ORDER — HEPARIN SODIUM (PORCINE) LOCK FLUSH IV SOLN 100 UNIT/ML 100 UNIT/ML
5 SOLUTION INTRAVENOUS
Status: CANCELLED | OUTPATIENT
Start: 2022-12-01

## 2022-12-01 RX ORDER — ALBUTEROL SULFATE 0.83 MG/ML
2.5 SOLUTION RESPIRATORY (INHALATION)
Status: CANCELLED | OUTPATIENT
Start: 2022-12-01

## 2022-12-01 RX ORDER — METHYLPREDNISOLONE SODIUM SUCCINATE 125 MG/2ML
125 INJECTION, POWDER, LYOPHILIZED, FOR SOLUTION INTRAMUSCULAR; INTRAVENOUS
Status: CANCELLED
Start: 2022-12-01

## 2022-12-01 RX ORDER — ALBUTEROL SULFATE 90 UG/1
1-2 AEROSOL, METERED RESPIRATORY (INHALATION)
Status: CANCELLED
Start: 2022-12-01

## 2022-12-01 RX ORDER — HEPARIN SODIUM,PORCINE 10 UNIT/ML
5 VIAL (ML) INTRAVENOUS
Status: CANCELLED | OUTPATIENT
Start: 2022-12-01

## 2022-12-01 RX ORDER — EPINEPHRINE 1 MG/ML
0.3 INJECTION, SOLUTION, CONCENTRATE INTRAVENOUS EVERY 5 MIN PRN
Status: CANCELLED | OUTPATIENT
Start: 2022-12-01

## 2022-12-01 RX ORDER — ACETAMINOPHEN 325 MG/1
650 TABLET ORAL ONCE
Status: CANCELLED | OUTPATIENT
Start: 2022-12-01

## 2022-12-01 ASSESSMENT — PAIN SCALES - GENERAL: PAINLEVEL: MILD PAIN (3)

## 2022-12-01 NOTE — LETTER
12/1/2022      RE: Vincent Zuniga  [address]  [place] [address]     Multiple Sclerosis Clinic Visit  12/01/2022    Reason: Multiple Sclerosis     Source of information: Patient and chart review    History of Present Symptom:  Vincent Zuniga is a 67 year old male with a PMH significant for depression and multiple sclerosis who presents today for follow up.  He reports symptoms related to MS are stable overall with no new concerns. He is maintained on Ocrevus and his last dose was 9/26/22.       He reports sleep is poor. He is getting up twice through the night to urinate and has difficulty falling back to sleep. Goes to bed around 10:30 pm and will wake up around 3 AM.     Balance is the main residual symptom he notices from MS. He walks unassisted up to a mile at a time.     Disease onset: January 2019 left sided weakness and slurred speech. MRI brain showed multiple enhancing lesions and CSF showed 61 WBCs and 5 OCB. He was initially treated with high dose steroids and treated as ADEM but subsequent MRI demonstrated additional lesion accumulation over time leading to final diagnosis of multiple sclerosis.     December 2020 he was admitted to [facility] with progressive decline in gait and coordination with weight loss. There was some concern for enhancement in the medulla and he was switched to siponidmod. This was poorly tolerated and he was started on Ocrelizumab.     Previous disease modifying therapy:   Aubagio started 2018 (stopped with concern for ongoing inflammatory disease)  siponimoid 2020 (side effects)   Ocrelizumab Dec 2020, last dose 9/26/22     The patient's medical, surgical, social, and family history were personally reviewed with the patient.  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     MS (multiple sclerosis) (H)      Numbness and tingling     in legs     Pneumonia 11/23/2020     Shortness of breath       Past Surgical History:   Procedure Laterality Date     BACK SURGERY      Lumbar     SEPTOPLASTY  10/31/2012    Procedure: SEPTOPLASTY;  Septoplasty, turbinoplasty, enlargement of maxillary ostia;  Surgeon: Carolina Robison MD;  Location: [facility] OR     spleen[  Age 18    spleenectomy     Social History     Tobacco Use     Smoking status: Every Day     Packs/day: 1.00     Years: 30.00     Pack years: 30.00     Types: Cigarettes     Smokeless tobacco: Never   Substance Use Topics     Alcohol use: No     Drug use: Yes     Types: Marijuana     Family History   Problem Relation Age of Onset     Cancer Father         Lung     Heart Disease Brother 35        Four heart attacks     Alcohol/Drug Brother      Psychotic Disorder Brother         Drug addiction     Unknown/Adopted Son      Unknown/Adopted Daughter      Unknown/Adopted Daughter      Unknown/Adopted Daughter      Unknown/Adopted Daughter      Current Outpatient Medications   Medication     albuterol (PROAIR HFA/PROVENTIL HFA/VENTOLIN HFA) 108 (90 Base) MCG/ACT inhaler     amitriptyline (ELAVIL) 10 MG tablet     Cholecalciferol (VITAMIN D) 50 MCG (2000 UT) CAPS     clonazePAM (KLONOPIN) 1 MG tablet     escitalopram (LEXAPRO) 20 MG tablet     oxybutynin (DITROPAN) 5 MG tablet     valproic acid (DEPAKENE) 250 MG/5ML syrup     vitamin D2 (ERGOCALCIFEROL) 27012 units (1250 mcg) capsule     No current facility-administered medications for this visit.     Allergies   Allergen Reactions     Immune Globulin Rash         Review of Systems:  14-point review of systems was completed. The pertinent positives and negatives are in the HPI.    Physical Examination   General: Patient appears comfortable in no acute distress.   HEENT: NC/AT, no icterus, moist mucous  membranes, poor dentition   Chest: non-labored on RA  Extremities: Warm, no edema  Skin: No rash or lesion   Psych: Affect appropriate for situation   Neuro:  Mental status: Awake, alert, attentive. Language is fluent with intact comprehension of commands.  Cranial nerves: conjugate gaze, EOMI, visual fields intact, face symmetric, shoulder shrug strong, tongue protrusion/uvula midline, no dysarthria.   Motor: Normal muscle bulk and tone. Fine amplitude tremor with action. Movement in the legs and rotation at the wrist while at rest. Appears restless, possible akathisia.     R L  Deltoid  5 5  Biceps  5 5  Triceps  5 5  Wrist ext 5 5  Finger ext 5 5  Finger abd 5 5    Hip flexion 5- 5-  Knee ext 5 5  Dorsiflexion 5 5    Reflexes: mildly brisk reflexes symmetric in biceps, brachioradialis, patellae, and achilles.   Sensory: Intact to light touch.   Coordination: FNF without ataxia or dysmetria.      Laboratory:  Vit d 96   IgG 639    Component Ref Range & Units     CD19% B Cells 6 - 27 % <1 Low      Absolute CD19, B Cells 107 - 698 cells/uL 3 Low           CBC RESULTS: Recent Labs   Lab Test 09/26/22  0833   WBC 8.8   RBC 3.95*   HGB 13.7   HCT 39.4*      MCH 34.7*   MCHC 34.8   RDW 12.7        Lab Results   Component Value Date    AST 31 09/26/2022    AST 33 03/31/2021     Lab Results   Component Value Date    ALT 33 09/26/2022    ALT 39 03/31/2021     Imaging:  No new imaging     Assessment/Plan:  Vincent Zuniga is a 67 year old male who presents for follow up for multiple sclerosis. He has been stable on Ocrevus with no new symptoms concerning for relapse or breakthrough inflammatory activity. He has residual balance difficulty but has not had any falls and is able to walk as far as he would like.     We discussed his sleep which is likely related to multiple cups of coffee through out the day and up until bed time. His brother states he takes naps through the day as well. He does wake to urinate  twice per night.     He is unaware of what medications he is taking and we recommend he bring or fax a current med list prior to our next visit. [facility].     -continue Ocrevus (Next infusion target march 26th 2023)   -blood work prior to next infusion   -antonette recommended, contact provided  -MRI as previously planned   -follow up in 6 months     Patient seen and discussed with Dr. Morillo.   I have reviewed the plan with the patient, who is in agreement.      Araseli Keller DO  Multiple Sclerosis Fellow     Attending physician: I saw and evaluated the patient with Dr. Keller and I agree with her findings and plan of care as documented above.    The patient is clinically stable with no evidence of active inflammatory demyelination on current disease modifying therapy with ocrelizumab, which he will continue. His next infusion is due in March 2023 with a target date of 3/26/23.    Prior to the next infusion, I will check routine laboratory studies for monitoring of this medication to include complete blood counts with differential and hepatic panel. I will also check a vitamin D level and advise on supplementation with vitamin D as needed to maintain his level within the goal range of 60-80 mcg/L. (These tests were ordered at the visit today.)    Regarding difficulty with sleep, I have asked them to send me an updated and accurate list of the patient's current medications. I did prescribe a trial of amitriptyline at his last visit, but it is not clear if he is taking this or at what dose. In any event, this does not seem to have been helpful.    Nocturia is one of the contributors to his sleep disturbance and we could consider a trial of tamsulosin empirically, but again need to clarify his current treatment regimen prior to making further changes.    Dominik Morillo MD   of Neurology  [facility]  [facility]    Diagnoses:    1) Multiple sclerosis  2) Insomnia    Cc:  Marcus Mccain MD (PCP)  Patient

## 2022-12-01 NOTE — PROGRESS NOTES
Multiple Sclerosis Clinic Visit  12/01/2022    Reason: Multiple Sclerosis     Source of information: Patient and chart review    History of Present Symptom:  Vincent Zuniga is a 67 year old male with a PMH significant for depression and multiple sclerosis who presents today for follow up.  He reports symptoms related to MS are stable overall with no new concerns. He is maintained on Ocrevus and his last dose was 9/26/22.       He reports sleep is poor. He is getting up twice through the night to urinate and has difficulty falling back to sleep. Goes to bed around 10:30 pm and will wake up around 3 AM.     Balance is the main residual symptom he notices from MS. He walks unassisted up to a mile at a time.     Disease onset: January 2019 left sided weakness and slurred speech. MRI brain showed multiple enhancing lesions and CSF showed 61 WBCs and 5 OCB. He was initially treated with high dose steroids and treated as ADEM but subsequent MRI demonstrated additional lesion accumulation over time leading to final diagnosis of multiple sclerosis.     December 2020 he was admitted to [facility] with progressive decline in gait and coordination with weight loss. There was some concern for enhancement in the medulla and he was switched to siponidmod. This was poorly tolerated and he was started on Ocrelizumab.     Previous disease modifying therapy:   Aubagio started 2018 (stopped with concern for ongoing inflammatory disease)  siponimoid 2020 (side effects)   Ocrelizumab Dec 2020, last dose 9/26/22     The patient's medical, surgical, social, and family history were personally reviewed with the patient.  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     MS (multiple sclerosis) (H)     Numbness and tingling     in legs    Pneumonia 11/23/2020    Shortness of breath       Past Surgical History:   Procedure Laterality Date    BACK SURGERY      Lumbar    SEPTOPLASTY  10/31/2012    Procedure: SEPTOPLASTY;  Septoplasty, turbinoplasty, enlargement of maxillary ostia;  Surgeon: Carolina Robison MD;  Location: [facility] OR    spleen[  Age 18    spleenectomy     Social History     Tobacco Use    Smoking status: Every Day     Packs/day: 1.00     Years: 30.00     Pack years: 30.00     Types: Cigarettes    Smokeless tobacco: Never   Substance Use Topics    Alcohol use: No    Drug use: Yes     Types: Marijuana     Family History   Problem Relation Age of Onset    Cancer Father         Lung    Heart Disease Brother 35        Four heart attacks    Alcohol/Drug Brother     Psychotic Disorder Brother         Drug addiction    Unknown/Adopted Son     Unknown/Adopted Daughter     Unknown/Adopted Daughter     Unknown/Adopted Daughter     Unknown/Adopted Daughter      Current Outpatient Medications   Medication    albuterol (PROAIR HFA/PROVENTIL HFA/VENTOLIN HFA) 108 (90 Base) MCG/ACT inhaler    amitriptyline (ELAVIL) 10 MG tablet    Cholecalciferol (VITAMIN D) 50 MCG (2000 UT) CAPS    clonazePAM (KLONOPIN) 1 MG tablet    escitalopram (LEXAPRO) 20 MG tablet    oxybutynin (DITROPAN) 5 MG tablet    valproic acid (DEPAKENE) 250 MG/5ML syrup    vitamin D2 (ERGOCALCIFEROL) 89068 units (1250 mcg) capsule     No current facility-administered medications for this visit.     Allergies   Allergen Reactions    Immune Globulin Rash         Review of Systems:  14-point review of systems was completed. The pertinent positives and negatives are in the HPI.    Physical Examination   General: Patient appears comfortable in no acute distress.   HEENT: NC/AT, no icterus, moist mucous membranes, poor dentition   Chest: non-labored on RA  Extremities: Warm, no edema  Skin: No rash or lesion   Psych: Affect  appropriate for situation   Neuro:  Mental status: Awake, alert, attentive. Language is fluent with intact comprehension of commands.  Cranial nerves: conjugate gaze, EOMI, visual fields intact, face symmetric, shoulder shrug strong, tongue protrusion/uvula midline, no dysarthria.   Motor: Normal muscle bulk and tone. Fine amplitude tremor with action. Movement in the legs and rotation at the wrist while at rest. Appears restless, possible akathisia.     R L  Deltoid  5 5  Biceps  5 5  Triceps  5 5  Wrist ext 5 5  Finger ext 5 5  Finger abd 5 5    Hip flexion 5- 5-  Knee ext 5 5  Dorsiflexion 5 5    Reflexes: mildly brisk reflexes symmetric in biceps, brachioradialis, patellae, and achilles.   Sensory: Intact to light touch.   Coordination: FNF without ataxia or dysmetria.      Laboratory:  Vit d 96   IgG 639    Component Ref Range & Units     CD19% B Cells 6 - 27 % <1 Low      Absolute CD19, B Cells 107 - 698 cells/uL 3 Low           CBC RESULTS: Recent Labs   Lab Test 09/26/22  0833   WBC 8.8   RBC 3.95*   HGB 13.7   HCT 39.4*      MCH 34.7*   MCHC 34.8   RDW 12.7        Lab Results   Component Value Date    AST 31 09/26/2022    AST 33 03/31/2021     Lab Results   Component Value Date    ALT 33 09/26/2022    ALT 39 03/31/2021     Imaging:  No new imaging     Assessment/Plan:  Vincent Zuniga is a 67 year old male who presents for follow up for multiple sclerosis. He has been stable on Ocrevus with no new symptoms concerning for relapse or breakthrough inflammatory activity. He has residual balance difficulty but has not had any falls and is able to walk as far as he would like.     We discussed his sleep which is likely related to multiple cups of coffee through out the day and up until bed time. His brother states he takes naps through the day as well. He does wake to urinate twice per night.     He is unaware of what medications he is taking and we recommend he bring or fax a current med list prior  to our next visit. [facility].     -continue Ocrevus (Next infusion target march 26th 2023)   -blood work prior to next infusion   -antonette recommended, contact provided  -MRI as previously planned   -follow up in 6 months     Patient seen and discussed with Dr. Morillo.   I have reviewed the plan with the patient, who is in agreement.      Araseli Keller DO  Multiple Sclerosis Fellow     Attending physician: I saw and evaluated the patient with Dr. Keller and I agree with her findings and plan of care as documented above.    The patient is clinically stable with no evidence of active inflammatory demyelination on current disease modifying therapy with ocrelizumab, which he will continue. His next infusion is due in March 2023 with a target date of 3/26/23.    Prior to the next infusion, I will check routine laboratory studies for monitoring of this medication to include complete blood counts with differential and hepatic panel. I will also check a vitamin D level and advise on supplementation with vitamin D as needed to maintain his level within the goal range of 60-80 mcg/L. (These tests were ordered at the visit today.)    Regarding difficulty with sleep, I have asked them to send me an updated and accurate list of the patient's current medications. I did prescribe a trial of amitriptyline at his last visit, but it is not clear if he is taking this or at what dose. In any event, this does not seem to have been helpful.    Nocturia is one of the contributors to his sleep disturbance and we could consider a trial of tamsulosin empirically, but again need to clarify his current treatment regimen prior to making further changes.    Dominik Morillo MD   of Neurology  [facility]    Diagnoses:    1) Multiple sclerosis  2) Insomnia

## 2022-12-01 NOTE — PATIENT INSTRUCTIONS
Schedule next Ocrevus infusion in March 2023 (target date is 3-)    2.   Blood tests on the day of next infusion    3.   MRI scans in three weeks as scheduled    4.   Please send us a current list of the medications that you are taking from all sources    5.  I would recommend that you receive the Evusheld antibody product for pre-exposure prophylaxis against COVID-19. This can be scheduled by calling [phone] to arrange an appointment at one of the [facility] [place] clinics administering this treatment ([facility], and [place]).     6. Return to clinic in 6 months

## 2022-12-01 NOTE — TELEPHONE ENCOUNTER
Vincent due for next Ocrevus infusion in March (target date of 3/26).  New orders routed to Dr. Morillo for signature.     Araseli Huerta RN

## 2022-12-01 NOTE — Clinical Note
12/1/2022       RE: Vincent Zuniga  [address]  [place] [address]     Dear Colleague,    Thank you for referring your patient, Vincent Zuniga, to the [facility] [place] at [facility]. Please see a copy of my visit note below.    Multiple Sclerosis Clinic Visit  12/01/2022    Reason: Multiple Sclerosis     Source of information: Patient and chart review    History of Present Symptom:  Vincent Zuniga is a 67 year old male with a PMH significant for depression and multiple sclerosis who presents today for follow up.  He reports symptoms related to MS are stable overall with no new concerns. He is maintained on Ocrevus and his last dose was 9/26/22.       He reports sleep is poor. He is getting up twice through the night to urinate and has difficulty falling back to sleep. Goes to bed around 10:30 pm and will wake up around 3 AM.     Balance is the main residual symptom he notices from MS. He walks unassisted up to a mile at a time.     Disease onset: January 2019 left sided weakness and slurred speech. MRI brain showed multiple enhancing lesions and CSF showed 61 WBCs and 5 OCB. He was initially treated with high dose steroids and treated as ADEM but subsequent MRI demonstrated additional lesion accumulation over time leading to final diagnosis of multiple sclerosis.     December 2020 he was admitted to [facility] with progressive decline in gait and coordination with weight loss. There was some concern for enhancement in the medulla and he was switched to siponidmod. This was poorly tolerated and he was started on Ocrelizumab.     Previous disease modifying therapy:   Aubagio started 2018 (stopped with concern for ongoing inflammatory disease)  siponimoid 2020 (side effects)   Ocrelizumab Dec 2020, last dose 9/26/22     The patient's medical, surgical, social, and family history were personally reviewed with the patient.  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    MS (multiple sclerosis) (H)     Numbness and tingling     in legs    Pneumonia 11/23/2020    Shortness of breath       Past Surgical History:   Procedure Laterality Date    BACK SURGERY      Lumbar    SEPTOPLASTY  10/31/2012    Procedure: SEPTOPLASTY;  Septoplasty, turbinoplasty, enlargement of maxillary ostia;  Surgeon: Carolina Robison MD;  Location: [facility] OR    spleen[  Age 18    spleenectomy     Social History     Tobacco Use    Smoking status: Every Day     Packs/day: 1.00     Years: 30.00     Pack years: 30.00     Types: Cigarettes    Smokeless tobacco: Never   Substance Use Topics    Alcohol use: No    Drug use: Yes     Types: Marijuana     Family History   Problem Relation Age of Onset    Cancer Father         Lung    Heart Disease Brother 35        Four heart attacks    Alcohol/Drug Brother     Psychotic Disorder Brother         Drug addiction    Unknown/Adopted Son     Unknown/Adopted Daughter     Unknown/Adopted Daughter     Unknown/Adopted Daughter     Unknown/Adopted Daughter      Current Outpatient Medications   Medication    albuterol (PROAIR HFA/PROVENTIL HFA/VENTOLIN HFA) 108 (90 Base) MCG/ACT inhaler    amitriptyline (ELAVIL) 10 MG tablet    Cholecalciferol (VITAMIN D) 50 MCG (2000 UT) CAPS    clonazePAM (KLONOPIN) 1 MG tablet    escitalopram (LEXAPRO) 20 MG tablet    oxybutynin (DITROPAN) 5 MG tablet    valproic acid (DEPAKENE) 250 MG/5ML syrup    vitamin D2 (ERGOCALCIFEROL) 39633 units (1250 mcg) capsule     No current facility-administered medications for this visit.     Allergies   Allergen Reactions    Immune Globulin Rash         Review of Systems:  14-point review of systems  was completed. The pertinent positives and negatives are in the HPI.    Physical Examination   General: Patient appears comfortable in no acute distress.   HEENT: NC/AT, no icterus, moist mucous membranes, poor dentition   Chest: non-labored on RA  Extremities: Warm, no edema  Skin: No rash or lesion   Psych: Affect appropriate for situation   Neuro:  Mental status: Awake, alert, attentive. Language is fluent with intact comprehension of commands.  Cranial nerves: conjugate gaze, EOMI, visual fields intact, face symmetric, shoulder shrug strong, tongue protrusion/uvula midline, no dysarthria.   Motor: Normal muscle bulk and tone. Fine amplitude tremor with action. Movement in the legs and rotation at the wrist while at rest. Appears restless, possible akathisia.     R L  Deltoid  5 5  Biceps  5 5  Triceps  5 5  Wrist ext 5 5  Finger ext 5 5  Finger abd 5 5    Hip flexion 5- 5-  Knee ext 5 5  Dorsiflexion 5 5    Reflexes: mildly brisk reflexes symmetric in biceps, brachioradialis, patellae, and achilles.   Sensory: Intact to light touch.   Coordination: FNF without ataxia or dysmetria.      Laboratory:  Vit d 96   IgG 639    Component Ref Range & Units     CD19% B Cells 6 - 27 % <1 Low      Absolute CD19, B Cells 107 - 698 cells/uL 3 Low           CBC RESULTS: Recent Labs   Lab Test 09/26/22  0833   WBC 8.8   RBC 3.95*   HGB 13.7   HCT 39.4*      MCH 34.7*   MCHC 34.8   RDW 12.7        Lab Results   Component Value Date    AST 31 09/26/2022    AST 33 03/31/2021     Lab Results   Component Value Date    ALT 33 09/26/2022    ALT 39 03/31/2021     Imaging:  No new imaging     Assessment/Plan:  Vincent Zuniga is a 67 year old male who presents for follow up for multiple sclerosis. He has been stable on Ocrevus with no new symptoms concerning for relapse or breakthrough inflammatory activity. He has residual balance difficulty but has not had any falls and is able to walk as far as he would like.     We  discussed his sleep which is likely related to multiple cups of coffee through out the day and up until bed time. His brother states he takes naps through the day as well. He does wake to urinate twice per night.     He is unaware of what medications he is taking and we recommend he bring or fax a current med list prior to our next visit. [facility].     -continue Ocrevus (Next infusion target march 26th 2023)   -blood work prior to next infusion   -antonette recommended, contact provided  -MRI as previously planned   -follow up in 6 months     Patient seen and discussed with Dr. Morillo.   I have reviewed the plan with the patient, who is in agreement.      Araseli Keller DO  Multiple Sclerosis Fellow     Attending physician: I saw and evaluated the patient with Dr. Keller and I agree with her findings and plan of care as documented above.    The patient is clinically stable with no evidence of active inflammatory demyelination on current disease modifying therapy with ocrelizumab, which he will continue. His next infusion is due in March 2023 with a target date of 3/26/23.    Prior to the next infusion, I will check routine laboratory studies for monitoring of this medication to include complete blood counts with differential and hepatic panel. I will also check a vitamin D level and advise on supplementation with vitamin D as needed to maintain his level within the goal range of 60-80 mcg/L. (These tests were ordered at the visit today.)    Regarding difficulty with sleep, I have asked them to send me an updated and accurate list of the patient's current medications. I did prescribe a trial of amitriptyline at his last visit, but it is not clear if he is taking this or at what dose. In any event, this does not seem to have been helpful.    Nocturia is one of the contributors to his sleep disturbance and we could consider a trial of  tamsulosin empirically, but again need to clarify his current treatment regimen prior to making further changes.    Dominik Morillo MD   of Neurology  [facility]    Diagnoses:    1) Multiple sclerosis  2) Insomnia              Again, thank you for allowing me to participate in the care of your patient.      Sincerely,    Dominik Morillo MD

## 2022-12-02 NOTE — TELEPHONE ENCOUNTER
Received refill request for amitriptyline from [facility]; Patient was last seen yesterday 12/1 and has follow up appointment in May with Dr Morillo. Routed to Dr Morillo due to medication interaction alert.    Esperanza Peña RN

## 2022-12-04 RX ORDER — AMITRIPTYLINE HYDROCHLORIDE 10 MG/1
TABLET ORAL
Qty: 30 TABLET | Refills: 0 | Status: SHIPPED | OUTPATIENT
Start: 2022-12-04 | End: 2022-12-29

## 2022-12-26 ENCOUNTER — HOSPITAL ENCOUNTER (OUTPATIENT)
Dept: MRI IMAGING | Facility: CLINIC | Age: 67
Discharge: HOME | End: 2022-12-26
Attending: PSYCHIATRY & NEUROLOGY
Payer: MEDICARE

## 2022-12-26 DIAGNOSIS — G35 MS (MULTIPLE SCLEROSIS) (H): ICD-10-CM

## 2022-12-26 PROCEDURE — G1004 CDSM NDSC: HCPCS | Performed by: RADIOLOGY

## 2022-12-26 PROCEDURE — 255N000002 HC RX 255 OP 636: Performed by: PSYCHIATRY & NEUROLOGY

## 2022-12-26 PROCEDURE — G1004 CDSM NDSC: HCPCS | Mod: GC | Performed by: RADIOLOGY

## 2022-12-26 PROCEDURE — 70553 MRI BRAIN STEM W/O & W/DYE: CPT | Mod: 26 | Performed by: RADIOLOGY

## 2022-12-26 PROCEDURE — 72156 MRI NECK SPINE W/O & W/DYE: CPT | Mod: 26 | Performed by: RADIOLOGY

## 2022-12-26 PROCEDURE — 72156 MRI NECK SPINE W/O & W/DYE: CPT | Mod: MG

## 2022-12-26 PROCEDURE — 70553 MRI BRAIN STEM W/O & W/DYE: CPT | Mod: MG

## 2022-12-26 PROCEDURE — A9585 GADOBUTROL INJECTION: HCPCS | Performed by: PSYCHIATRY & NEUROLOGY

## 2022-12-26 RX ORDER — GADOBUTROL 604.72 MG/ML
6.5 INJECTION INTRAVENOUS ONCE
Status: COMPLETED | OUTPATIENT
Start: 2022-12-26 | End: 2022-12-26

## 2022-12-26 RX ADMIN — GADOBUTROL 6.5 ML: 604.72 INJECTION INTRAVENOUS at 09:10

## 2022-12-28 DIAGNOSIS — G47.00 INSOMNIA, UNSPECIFIED TYPE: ICD-10-CM

## 2022-12-29 NOTE — TELEPHONE ENCOUNTER
Patient requesting refill of their amitriptyline; Patient was last seen in December and has follow up appointment in May with Dr Morillo. Pended rx to Dr Morillo for signature.    Esperanza Peña RN

## 2022-12-30 RX ORDER — AMITRIPTYLINE HYDROCHLORIDE 10 MG/1
TABLET ORAL
Qty: 30 TABLET | Refills: 5 | Status: SHIPPED | OUTPATIENT
Start: 2022-12-30 | End: 2023-06-26

## 2023-01-05 DIAGNOSIS — G35 MULTIPLE SCLEROSIS (H): ICD-10-CM

## 2023-01-06 RX ORDER — CLONAZEPAM 1 MG/1
TABLET ORAL
Qty: 30 TABLET | Refills: 5 | Status: SHIPPED | OUTPATIENT
Start: 2023-01-06 | End: 2023-08-31

## 2023-01-06 NOTE — TELEPHONE ENCOUNTER
Patient requesting refill of their clonazepam; Patient was last seen in December and has follow up appointment in May with Dr Morillo. Pended rx to Dr Mroillo for signature and will send electronically to the pharmacy once signed.    Esperanza Peña RN

## 2023-02-06 NOTE — TELEPHONE ENCOUNTER
"Left VMM about scheduling next Ocrevus infusion (\"target\" date of 3/27).    Araseli Huerta RN    "

## 2023-03-07 NOTE — TELEPHONE ENCOUNTER
Spoke with Michelle and reminded her to schedule Vincent's infusion.  Provided Michelle the number for [facility] scheduling and a target date of 3/27.    Araseli Huerta RN

## 2023-03-26 ENCOUNTER — HEALTH MAINTENANCE LETTER (OUTPATIENT)
Age: 68
End: 2023-03-26

## 2023-03-28 ENCOUNTER — INFUSION THERAPY VISIT (OUTPATIENT)
Dept: INFUSION THERAPY | Facility: CLINIC | Age: 68
End: 2023-03-28
Attending: PSYCHIATRY & NEUROLOGY
Payer: MEDICARE

## 2023-03-28 VITALS
TEMPERATURE: 97.9 F | DIASTOLIC BLOOD PRESSURE: 73 MMHG | WEIGHT: 154.7 LBS | HEART RATE: 88 BPM | RESPIRATION RATE: 16 BRPM | BODY MASS INDEX: 22.85 KG/M2 | SYSTOLIC BLOOD PRESSURE: 110 MMHG | OXYGEN SATURATION: 97 %

## 2023-03-28 DIAGNOSIS — G35 MULTIPLE SCLEROSIS (H): Primary | ICD-10-CM

## 2023-03-28 DIAGNOSIS — G35 MS (MULTIPLE SCLEROSIS) (H): ICD-10-CM

## 2023-03-28 DIAGNOSIS — E55.9 VITAMIN D DEFICIENCY: ICD-10-CM

## 2023-03-28 LAB
ALBUMIN SERPL BCG-MCNC: 4 G/DL (ref 3.5–5.2)
ALP SERPL-CCNC: 85 U/L (ref 40–129)
ALT SERPL W P-5'-P-CCNC: 24 U/L (ref 10–50)
AST SERPL W P-5'-P-CCNC: 25 U/L (ref 10–50)
BASOPHILS # BLD AUTO: 0.1 10E3/UL (ref 0–0.2)
BASOPHILS NFR BLD AUTO: 0 %
BILIRUB DIRECT SERPL-MCNC: <0.2 MG/DL (ref 0–0.3)
BILIRUB SERPL-MCNC: <0.2 MG/DL
DEPRECATED CALCIDIOL+CALCIFEROL SERPL-MC: 51 UG/L (ref 20–75)
EOSINOPHIL # BLD AUTO: 0.4 10E3/UL (ref 0–0.7)
EOSINOPHIL NFR BLD AUTO: 3 %
ERYTHROCYTE [DISTWIDTH] IN BLOOD BY AUTOMATED COUNT: 12.5 % (ref 10–15)
HCT VFR BLD AUTO: 35.3 % (ref 40–53)
HGB BLD-MCNC: 12.1 G/DL (ref 13.3–17.7)
IMM GRANULOCYTES # BLD: 0 10E3/UL
IMM GRANULOCYTES NFR BLD: 0 %
LYMPHOCYTES # BLD AUTO: 1.8 10E3/UL (ref 0.8–5.3)
LYMPHOCYTES NFR BLD AUTO: 16 %
MCH RBC QN AUTO: 33.5 PG (ref 26.5–33)
MCHC RBC AUTO-ENTMCNC: 34.3 G/DL (ref 31.5–36.5)
MCV RBC AUTO: 98 FL (ref 78–100)
MONOCYTES # BLD AUTO: 1.9 10E3/UL (ref 0–1.3)
MONOCYTES NFR BLD AUTO: 16 %
NEUTROPHILS # BLD AUTO: 7.4 10E3/UL (ref 1.6–8.3)
NEUTROPHILS NFR BLD AUTO: 65 %
NRBC # BLD AUTO: 0 10E3/UL
NRBC BLD AUTO-RTO: 0 /100
PLATELET # BLD AUTO: 254 10E3/UL (ref 150–450)
PROT SERPL-MCNC: 6.6 G/DL (ref 6.4–8.3)
RBC # BLD AUTO: 3.61 10E6/UL (ref 4.4–5.9)
WBC # BLD AUTO: 11.6 10E3/UL (ref 4–11)

## 2023-03-28 PROCEDURE — 96375 TX/PRO/DX INJ NEW DRUG ADDON: CPT

## 2023-03-28 PROCEDURE — 250N000011 HC RX IP 250 OP 636: Performed by: PSYCHIATRY & NEUROLOGY

## 2023-03-28 PROCEDURE — 96366 THER/PROPH/DIAG IV INF ADDON: CPT

## 2023-03-28 PROCEDURE — 258N000003 HC RX IP 258 OP 636: Performed by: PSYCHIATRY & NEUROLOGY

## 2023-03-28 PROCEDURE — 85004 AUTOMATED DIFF WBC COUNT: CPT

## 2023-03-28 PROCEDURE — 80076 HEPATIC FUNCTION PANEL: CPT

## 2023-03-28 PROCEDURE — 36415 COLL VENOUS BLD VENIPUNCTURE: CPT

## 2023-03-28 PROCEDURE — 250N000013 HC RX MED GY IP 250 OP 250 PS 637: Performed by: PSYCHIATRY & NEUROLOGY

## 2023-03-28 PROCEDURE — 82306 VITAMIN D 25 HYDROXY: CPT

## 2023-03-28 PROCEDURE — 96365 THER/PROPH/DIAG IV INF INIT: CPT

## 2023-03-28 RX ORDER — EPINEPHRINE 1 MG/ML
0.3 INJECTION, SOLUTION INTRAMUSCULAR; SUBCUTANEOUS EVERY 5 MIN PRN
Status: CANCELLED | OUTPATIENT
Start: 2023-09-24

## 2023-03-28 RX ORDER — HEPARIN SODIUM (PORCINE) LOCK FLUSH IV SOLN 100 UNIT/ML 100 UNIT/ML
5 SOLUTION INTRAVENOUS
Status: CANCELLED | OUTPATIENT
Start: 2023-09-24

## 2023-03-28 RX ORDER — ALBUTEROL SULFATE 90 UG/1
1-2 AEROSOL, METERED RESPIRATORY (INHALATION)
Status: CANCELLED
Start: 2023-09-24

## 2023-03-28 RX ORDER — DIPHENHYDRAMINE HYDROCHLORIDE 50 MG/ML
50 INJECTION INTRAMUSCULAR; INTRAVENOUS
Status: CANCELLED
Start: 2023-09-24

## 2023-03-28 RX ORDER — ACETAMINOPHEN 325 MG/1
650 TABLET ORAL ONCE
Status: COMPLETED | OUTPATIENT
Start: 2023-03-28 | End: 2023-03-28

## 2023-03-28 RX ORDER — ACETAMINOPHEN 325 MG/1
650 TABLET ORAL ONCE
Status: CANCELLED | OUTPATIENT
Start: 2023-09-24

## 2023-03-28 RX ORDER — ALBUTEROL SULFATE 0.83 MG/ML
2.5 SOLUTION RESPIRATORY (INHALATION)
Status: CANCELLED | OUTPATIENT
Start: 2023-09-24

## 2023-03-28 RX ORDER — DIPHENHYDRAMINE HCL 25 MG
50 CAPSULE ORAL ONCE
Status: COMPLETED | OUTPATIENT
Start: 2023-03-28 | End: 2023-03-28

## 2023-03-28 RX ORDER — METHYLPREDNISOLONE SODIUM SUCCINATE 125 MG/2ML
125 INJECTION, POWDER, LYOPHILIZED, FOR SOLUTION INTRAMUSCULAR; INTRAVENOUS ONCE
Status: CANCELLED | OUTPATIENT
Start: 2023-09-24

## 2023-03-28 RX ORDER — METHYLPREDNISOLONE SODIUM SUCCINATE 125 MG/2ML
125 INJECTION, POWDER, LYOPHILIZED, FOR SOLUTION INTRAMUSCULAR; INTRAVENOUS
Status: CANCELLED
Start: 2023-09-24

## 2023-03-28 RX ORDER — HEPARIN SODIUM,PORCINE 10 UNIT/ML
5 VIAL (ML) INTRAVENOUS
Status: CANCELLED | OUTPATIENT
Start: 2023-09-24

## 2023-03-28 RX ORDER — DIPHENHYDRAMINE HCL 25 MG
50 CAPSULE ORAL ONCE
Status: CANCELLED | OUTPATIENT
Start: 2023-09-24

## 2023-03-28 RX ORDER — METHYLPREDNISOLONE SODIUM SUCCINATE 125 MG/2ML
125 INJECTION, POWDER, LYOPHILIZED, FOR SOLUTION INTRAMUSCULAR; INTRAVENOUS ONCE
Status: COMPLETED | OUTPATIENT
Start: 2023-03-28 | End: 2023-03-28

## 2023-03-28 RX ADMIN — METHYLPREDNISOLONE SODIUM SUCCINATE 125 MG: 125 INJECTION, POWDER, FOR SOLUTION INTRAMUSCULAR; INTRAVENOUS at 08:14

## 2023-03-28 RX ADMIN — DIPHENHYDRAMINE HYDROCHLORIDE 50 MG: 25 CAPSULE ORAL at 08:05

## 2023-03-28 RX ADMIN — ACETAMINOPHEN 650 MG: 325 TABLET ORAL at 08:04

## 2023-03-28 RX ADMIN — OCRELIZUMAB 600 MG: 300 INJECTION INTRAVENOUS at 08:58

## 2023-03-28 NOTE — PROGRESS NOTES
Nursing Note  Vincent Zuniga presents today to [facility] for:   Chief Complaint   Patient presents with     Infusion     IV Ocrevus       During today's [facility] appointment, orders from Dr RADHA Morillo were completed.  Frequency: every 6 months    Progress note:  Patient identification verified by name and date of birth.  Assessment completed.  Vitals recorded in Doc Flowsheets.  Patient was provided with education regarding medication/procedure and possible side effects.  Patient verbalized understanding.     present during visit today: Not Applicable.    Treatment Conditions: ~~~ NOTE: If the patient answers yes to any of the questions below, hold the infusion and contact ordering provider or on-call provider.    1. Have you recently had an elevated temperature, fever, chills, productive cough, coughing for 3 weeks or longer or hemoptysis, abnormal vital signs, night sweats,  chest pain or have you noticed a decrease in your appetite, unexplained weight loss or fatigue? No  2. Do you have any open wounds or new incisions? No  3. Do you have any recent or upcoming hospitalizations, surgeries or dental procedures? No  4. Do you currently have or recently have had any signs of illness or infection or are you on any antibiotics? No  5. Have you had any new, sudden or worsening abdominal pain? No  6. Have you or anyone in your household received a live vaccination in the past 4 weeks? Please note:  No live vaccines while on biologic/chemotherapy until 6 months after the last treatment.  Patient can receive the flu vaccine (shot only) and the pneumovax.  It is optimal for the patient to get these vaccines mid cycle, but they can be given at any time as long as it is not on the day of the infusion. No  7. Have you recently been diagnosed with any new nervous system diseases (ie. Multiple sclerosis, Guillain [place], seizures, neurological changes)  or cancer diagnosis? No  8. Are you on any form of radiation or chemotherapy? No  9. Are you pregnant or breast feeding or do you have plans of pregnancy in the future? NA  10. Have you been having any signs of worsening depression or suicidal ideations?  (benlysta only) NA  11. Have there been any other new onset medical symptoms? No    Premedications: administered per order.    Drug Waste Record: No    Infusion length and rate:    100 ml/hr x 15 min  200 ml/hr x 15 min  250 ml/hr x 30 min  300 ml/hr x remainder    Labs: were drawn per orders.     Vascular access: peripheral IV placed today.    Is the next appt scheduled? Message sent to .    Post Infusion Assessment:  Patient tolerated infusion without incident.  Patient observed for 60 minutes post infusion per orders.  Blood return noted pre and post infusion.  Site patent and intact, free from redness, edema or discomfort.  No evidence of extravasations.  Access discontinued per protocol.     Discharge Plan:   Follow up plan of care with: ongoing infusions at [facility].  Discharge instructions were reviewed with patient.  Patient/representative verbalized understanding of discharge instructions and all questions answered.  Patient discharged from [facility] in stable condition.    Noel Pinto RN       Administrations This Visit     acetaminophen (TYLENOL) tablet 650 mg     Admin Date  03/28/2023 Action  $Given Dose  650 mg Route  Oral Administered By  Noel Pinto RN          diphenhydrAMINE (BENADRYL) capsule 50 mg     Admin Date  03/28/2023 Action  $Given Dose  50 mg Route  Oral Administered By  Noel Pinto RN          methylPREDNISolone sodium succinate (solu-MEDROL) injection 125 mg     Admin Date  03/28/2023 Action  $Given Dose  125 mg Route  Intravenous Administered By  Noel Pinto RN          ocrelizumab (OCREVUS) 600 mg in sodium chloride 0.9 % 500 mL  infusion     Admin Date  03/28/2023 Action  $New Bag Dose  600 mg Route  Intravenous Administered By  Noel Pinto RN              /72 (BP Location: Left arm, Patient Position: Semi-Sr's, Cuff Size: Adult Regular)   Pulse 71   Temp 97.9  F (36.6  C) (Oral)   Resp 16   Wt 70.2 kg (154 lb 11.2 oz)   SpO2 95%   BMI 22.85 kg/m

## 2023-04-26 ENCOUNTER — TELEPHONE (OUTPATIENT)
Dept: NEUROLOGY | Facility: CLINIC | Age: 68
End: 2023-04-26

## 2023-04-26 NOTE — TELEPHONE ENCOUNTER
[facility] Call Center    Phone Message    May a detailed message be left on voicemail: yes     Reason for Call: Other: Pt's daughter is calling on his behalf wanting to discuss pt currently having a MS Flare Up. Writer did offer appt with Dr. Morillo tomorrow 4/27 that was open, pt declined and wanted to keep appt for 5/25. Please contact back to discuss.     Action Taken: Message routed to:  [facility] ([facility] Neurology    Travel Screening: Not Applicable

## 2023-04-28 NOTE — TELEPHONE ENCOUNTER
"Spoke with Michelle (daughter) and Iraj.  Iraj states he doesn't \"feel too good\".  He had difficulty describing symptoms.  States he is more tired than usual and there's a pressure in his eye.  He said it is not pain, but a feeling of pressure.  Michelle wonders if Iraj's symptoms are from his diet (she states he eats jars of caramel sauce in a sitting).  Iraj denied any changes in his vision from baseline. Also denied any balance issues, headaches, new sensory symptoms or new areas of weakness.  Iraj has an appointment scheduled with Dr. Morillo in May.  Will notify Dr. Morillo.    Araseli Huerta RN    "

## 2023-05-25 ENCOUNTER — OFFICE VISIT (OUTPATIENT)
Dept: NEUROLOGY | Facility: CLINIC | Age: 68
End: 2023-05-25
Attending: PSYCHIATRY & NEUROLOGY
Payer: MEDICARE

## 2023-05-25 VITALS
DIASTOLIC BLOOD PRESSURE: 71 MMHG | OXYGEN SATURATION: 98 % | WEIGHT: 153.2 LBS | HEART RATE: 57 BPM | BODY MASS INDEX: 22.62 KG/M2 | SYSTOLIC BLOOD PRESSURE: 124 MMHG

## 2023-05-25 DIAGNOSIS — G35 MS (MULTIPLE SCLEROSIS) (H): Primary | ICD-10-CM

## 2023-05-25 DIAGNOSIS — R39.15 URINARY URGENCY: ICD-10-CM

## 2023-05-25 DIAGNOSIS — H53.2 DIPLOPIA: ICD-10-CM

## 2023-05-25 DIAGNOSIS — F17.218 CIGARETTE NICOTINE DEPENDENCE WITH OTHER NICOTINE-INDUCED DISORDER: ICD-10-CM

## 2023-05-25 PROCEDURE — 99214 OFFICE O/P EST MOD 30 MIN: CPT | Mod: GC | Performed by: PSYCHIATRY & NEUROLOGY

## 2023-05-25 PROCEDURE — G0463 HOSPITAL OUTPT CLINIC VISIT: HCPCS

## 2023-05-25 RX ORDER — OXYBUTYNIN CHLORIDE 5 MG/1
5 TABLET ORAL 2 TIMES DAILY
Qty: 180 TABLET | Refills: 3 | Status: SHIPPED | OUTPATIENT
Start: 2023-05-25 | End: 2024-04-08

## 2023-05-25 ASSESSMENT — PAIN SCALES - GENERAL: PAINLEVEL: NO PAIN (0)

## 2023-05-25 NOTE — Clinical Note
5/25/2023       RE: Vincent Zuniga  [address]  [place] [address]     Dear Colleague,    Thank you for referring your patient, Vincent Zuniga, to the [facility] MULTIPLE [facility]OSIS CLINIC [place] [facility]. Please see a copy of my visit note below.      Neurology Clinic Visit    Reason: Multiple Sclerosis      05/25/2023   Source of information: Patient and chart review    History of Present Symptom:   Vincent Zuniga is a 67 year old male with a PMH significant for depression and multiple sclerosis who presents today for follow up.  He reports symptoms related to MS are stable overall with no new concerns. He is maintained on Ocrevus and his last dose was 03/28/23 .        He reports sleep is poor. He is getting up twice through the night to urinate and has difficulty falling back to sleep. Goes to bed around 10:30 pm and will wake up around 3 AM.      Balance is the main residual symptom he notices from MS. He walks unassisted up to a mile at a time.      Disease onset: January 2019 left sided weakness and slurred speech. MRI brain showed multiple enhancing lesions and CSF showed 61 WBCs and 5 OCB. He was initially treated with high dose steroids and treated as ADEM but subsequent MRI demonstrated additional lesion accumulation over time leading to final diagnosis of multiple sclerosis.      December 2020 he was admitted to [facility] with progressive decline in gait and coordination with weight loss. There was some concern for enhancement in the medulla and he was switched to siponidmod. This was poorly tolerated and he was started on Ocrelizumab.      Previous disease modifying therapy:   Aubagio started 2018 (stopped with concern for ongoing inflammatory disease)  siponimoid 2020 (side effects)   Ocrelizumab Dec 2020, last dose 03/28/23     The patient's medical, surgical, social, and family history were personally reviewed with the patient.  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     MS (multiple sclerosis) (H)      Numbness and tingling     in legs     Pneumonia 11/23/2020     Shortness of breath       Past Surgical History:   Procedure Laterality Date     BACK SURGERY      Lumbar     SEPTOPLASTY  10/31/2012    Procedure: SEPTOPLASTY;  Septoplasty, turbinoplasty, enlargement of maxillary ostia;  Surgeon: Carolina Robison MD;  Location: [facility] OR     spleen[  Age 18    spleenectomy     Social History     Tobacco Use     Smoking status: Every Day     Packs/day: 1.00     Years: 30.00     Pack years: 30.00     Types: Cigarettes     Smokeless tobacco: Never   Substance Use Topics     Alcohol use: No     Drug use: Yes     Types: Marijuana     Family History   Problem Relation Age of Onset     Cancer Father         Lung     Heart Disease Brother 35        Four heart attacks     Alcohol/Drug Brother      Psychotic Disorder Brother         Drug addiction     Unknown/Adopted Son      Unknown/Adopted Daughter      Unknown/Adopted Daughter      Unknown/Adopted Daughter      Unknown/Adopted Daughter      Current Outpatient Medications   Medication     albuterol (PROAIR HFA/PROVENTIL HFA/VENTOLIN HFA) 108 (90 Base) MCG/ACT inhaler     amitriptyline (ELAVIL) 10 MG tablet     Cholecalciferol (VITAMIN D) 50 MCG (2000 UT) CAPS     clonazePAM (KLONOPIN) 1 MG tablet     escitalopram (LEXAPRO) 20 MG tablet     OLANZapine (ZYPREXA) 2.5 MG tablet     oxybutynin (DITROPAN) 5 MG tablet     valproic acid (DEPAKENE) 250 MG/5ML syrup     vitamin D2 (ERGOCALCIFEROL) 52899 units (1250 mcg) capsule     No current facility-administered medications for this visit.     Allergies   Allergen  Reactions     Immune Globulin Rash       Physical Examination   Vitals: There were no vitals taken for this visit.   General: Patient appears comfortable in no acute distress.   HEENT: NC/AT, no icterus, moist mucous membranes  Chest: non-labored on RA  Extremities: Warm, no edema  Skin: No rash or lesion   Psych: Affect appropriate for situation   Neuro:  Mental status: Awake, alert, attentive. Language is fluent with intact comprehension.   Cranial nerves: PERRL with no relative afferent pupillary defect, conjugate gaze, EOMI, visual fields intact, face symmetric, shoulder shrug strong, tongue protrusion/uvula midline, no dysarthria.   Motor: Normal muscle bulk and tone. No abnormal movements. 5/5 strength in 4/4 extremities.     R L  Deltoid  5 5  Biceps  5 5  Triceps 5 5  Wrist ext 5 5  Finger ext 5 5  Finger abd 5 5    Hip flexion 5 5  Knee flexion 5 5  Knee ext 5 5  Dorsiflexion 5 5    Reflexes: ***reflexes symmetric in biceps, brachioradialis, patellae, and achilles. No clonus, toes down-going.  Sensory: Intact to light touch, pin, vibration, and proprioception. Romberg is negative.   Coordination: FNF and HS without ataxia or dysmetria.    Gait: Normal width, stride length, turn, with symmetric arm swing. Tandem walk intact.    Laboratory:  03/23  Vit D 51  LFTs wnl  CBC with mile leucocytosis    09/22  IgG 639  CD 19 <1% (absolute cells 3)    Imaging:  MRI brain 12/26/2022  Multiple foci of T2-Hyperintensity in cerebral white matter, gd-ve    Assessment/Plan:  Vincent Zuniga is a 67 year old male who presents for follow up for multiple sclerosis. He has been stable on Ocrevus with no new symptoms concerning for relapse or breakthrough inflammatory activity. He has residual balance difficulty but has not had any falls and is able to walk as far as he would like.      We discussed his sleep which is likely related to multiple cups of coffee through out the day and up until bed time. His brother states he  takes naps through the day as well. He does wake to urinate twice per night.      He is unaware of what medications he is taking and we recommend he bring or fax a current med list prior to our next visit. [facility].      -continue Ocrevus (Next infusion target march 26th 2023)   -blood work prior to next infusion   -antonette recommended, contact provided  -MRI as previously planned   -follow up in 6 months     Patient seen and discussed with Dr. Morillo.  I have reviewed the plan with the patient, who is in agreement.      Prabhakar Austin  Neurology Resident            Neurology Clinic Visit    Reason: Multiple Sclerosis       05/25/2023   Source of information: Patient and chart review    History of Present Symptom:  Vincent Zuniga is a 68 year old male with a PMH significant for multiple sclerosis who presents today for follow up for multiple sclerosis.       Disease onset: January 2019 left sided weakness and slurred speech. MRI brain showed multiple enhancing lesions and CSF showed 61 WBCs and 5 OCB. He was initially treated with high dose steroids and treated as ADEM but subsequent MRI demonstrated additional lesion accumulation over time leading to final diagnosis of multiple sclerosis.      December 2020 he was admitted to [facility] with progressive decline in gait and coordination with weight loss. There was some concern for enhancement in the medulla and he was switched to siponidmod. This was poorly tolerated and he was started on Ocrelizumab.      Previous disease modifying therapy:   Aubagio started 2018 (stopped with concern for ongoing inflammatory disease)  siponimoid 2020 (side effects)   Ocrelizumab Dec 2020, last dose 3/28/23     The patient's medical, surgical, social, and family history were personally reviewed with the patient.  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     MS (multiple sclerosis) (H)      Numbness and tingling     in legs     Pneumonia 11/23/2020     Shortness of breath       Past Surgical History:   Procedure Laterality Date     BACK SURGERY      Lumbar     SEPTOPLASTY  10/31/2012    Procedure: SEPTOPLASTY;  Septoplasty, turbinoplasty, enlargement of maxillary ostia;  Surgeon: Carolina Robison MD;  Location: [facility] OR     spleen[  Age 18    spleenectomy     Social History     Tobacco Use     Smoking status: Every Day     Packs/day: 1.00     Years: 30.00     Pack years: 30.00     Types: Cigarettes     Smokeless tobacco: Never   Substance Use Topics     Alcohol use: No     Drug use: Yes     Types: Marijuana     Family History   Problem Relation Age of Onset     Cancer Father         Lung     Heart Disease Brother 35        Four heart attacks     Alcohol/Drug Brother      Psychotic Disorder Brother         Drug addiction     Unknown/Adopted Son      Unknown/Adopted Daughter      Unknown/Adopted Daughter      Unknown/Adopted Daughter      Unknown/Adopted Daughter      Current Outpatient Medications   Medication     albuterol (PROAIR HFA/PROVENTIL HFA/VENTOLIN HFA) 108 (90 Base) MCG/ACT inhaler     amitriptyline (ELAVIL) 10 MG tablet     Cholecalciferol (VITAMIN D) 50 MCG (2000 UT) CAPS     clonazePAM (KLONOPIN) 1 MG tablet     escitalopram (LEXAPRO) 20 MG tablet     OLANZapine (ZYPREXA) 2.5 MG tablet     oxybutynin (DITROPAN) 5 MG tablet     valproic acid (DEPAKENE) 250 MG/5ML syrup     vitamin D2 (ERGOCALCIFEROL) 34298 units (1250 mcg) capsule     No current facility-administered medications for this visit.     Allergies   Allergen Reactions     Immune Globulin Rash       Physical Examination   Vitals: There were no  vitals taken for this visit. ***  General: Patient appears comfortable in no acute distress.   HEENT: NC/AT, no icterus, moist mucous membranes  Chest: non-labored on RA  Extremities: Warm, no edema  Skin: No rash or lesion   Psych: Affect appropriate for situation   Neuro:  Mental status: Awake, alert, attentive. Language is fluent with intact comprehension.   Cranial nerves: PERRL with no relative afferent pupillary defect, conjugate gaze, EOMI, visual fields intact, face symmetric, shoulder shrug strong, tongue protrusion/uvula midline, no dysarthria.   Motor: Normal muscle bulk and tone. No abnormal movements. 5/5 strength in 4/4 extremities.     R L  Deltoid  5 5  Biceps  5 5  Triceps 5 5  Wrist ext 5 5  Finger ext 5 5  Finger abd 5 5    Hip flexion 5 5  Knee flexion 5 5  Knee ext 5 5  Dorsiflexion 5 5    Reflexes: ***reflexes symmetric in biceps, brachioradialis, patellae, and achilles. No clonus, toes down-going.  Sensory: Intact to light touch, pin, vibration, and proprioception. Romberg is negative.   Coordination: FNF and HS without ataxia or dysmetria.    Gait: Normal width, stride length, turn, with symmetric arm swing. Tandem walk intact.    Laboratory:  03/23  Vit D 51  LFTs wnl  CBC with mile leucocytosis     09/22  IgG 639  CD 19 <1% (absolute cells 3)     Imaging:  MRI brain 12/26/2022  Multiple foci of T2-Hyperintensity in cerebral white matter, gd-  C-spine with no demyelinating lesions  Stable as compared to 7/2021    Assessment/Plan:  Vincent Zuniga is a 68 year old male who presents for follow up for multiple sclerosis    Patient seen and discussed with Dr. Morillo.   I have reviewed the plan with the patient, who is in agreement.      Araseli Keller, DO  Multiple Sclerosis Fellow               Again, thank you for allowing me to participate in the care of your patient.      Sincerely,    Dominik Morillo MD

## 2023-05-25 NOTE — PROGRESS NOTES
Multiple Sclerosis Clinic Visit  05/25/2023     Reason: Multiple Sclerosis     Source of information: Patient and chart review. His daughter is with him today and assists in history and medication review.     History of Present Symptom:  Vincent Zuniga is a 68 year old male with a PMH significant for multiple sclerosis who presents today for follow up for multiple sclerosis. He is on Ocrevus and has been doing okay overall.       He feels he can walk about a half mile before having to rest. Limited by shortness of breath. He thinks this is about half the distance he would have been able to walk one year ago. He also has residual balance difficulty and some double vision.     He has a chronic cough and suffers from COPD, which has worsened in the past year.     He is taking oxybutynin in the morning. He does wake up regularly at night to use the restroom. He is also drinking significant amounts of coffee at all hours.      Disease onset: January 2019 left sided weakness and slurred speech. MRI brain showed multiple enhancing lesions and CSF showed 61 WBCs and 5 OCB. He was initially treated with high dose steroids and treated as ADEM but subsequent MRI demonstrated additional lesion accumulation over time leading to final diagnosis of multiple sclerosis.      December 2020 he was admitted to [facility] with progressive decline in gait and coordination with weight loss. There was some concern for enhancement in the medulla and he was switched to siponidmod. This was poorly tolerated, and he was started on ocrelizumab.      Previous disease modifying therapy:   Aubagio started 2018 (stopped with concern for ongoing inflammatory disease)  Siponimod 2020 (side effects)   Ocrelizumab Dec 2020, last dose 3/28/23     The patient's medical, surgical, social, and family history were personally reviewed with the patient.  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     MS (multiple sclerosis) (H)      Numbness and tingling     in legs     Pneumonia 11/23/2020     Shortness of breath       Past Surgical History:   Procedure Laterality Date     BACK SURGERY      Lumbar     SEPTOPLASTY  10/31/2012    Procedure: SEPTOPLASTY;  Septoplasty, turbinoplasty, enlargement of maxillary ostia;  Surgeon: Carolina Robison MD;  Location: [facility] OR     spleen[  Age 18    spleenectomy     Social History     Tobacco Use     Smoking status: Every Day     Packs/day: 1.00     Years: 30.00     Pack years: 30.00     Types: Cigarettes     Smokeless tobacco: Never   Substance Use Topics     Alcohol use: No     Drug use: Yes     Types: Marijuana     Family History   Problem Relation Age of Onset     Cancer Father         Lung     Heart Disease Brother 35        Four heart attacks     Alcohol/Drug Brother      Psychotic Disorder Brother         Drug addiction     Unknown/Adopted Son      Unknown/Adopted Daughter      Unknown/Adopted Daughter      Unknown/Adopted Daughter      Unknown/Adopted Daughter      Current Outpatient Medications   Medication     albuterol (PROAIR HFA/PROVENTIL HFA/VENTOLIN HFA) 108 (90 Base) MCG/ACT inhaler     amitriptyline (ELAVIL) 10 MG tablet     Cholecalciferol (VITAMIN D) 50 MCG (2000 UT) CAPS     clonazePAM (KLONOPIN) 1 MG tablet     escitalopram (LEXAPRO) 20 MG tablet     OLANZapine (ZYPREXA) 2.5 MG tablet     oxybutynin (DITROPAN) 5 MG tablet     oxybutynin (DITROPAN) 5 MG tablet     valproic acid (DEPAKENE) 250 MG/5ML syrup     vitamin D2 (ERGOCALCIFEROL) 38198 units (1250 mcg) capsule     No current facility-administered medications for this visit.     Allergies   Allergen Reactions     Immune Globulin Rash       Physical Examination    Vitals: /71 (BP Location: Right arm, Patient Position: Sitting, Cuff Size: Adult Regular)   Pulse 57   Wt 69.5 kg (153 lb 3.2 oz)   SpO2 98%   BMI 22.62 kg/m     General: Patient appears comfortable in no acute distress.   HEENT: NC/AT, no icterus, moist mucous membranes  Chest: non-labored on RA  Extremities: Warm, no edema  Skin: No rash or lesion   Psych: Affect appropriate for situation   Neuro:  Mental status: Awake, alert, attentive. Language is fluent with intact comprehension.   Cranial nerves: PERRL with no relative afferent pupillary defect, conjugate gaze, EOMI, face symmetric, shoulder shrug strong, tongue protrusion/uvula midline, no dysarthria.   Motor:      R L  Deltoid  5 5  Biceps  5 5  Triceps 5 5  Wrist ext 5 5  Finger ext 5 5  Finger abd 5 5    Hip flexion 5- 5-  Knee flexion 5 5  Knee ext 5 5  Dorsiflexion 5 5    Reflexes: Mildly brisk reflexes symmetric in biceps, brachioradialis, patellae, and achilles.    Coordination: FNF without ataxia or dysmetria.    Gait: Normal width, stride length. Difficulty with tandem walk intact.     Laboratory:  03/23  Vit D 51  LFTs wnl  CBC with mile leucocytosis     09/22  IgG 639  CD 19 <1% (absolute cells 3)     Imaging:  MRI brain 12/26/2022  Multiple foci of T2-Hyperintensity in cerebral white matter, gd-  C-spine with no demyelinating lesions  Stable as compared to 7/2021    Assessment/Plan:  Vincent Zuniga is a 68 year old male who presents for follow up for multiple sclerosis who is on Ocrevus. He remains stable clinically and based on recent MRI scan last December.     His daughter reports his balance and double vision may be more noticeable. He has a hard time describing these symptoms in detail. We offered referral to neur ophthalmology for assistance in coping with horizontal binocular diplopia.    His urinary frequency at night continues to be bothersome. We discussed increase in oxybutynin to twice daily.     -continue ocrelizumab,  plan for next infusion in September  -increase oxybutynin to twice daily 5 mg   -consider urology eval if symptoms persist  -refer for neuro ophthalmology for diplopia   -encouraged smoking cessation   -follow up 6 months     Patient seen and discussed with Dr. Morillo.   I have reviewed the plan with the patient, who is in agreement.      Araseli Keller DO  Multiple Sclerosis Fellow     Attending physician: I saw and evaluated the patient with Dr. Keller, and I agree with her findings and plan of care as documented above, with the following additions.    The patient remains clinically stable, with no evidence of active inflammatory demyelination on current disease modifying therapy with ocrelizumab, which he will continue with the next infusion to be completed as scheduled in September.    Of particular concern, he continues to smoke cigarettes despite known COPD diagnosis and worsening respiratory function. I told him that continued cigarette smoking will inevitably result in worsening of this condition over time and will likely limit his life expectancy.    Symptomatically, we will address his urinary symptoms by increasing oxybutynin to twice daily, in the hopes of cutting down on nocturia--presently he has been taking this in the morning only.    We will also refer him to neuro-ophthalmology for complaint of double vision.    I will see him back for a review in 6 months.    Dominik Morillo MD   of Neurology  [facility]    Diagnoses:  1) MS  2) Cigarette nicotine dependence, complicated by COPD  3) Urinary urgency  4) Diplopia

## 2023-05-25 NOTE — NURSING NOTE
Chief Complaint   Patient presents with     MS     RECHECK     6 month follow up      Vitals were taken and medications were reconciled.   Noah Mccoy, EMT  11:31 AM

## 2023-05-25 NOTE — LETTER
5/25/2023      RE: Vincent Zuniga  [address]  [place] [address]     Multiple Sclerosis Clinic Visit  05/25/2023     Reason: Multiple Sclerosis     Source of information: Patient and chart review. His daughter is with him today and assists in history and medication review.     History of Present Symptom:  Vincent Zuniga is a 68 year old male with a PMH significant for multiple sclerosis who presents today for follow up for multiple sclerosis. He is on Ocrevus and has been doing okay overall.       He feels he can walk about a half mile before having to rest. Limited by shortness of breath. He thinks this is about half the distance he would have been able to walk one year ago. He also has residual balance difficulty and some double vision.     He has a chronic cough and suffers from COPD, which has worsened in the past year.     He is taking oxybutynin in the morning. He does wake up regularly at night to use the restroom. He is also drinking significant amounts of coffee at all hours.      Disease onset: January 2019 left sided weakness and slurred speech. MRI brain showed multiple enhancing lesions and CSF showed 61 WBCs and 5 OCB. He was initially treated with high dose steroids and treated as ADEM but subsequent MRI demonstrated additional lesion accumulation over time leading to final diagnosis of multiple sclerosis.      December 2020 he was admitted to [facility] with progressive decline in gait and coordination with weight loss. There was some concern for enhancement in the medulla and he was switched to siponidmod. This was poorly tolerated, and he was started on ocrelizumab.      Previous disease modifying therapy:   Aubagio started 2018 (stopped with concern for ongoing inflammatory disease)  Siponimod 2020 (side effects)   Ocrelizumab Dec 2020, last dose 3/28/23     The patient's medical, surgical, social, and family history were personally reviewed with the patient.  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     MS (multiple sclerosis) (H)      Numbness and tingling     in legs     Pneumonia 11/23/2020     Shortness of breath       Past Surgical History:   Procedure Laterality Date     BACK SURGERY      Lumbar     SEPTOPLASTY  10/31/2012    Procedure: SEPTOPLASTY;  Septoplasty, turbinoplasty, enlargement of maxillary ostia;  Surgeon: Carolina Robison MD;  Location: [facility] OR     spleen[  Age 18    spleenectomy     Social History     Tobacco Use     Smoking status: Every Day     Packs/day: 1.00     Years: 30.00     Pack years: 30.00     Types: Cigarettes     Smokeless tobacco: Never   Substance Use Topics     Alcohol use: No     Drug use: Yes     Types: Marijuana     Family History   Problem Relation Age of Onset     Cancer Father         Lung     Heart Disease Brother 35        Four heart attacks     Alcohol/Drug Brother      Psychotic Disorder Brother         Drug addiction     Unknown/Adopted Son      Unknown/Adopted Daughter      Unknown/Adopted Daughter      Unknown/Adopted Daughter      Unknown/Adopted Daughter      Current Outpatient Medications   Medication     albuterol (PROAIR HFA/PROVENTIL HFA/VENTOLIN HFA) 108 (90 Base) MCG/ACT inhaler     amitriptyline (ELAVIL) 10 MG tablet     Cholecalciferol (VITAMIN D) 50 MCG (2000 UT) CAPS     clonazePAM (KLONOPIN) 1 MG tablet     escitalopram (LEXAPRO) 20 MG tablet     OLANZapine (ZYPREXA) 2.5 MG tablet     oxybutynin (DITROPAN) 5 MG tablet     oxybutynin (DITROPAN) 5 MG tablet     valproic acid (DEPAKENE) 250 MG/5ML syrup     vitamin D2 (ERGOCALCIFEROL) 65382 units (1250 mcg) capsule     No current facility-administered medications for this visit.     Allergies    Allergen Reactions     Immune Globulin Rash       Physical Examination   Vitals: /71 (BP Location: Right arm, Patient Position: Sitting, Cuff Size: Adult Regular)   Pulse 57   Wt 69.5 kg (153 lb 3.2 oz)   SpO2 98%   BMI 22.62 kg/m     General: Patient appears comfortable in no acute distress.   HEENT: NC/AT, no icterus, moist mucous membranes  Chest: non-labored on RA  Extremities: Warm, no edema  Skin: No rash or lesion   Psych: Affect appropriate for situation   Neuro:  Mental status: Awake, alert, attentive. Language is fluent with intact comprehension.   Cranial nerves: PERRL with no relative afferent pupillary defect, conjugate gaze, EOMI, face symmetric, shoulder shrug strong, tongue protrusion/uvula midline, no dysarthria.   Motor:      R L  Deltoid  5 5  Biceps  5 5  Triceps  5 5  Wrist ext 5 5  Finger ext 5 5  Finger abd 5 5    Hip flexion 5- 5-  Knee flexion 5 5  Knee ext 5 5  Dorsiflexion 5 5    Reflexes: Mildly brisk reflexes symmetric in biceps, brachioradialis, patellae, and achilles.    Coordination: FNF without ataxia or dysmetria.    Gait: Normal width, stride length. Difficulty with tandem walk intact.     Laboratory:  03/23  Vit D 51  LFTs wnl  CBC with mile leucocytosis     09/22  IgG 639  CD 19 <1% (absolute cells 3)     Imaging:  MRI brain 12/26/2022  Multiple foci of T2-Hyperintensity in cerebral white matter, gd-  C-spine with no demyelinating lesions  Stable as compared to 7/2021    Assessment/Plan:  Vincent Zuniga is a 68 year old male who presents for follow up for multiple sclerosis who is on Ocrevus. He remains stable clinically and based on recent MRI scan last December.     His daughter reports his balance and double vision may be more noticeable. He has a hard time describing these symptoms in detail. We offered referral to neur ophthalmology for assistance in coping with horizontal binocular diplopia.    His urinary frequency at night continues to be bothersome. We  discussed increase in oxybutynin to twice daily.     -continue ocrelizumab, plan for next infusion in September  -increase oxybutynin to twice daily 5 mg   -consider urology eval if symptoms persist  -refer for neuro ophthalmology for diplopia   -encouraged smoking cessation   -follow up 6 months     Patient seen and discussed with Dr. Morillo.   I have reviewed the plan with the patient, who is in agreement.      Araseli Keller DO  Multiple Sclerosis Fellow     Attending physician: I saw and evaluated the patient with Dr. Keller, and I agree with her findings and plan of care as documented above, with the following additions.    The patient remains clinically stable, with no evidence of active inflammatory demyelination on current disease modifying therapy with ocrelizumab, which he will continue with the next infusion to be completed as scheduled in September.    Of particular concern, he continues to smoke cigarettes despite known COPD diagnosis and worsening respiratory function. I told him that continued cigarette smoking will inevitably result in worsening of this condition over time and will likely limit his life expectancy.    Symptomatically, we will address his urinary symptoms by increasing oxybutynin to twice daily, in the hopes of cutting down on nocturia--presently he has been taking this in the morning only.    We will also refer him to neuro-ophthalmology for complaint of double vision.    I will see him back for a review in 6 months.    Dominik Morillo MD   of Neurology  [facility]    Diagnoses:  1) MS  2) Cigarette nicotine dependence, complicated by COPD  3) Urinary urgency  4) Diplopia    Cc:  Marcus Mccain MD (PCP)  Patient

## 2023-05-25 NOTE — PATIENT INSTRUCTIONS
Proceed with next Ocrevus infusion in September as scheduled    2.   We will try increasing oxybutynin to 5 mg twice daily to see if this helps with urinary frequency at night    3.  We will refer you to neuro-ophthalmology for evaluation of double vision    4.  Return to clinic in 6 months

## 2023-06-26 DIAGNOSIS — G47.00 INSOMNIA, UNSPECIFIED TYPE: ICD-10-CM

## 2023-06-26 NOTE — TELEPHONE ENCOUNTER
Received refill request for amitriptyline from [facility]; Patient was last seen in May 2023 and has follow up appointment in Nov 2023 with Dr Morillo. Pended to Dr Morillo due to medication interaction alert.    Esperanza Peña RN

## 2023-06-27 RX ORDER — AMITRIPTYLINE HYDROCHLORIDE 10 MG/1
TABLET ORAL
Qty: 30 TABLET | Refills: 5 | Status: SHIPPED | OUTPATIENT
Start: 2023-06-27 | End: 2024-01-29

## 2023-08-29 DIAGNOSIS — G35 MULTIPLE SCLEROSIS (H): ICD-10-CM

## 2023-08-30 NOTE — TELEPHONE ENCOUNTER
Patient requesting refill of their clonazepam; Patient was last seen in May 2023 and has follow up appointment in Nov 2023 with Dr Morillo. Pended rx to Dr Morillo for signature and will send electronically to the pharmacy once signed.    Esperanza Peña RN

## 2023-08-31 RX ORDER — CLONAZEPAM 1 MG/1
TABLET ORAL
Qty: 30 TABLET | Refills: 2 | Status: SHIPPED | OUTPATIENT
Start: 2023-08-31 | End: 2023-12-12

## 2023-09-25 ENCOUNTER — INFUSION THERAPY VISIT (OUTPATIENT)
Dept: INFUSION THERAPY | Facility: CLINIC | Age: 68
End: 2023-09-25
Attending: PSYCHIATRY & NEUROLOGY
Payer: MEDICARE

## 2023-09-25 VITALS
SYSTOLIC BLOOD PRESSURE: 113 MMHG | OXYGEN SATURATION: 97 % | WEIGHT: 137.6 LBS | TEMPERATURE: 97.6 F | HEART RATE: 50 BPM | BODY MASS INDEX: 20.32 KG/M2 | RESPIRATION RATE: 16 BRPM | DIASTOLIC BLOOD PRESSURE: 73 MMHG

## 2023-09-25 DIAGNOSIS — G35 MULTIPLE SCLEROSIS (H): Primary | ICD-10-CM

## 2023-09-25 PROCEDURE — 258N000003 HC RX IP 258 OP 636: Performed by: PSYCHIATRY & NEUROLOGY

## 2023-09-25 PROCEDURE — 96366 THER/PROPH/DIAG IV INF ADDON: CPT

## 2023-09-25 PROCEDURE — 250N000011 HC RX IP 250 OP 636: Mod: JZ | Performed by: PSYCHIATRY & NEUROLOGY

## 2023-09-25 PROCEDURE — 250N000013 HC RX MED GY IP 250 OP 250 PS 637: Performed by: PSYCHIATRY & NEUROLOGY

## 2023-09-25 PROCEDURE — 96375 TX/PRO/DX INJ NEW DRUG ADDON: CPT

## 2023-09-25 PROCEDURE — 96365 THER/PROPH/DIAG IV INF INIT: CPT

## 2023-09-25 RX ORDER — METHYLPREDNISOLONE SODIUM SUCCINATE 125 MG/2ML
125 INJECTION, POWDER, LYOPHILIZED, FOR SOLUTION INTRAMUSCULAR; INTRAVENOUS ONCE
Status: CANCELLED | OUTPATIENT
Start: 2024-03-22

## 2023-09-25 RX ORDER — DIPHENHYDRAMINE HYDROCHLORIDE 50 MG/ML
50 INJECTION INTRAMUSCULAR; INTRAVENOUS
Status: CANCELLED
Start: 2024-03-22

## 2023-09-25 RX ORDER — DIPHENHYDRAMINE HCL 25 MG
50 CAPSULE ORAL ONCE
Status: COMPLETED | OUTPATIENT
Start: 2023-09-25 | End: 2023-09-25

## 2023-09-25 RX ORDER — HEPARIN SODIUM,PORCINE 10 UNIT/ML
5 VIAL (ML) INTRAVENOUS
Status: CANCELLED | OUTPATIENT
Start: 2024-03-22

## 2023-09-25 RX ORDER — METHYLPREDNISOLONE SODIUM SUCCINATE 125 MG/2ML
125 INJECTION, POWDER, LYOPHILIZED, FOR SOLUTION INTRAMUSCULAR; INTRAVENOUS ONCE
Status: COMPLETED | OUTPATIENT
Start: 2023-09-25 | End: 2023-09-25

## 2023-09-25 RX ORDER — ACETAMINOPHEN 325 MG/1
650 TABLET ORAL ONCE
Status: CANCELLED | OUTPATIENT
Start: 2024-03-22

## 2023-09-25 RX ORDER — ACETAMINOPHEN 325 MG/1
650 TABLET ORAL ONCE
Status: COMPLETED | OUTPATIENT
Start: 2023-09-25 | End: 2023-09-25

## 2023-09-25 RX ORDER — EPINEPHRINE 1 MG/ML
0.3 INJECTION, SOLUTION INTRAMUSCULAR; SUBCUTANEOUS EVERY 5 MIN PRN
Status: CANCELLED | OUTPATIENT
Start: 2024-03-22

## 2023-09-25 RX ORDER — METHYLPREDNISOLONE SODIUM SUCCINATE 125 MG/2ML
125 INJECTION, POWDER, LYOPHILIZED, FOR SOLUTION INTRAMUSCULAR; INTRAVENOUS
Status: CANCELLED
Start: 2024-03-22

## 2023-09-25 RX ORDER — DIPHENHYDRAMINE HCL 25 MG
50 CAPSULE ORAL ONCE
Status: CANCELLED | OUTPATIENT
Start: 2024-03-22

## 2023-09-25 RX ORDER — ALBUTEROL SULFATE 90 UG/1
1-2 AEROSOL, METERED RESPIRATORY (INHALATION)
Status: CANCELLED
Start: 2024-03-22

## 2023-09-25 RX ORDER — ALBUTEROL SULFATE 0.83 MG/ML
2.5 SOLUTION RESPIRATORY (INHALATION)
Status: CANCELLED | OUTPATIENT
Start: 2024-03-22

## 2023-09-25 RX ORDER — HEPARIN SODIUM (PORCINE) LOCK FLUSH IV SOLN 100 UNIT/ML 100 UNIT/ML
5 SOLUTION INTRAVENOUS
Status: CANCELLED | OUTPATIENT
Start: 2024-03-22

## 2023-09-25 RX ADMIN — METHYLPREDNISOLONE SODIUM SUCCINATE 125 MG: 125 INJECTION, POWDER, FOR SOLUTION INTRAMUSCULAR; INTRAVENOUS at 11:57

## 2023-09-25 RX ADMIN — DIPHENHYDRAMINE HYDROCHLORIDE 50 MG: 25 CAPSULE ORAL at 11:57

## 2023-09-25 RX ADMIN — OCRELIZUMAB 600 MG: 300 INJECTION INTRAVENOUS at 12:34

## 2023-09-25 RX ADMIN — ACETAMINOPHEN 650 MG: 325 TABLET ORAL at 11:56

## 2023-09-25 NOTE — PROGRESS NOTES
Nursing Note  Vincent Zuniga presents today to [facility] for:   Chief Complaint   Patient presents with    Infusion     IV Ocrevus     During today's [facility] appointment, orders from Dr RADHA Morillo were completed.  Frequency: every 6 months    Progress note:  Patient identification verified by name and date of birth.  Assessment completed.  Vitals recorded in Doc Flowsheets.  Patient was provided with education regarding medication/procedure and possible side effects.  Patient verbalized understanding.     present during visit today: Not Applicable.    Treatment Conditions: ~~~ NOTE: If the patient answers yes to any of the questions below, hold the infusion and contact ordering provider or on-call provider.    Have you recently had an elevated temperature, fever, chills, productive cough, coughing for 3 weeks or longer or hemoptysis,  abnormal vital signs, night sweats,  chest pain or have you noticed a decrease in your appetite, unexplained weight loss or fatigue? Yes, patient reports mild intermittent night sweats which is new in the last 6 months ; patient has had an approximate 16 pound weight loss in the last 4 months and reports a decreased appetite. Dr Chavez informed and gave order to proceed with today's Ocrevus infusion and to follow up with primary physician regarding the night sweats, decreased appetite and weight loss; patient informed and verbalized understanding.   Do you have any open wounds or new incisions? No  Do you have any upcoming hospitalizations or surgeries? Does not include esophagogastroduodenoscopy, colonoscopy, endoscopic retrograde cholangiopancreatography (ERCP), endoscopic ultrasound (EUS), dental procedures or joint aspiration/steroid injections No  Do you currently have any signs of illness or infection or are you on any antibiotics? No  Have you had any new, sudden or worsening abdominal pain?  No  Have you or anyone in your household received a live vaccination in the past 4 weeks? Please note: No live vaccines while on biologic/chemotherapy until 6 months after the last treatment. Patient can receive the flu vaccine (shot only), pneumovax and the Covid vaccine. It is optimal for the patient to get these vaccines mid cycle, but they can be given at any time as long as it is not on the day of the infusion. No  Have you recently been diagnosed with any new nervous system diseases (ie. Multiple sclerosis, Guillain Oregonia, seizures, neurological changes) or cancer diagnosis? Are you on any form of radiation or chemotherapy? No  Are you pregnant or breast feeding or do you have plans of pregnancy in the future? NA  Have you been having any signs of worsening depression or suicidal ideations?  (benlysta only) NA  Have there been any other new onset medical symptoms? No  Have you had any new blood clots? (IVIG only) NA  Premedications: administered per order.    Drug Waste Record: No    Infusion length and rate:    100 ml/hr x 15 min  200 ml/hr x 15 min  250 ml/hr x 30 min  300 ml/hr x remainder    Labs: were not ordered for this appointment.    Vascular access: peripheral IV placed today.    Is the next appt scheduled? Message sent to provider for new orders and scheduling to schedule.     Patient report given to Uma Goel RN at 1330 and patient care transferred at that time.    Post Infusion Assessment:  Patient tolerated infusion without incident.  Patient observed for one hour post infusion per orders.   Blood return noted pre and post infusion.  Site patent and intact, free from redness, edema or discomfort.  No evidence of extravasations.  Access discontinued per protocol.     Discharge Plan:   Follow up plan of care with: ongoing infusions at [facility].  Discharge instructions were reviewed with patient.  Patient/representative verbalized understanding of discharge  instructions and all questions answered.  Patient discharged from [facility] in stable condition.    Noel Pinto RN      Administrations This Visit       acetaminophen (TYLENOL) tablet 650 mg       Admin Date  09/25/2023 Action  $Given Dose  650 mg Route  Oral Administered By  Noel Pinto RN              diphenhydrAMINE (BENADRYL) capsule 50 mg       Admin Date  09/25/2023 Action  $Given Dose  50 mg Route  Oral Administered By  Noel Pinto RN              methylPREDNISolone sodium succinate (solu-MEDROL) injection 125 mg       Admin Date  09/25/2023 Action  $Given Dose  125 mg Route  Intravenous Administered By  Noel Pinto RN              ocrelizumab (OCREVUS) 600 mg in sodium chloride 0.9 % 500 mL infusion       Admin Date  09/25/2023 Action  $New Bag Dose  600 mg Route  Intravenous Administered By  Juhi Goel RN                  /73 (BP Location: Left arm, Patient Position: Semi-Sr's, Cuff Size: Adult Regular)   Pulse 50   Temp 97.6  F (36.4  C) (Oral)   Resp 16   Wt 62.4 kg (137 lb 9.6 oz)   SpO2 97%   BMI 20.32 kg/m

## 2023-09-27 ENCOUNTER — TELEPHONE (OUTPATIENT)
Dept: NEUROLOGY | Facility: CLINIC | Age: 68
End: 2023-09-27

## 2023-09-27 NOTE — TELEPHONE ENCOUNTER
Vincent due for next Ocrevus infusion in March 2024 and would like to schedule at this time. New Ocrevus orders pended to Dr. Morillo for signature.    Araseli Huerta RN

## 2023-10-03 ENCOUNTER — TELEPHONE (OUTPATIENT)
Dept: NEUROLOGY | Facility: CLINIC | Age: 68
End: 2023-10-03
Payer: MEDICARE

## 2023-10-30 DIAGNOSIS — E55.9 VITAMIN D DEFICIENCY: ICD-10-CM

## 2023-10-30 RX ORDER — ACETAMINOPHEN 500 MG
1 TABLET ORAL DAILY
Qty: 90 CAPSULE | Refills: 3 | Status: SHIPPED | OUTPATIENT
Start: 2023-10-30

## 2023-10-30 NOTE — TELEPHONE ENCOUNTER
Received refill request for Vitamin D3 from [facility]; Patient was last seen in May 2023 and has follow up appointment in Nov 2023 with Dr Morillo. Refilled per [facility] refill protocol.    Esperanza Peña RN

## 2023-11-08 RX ORDER — ACETAMINOPHEN 325 MG/1
650 TABLET ORAL ONCE
Status: CANCELLED | OUTPATIENT
Start: 2023-11-08

## 2023-11-08 RX ORDER — EPINEPHRINE 1 MG/ML
0.3 INJECTION, SOLUTION, CONCENTRATE INTRAVENOUS EVERY 5 MIN PRN
OUTPATIENT
Start: 2023-11-08

## 2023-11-08 RX ORDER — METHYLPREDNISOLONE SODIUM SUCCINATE 125 MG/2ML
125 INJECTION, POWDER, LYOPHILIZED, FOR SOLUTION INTRAMUSCULAR; INTRAVENOUS ONCE
Status: CANCELLED | OUTPATIENT
Start: 2023-11-08

## 2023-11-08 RX ORDER — DIPHENHYDRAMINE HCL 25 MG
50 CAPSULE ORAL ONCE
Status: CANCELLED | OUTPATIENT
Start: 2023-11-08

## 2023-11-08 RX ORDER — HEPARIN SODIUM,PORCINE 10 UNIT/ML
5-20 VIAL (ML) INTRAVENOUS DAILY PRN
OUTPATIENT
Start: 2023-11-08

## 2023-11-08 RX ORDER — ALBUTEROL SULFATE 0.83 MG/ML
2.5 SOLUTION RESPIRATORY (INHALATION)
OUTPATIENT
Start: 2023-11-08

## 2023-11-08 RX ORDER — ALBUTEROL SULFATE 90 UG/1
1-2 AEROSOL, METERED RESPIRATORY (INHALATION)
Start: 2023-11-08

## 2023-11-08 RX ORDER — DIPHENHYDRAMINE HYDROCHLORIDE 50 MG/ML
50 INJECTION INTRAMUSCULAR; INTRAVENOUS
Start: 2023-11-08

## 2023-11-08 RX ORDER — METHYLPREDNISOLONE SODIUM SUCCINATE 125 MG/2ML
125 INJECTION, POWDER, LYOPHILIZED, FOR SOLUTION INTRAMUSCULAR; INTRAVENOUS
Start: 2023-11-08

## 2023-11-08 RX ORDER — HEPARIN SODIUM (PORCINE) LOCK FLUSH IV SOLN 100 UNIT/ML 100 UNIT/ML
5 SOLUTION INTRAVENOUS
OUTPATIENT
Start: 2023-11-08

## 2023-11-21 ENCOUNTER — DOCUMENTATION ONLY (OUTPATIENT)
Dept: NEUROLOGY | Facility: CLINIC | Age: 68
End: 2023-11-21
Payer: MEDICARE

## 2023-11-30 ENCOUNTER — OFFICE VISIT (OUTPATIENT)
Dept: NEUROLOGY | Facility: CLINIC | Age: 68
End: 2023-11-30
Attending: PSYCHIATRY & NEUROLOGY
Payer: MEDICARE

## 2023-11-30 VITALS
OXYGEN SATURATION: 97 % | BODY MASS INDEX: 21.69 KG/M2 | SYSTOLIC BLOOD PRESSURE: 106 MMHG | HEART RATE: 67 BPM | DIASTOLIC BLOOD PRESSURE: 71 MMHG | WEIGHT: 146.9 LBS

## 2023-11-30 DIAGNOSIS — G20.C PARKINSONISM, UNSPECIFIED PARKINSONISM TYPE (H): ICD-10-CM

## 2023-11-30 DIAGNOSIS — G35 MS (MULTIPLE SCLEROSIS) (H): Primary | ICD-10-CM

## 2023-11-30 DIAGNOSIS — G47.00 INSOMNIA, UNSPECIFIED TYPE: ICD-10-CM

## 2023-11-30 DIAGNOSIS — R39.15 URINARY URGENCY: ICD-10-CM

## 2023-11-30 DIAGNOSIS — E55.9 VITAMIN D DEFICIENCY: ICD-10-CM

## 2023-11-30 PROCEDURE — 99214 OFFICE O/P EST MOD 30 MIN: CPT | Performed by: PSYCHIATRY & NEUROLOGY

## 2023-11-30 PROCEDURE — G0463 HOSPITAL OUTPT CLINIC VISIT: HCPCS | Performed by: PSYCHIATRY & NEUROLOGY

## 2023-11-30 ASSESSMENT — PAIN SCALES - GENERAL: PAINLEVEL: NO PAIN (0)

## 2023-11-30 NOTE — PATIENT INSTRUCTIONS
Proceed with next Ocrevus infusion on or about March 25, 2024. You can contact [phone] to schedule    2.  Blood tests on date of next infusion    3.  Please confirm with your daughter that you are taking oxybutynin (bladder medication) twice daily    4.  Continue clonazepam and amitriptyline at bedtime    5.  Return to clinic in 6 months with MRI scans of the brain and cervical spine prior to visit

## 2023-11-30 NOTE — NURSING NOTE
Chief Complaint   Patient presents with    MS    RECHECK     6 month follow up      Vitals were taken and medications were reconciled.   Noah Mccoy, EMT  1:26 PM

## 2023-11-30 NOTE — Clinical Note
11/30/2023       RE: Vincent Zuniga  [address] [facility] [address]     Dear Colleague,    Thank you for referring your patient, Vincent Zuniga, to the [facility] [place] [facility]. Please see a copy of my visit note below.    Referral source: Established patient    Chief complaint: Multiple sclerosis    History of the Present Illness: Mr. Vincent Zuniga is a 68 year old right handed man who is evaluated in the Multiple Sclerosis Clinic today for scheduled follow up visit regarding his diagnosis of multiple sclerosis.    The patient's history is as per my previous notes. He initially developed symptoms of demyelinating disease in January 2019. when he presented to the hospital at [facility] with left-sided weakness and slurred speech.  Brain MRI imaging at that time demonstrated multiple enhancing lesions, and a CSF examination was also positive for oligoclonal bands. He was initially treated with a short course of IV steroids on suspicion of acute disseminated encephalomyelitis. However, follow up MRI in April 2019 showed radiologic disease progression and he was placed on teriflunomide by an outside provider.  In spring 2020 he was admitted to our hospital with increasing difficulties with gait instability, incoordination and weight loss.  Treatment with siponimod was attempted, but he did not tolerate that well.  Most recently, he has been on ocrelizumab since December 2020, and remains on that medication up until the present time, with the last infusion completed on 9/25/2023.    Today, the patient denies any new episodic changes in vision, balance, strength or sensation suggestive of new relapse of multiple sclerosis since he was last seen.  Of note, the patient is a poor historian and is unable to provide any meaningful details about his response to current medications.  He does not seem overtly concerned about any  particular symptoms today.    Symptomatically, at our last visit I had recommended that the patient take oxybutynin 5 mg at bedtime as well as in the morning as he was complaining of nocturia.  All of his pills are laid out for him by his daughter and he is not sure if he is taking this medication as directed, or not.  In any event, at present he is not endorsing bothersome nocturia.    He takes amitriptyline 10 mg and clonazepam 1 mg at bedtime for sleep.  He also reports that sleep is going OK.    At our last visit, I had placed a referral to neuro-ophthalmology for complaint of double vision.  It does not appear that an appointment in ophthalmology was ever scheduled, and currently he is denying double vision.    PHYSICAL EXAMINATION:  VITAL SIGNS: Blood pressure 106/71; pulse 67; oxygen saturation 97%; weight 66.6 kg.  GENERAL: Well-nourished man who presents to the examination accompanied by his brother, awake and alert and in no acute distress.    NEUROLOGIC EXAMINATION:  CRANIAL NERVES: Visual fields are full to confrontation.  There are no gross abnormalities of extraocular movements and no internuclear ophthalmoplegia.  Facial strength is normal; there is a degree of hypomimia.  Palate elevation and tongue protrusion are normal.  POWER: Strength is within normal limits in proximal and distal muscles in the upper and lower limbs throughout.  REFLEXES: Reflexes are symmetric and within normal limits in the arms and legs.  MOTOR/CEREBELLAR: There is an intermittent pill-rolling tremor of the right hand superimposed on frequent, seemingly volitional motor activity.  In general, he appears restless with a pattern of movement suggestive of akathisia.  There is no appendicular ataxia on finger-to-nose testing.  Rapid alternating movements are within normal limits in the hands and fingers.  There is no pronator drift in the arms.  GAIT: The patient is able to ambulate independently on a flat, level surface with no  gross loss of postural stability.    Assessment/plan:    1. Multiple sclerosis  As nearly as I can ascertain, the patient remains clinically stable as regards any evidence of active inflammatory demyelination on current disease modifying therapy with ocrelizumab, and we will continue this treatment with the next infusion due on or about 3/25/2024.  Prior to the infusion, I will check routine laboratory studies for monitoring of this medication to include complete blood counts and differential, hepatic panel, and total antibody (IgG) level.    I will see him back in 6 months for a review with MRI scans of the brain and cervical spine to be performed prior to that visit in order to monitor the radiologic stability of his condition.    2. Urinary urgency  I asked them to confirm with the he patient's daughter, who organizes his medications, that he is currently taking oxybutynin 5 mg twice daily.  In any event, he denies being bothered by frequent nocturia at present.    3. Insomnia  It is also my understanding that sleep difficulties are being adequately managed with current doses of amitriptyline and clonazepam at bedtime, which we will continue.    4. Vitamin D deficiency  At the next laboratory draw, I will check a vitamin D level to follow-up on previous finding of vitamin D deficiency and will advise the patient on supplementation with vitamin D as needed to maintain his level within our goal range of 60 to 80 mcg/L.     5. Parkinsonism  There are parkinsonian features on motor examination, as before.  He is overall motor examination, including significant degree of motor agitation, is largely consistent with what is expected for longstanding neuroleptic exposure.  I cannot exclude a comorbid synucleinopathy, but would not favor a trial of levodopa replacement therapy at present.      Again, thank you for allowing me to participate in the care of your patient.      Sincerely,    Dominik Morillo MD

## 2023-11-30 NOTE — LETTER
11/30/2023      RE: Vincent Zuniga  [address] [facility] [address]     Referral source: Established patient    Chief complaint: Multiple sclerosis    History of the Present Illness: Mr. Vincent Zuniga is a 68 year old right handed man who is evaluated in the Multiple Sclerosis Clinic today for scheduled follow up visit regarding his diagnosis of multiple sclerosis.    The patient's history is as per my previous notes. He initially developed symptoms of demyelinating disease in January 2019. when he presented to the hospital at [facility] with left-sided weakness and slurred speech.  Brain MRI imaging at that time demonstrated multiple enhancing lesions, and a CSF examination was also positive for oligoclonal bands. He was initially treated with a short course of IV steroids on suspicion of acute disseminated encephalomyelitis. However, follow up MRI in April 2019 showed radiologic disease progression and he was placed on teriflunomide by an outside provider.  In spring 2020 he was admitted to our hospital with increasing difficulties with gait instability, incoordination and weight loss.  Treatment with siponimod was attempted, but he did not tolerate that well.  Most recently, he has been on ocrelizumab since December 2020, and remains on that medication up until the present time, with the last infusion completed on 9/25/2023.    Today, the patient denies any new episodic changes in vision, balance, strength or sensation suggestive of new relapse of multiple sclerosis since he was last seen.  Of note, the patient is a poor historian and is unable to provide any meaningful details about his response to current medications.  He does not seem overtly concerned about any particular symptoms today.    Symptomatically, at our last visit I had recommended that the patient take oxybutynin 5 mg at bedtime as well as in the morning as he was complaining of nocturia.  All of his pills are laid out for him by his  daughter and he is not sure if he is taking this medication as directed, or not.  In any event, at present he is not endorsing bothersome nocturia.    He takes amitriptyline 10 mg and clonazepam 1 mg at bedtime for sleep.  He also reports that sleep is going OK.    At our last visit, I had placed a referral to neuro-ophthalmology for complaint of double vision.  It does not appear that an appointment in ophthalmology was ever scheduled, and currently he is denying double vision.    PHYSICAL EXAMINATION:  VITAL SIGNS: Blood pressure 106/71; pulse 67; oxygen saturation 97%; weight 66.6 kg.  GENERAL: Well-nourished man who presents to the examination accompanied by his brother, awake and alert and in no acute distress.    NEUROLOGIC EXAMINATION:  CRANIAL NERVES: Visual fields are full to confrontation.  There are no gross abnormalities of extraocular movements and no internuclear ophthalmoplegia.  Facial strength is normal; there is a degree of hypomimia.  Palate elevation and tongue protrusion are normal.  POWER: Strength is within normal limits in proximal and distal muscles in the upper and lower limbs throughout.  REFLEXES: Reflexes are symmetric and within normal limits in the arms and legs.  MOTOR/CEREBELLAR: There is an intermittent pill-rolling tremor of the right hand superimposed on frequent, seemingly volitional motor activity.  In general, he appears restless with a pattern of movement suggestive of akathisia.  There is no appendicular ataxia on finger-to-nose testing.  Rapid alternating movements are within normal limits in the hands and fingers.  There is no pronator drift in the arms.  GAIT: The patient is able to ambulate independently on a flat, level surface with no gross loss of postural stability.    Assessment/plan:    1. Multiple sclerosis  As nearly as I can ascertain, the patient remains clinically stable as regards any evidence of active inflammatory demyelination on current disease modifying  therapy with ocrelizumab, and we will continue this treatment with the next infusion due on or about 3/25/2024.  Prior to the infusion, I will check routine laboratory studies for monitoring of this medication to include complete blood counts and differential, hepatic panel, and total antibody (IgG) level.    I will see him back in 6 months for a review with MRI scans of the brain and cervical spine to be performed prior to that visit in order to monitor the radiologic stability of his condition.    2. Urinary urgency  I asked them to confirm with the he patient's daughter, who organizes his medications, that he is currently taking oxybutynin 5 mg twice daily.  In any event, he denies being bothered by frequent nocturia at present.    3. Insomnia  It is also my understanding that sleep difficulties are being adequately managed with current doses of amitriptyline and clonazepam at bedtime, which we will continue.    4. Vitamin D deficiency  At the next laboratory draw, I will check a vitamin D level to follow-up on previous finding of vitamin D deficiency and will advise the patient on supplementation with vitamin D as needed to maintain his level within our goal range of 60 to 80 mcg/L.     5. Parkinsonism  There are parkinsonian features on motor examination, as before.  He is overall motor examination, including significant degree of motor agitation, is largely consistent with what is expected for longstanding           neuroleptic exposure.  I cannot exclude a comorbid synucleinopathy, but would not favor a trial of levodopa replacement therapy at present.    Dominik Morillo MD   of Neurology  [facility]    Cc:  Marcus Mccain MD (PCP)  Patient

## 2023-12-09 DIAGNOSIS — G35 MULTIPLE SCLEROSIS (H): ICD-10-CM

## 2023-12-12 RX ORDER — CLONAZEPAM 1 MG/1
TABLET ORAL
Qty: 30 TABLET | Refills: 5 | Status: SHIPPED | OUTPATIENT
Start: 2023-12-12 | End: 2024-06-11

## 2023-12-24 NOTE — PROGRESS NOTES
Referral source: Established patient    Chief complaint: Multiple sclerosis    History of the Present Illness: Mr. Vincent Zuniga is a 68 year old right handed man who is evaluated in the Multiple Sclerosis Clinic today for scheduled follow up visit regarding his diagnosis of multiple sclerosis.    The patient's history is as per my previous notes. He initially developed symptoms of demyelinating disease in January 2019. when he presented to the hospital at [facility] with left-sided weakness and slurred speech.  Brain MRI imaging at that time demonstrated multiple enhancing lesions, and a CSF examination was also positive for oligoclonal bands. He was initially treated with a short course of IV steroids on suspicion of acute disseminated encephalomyelitis. However, follow up MRI in April 2019 showed radiologic disease progression and he was placed on teriflunomide by an outside provider.  In spring 2020 he was admitted to our hospital with increasing difficulties with gait instability, incoordination and weight loss.  Treatment with siponimod was attempted, but he did not tolerate that well.  Most recently, he has been on ocrelizumab since December 2020, and remains on that medication up until the present time, with the last infusion completed on 9/25/2023.    Today, the patient denies any new episodic changes in vision, balance, strength or sensation suggestive of new relapse of multiple sclerosis since he was last seen.  Of note, the patient is a poor historian and is unable to provide any meaningful details about his response to current medications.  He does not seem overtly concerned about any particular symptoms today.    Symptomatically, at our last visit I had recommended that the patient take oxybutynin 5 mg at bedtime as well as in the morning as he was complaining of nocturia.  All of his pills are laid out for him by his daughter and he is not sure if he is taking this medication as directed, or not.   In any event, at present he is not endorsing bothersome nocturia.    He takes amitriptyline 10 mg and clonazepam 1 mg at bedtime for sleep.  He also reports that sleep is going OK.    At our last visit, I had placed a referral to neuro-ophthalmology for complaint of double vision.  It does not appear that an appointment in ophthalmology was ever scheduled, and currently he is denying double vision.    PHYSICAL EXAMINATION:  VITAL SIGNS: Blood pressure 106/71; pulse 67; oxygen saturation 97%; weight 66.6 kg.  GENERAL: Well-nourished man who presents to the examination accompanied by his brother, awake and alert and in no acute distress.    NEUROLOGIC EXAMINATION:  CRANIAL NERVES: Visual fields are full to confrontation.  There are no gross abnormalities of extraocular movements and no internuclear ophthalmoplegia.  Facial strength is normal; there is a degree of hypomimia.  Palate elevation and tongue protrusion are normal.  POWER: Strength is within normal limits in proximal and distal muscles in the upper and lower limbs throughout.  REFLEXES: Reflexes are symmetric and within normal limits in the arms and legs.  MOTOR/CEREBELLAR: There is an intermittent pill-rolling tremor of the right hand superimposed on frequent, seemingly volitional motor activity.  In general, he appears restless with a pattern of movement suggestive of akathisia.  There is no appendicular ataxia on finger-to-nose testing.  Rapid alternating movements are within normal limits in the hands and fingers.  There is no pronator drift in the arms.  GAIT: The patient is able to ambulate independently on a flat, level surface with no gross loss of postural stability.    Assessment/plan:    1. Multiple sclerosis  As nearly as I can ascertain, the patient remains clinically stable as regards any evidence of active inflammatory demyelination on current disease modifying therapy with ocrelizumab, and we will continue this treatment with the next  infusion due on or about 3/25/2024.  Prior to the infusion, I will check routine laboratory studies for monitoring of this medication to include complete blood counts and differential, hepatic panel, and total antibody (IgG) level.    I will see him back in 6 months for a review with MRI scans of the brain and cervical spine to be performed prior to that visit in order to monitor the radiologic stability of his condition.    2. Urinary urgency  I asked them to confirm with the he patient's daughter, who organizes his medications, that he is currently taking oxybutynin 5 mg twice daily.  In any event, he denies being bothered by frequent nocturia at present.    3. Insomnia  It is also my understanding that sleep difficulties are being adequately managed with current doses of amitriptyline and clonazepam at bedtime, which we will continue.    4. Vitamin D deficiency  At the next laboratory draw, I will check a vitamin D level to follow-up on previous finding of vitamin D deficiency and will advise the patient on supplementation with vitamin D as needed to maintain his level within our goal range of 60 to 80 mcg/L.     5. Parkinsonism  There are parkinsonian features on motor examination, as before.  He is overall motor examination, including significant degree of motor agitation, is largely consistent with what is expected for longstanding neuroleptic exposure.  I cannot exclude a comorbid synucleinopathy, but would not favor a trial of levodopa replacement therapy at present.

## 2024-01-28 DIAGNOSIS — G47.00 INSOMNIA, UNSPECIFIED TYPE: ICD-10-CM

## 2024-01-29 RX ORDER — AMITRIPTYLINE HYDROCHLORIDE 10 MG/1
TABLET ORAL
Qty: 30 TABLET | Refills: 4 | Status: SHIPPED | OUTPATIENT
Start: 2024-01-29 | End: 2024-08-09

## 2024-01-29 NOTE — TELEPHONE ENCOUNTER
Received refill request for amitriptyline from [facility]; Patient was last seen in Nov 2023 and has follow up appointment in May 2024 with Dr Morillo. Routed to Dr Morillo.    Esperanza Peña RN

## 2024-03-20 ENCOUNTER — DOCUMENTATION ONLY (OUTPATIENT)
Dept: NEUROLOGY | Facility: CLINIC | Age: 69
End: 2024-03-20
Payer: MEDICARE

## 2024-03-20 NOTE — PROGRESS NOTES
Gastromy tube exchange report has been received from [facility], report placed in Dr. Bro's folder for review and sinature.   Noah Mccoy EMT 03/20/2024 10:39AM

## 2024-04-01 ENCOUNTER — LAB (OUTPATIENT)
Dept: LAB | Facility: CLINIC | Age: 69
End: 2024-04-01
Payer: MEDICARE

## 2024-04-01 DIAGNOSIS — G35 MS (MULTIPLE SCLEROSIS) (H): ICD-10-CM

## 2024-04-01 DIAGNOSIS — E55.9 VITAMIN D DEFICIENCY: ICD-10-CM

## 2024-04-01 LAB
BASOPHILS # BLD AUTO: 0 10E3/UL (ref 0–0.2)
BASOPHILS NFR BLD AUTO: 0 %
EOSINOPHIL # BLD AUTO: 0.4 10E3/UL (ref 0–0.7)
EOSINOPHIL NFR BLD AUTO: 5 %
ERYTHROCYTE [DISTWIDTH] IN BLOOD BY AUTOMATED COUNT: 12.5 % (ref 10–15)
HCT VFR BLD AUTO: 38 % (ref 40–53)
HGB BLD-MCNC: 13 G/DL (ref 13.3–17.7)
IMM GRANULOCYTES # BLD: 0 10E3/UL
IMM GRANULOCYTES NFR BLD: 0 %
LYMPHOCYTES # BLD AUTO: 2.8 10E3/UL (ref 0.8–5.3)
LYMPHOCYTES NFR BLD AUTO: 40 %
MCH RBC QN AUTO: 34.4 PG (ref 26.5–33)
MCHC RBC AUTO-ENTMCNC: 34.2 G/DL (ref 31.5–36.5)
MCV RBC AUTO: 101 FL (ref 78–100)
MONOCYTES # BLD AUTO: 0.7 10E3/UL (ref 0–1.3)
MONOCYTES NFR BLD AUTO: 10 %
NEUTROPHILS # BLD AUTO: 3.1 10E3/UL (ref 1.6–8.3)
NEUTROPHILS NFR BLD AUTO: 44 %
PLATELET # BLD AUTO: 288 10E3/UL (ref 150–450)
RBC # BLD AUTO: 3.78 10E6/UL (ref 4.4–5.9)
WBC # BLD AUTO: 7.1 10E3/UL (ref 4–11)

## 2024-04-01 PROCEDURE — 85025 COMPLETE CBC W/AUTO DIFF WBC: CPT

## 2024-04-01 PROCEDURE — 82306 VITAMIN D 25 HYDROXY: CPT

## 2024-04-01 PROCEDURE — 36415 COLL VENOUS BLD VENIPUNCTURE: CPT

## 2024-04-01 PROCEDURE — 80076 HEPATIC FUNCTION PANEL: CPT

## 2024-04-01 PROCEDURE — 82784 ASSAY IGA/IGD/IGG/IGM EACH: CPT

## 2024-04-02 LAB
ALBUMIN SERPL BCG-MCNC: 4.5 G/DL (ref 3.5–5.2)
ALP SERPL-CCNC: 91 U/L (ref 40–150)
ALT SERPL W P-5'-P-CCNC: 28 U/L (ref 0–70)
AST SERPL W P-5'-P-CCNC: 29 U/L (ref 0–45)
BILIRUB DIRECT SERPL-MCNC: <0.2 MG/DL (ref 0–0.3)
BILIRUB SERPL-MCNC: 0.2 MG/DL
IGG SERPL-MCNC: 657 MG/DL (ref 610–1616)
PROT SERPL-MCNC: 7 G/DL (ref 6.4–8.3)
VIT D+METAB SERPL-MCNC: 58 NG/ML (ref 20–50)

## 2024-04-03 ENCOUNTER — INFUSION THERAPY VISIT (OUTPATIENT)
Dept: INFUSION THERAPY | Facility: CLINIC | Age: 69
End: 2024-04-03
Attending: PSYCHIATRY & NEUROLOGY
Payer: MEDICARE

## 2024-04-03 VITALS
RESPIRATION RATE: 16 BRPM | HEART RATE: 61 BPM | SYSTOLIC BLOOD PRESSURE: 108 MMHG | TEMPERATURE: 97.7 F | OXYGEN SATURATION: 96 % | DIASTOLIC BLOOD PRESSURE: 65 MMHG

## 2024-04-03 DIAGNOSIS — G35 MULTIPLE SCLEROSIS (H): Primary | ICD-10-CM

## 2024-04-03 PROCEDURE — 258N000003 HC RX IP 258 OP 636: Performed by: PSYCHIATRY & NEUROLOGY

## 2024-04-03 PROCEDURE — 250N000013 HC RX MED GY IP 250 OP 250 PS 637: Performed by: PSYCHIATRY & NEUROLOGY

## 2024-04-03 PROCEDURE — 96366 THER/PROPH/DIAG IV INF ADDON: CPT

## 2024-04-03 PROCEDURE — 96375 TX/PRO/DX INJ NEW DRUG ADDON: CPT

## 2024-04-03 PROCEDURE — 96365 THER/PROPH/DIAG IV INF INIT: CPT

## 2024-04-03 PROCEDURE — 250N000011 HC RX IP 250 OP 636: Performed by: PSYCHIATRY & NEUROLOGY

## 2024-04-03 RX ORDER — EPINEPHRINE 1 MG/ML
0.3 INJECTION, SOLUTION INTRAMUSCULAR; SUBCUTANEOUS EVERY 5 MIN PRN
OUTPATIENT
Start: 2024-09-30

## 2024-04-03 RX ORDER — DIPHENHYDRAMINE HCL 25 MG
50 CAPSULE ORAL ONCE
Status: COMPLETED | OUTPATIENT
Start: 2024-04-03 | End: 2024-04-03

## 2024-04-03 RX ORDER — DIPHENHYDRAMINE HCL 25 MG
50 CAPSULE ORAL ONCE
OUTPATIENT
Start: 2024-09-30

## 2024-04-03 RX ORDER — DIPHENHYDRAMINE HYDROCHLORIDE 50 MG/ML
50 INJECTION INTRAMUSCULAR; INTRAVENOUS
Start: 2024-09-30

## 2024-04-03 RX ORDER — ALBUTEROL SULFATE 90 UG/1
1-2 AEROSOL, METERED RESPIRATORY (INHALATION)
Start: 2024-09-30

## 2024-04-03 RX ORDER — ACETAMINOPHEN 325 MG/1
650 TABLET ORAL ONCE
OUTPATIENT
Start: 2024-09-30

## 2024-04-03 RX ORDER — ACETAMINOPHEN 325 MG/1
650 TABLET ORAL ONCE
Status: COMPLETED | OUTPATIENT
Start: 2024-04-03 | End: 2024-04-03

## 2024-04-03 RX ORDER — METHYLPREDNISOLONE SODIUM SUCCINATE 125 MG/2ML
125 INJECTION, POWDER, LYOPHILIZED, FOR SOLUTION INTRAMUSCULAR; INTRAVENOUS
Start: 2024-09-30

## 2024-04-03 RX ORDER — ALBUTEROL SULFATE 0.83 MG/ML
2.5 SOLUTION RESPIRATORY (INHALATION)
OUTPATIENT
Start: 2024-09-30

## 2024-04-03 RX ORDER — METHYLPREDNISOLONE SODIUM SUCCINATE 125 MG/2ML
125 INJECTION, POWDER, LYOPHILIZED, FOR SOLUTION INTRAMUSCULAR; INTRAVENOUS ONCE
Status: COMPLETED | OUTPATIENT
Start: 2024-04-03 | End: 2024-04-03

## 2024-04-03 RX ORDER — HEPARIN SODIUM,PORCINE 10 UNIT/ML
5-20 VIAL (ML) INTRAVENOUS DAILY PRN
OUTPATIENT
Start: 2024-09-30

## 2024-04-03 RX ORDER — HEPARIN SODIUM (PORCINE) LOCK FLUSH IV SOLN 100 UNIT/ML 100 UNIT/ML
5 SOLUTION INTRAVENOUS
OUTPATIENT
Start: 2024-09-30

## 2024-04-03 RX ORDER — METHYLPREDNISOLONE SODIUM SUCCINATE 125 MG/2ML
125 INJECTION, POWDER, LYOPHILIZED, FOR SOLUTION INTRAMUSCULAR; INTRAVENOUS ONCE
OUTPATIENT
Start: 2024-09-30

## 2024-04-03 RX ADMIN — METHYLPREDNISOLONE SODIUM SUCCINATE 125 MG: 125 INJECTION, POWDER, FOR SOLUTION INTRAMUSCULAR; INTRAVENOUS at 07:22

## 2024-04-03 RX ADMIN — ACETAMINOPHEN 650 MG: 325 TABLET ORAL at 07:20

## 2024-04-03 RX ADMIN — OCRELIZUMAB 600 MG: 300 INJECTION INTRAVENOUS at 07:51

## 2024-04-03 RX ADMIN — DIPHENHYDRAMINE HYDROCHLORIDE 50 MG: 25 CAPSULE ORAL at 07:20

## 2024-04-03 ASSESSMENT — PAIN SCALES - GENERAL: PAINLEVEL: NO PAIN (0)

## 2024-04-03 NOTE — PROGRESS NOTES
Infusion Nursing Note:  Vincent MENDIOLA Kariemiah presents today for Ocrevus infusion.    Patient seen by provider today: No   present during visit today: Not Applicable.    Note: Pre-medications given as ordered. Rapid Ocrevus infusion given over approx 2 hours at the following rates:  100ml/hr x 15 mins  200ml/hr x 15 mins  250ml/hr x 30 mins  300ml/hr x 60 mins    1 hour observation following infusion.    Intravenous Access:  Peripheral IV placed.    Treatment Conditions:  Biological Infusion Checklist:  ~~~ NOTE: If the patient answers yes to any of the questions below, hold the infusion and contact ordering provider or on-call provider.    Have you recently had an elevated temperature, fever, chills, productive cough, coughing for 3 weeks or longer or hemoptysis,  abnormal vital signs, night sweats,  chest pain or have you noticed a decrease in your appetite, unexplained weight loss or fatigue? No  Do you have any open wounds or new incisions? No  Do you have any upcoming hospitalizations or surgeries? Does not include esophagogastroduodenoscopy, colonoscopy, endoscopic retrograde cholangiopancreatography (ERCP), endoscopic ultrasound (EUS), dental procedures or joint aspiration/steroid injections No  Do you currently have any signs of illness or infection or are you on any antibiotics? No  Have you had any new, sudden or worsening abdominal pain? No  Have you or anyone in your household received a live vaccination in the past 4 weeks? Please note: No live vaccines while on biologic/chemotherapy until 6 months after the last treatment. Patient can receive the flu vaccine (shot only), pneumovax and the Covid vaccine. It is optimal for the patient to get these vaccines mid cycle, but they can be given at any time as long as it is not on the day of the infusion. No  Have you recently been diagnosed with any new nervous system diseases (ie. Multiple sclerosis, Guillain [place], seizures, neurological changes) or  cancer diagnosis? Are you on any form of radiation or chemotherapy? No  Are you pregnant or breast feeding or do you have plans of pregnancy in the future? N/A  Have you been having any signs of worsening depression or suicidal ideations?  (benlysta only) N/A  Have there been any other new onset medical symptoms? No  Have you had any new blood clots? (IVIG only) N/A  /73 (BP Location: Left arm, Patient Position: Semi-Sr's, Cuff Size: Adult Regular)   Pulse 54   Temp 97.7  F (36.5  C) (Oral)   Resp 16   SpO2 96%     Administrations This Visit       acetaminophen (TYLENOL) tablet 650 mg       Admin Date  04/03/2024 Action  $Given Dose  650 mg Route  Oral Documented By  Samantha Stallings RN              diphenhydrAMINE (BENADRYL) capsule 50 mg       Admin Date  04/03/2024 Action  $Given Dose  50 mg Route  Oral Documented By  Samantha Stallings RN              methylPREDNISolone sodium succinate (solu-MEDROL) injection 125 mg       Admin Date  04/03/2024 Action  $Given Dose  125 mg Route  Intravenous Documented By  Samantha Stallings RN              ocrelizumab (OCREVUS) 600 mg in sodium chloride 0.9 % 500 mL infusion       Admin Date  04/03/2024 Action  $New Bag Dose  600 mg Route  Intravenous Documented By  Samantha Stallings RN                  Post Infusion Assessment:  Patient tolerated infusion without incident.  Patient observed for 60 minutes post Ocrevus infusion per protocol.  Site patent and intact, free from redness, edema or discomfort.  No evidence of extravasations.  Access discontinued per protocol.     Discharge Plan:   AVS to patient via MYCHART.  No future appointments scheduled in [facility] at this time.  Patient discharged in stable condition accompanied by: self.  Departure Mode: Ambulatory.    Samantha Stallings RN

## 2024-04-03 NOTE — PATIENT INSTRUCTIONS
Dear Vincent Zuniga    Thank you for choosing [facility] ([facility]) for your infusion.  The following information is a summary of our appointment as well as important reminders.      We look forward in seeing you on your next appointment here at [facility] ([facility]).  Please don t hesitate to call us at [phone] to reschedule any of your appointments or to speak with one of the [facility] registered nurses.  It was a pleasure taking care of you today.    Sincerely,    [facility]  [address] [place] [address]  [address]  Phone:  [phone]

## 2024-04-06 DIAGNOSIS — R39.15 URINARY URGENCY: ICD-10-CM

## 2024-04-08 RX ORDER — OXYBUTYNIN CHLORIDE 5 MG/1
5 TABLET ORAL 2 TIMES DAILY
Qty: 180 TABLET | Refills: 0 | Status: SHIPPED | OUTPATIENT
Start: 2024-04-08

## 2024-04-08 NOTE — TELEPHONE ENCOUNTER
Received refill request for oxybutynin from [facility]; Patient was last seen in Nov 2023 and has follow up appointment in May 2024 with Dr Morillo. Refilled per [facility] refill protocol.    Esperanza Peña RN

## 2024-05-26 ENCOUNTER — HEALTH MAINTENANCE LETTER (OUTPATIENT)
Age: 69
End: 2024-05-26

## 2024-05-30 ENCOUNTER — ANCILLARY PROCEDURE (OUTPATIENT)
Dept: MRI IMAGING | Facility: CLINIC | Age: 69
End: 2024-05-30
Attending: PSYCHIATRY & NEUROLOGY
Payer: MEDICARE

## 2024-05-30 ENCOUNTER — OFFICE VISIT (OUTPATIENT)
Dept: NEUROLOGY | Facility: CLINIC | Age: 69
End: 2024-05-30
Attending: PSYCHIATRY & NEUROLOGY
Payer: MEDICARE

## 2024-05-30 VITALS
WEIGHT: 142.3 LBS | OXYGEN SATURATION: 96 % | HEART RATE: 58 BPM | SYSTOLIC BLOOD PRESSURE: 115 MMHG | DIASTOLIC BLOOD PRESSURE: 77 MMHG | BODY MASS INDEX: 21.01 KG/M2

## 2024-05-30 DIAGNOSIS — G35 MS (MULTIPLE SCLEROSIS) (H): ICD-10-CM

## 2024-05-30 DIAGNOSIS — G35 MULTIPLE SCLEROSIS (H): Primary | ICD-10-CM

## 2024-05-30 DIAGNOSIS — R39.15 URINARY URGENCY: ICD-10-CM

## 2024-05-30 DIAGNOSIS — G47.00 INSOMNIA, UNSPECIFIED TYPE: ICD-10-CM

## 2024-05-30 PROCEDURE — G1010 CDSM STANSON: HCPCS | Performed by: RADIOLOGY

## 2024-05-30 PROCEDURE — 70553 MRI BRAIN STEM W/O & W/DYE: CPT | Mod: MG | Performed by: RADIOLOGY

## 2024-05-30 PROCEDURE — A9585 GADOBUTROL INJECTION: HCPCS | Mod: JZ | Performed by: RADIOLOGY

## 2024-05-30 PROCEDURE — 72156 MRI NECK SPINE W/O & W/DYE: CPT | Mod: MG | Performed by: RADIOLOGY

## 2024-05-30 PROCEDURE — G0463 HOSPITAL OUTPT CLINIC VISIT: HCPCS | Performed by: PSYCHIATRY & NEUROLOGY

## 2024-05-30 PROCEDURE — G2211 COMPLEX E/M VISIT ADD ON: HCPCS | Performed by: PSYCHIATRY & NEUROLOGY

## 2024-05-30 PROCEDURE — 99214 OFFICE O/P EST MOD 30 MIN: CPT | Performed by: PSYCHIATRY & NEUROLOGY

## 2024-05-30 RX ORDER — GADOBUTROL 604.72 MG/ML
7.5 INJECTION INTRAVENOUS ONCE
Status: COMPLETED | OUTPATIENT
Start: 2024-05-30 | End: 2024-05-30

## 2024-05-30 RX ADMIN — GADOBUTROL 6.5 ML: 604.72 INJECTION INTRAVENOUS at 07:36

## 2024-05-30 ASSESSMENT — PAIN SCALES - GENERAL: PAINLEVEL: MILD PAIN (3)

## 2024-05-30 NOTE — NURSING NOTE
Chief Complaint   Patient presents with    MS    RECHECK     Ms follow up      Vitals were taken and medications were reconciled.   Noah Mccoy, EMT  9:27 AM

## 2024-05-30 NOTE — DISCHARGE INSTRUCTIONS
MRI Contrast Discharge Instructions    The IV contrast you received today will pass out of your body in your  urine. This will happen in the next 24 hours. You will not feel this process.  Your urine will not change color.    Drink at least 4 extra glasses of water or juice today (unless your doctor  has restricted your fluids). This reduces the stress on your kidneys.  You may take your regular medicines.    If you are on dialysis: It is best to have dialysis today.    If you have a reaction: Most reactions happen right away. If you have  any new symptoms after leaving the hospital (such as hives or swelling),  call your hospital at the correct number below. Or call your family doctor.  If you have breathing distress or wheezing, call 911.    Special instructions: ***    I have read and understand the above information.    Signature:______________________________________ Date:___________    Staff:__________________________________________ Date:___________     Time:__________    [place] [facility] Departments:    ___[place]: [phone]  ___[facility]: [phone]  ___[place]: [phone] ___[facility]: [phone]  ___[facility]: [phone]  ___[facility]: [phone]  ___[phone]  ___[facility]: [phone]  ___[phone]

## 2024-05-30 NOTE — PATIENT INSTRUCTIONS
Proceed with next Ocrevus infusion on or about October 3, 2024: you can call [phone] to schedule    Continue amitripytline, clonazepam, and oxybutynin as you are doing    3.   Return to clinic in 6 months

## 2024-05-30 NOTE — Clinical Note
5/30/2024       RE: Vincent Zuniga  [address] [facility] [address]     Dear Colleague,    Thank you for referring your patient, Vincent Zuniga, to the [facility]. Please see a copy of my visit note below.    No notes on file    Again, thank you for allowing me to participate in the care of your patient.      Sincerely,    Dominik Morillo MD

## 2024-05-30 NOTE — DISCHARGE INSTRUCTIONS
MRI Contrast Discharge Instructions    The IV contrast you received today will pass out of your body in your  urine. This will happen in the next 24 hours. You will not feel this process.  Your urine will not change color.    Drink at least 4 extra glasses of water or juice today (unless your doctor  has restricted your fluids). This reduces the stress on your kidneys.  You may take your regular medicines.    If you are on dialysis: It is best to have dialysis today.    If you have a reaction: Most reactions happen right away. If you have  any new symptoms after leaving the hospital (such as hives or swelling),  call your hospital at the correct number below. Or call your family doctor.  If you have breathing distress or wheezing, call 911.    Special instructions: ***    I have read and understand the above information.    Signature:______________________________________ Date:___________    Staff:__________________________________________ Date:___________     Time:__________    [place] [facility] Departments:    ___[place]: [phone]  ___[facility]: [phone]  ___[place]: [phone] ___[facility]: [phone]  ___[facility]: [phone]  ___[facility]: [phone]  ___[phone]  ___[facility]: [phone]  ___[phone]

## 2024-06-10 DIAGNOSIS — G35 MULTIPLE SCLEROSIS (H): ICD-10-CM

## 2024-06-11 RX ORDER — CLONAZEPAM 1 MG/1
TABLET ORAL
Qty: 30 TABLET | Refills: 0 | Status: SHIPPED | OUTPATIENT
Start: 2024-06-11 | End: 2024-07-16

## 2024-06-11 NOTE — TELEPHONE ENCOUNTER
Patient requesting refill of their clonazepam; Patient was last seen in May 2024 and has follow up appointment in Nov 2024 with Dr Morillo. Pended rx to Dr Morillo for signature and will send electronically to the pharmacy once signed.    Esperanza Peña RN

## 2024-07-03 ENCOUNTER — DOCUMENTATION ONLY (OUTPATIENT)
Dept: NEUROLOGY | Facility: CLINIC | Age: 69
End: 2024-07-03
Payer: MEDICARE

## 2024-07-03 NOTE — PROGRESS NOTES
Percutaneous tube change report has been received from [facility], report has been placed in Dr. Morillo's folder for review and signature.   Noah Mccoy EMT July 3, 2024

## 2024-07-15 DIAGNOSIS — G35 MULTIPLE SCLEROSIS (H): ICD-10-CM

## 2024-07-16 RX ORDER — CLONAZEPAM 1 MG/1
TABLET ORAL
Qty: 30 TABLET | Refills: 0 | Status: SHIPPED | OUTPATIENT
Start: 2024-07-16 | End: 2024-08-23

## 2024-07-16 NOTE — TELEPHONE ENCOUNTER
Patient requesting refill of their clonazepam; Patient was last seen in May and has follow up appointment in November with Dr. Morillo. Pended rx to Dr. Morillo for signature and will send electronically to the pharmacy once signed.    Araseli Huerta RN

## 2024-08-09 DIAGNOSIS — G47.00 INSOMNIA, UNSPECIFIED TYPE: ICD-10-CM

## 2024-08-09 RX ORDER — AMITRIPTYLINE HYDROCHLORIDE 10 MG/1
TABLET ORAL
Qty: 30 TABLET | Refills: 11 | Status: SHIPPED | OUTPATIENT
Start: 2024-08-09

## 2024-08-09 NOTE — TELEPHONE ENCOUNTER
Received refill request for amitriptyline from [facility]; Patient was last seen in May 2024 and has follow up appointment in Nov 2024 with Dr Morillo. Routing to Dr Morillo for approval.     Esperanza Peña RN

## 2024-08-22 DIAGNOSIS — G35 MULTIPLE SCLEROSIS (H): ICD-10-CM

## 2024-08-23 RX ORDER — CLONAZEPAM 1 MG/1
TABLET ORAL
Qty: 30 TABLET | Refills: 5 | Status: SHIPPED | OUTPATIENT
Start: 2024-08-23

## 2024-09-11 DIAGNOSIS — R39.15 URINARY URGENCY: ICD-10-CM

## 2024-09-12 RX ORDER — OXYBUTYNIN CHLORIDE 5 MG/1
5 TABLET ORAL 2 TIMES DAILY
Qty: 180 TABLET | Refills: 3 | Status: SHIPPED | OUTPATIENT
Start: 2024-09-12

## 2024-09-12 NOTE — TELEPHONE ENCOUNTER
Received refill request for oxybutynin from [facility]; Patient was last seen in May 2024 and has follow up appointment in Nov 2024 with Dr Morillo. Refilled per [facility] refill protocol.    Esperanza Peña RN

## 2024-10-08 DIAGNOSIS — E55.9 VITAMIN D DEFICIENCY: ICD-10-CM

## 2024-10-08 DIAGNOSIS — G35 MULTIPLE SCLEROSIS (H): Primary | ICD-10-CM

## 2024-10-08 RX ORDER — ACETAMINOPHEN 500 MG
1 TABLET ORAL DAILY
Qty: 90 CAPSULE | Refills: 0 | Status: SHIPPED | OUTPATIENT
Start: 2024-10-08

## 2024-10-08 NOTE — TELEPHONE ENCOUNTER
Received refill request for vitamin D from [facility]; Patient was last seen in May [id number] and has follow up appointment in Nov 2024 with Dr Morillo. Refilled per [facility] refill protocol.    Also noted that pt has not been scheduled for their Ocrevus infusion due October 3. Infusion appt request added to therapy plan and message sent to [facility] scheduling.     Esperanza Peña RN

## 2024-10-18 ENCOUNTER — DOCUMENTATION ONLY (OUTPATIENT)
Dept: NEUROLOGY | Facility: CLINIC | Age: 69
End: 2024-10-18
Payer: MEDICARE

## 2024-10-28 DIAGNOSIS — G35 MULTIPLE SCLEROSIS (H): Primary | ICD-10-CM

## 2024-10-28 RX ORDER — HEPARIN SODIUM,PORCINE 10 UNIT/ML
5-20 VIAL (ML) INTRAVENOUS DAILY PRN
Status: CANCELLED | OUTPATIENT
Start: 2024-10-28

## 2024-10-28 RX ORDER — ALBUTEROL SULFATE 0.83 MG/ML
2.5 SOLUTION RESPIRATORY (INHALATION)
Status: CANCELLED | OUTPATIENT
Start: 2024-10-28

## 2024-10-28 RX ORDER — DIPHENHYDRAMINE HYDROCHLORIDE 50 MG/ML
25 INJECTION INTRAMUSCULAR; INTRAVENOUS
Status: CANCELLED
Start: 2024-10-28

## 2024-10-28 RX ORDER — EPINEPHRINE 1 MG/ML
0.3 INJECTION, SOLUTION, CONCENTRATE INTRAVENOUS EVERY 5 MIN PRN
Status: CANCELLED | OUTPATIENT
Start: 2024-10-28

## 2024-10-28 RX ORDER — HEPARIN SODIUM (PORCINE) LOCK FLUSH IV SOLN 100 UNIT/ML 100 UNIT/ML
5 SOLUTION INTRAVENOUS
Status: CANCELLED | OUTPATIENT
Start: 2024-10-28

## 2024-10-28 RX ORDER — ALBUTEROL SULFATE 90 UG/1
1-2 INHALANT RESPIRATORY (INHALATION)
Status: CANCELLED
Start: 2024-10-28

## 2024-10-28 RX ORDER — DIPHENHYDRAMINE HYDROCHLORIDE 50 MG/ML
50 INJECTION INTRAMUSCULAR; INTRAVENOUS
Status: CANCELLED
Start: 2024-10-28

## 2024-10-28 RX ORDER — METHYLPREDNISOLONE SODIUM SUCCINATE 40 MG/ML
40 INJECTION INTRAMUSCULAR; INTRAVENOUS
Status: CANCELLED
Start: 2024-10-28

## 2024-10-28 RX ORDER — MEPERIDINE HYDROCHLORIDE 25 MG/ML
25 INJECTION INTRAMUSCULAR; INTRAVENOUS; SUBCUTANEOUS
Status: CANCELLED | OUTPATIENT
Start: 2024-10-28

## 2024-10-31 ENCOUNTER — INFUSION THERAPY VISIT (OUTPATIENT)
Dept: INFUSION THERAPY | Facility: CLINIC | Age: 69
End: 2024-10-31
Attending: PSYCHIATRY & NEUROLOGY
Payer: MEDICARE

## 2024-10-31 VITALS
SYSTOLIC BLOOD PRESSURE: 102 MMHG | TEMPERATURE: 98.1 F | OXYGEN SATURATION: 97 % | DIASTOLIC BLOOD PRESSURE: 68 MMHG | HEART RATE: 62 BPM | RESPIRATION RATE: 16 BRPM

## 2024-10-31 DIAGNOSIS — G35 MULTIPLE SCLEROSIS (H): Primary | ICD-10-CM

## 2024-10-31 PROCEDURE — 250N000011 HC RX IP 250 OP 636: Mod: JZ | Performed by: PSYCHIATRY & NEUROLOGY

## 2024-10-31 PROCEDURE — 250N000013 HC RX MED GY IP 250 OP 250 PS 637: Performed by: PSYCHIATRY & NEUROLOGY

## 2024-10-31 PROCEDURE — 96365 THER/PROPH/DIAG IV INF INIT: CPT

## 2024-10-31 PROCEDURE — 96375 TX/PRO/DX INJ NEW DRUG ADDON: CPT

## 2024-10-31 PROCEDURE — 258N000003 HC RX IP 258 OP 636: Performed by: PSYCHIATRY & NEUROLOGY

## 2024-10-31 PROCEDURE — 96366 THER/PROPH/DIAG IV INF ADDON: CPT

## 2024-10-31 RX ORDER — HEPARIN SODIUM (PORCINE) LOCK FLUSH IV SOLN 100 UNIT/ML 100 UNIT/ML
5 SOLUTION INTRAVENOUS
OUTPATIENT
Start: 2025-03-29

## 2024-10-31 RX ORDER — HEPARIN SODIUM,PORCINE 10 UNIT/ML
5-20 VIAL (ML) INTRAVENOUS DAILY PRN
OUTPATIENT
Start: 2025-03-29

## 2024-10-31 RX ORDER — DIPHENHYDRAMINE HCL 25 MG
50 CAPSULE ORAL ONCE
OUTPATIENT
Start: 2025-03-29

## 2024-10-31 RX ORDER — EPINEPHRINE 1 MG/ML
0.3 INJECTION, SOLUTION INTRAMUSCULAR; SUBCUTANEOUS EVERY 5 MIN PRN
OUTPATIENT
Start: 2025-03-29

## 2024-10-31 RX ORDER — METHYLPREDNISOLONE SODIUM SUCCINATE 40 MG/ML
40 INJECTION INTRAMUSCULAR; INTRAVENOUS
Start: 2025-03-29

## 2024-10-31 RX ORDER — DULOXETIN HYDROCHLORIDE 60 MG/1
60 CAPSULE, DELAYED RELEASE ORAL DAILY
COMMUNITY
Start: 2024-10-14

## 2024-10-31 RX ORDER — MEPERIDINE HYDROCHLORIDE 25 MG/ML
25 INJECTION INTRAMUSCULAR; INTRAVENOUS; SUBCUTANEOUS
OUTPATIENT
Start: 2025-03-29

## 2024-10-31 RX ORDER — ACETAMINOPHEN 325 MG/1
650 TABLET ORAL ONCE
Status: COMPLETED | OUTPATIENT
Start: 2024-10-31 | End: 2024-10-31

## 2024-10-31 RX ORDER — METHYLPREDNISOLONE SODIUM SUCCINATE 125 MG/2ML
125 INJECTION INTRAMUSCULAR; INTRAVENOUS ONCE
Status: COMPLETED | OUTPATIENT
Start: 2024-10-31 | End: 2024-10-31

## 2024-10-31 RX ORDER — METHYLPREDNISOLONE SODIUM SUCCINATE 125 MG/2ML
125 INJECTION INTRAMUSCULAR; INTRAVENOUS ONCE
OUTPATIENT
Start: 2025-03-29

## 2024-10-31 RX ORDER — ALBUTEROL SULFATE 90 UG/1
1-2 INHALANT RESPIRATORY (INHALATION)
Start: 2025-03-29

## 2024-10-31 RX ORDER — DIPHENHYDRAMINE HYDROCHLORIDE 50 MG/ML
50 INJECTION INTRAMUSCULAR; INTRAVENOUS
Start: 2025-03-29

## 2024-10-31 RX ORDER — ACETAMINOPHEN 325 MG/1
650 TABLET ORAL ONCE
OUTPATIENT
Start: 2025-03-29

## 2024-10-31 RX ORDER — DIPHENHYDRAMINE HYDROCHLORIDE 50 MG/ML
25 INJECTION INTRAMUSCULAR; INTRAVENOUS
Start: 2025-03-29

## 2024-10-31 RX ORDER — ALBUTEROL SULFATE 0.83 MG/ML
2.5 SOLUTION RESPIRATORY (INHALATION)
OUTPATIENT
Start: 2025-03-29

## 2024-10-31 RX ORDER — DIPHENHYDRAMINE HCL 25 MG
50 CAPSULE ORAL ONCE
Status: COMPLETED | OUTPATIENT
Start: 2024-10-31 | End: 2024-10-31

## 2024-10-31 RX ADMIN — DIPHENHYDRAMINE HYDROCHLORIDE 50 MG: 25 CAPSULE ORAL at 07:24

## 2024-10-31 RX ADMIN — ACETAMINOPHEN 650 MG: 325 TABLET ORAL at 07:24

## 2024-10-31 RX ADMIN — METHYLPREDNISOLONE SODIUM SUCCINATE 125 MG: 125 INJECTION, POWDER, FOR SOLUTION INTRAMUSCULAR; INTRAVENOUS at 07:23

## 2024-10-31 RX ADMIN — OCRELIZUMAB 600 MG: 300 INJECTION INTRAVENOUS at 07:58

## 2024-10-31 NOTE — PROGRESS NOTES
Infusion Nursing Note:  Vincent MENDIOLA Kariemiah  presents today for Patient presents with:  Infusion: Ocrevus    .    Patient seen by provider today: No   present during visit today: Not Applicable.    Note: Patient identification verified by name and date of birth.  Assessment completed.  Vitals recorded in Doc Flowsheets.  Patient was provided with education regarding medication/procedure and possible side effects.  Patient verbalized understanding.    During today's [facility] appointment, orders from Dr. Ro were completed.     Note:  Frequency: Q 180 days  Labs: none  Premedications:given per orders  Infusion Rate/Length: Ocrevus  infused at 100  mL/hr, x 15 min, 200 ml/hr x 15 min, 250 ml/hr x 30 min and 300 ml/hr for remainder of infusion. Total infusion time of approximately 2 hours with one hour observation    .  Administrations This Visit       acetaminophen (TYLENOL) tablet 650 mg       Admin Date  10/31/2024 Action  $Given Dose  650 mg Route  Oral Documented By  Yohana Juarez RN              diphenhydrAMINE (BENADRYL) capsule 50 mg       Admin Date  10/31/2024 Action  $Given Dose  50 mg Route  Oral Documented By  Yohana Juarez RN              methylPREDNISolone Na Suc (solu-MEDROL) injection 125 mg       Admin Date  10/31/2024 Action  $Given Dose  125 mg Route  Intravenous Documented By  Yohana Juarez RN              ocrelizumab (OCREVUS) 600 mg in sodium chloride 0.9 % 500 mL infusion       Admin Date  10/31/2024 Action  $New Bag Dose  600 mg Route  Intravenous Documented By  Yohana Juarez RN                      Intravenous Access:  Peripheral IV placed.    Treatment Conditions:  Biological Infusion Checklist:  ~~~ NOTE: If the patient answers yes to any of the questions below, hold the infusion and contact ordering provider or on-call provider.    Have you recently had an elevated temperature, fever, chills, productive cough, coughing for 3 weeks or  longer or hemoptysis,  abnormal vital signs, night sweats,  chest pain or have you noticed a decrease in your appetite, unexplained weight loss or fatigue? No  Do you have any open wounds or new incisions? No  Do you have any upcoming hospitalizations or surgeries? Does not include esophagogastroduodenoscopy, colonoscopy, endoscopic retrograde cholangiopancreatography (ERCP), endoscopic ultrasound (EUS), dental procedures or joint aspiration/steroid injections No  Do you currently have any signs of illness or infection or are you on any antibiotics? No  Have you had any new, sudden or worsening abdominal pain? No  Have you or anyone in your household received a live vaccination in the past 4 weeks? Please note: No live vaccines while on biologic/chemotherapy until 6 months after the last treatment. Patient can receive the flu vaccine (shot only), pneumovax and the Covid vaccine. It is optimal for the patient to get these vaccines mid cycle, but they can be given at any time as long as it is not on the day of the infusion. No  Have you recently been diagnosed with any new nervous system diseases (ie. Multiple sclerosis, Guillain [place], seizures, neurological changes) or cancer diagnosis? Are you on any form of radiation or chemotherapy? No  Are you pregnant or breast feeding or do you have plans of pregnancy in the future? No  Have there been any other new onset medical symptoms? No    POST-INFUSION OF BIOLOGICAL MEDICATION:     Reviewed with patient.  Given biologic medication or medication hand-out. Inform patient if any fever, chills or signs of infection, new symptoms, abdominal pain, heart palpitations, shortness of breath, reaction, weakness, neurological changes, seek medical attention immediately and should not receive infusions. No live virus vaccines prior to or during treatment or up to 6 months post infusion. If the patient has an upcoming procedure or surgery, this should be discussed with the  rheumatologist and surgeon or provider.    Post Infusion Assessment:  Patient tolerated infusion without incident.  Blood return noted pre and post infusion.  Site patent and intact, free from redness, edema or discomfort.  No evidence of extravasations.  Access discontinued per protocol.         Discharge Plan:   Discharge instructions reviewed with: Patient.  Patient and/or family verbalized understanding of discharge instructions and all questions answered.  AVS to patient via [employer].  Patient will return   Future Appointments   Date Time Provider Department Center   11/21/2024  2:00 PM Dominik Morillo MD [facility]      for next appointment.   Patient discharged in stable condition accompanied by: self.  Departure Mode: Ambulatory.    /72 (BP Location: Left arm, Patient Position: Semi-Sr's, Cuff Size: Adult Regular)   Pulse 56   Temp 98.1  F (36.7  C) (Oral)   SpO2 95%     Yohana Juarez RN on 10/31/2024 at 8:09 AM

## 2024-10-31 NOTE — PATIENT INSTRUCTIONS
Dear Vincent Zuniga    Thank you for choosing [facility] ([facility] for your infusion.  The following information is a summary of our appointment as well as important reminders.      EDUCATION POST BIOLOGICAL/CHEMOTHERAPY INFUSION  Call the triage nurse at your clinic or seek medical attention if you have chills and/or temperature greater than or equal to 100.5, uncontrolled nausea/vomiting, diarrhea, constipation, dizziness, shortness of breath, chest pain, heart palpitations, weakness or any other new or concerning symptoms, questions or concerns.  You can not have any live virus vaccines prior to or during treatment or up to 6 months post infusion.  If you have an upcoming surgery, medical procedure or dental procedure during treatment, this should be discussed with your ordering physician and your surgeon/dentist.  If you are having any concerning symptom, if you are unsure if you should get your next infusion or wish to speak to a provider before your next infusion, please call your care coordinator or triage nurse at your clinic to notify them so we can adequately serve you.     If you are a transplant patient and require transplant education, please click on this link: [email or web address].    If you have any questions on your upcoming Specialty Infusion appointments, please call scheduling at [phone].  It was a pleasure taking care of you today.    Sincerely,    [facility]  [facility]  [address] [place] [address]  [address]  Phone:  [phone]

## 2024-11-21 ENCOUNTER — OFFICE VISIT (OUTPATIENT)
Dept: NEUROLOGY | Facility: CLINIC | Age: 69
End: 2024-11-21
Attending: PSYCHIATRY & NEUROLOGY
Payer: MEDICARE

## 2024-11-21 ENCOUNTER — TELEPHONE (OUTPATIENT)
Dept: NEUROLOGY | Facility: CLINIC | Age: 69
End: 2024-11-21

## 2024-11-21 VITALS
SYSTOLIC BLOOD PRESSURE: 114 MMHG | HEIGHT: 68 IN | HEART RATE: 69 BPM | OXYGEN SATURATION: 95 % | WEIGHT: 150.7 LBS | DIASTOLIC BLOOD PRESSURE: 74 MMHG | BODY MASS INDEX: 22.84 KG/M2

## 2024-11-21 DIAGNOSIS — G35 MULTIPLE SCLEROSIS (H): Primary | ICD-10-CM

## 2024-11-21 DIAGNOSIS — R39.15 URINARY URGENCY: ICD-10-CM

## 2024-11-21 DIAGNOSIS — G20.C PARKINSONISM, UNSPECIFIED PARKINSONISM TYPE (H): ICD-10-CM

## 2024-11-21 DIAGNOSIS — G47.00 INSOMNIA, UNSPECIFIED TYPE: ICD-10-CM

## 2024-11-21 PROCEDURE — G2211 COMPLEX E/M VISIT ADD ON: HCPCS | Performed by: PSYCHIATRY & NEUROLOGY

## 2024-11-21 PROCEDURE — 99215 OFFICE O/P EST HI 40 MIN: CPT | Performed by: PSYCHIATRY & NEUROLOGY

## 2024-11-21 PROCEDURE — G0463 HOSPITAL OUTPT CLINIC VISIT: HCPCS | Performed by: PSYCHIATRY & NEUROLOGY

## 2024-11-21 RX ORDER — DIPHENHYDRAMINE HYDROCHLORIDE 50 MG/ML
25 INJECTION, SOLUTION INTRAMUSCULAR; INTRAVENOUS
Start: 2025-04-30

## 2024-11-21 RX ORDER — HEPARIN SODIUM,PORCINE 10 UNIT/ML
5-20 VIAL (ML) INTRAVENOUS DAILY PRN
OUTPATIENT
Start: 2025-04-30

## 2024-11-21 RX ORDER — ACETAMINOPHEN 325 MG/1
650 TABLET ORAL ONCE
OUTPATIENT
Start: 2025-04-30

## 2024-11-21 RX ORDER — METHYLPREDNISOLONE SODIUM SUCCINATE 125 MG/2ML
125 INJECTION INTRAMUSCULAR; INTRAVENOUS ONCE
OUTPATIENT
Start: 2025-04-30

## 2024-11-21 RX ORDER — EPINEPHRINE 1 MG/ML
0.3 INJECTION, SOLUTION, CONCENTRATE INTRAVENOUS EVERY 5 MIN PRN
OUTPATIENT
Start: 2025-04-30

## 2024-11-21 RX ORDER — DIPHENHYDRAMINE HYDROCHLORIDE 50 MG/ML
50 INJECTION, SOLUTION INTRAMUSCULAR; INTRAVENOUS
Start: 2025-04-30

## 2024-11-21 RX ORDER — ALBUTEROL SULFATE 90 UG/1
1-2 INHALANT RESPIRATORY (INHALATION)
Start: 2025-04-30

## 2024-11-21 RX ORDER — DIPHENHYDRAMINE HCL 25 MG
50 CAPSULE ORAL ONCE
OUTPATIENT
Start: 2025-04-30

## 2024-11-21 RX ORDER — HEPARIN SODIUM (PORCINE) LOCK FLUSH IV SOLN 100 UNIT/ML 100 UNIT/ML
5 SOLUTION INTRAVENOUS
OUTPATIENT
Start: 2025-04-30

## 2024-11-21 RX ORDER — ALBUTEROL SULFATE 0.83 MG/ML
2.5 SOLUTION RESPIRATORY (INHALATION)
OUTPATIENT
Start: 2025-04-30

## 2024-11-21 RX ORDER — METHYLPREDNISOLONE SODIUM SUCCINATE 40 MG/ML
40 INJECTION INTRAMUSCULAR; INTRAVENOUS
Start: 2025-04-30

## 2024-11-21 ASSESSMENT — PAIN SCALES - GENERAL: PAINLEVEL_OUTOF10: NO PAIN (0)

## 2024-11-21 NOTE — LETTER
11/21/2024      RE: Vincent Zuniga  [address] [facility] [address]     Referral source: Established patient    Chief complaint: Multiple sclerosis    History of the Present Illness: Mr. Vincent Zuniga is a 69 year old right-handed man who presents to the Multiple Sclerosis Clinic today for a scheduled follow up visit regarding his diagnosis of multiple sclerosis.    The patient is initially seen alone, later joined in person by his son-in-law and his daughter via telephone, who provide additional context and history.    The patient's history is as per my previous notes. He initially developed symptoms of demyelinating disease in January 2019. when he presented to the hospital [facility] with left-sided weakness and slurred speech.  Brain MRI imaging at that time demonstrated multiple enhancing lesions, and a CSF examination was also positive for oligoclonal bands. He was initially treated with a short course of IV steroids on suspicion of acute disseminated encephalomyelitis. However, follow up MRI in April 2019 showed radiologic disease progression and he was placed on teriflunomide by an outside provider.  In spring 2020 he was admitted to our hospital with increasing difficulties with gait instability, incoordination and weight loss.  Treatment with siponimod was attempted, but he did not tolerate that well.  Most recently, he has been on ocrelizumab since December 2020, and remains on that medication up until the present time, with the last infusion completed on 10/31/2024.    Today, they deny any new, episodic changes in vision, balance, strength or sensation suggestive of new relapse of multiple sclerosis since he was last seen in this clinic.    The patient's primary concern is tremor. It is difficult for him to hold a coffee cup without spilling liquid from it.    His daughter also relates that oxybutynin 5 mg twice daily is no longer working as well for his urinary urgency. The patient relates  "that \"if I gotta go, I gotta go\". They think that the individual doses of the medication may wear off in the middle of the day.    Sleep quality is poor, particularly with difficulty staying asleep. Currently, he takes clonazepam 1 mg and amitriptyline 10 mg at bedtime.    On neurologic review of systems, the patient endorses micrographia. He denies trouble with rapid alternating movements such as turning a screwdriver. He is not having difficulty turning over in bed. He denies constipation.     His family relates that he is \"off balance\" and has fallen a few times.    PHYSICAL EXAMINATION:  VITAL SIGNS: Blood pressure 114/74; pulse 69; weight 68.4 kg; height 1.73 m; oxygen saturation 95%.  GENERAL: Well-nourished man who presents to the examination accompanied by his son-in-law, awake and alert and in no acute distress.    NEUROLOGIC EXAMINATION:  CRANIAL NERVES: Visual fields are full to confrontation.  Extraocular movements are intact with no internuclear ophthalmoplegia.  Facial strength is normal; there is reduced blink rate and reduced facial expression. Palate elevation and tongue protrusion are normal.  POWER: Strength is within normal limits in proximal and distal muscles in the upper and lower limbs throughout.  REFLEXES: Reflexes are symmetric and within normal limits in the arms and legs.  MOTOR/CEREBELLAR: There is an intermittent, pill rolling tremor of the right hand. This tends to appear with ambulation. Tone in the upper limbs is relatively symmetric with passive range of motion. There is no appendicular ataxia on finger-to-nose testing. The amplitude of rapid alternating movements is not significantly reduced. There is no pronator drift in the arms.  GAIT: The patient is able to ambulate independently on a flat, level surface with no gross loss of postural stability. Arm swing is reduced bilaterally but without gross asymmetry. Stride length is relatively normal.     Assessment/plan:    1. Multiple " sclerosis  The patient is clinically stable as regards any evidence of active inflammatory demyelination on current disease modifying therapy with ocrelizumab, which he will continue, with the next infusion due to be performed on or about 5/1/2025.    2. Parkinsonism  The patient is currently being treated with olanzapine.  Neither the patient nor his family are aware of any previous diagnosis of bipolar disorder or psychosis, and it is not entirely clear to me why he is taking this medication.     I recommended that they schedule an appointment with his primary care doctor, who is prescribing the medication, to discuss possibly discontinuing it, as this may be a cause of drug-induced parkinsonism.    I do think that comorbid idiopathic Parkinson disease is a possibility.  I am going to plan to see him back in 3 months, ideally off of the dopamine antagonist, and if his symptoms are not improved we will likely proceed with a trial of carbidopa/levodopa at that time.    Parkinsonism caused by demyelinating lesions in the midbrain and brainstem has been reported, but is not at all common (occurring in less than 1% of people with MS), and this is essentially a diagnosis of exclusion.    3. Urinary urgency  For now, I will try increasing oxybutynin from two to three times daily to see if this provides more consistent symptom control.    4. Insomnia  I will try increasing his dose of amitriptyline to 20 mg at bedtime, and continue clonazepam at 1 mg at bedtime for now.    I spent a total of 40 minutes on patient care activities related to this encounter on the date of service, including time spent in reviewing the chart, obtaining history from the patient and collateral history from his family members, performing neurologic examination, and in counseling the patient and his family. I answered their questions.    The longitudinal plans of care for the diagnoses as documented were addressed during this visit. Due to the  added complexity in care, I will continue to support the patient in subsequent management and with ongoing continuity of care.    Dominik Morillo MD   of Neurology  [facility]    Cc:  Marcus Mccain MD (PCP)  Patient

## 2024-11-21 NOTE — NURSING NOTE
Chief Complaint   Patient presents with    MS    RECHECK     6 month follow up      Vitals were taken and medications were reconciled.   Noah Mccoy, EMT  2:05 PM

## 2024-11-21 NOTE — Clinical Note
11/21/2024       RE: Vincent Zuniga  [address] [facility] [address]     Dear Colleague,    Thank you for referring your patient, Vincent Zuniga, to the [facility] [place] at [facility]. Please see a copy of my visit note below.    Referral source: Established patient    Chief complaint: Multiple sclerosis    History of the Present Illness: Mr. Vincent Zuniga is a 69 year old right-handed man who presents to the Multiple Sclerosis Clinic today for a scheduled follow up visit regarding his diagnosis of multiple sclerosis.    The patient is initially seen alone, later joined in person by his son-in-law and his daughter via telephone, who provide additional context and history.    The patient's history is as per my previous notes. He initially developed symptoms of demyelinating disease in January 2019. when he presented to the hospital at [facility] with left-sided weakness and slurred speech.  Brain MRI imaging at that time demonstrated multiple enhancing lesions, and a CSF examination was also positive for oligoclonal bands. He was initially treated with a short course of IV steroids on suspicion of acute disseminated encephalomyelitis. However, follow up MRI in April 2019 showed radiologic disease progression and he was placed on teriflunomide by an outside provider.  In spring 2020 he was admitted to our hospital with increasing difficulties with gait instability, incoordination and weight loss.  Treatment with siponimod was attempted, but he did not tolerate that well.  Most recently, he has been on ocrelizumab since December 2020, and remains on that medication up until the present time, with the last infusion completed on 10/31/2024.    Today, they deny any new, episodic changes in vision, balance, strength or sensation suggestive of new relapse of multiple sclerosis since he was last seen in this clinic.    The patient's  "primary concern is tremor. It is difficult for him to hold a coffee cup without spilling liquid from it.    His daughter also relates that oxybutynin 5 mg twice daily is no longer working as well for his urinary urgency. The patient relates that \"if I gotta go, I gotta go\". They think that the individual doses of the medication may wear off in the middle of the day.    Sleep quality is poor, particularly with difficulty staying asleep. Currently, he takes clonazepam 1 mg and amitriptyline 10 mg at bedtime.    On neurologic review of systems, the patient endorses micrographia. He denies trouble with rapid alternating movements such as turning a screwdriver. He is not having difficulty turning over in bed. He denies constipation.     His family relates that he is \"off balance\" and has fallen a few times.    PHYSICAL EXAMINATION:  VITAL SIGNS: Blood pressure 114/74; pulse 69; weight 68.4 kg; height 1.73 m; oxygen saturation 95%.  GENERAL: Well-nourished man who presents to the examination accompanied by his son-in-law, awake and alert and in no acute distress.    NEUROLOGIC EXAMINATION:  CRANIAL NERVES: Visual fields are full to confrontation.  Extraocular movements are intact with no internuclear ophthalmoplegia.  Facial strength is normal; there is reduced blink rate and reduced facial expression. Palate elevation and tongue protrusion are normal.  POWER: Strength is within normal limits in proximal and distal muscles in the upper and lower limbs throughout.  REFLEXES: Reflexes are symmetric and within normal limits in the arms and legs.  MOTOR/CEREBELLAR: There is an intermittent, pill rolling tremor of the right hand. This tends to appear with ambulation. Tone in the upper limbs is relatively symmetric with passive range of motion. There is no appendicular ataxia on finger-to-nose testing. The amplitude of rapid alternating movements is not significantly reduced. There is no pronator drift in the arms.  GAIT: The " patient is able to ambulate independently on a flat, level surface with no gross loss of postural stability. Arm swing is reduced bilaterally but without gross asymmetry. Stride length is relatively normal.     Assessment/plan:    1. Multiple sclerosis  The patient is clinically stable as regards any evidence of active inflammatory demyelination on current disease modifying therapy with ocrelizumab, which he will continue, with the next infusion due to be performed on or about 5/1/2025.    2. Parkinsonism  The patient is currently being treated with olanzapine.  Neither the patient nor his family are aware of any previous diagnosis of bipolar disorder or psychosis, and it is not entirely clear to me why he is taking this medication.     I recommended that they schedule an appointment with his primary care doctor, who is prescribing the medication, to discuss possibly discontinuing it, as this may be a cause of drug-induced parkinsonism.    I do think that comorbid idiopathic Parkinson disease is a possibility.  I am going to plan to see him back in 3 months, ideally off of the dopamine antagonist, and if his symptoms are not improved we will likely proceed with a trial of carbidopa/levodopa at that time.    Parkinsonism caused by demyelinating lesions in the midbrain and brainstem has been reported, but is not at all common (occurring in less than 1% of people with MS), and this is essentially a diagnosis of exclusion.    3. Urinary urgency  For now, I will try increasing oxybutynin from two to three times daily to see if this provides more consistent symptom control.    4. Insomnia  I will try increasing his dose of amitriptyline to 20 mg at bedtime, and continue clonazepam at 1 mg at bedtime for now.    I spent a total of 40 minutes on patient care activities related to this encounter on the date of service, including time spent in reviewing the chart, obtaining history from the patient and collateral history from  his family members, performing neurologic examination, and in counseling the patient and his family. I answered their questions.    The longitudinal plans of care for the diagnoses as documented were addressed during this visit. Due to the added complexity in care, I will continue to support the patient in subsequent management and with ongoing continuity of care.      Again, thank you for allowing me to participate in the care of your patient.      Sincerely,    Dominik Morillo MD

## 2024-11-26 NOTE — PROGRESS NOTES
Referral source: Established patient    Chief complaint: Multiple sclerosis    History of the Present Illness: Mr. Vincent Zuniga is a 69 year old man who presents to the Multiple Sclerosis Clinic today for a scheduled follow up visit after MRI scans of the brain and cervical spine performed earlier on today's date.    The patient's history is as per my previous notes. He initially developed symptoms of demyelinating disease in January 2019. when he presented to the hospital at [facility] with left-sided weakness and slurred speech.  Brain MRI imaging at that time demonstrated multiple enhancing lesions, and a CSF examination was also positive for oligoclonal bands. He was initially treated with a short course of IV steroids on suspicion of acute disseminated encephalomyelitis. However, follow up MRI in April 2019 showed radiologic disease progression and he was placed on teriflunomide by an outside provider.  In spring 2020 he was admitted to our hospital with increasing difficulties with gait instability, incoordination and weight loss.  Treatment with siponimod was attempted, but he did not tolerate that well.  Most recently, he has been on ocrelizumab since December 2020, and remains on that medication up until the present time, with the last infusion completed on 4/3/2024.    Today, he denies any new episodic changes in vision, balance, strength, or sensation suggestive of new relapse of multiple sclerosis since he was last seen in this clinic.     He is taking oxybutynin 5 mg twice daily for urinary urgency and amitriptyline 10 mg as well as clonazepam 1 mg at bedtime for sleep. He does not have a good sense of how these medications are working for him, but is not voicing complaints about his bladder or sleep today.    PHYSICAL EXAMINATION:  VITAL SIGNS: Blood pressure 115/77; pulse 58; weight 64.5 kg; oxygen saturation 96%  GENERAL: Well-nourished man who presents to the evaluation awake and alert and in  no acute distress.    Investigations:  I reviewed images of MRI scan of the brain and cervical spine performed earlier on today's date, and the following is my independent interpretation of those images.    There is no abnormal enhancement with gadolinium contrast throughout the brain and cord parenchyma. The visualized cord appears normal in signal and contour. Multiple periventricular, juxtacortical, and infratentorial foci of T2 hyperintensity in the brain are unchanged in comparison to previous MRI of 12/26/2022. No new lesions are seen.    Assessment/plan:    1. Multiple sclerosis  The patient is clinically and radiologically stable as regards any evidence of active inflammatory demyelination on current disease modifying therapy with ocrelizumab, which he will continue, with the next infusion to be performed on or about October 3, 2024.    I would like to see him back in 6 months for a review.    2. Urinary urgency  He will continue oxybutynin 5 mg twice daily.    3. Insomnia  He will remain on amitriptyline and clonazepam at the doses above.    The longitudinal plans of care for the diagnoses as documented were addressed during this visit. Due to the added complexity in care, I will continue to support the patient in subsequent management and with ongoing continuity of care.

## 2024-12-16 NOTE — PROGRESS NOTES
"Referral source: Established patient    Chief complaint: Multiple sclerosis    History of the Present Illness: Mr. Vincent Zuniga is a 69 year old right-handed man who presents to the [facility] today for a scheduled follow up visit regarding his diagnosis of multiple sclerosis.    The patient is initially seen alone, later joined in person by his son-in-law and his daughter via telephone, who provide additional context and history.    The patient's history is as per my previous notes. He initially developed symptoms of demyelinating disease in January 2019. when he presented to the [facility] with left-sided weakness and slurred speech.  Brain MRI imaging at that time demonstrated multiple enhancing lesions, and a CSF examination was also positive for oligoclonal bands. He was initially treated with a short course of IV steroids on suspicion of acute disseminated encephalomyelitis. However, follow up MRI in April 2019 showed radiologic disease progression and he was placed on teriflunomide by an outside provider.  In spring 2020 he was admitted to our hospital with increasing difficulties with gait instability, incoordination and weight loss.  Treatment with siponimod was attempted, but he did not tolerate that well.  Most recently, he has been on ocrelizumab since December 2020, and remains on that medication up until the present time, with the last infusion completed on 10/31/2024.    Today, they deny any new, episodic changes in vision, balance, strength or sensation suggestive of new relapse of multiple sclerosis since he was last seen in this clinic.    The patient's primary concern is tremor. It is difficult for him to hold a coffee cup without spilling liquid from it.    His daughter also relates that oxybutynin 5 mg twice daily is no longer working as well for his urinary urgency. The patient relates that \"if I gotta go, I gotta go\". They think that the individual doses of the " "medication may wear off in the middle of the day.    Sleep quality is poor, particularly with difficulty staying asleep. Currently, he takes clonazepam 1 mg and amitriptyline 10 mg at bedtime.    On neurologic review of systems, the patient endorses micrographia. He denies trouble with rapid alternating movements such as turning a screwdriver. He is not having difficulty turning over in bed. He denies constipation.     His family relates that he is \"off balance\" and has fallen a few times.    PHYSICAL EXAMINATION:  VITAL SIGNS: Blood pressure 114/74; pulse 69; weight 68.4 kg; height 1.73 m; oxygen saturation 95%.  GENERAL: Well-nourished man who presents to the examination accompanied by his son-in-law, awake and alert and in no acute distress.    NEUROLOGIC EXAMINATION:  CRANIAL NERVES: Visual fields are full to confrontation.  Extraocular movements are intact with no internuclear ophthalmoplegia.  Facial strength is normal; there is reduced blink rate and reduced facial expression. Palate elevation and tongue protrusion are normal.  POWER: Strength is within normal limits in proximal and distal muscles in the upper and lower limbs throughout.  REFLEXES: Reflexes are symmetric and within normal limits in the arms and legs.  MOTOR/CEREBELLAR: There is an intermittent, pill rolling tremor of the right hand. This tends to appear with ambulation. Tone in the upper limbs is relatively symmetric with passive range of motion. There is no appendicular ataxia on finger-to-nose testing. The amplitude of rapid alternating movements is not significantly reduced. There is no pronator drift in the arms.  GAIT: The patient is able to ambulate independently on a flat, level surface with no gross loss of postural stability. Arm swing is reduced bilaterally but without gross asymmetry. Stride length is relatively normal.     Assessment/plan:    1. Multiple sclerosis  The patient is clinically stable as regards any evidence of active " inflammatory demyelination on current disease modifying therapy with ocrelizumab, which he will continue, with the next infusion due to be performed on or about 5/1/2025.    2. Parkinsonism  The patient is currently being treated with olanzapine.  Neither the patient nor his family are aware of any previous diagnosis of bipolar disorder or psychosis, and it is not entirely clear to me why he is taking this medication.     I recommended that they schedule an appointment with his primary care doctor, who is prescribing the medication, to discuss possibly discontinuing it, as this may be a cause of drug-induced parkinsonism.    I do think that comorbid idiopathic Parkinson disease is a possibility.  I am going to plan to see him back in 3 months, ideally off of the dopamine antagonist, and if his symptoms are not improved we will likely proceed with a trial of carbidopa/levodopa at that time.    Parkinsonism caused by demyelinating lesions in the midbrain and brainstem has been reported, but is not at all common (occurring in less than 1% of people with MS), and this is essentially a diagnosis of exclusion.    3. Urinary urgency  For now, I will try increasing oxybutynin from two to three times daily to see if this provides more consistent symptom control.    4. Insomnia  I will try increasing his dose of amitriptyline to 20 mg at bedtime, and continue clonazepam at 1 mg at bedtime for now.    I spent a total of 40 minutes on patient care activities related to this encounter on the date of service, including time spent in reviewing the chart, obtaining history from the patient and collateral history from his family members, performing neurologic examination, and in counseling the patient and his family. I answered their questions.    The longitudinal plans of care for the diagnoses as documented were addressed during this visit. Due to the added complexity in care, I will continue to support the patient in subsequent  management and with ongoing continuity of care.

## 2024-12-17 DIAGNOSIS — E55.9 VITAMIN D DEFICIENCY: ICD-10-CM

## 2024-12-17 RX ORDER — MULTIVIT-MIN/IRON/FOLIC ACID/K 18-600-40
1 CAPSULE ORAL DAILY
Qty: 90 CAPSULE | Refills: 3 | Status: SHIPPED | OUTPATIENT
Start: 2024-12-17

## 2024-12-17 NOTE — TELEPHONE ENCOUNTER
Received refill request for vitamin D from [facility]; Patient was last seen in Nov 2024 and has follow up appointment in Feb 2025 with Dr Morillo. Refilled per [facility] refill protocol.    Esperanza Peña RN

## 2024-12-21 ENCOUNTER — HEALTH MAINTENANCE LETTER (OUTPATIENT)
Age: 69
End: 2024-12-21

## 2025-02-27 ENCOUNTER — OFFICE VISIT (OUTPATIENT)
Dept: NEUROLOGY | Facility: CLINIC | Age: 70
End: 2025-02-27
Attending: PSYCHIATRY & NEUROLOGY
Payer: MEDICARE

## 2025-02-27 VITALS
HEIGHT: 68 IN | HEART RATE: 75 BPM | DIASTOLIC BLOOD PRESSURE: 76 MMHG | WEIGHT: 153.7 LBS | BODY MASS INDEX: 23.3 KG/M2 | OXYGEN SATURATION: 96 % | SYSTOLIC BLOOD PRESSURE: 116 MMHG

## 2025-02-27 DIAGNOSIS — G35 MS (MULTIPLE SCLEROSIS) (H): Primary | ICD-10-CM

## 2025-02-27 DIAGNOSIS — G47.00 INSOMNIA, UNSPECIFIED TYPE: ICD-10-CM

## 2025-02-27 DIAGNOSIS — G20.C PARKINSONISM, UNSPECIFIED PARKINSONISM TYPE (H): ICD-10-CM

## 2025-02-27 DIAGNOSIS — R39.9 LOWER URINARY TRACT SYMPTOMS (LUTS): ICD-10-CM

## 2025-02-27 PROCEDURE — G0463 HOSPITAL OUTPT CLINIC VISIT: HCPCS | Performed by: PSYCHIATRY & NEUROLOGY

## 2025-02-27 ASSESSMENT — PAIN SCALES - GENERAL: PAINLEVEL_OUTOF10: NO PAIN (0)

## 2025-02-27 NOTE — LETTER
2/27/2025      RE: Vincent Zuniga  [address] [place] [address]     Referral source: Established patient    Chief complaint: Multiple sclerosis    History of the Present Illness: Mr. Vincent Zuniga is a 70 year old man who presents to the Multiple Sclerosis Clinic today for a scheduled follow up visit regarding his diagnosis of multiple sclerosis.    The patient's history is as per my previous notes. He initially developed symptoms of demyelinating disease in January 2019. when he presented to the hospital at [facility] with left-sided weakness and slurred speech. Brain MRI imaging at that time demonstrated multiple enhancing lesions, and a CSF examination was also positive for oligoclonal bands. He was initially treated with a short course of IV steroids on suspicion of acute disseminated encephalomyelitis. However, follow up MRI in April 2019 showed radiologic disease progression and he was placed on teriflunomide by an outside provider. In spring 2020 he was admitted to our hospital with increasing difficulties with gait instability, incoordination and weight loss.  Treatment with siponimod was attempted, but he did not tolerate that well.  Most recently, he has been on ocrelizumab since December 2020, and remains on that medication up until the present time, with the last infusion completed on 10/31/2024.     There continue to be no episodic changes in vision, balance, strength or sensation suggestive of relapse of multiple sclerosis.    When I last saw the patient in December, we discussed parkinsonian motor findings (tremor, masked facies, micrographia, and postural instability), with differential diagnosis including drug induced parkinsonism, superimposed neurodegenerative process such as idiopathic Parkinson disease, and less likely parkinsonism secondary to brainstem demyelination. I suggested that he discuss coming off of olanzapine with his primary care provider.    Today, he is off of the  "medication but apparently this is a very recent development within the past couple of days.    Regarding other symptoms, at his last visit I increased oxybutynin to 5 mg three times daily, up from two times daily. During the day, he describes his bladder symptoms as \"pretty good\" on this dose.     For sleep, he is currently taking amitriptyline 20 mg and clonazepam 1 mg at bedtime. Unfortunately, he is continuing to wake up around 3 am to go to the bathroom, and is not getting back to sleep. He states that he does not try as he is \"not tired\".     PHYSICAL EXAMINATION:  VITAL SIGNS: Blood pressure 116/76; pulse 75; weight 69.7 kg; height 1.73 m; oxygen saturation 96%.  GENERAL: Well-nourished man who presents to the evaluation accompanied by his brother, awake and alert and in no acute distress.    Assessment/plan:    1. Multiple sclerosis  The patient remains clinically stable as regards any evidence of active inflammatory demyelination on current disease modifying therapy with ocrelizumab, which he will continue, with the next infusion due to be performed on or about 5/1/2025.    On the date of the infusion, I will obtain routine laboratory studies for monitoring of this medication to include complete blood counts with differential, hepatic panel, and total antibody (IgG) level.    2. Parkinsonism  He has not been off of olanzapine for long enough to make any conclusion about whether discontinuing this will improve motor symptoms of parkinsonism. Accordingly, I will plan to see him back in another 3 months to repeat his motor examination. Ideally I would like one of the people who lives with him (daughter and her significant other) to attend this visit, to provide collateral history and to discuss possible levodopa trial at that time.    3. Lower urinary tract symptoms  He appears less bothered by urinary symptoms during the daytime with the increased frequency of oxybutynin dosing.     Regarding nocturia, waking " up once per night to void is not unusual in a man in his age group and I am not sure that we are going to be able to eliminate this with medications. Presently, his symptoms do not seem severe enough to warrant urologic evaluation, although we can re-address this in the future, as needed.    4. Insomnia  I would also like to get collateral history regarding his daily sleep schedule. My sense is that there are likely significant difficulties with sleep hygiene. At the present time I am not going to make any changes in medications for sleep, although there is room to increase amitriptyline in the future, if needed.    The longitudinal plans of care for the diagnoses as documented were addressed during this visit. Due to the added complexity in care, I will continue to support the patient in subsequent management and with ongoing continuity of care.    Dominik Morillo MD   of Neurology  [facility]    Cc:  Marcus Mccain MD (PCP)  Patient

## 2025-02-27 NOTE — NURSING NOTE
Chief Complaint   Patient presents with    MS    RECHECK     MS follow up      Vitals were taken and medications were reconciled.    Noah Mccoy, EMT  9:56 AM

## 2025-02-27 NOTE — PATIENT INSTRUCTIONS
Proceed with next Ocrevus infusion on or about May 1, 2025: you can call [phone] to schedule the next infusion    2.   Blood tests on the day of the infusion    3.   Continue oxybuytnin for now    4.   Continue amitriptyline and clonazepam at bedtime    5.   Return to clinic in 3 months

## 2025-02-27 NOTE — Clinical Note
2/27/2025       RE: Vincent Zuniga  [address] [place] [address]     Dear Colleague,    Thank you for referring your patient, Vincent Zuniga, to the [facility] [place] at [facility]. Please see a copy of my visit note below.    Referral source: Established patient    Chief complaint: Multiple sclerosis    History of the Present Illness: Mr. Vincent Zuniga is a 70 year old man who presents to the Multiple Sclerosis Clinic today for a scheduled follow up visit regarding his diagnosis of multiple sclerosis.    The patient's history is as per my previous notes. He initially developed symptoms of demyelinating disease in January 2019. when he presented to the hospital at [facility] with left-sided weakness and slurred speech. Brain MRI imaging at that time demonstrated multiple enhancing lesions, and a CSF examination was also positive for oligoclonal bands. He was initially treated with a short course of IV steroids on suspicion of acute disseminated encephalomyelitis. However, follow up MRI in April 2019 showed radiologic disease progression and he was placed on teriflunomide by an outside provider. In spring 2020 he was admitted to our hospital with increasing difficulties with gait instability, incoordination and weight loss.  Treatment with siponimod was attempted, but he did not tolerate that well.  Most recently, he has been on ocrelizumab since December 2020, and remains on that medication up until the present time, with the last infusion completed on 10/31/2024.     There continue to be no episodic changes in vision, balance, strength or sensation suggestive of relapse of multiple sclerosis.    When I last saw the patient in December, we discussed parkinsonian motor findings (tremor, masked facies, micrographia, and postural instability), with differential diagnosis including drug induced parkinsonism, superimposed  "neurodegenerative process such as idiopathic Parkinson disease, and less likely parkinsonism secondary to brainstem demyelination. I suggested that he discuss coming off of olanzapine with his primary care provider.    Today, he is off of the medication but apparently this is a very recent development within the past couple of days.    Regarding other symptoms, at his last visit I increased oxybutynin to 5 mg three times daily, up from two times daily. During the day, he describes his bladder symptoms as \"pretty good\" on this dose.     For sleep, he is currently taking amitriptyline 20 mg and clonazepam 1 mg at bedtime. Unfortunately, he is continuing to wake up around 3 am to go to the bathroom, and is not getting back to sleep. He states that he does not try as he is \"not tired\".     PHYSICAL EXAMINATION:  VITAL SIGNS: Blood pressure 116/76; pulse 75; weight 69.7 kg; height 1.73 m; oxygen saturation 96%.  GENERAL: Well-nourished man who presents to the evaluation accompanied by his brother, awake and alert and in no acute distress.    Assessment/plan:    1. Multiple sclerosis  The patient remains clinically stable as regards any evidence of active inflammatory demyelination on current disease modifying therapy with ocrelizumab, which he will continue, with the next infusion due to be performed on or about 5/1/2025.    On the date of the infusion, I will obtain routine laboratory studies for monitoring of this medication to include complete blood counts with differential, hepatic panel, and total antibody (IgG) level.    2. Parkinsonism  He has not been off of olanzapine for long enough to make any conclusion about whether discontinuing this will improve motor symptoms of parkinsonism. Accordingly, I will plan to see him back in another 3 months to repeat his motor examination. Ideally I would like one of the people who lives with him (daughter and her significant other) to attend this visit, to provide collateral " history and to discuss possible levodopa trial at that time.    3. Lower urinary tract symptoms  He appears less bothered by urinary symptoms during the daytime with the increased frequency of oxybutynin dosing.     Regarding nocturia, waking up once per night to void is not unusual in a man in his age group and I am not sure that we are going to be able to eliminate this with medications. Presently, his symptoms do not seem severe enough to warrant urologic evaluation, although we can re-address this in the future, as needed.    4. Insomnia  I would also like to get collateral history regarding his daily sleep schedule. My sense is that there are likely significant difficulties with sleep hygiene. At the present time I am not going to make any changes in medications for sleep, although there is room to increase amitriptyline in the future, if needed.    The longitudinal plans of care for the diagnoses as documented were addressed during this visit. Due to the added complexity in care, I will continue to support the patient in subsequent management and with ongoing continuity of care.      Again, thank you for allowing me to participate in the care of your patient.      Sincerely,    Dominik Morillo MD

## 2025-03-03 DIAGNOSIS — G35 MULTIPLE SCLEROSIS (H): ICD-10-CM

## 2025-03-04 RX ORDER — CLONAZEPAM 1 MG/1
TABLET ORAL
Qty: 30 TABLET | Refills: 5 | Status: SHIPPED | OUTPATIENT
Start: 2025-03-04

## 2025-03-04 NOTE — TELEPHONE ENCOUNTER
Patient requesting refill of their clonazepam; Patient was last seen in Feb 2025 and has follow up appointment in   May 2025 with Dr Morillo. Pended rx to Dr Morillo for signature and will send electronically to the pharmacy once signed.    Esperanza Peña RN

## 2025-03-06 NOTE — PROGRESS NOTES
Referral source: Established patient    Chief complaint: Multiple sclerosis    History of the Present Illness: Mr. Vincent Zuniga is a 70 year old man who presents to the Multiple Sclerosis Clinic today for a scheduled follow up visit regarding his diagnosis of multiple sclerosis.    The patient's history is as per my previous notes. He initially developed symptoms of demyelinating disease in January 2019. when he presented to the hospital at [facility] with left-sided weakness and slurred speech. Brain MRI imaging at that time demonstrated multiple enhancing lesions, and a CSF examination was also positive for oligoclonal bands. He was initially treated with a short course of IV steroids on suspicion of acute disseminated encephalomyelitis. However, follow up MRI in April 2019 showed radiologic disease progression and he was placed on teriflunomide by an outside provider. In spring 2020 he was admitted to our hospital with increasing difficulties with gait instability, incoordination and weight loss.  Treatment with siponimod was attempted, but he did not tolerate that well.  Most recently, he has been on ocrelizumab since December 2020, and remains on that medication up until the present time, with the last infusion completed on 10/31/2024.     There continue to be no episodic changes in vision, balance, strength or sensation suggestive of relapse of multiple sclerosis.    When I last saw the patient in December, we discussed parkinsonian motor findings (tremor, masked facies, micrographia, and postural instability), with differential diagnosis including drug induced parkinsonism, superimposed neurodegenerative process such as idiopathic Parkinson disease, and less likely parkinsonism secondary to brainstem demyelination. I suggested that he discuss coming off of olanzapine with his primary care provider.    Today, he is off of the medication but apparently this is a very recent development within the past  "couple of days.    Regarding other symptoms, at his last visit I increased oxybutynin to 5 mg three times daily, up from two times daily. During the day, he describes his bladder symptoms as \"pretty good\" on this dose.     For sleep, he is currently taking amitriptyline 20 mg and clonazepam 1 mg at bedtime. Unfortunately, he is continuing to wake up around 3 am to go to the bathroom, and is not getting back to sleep. He states that he does not try as he is \"not tired\".     PHYSICAL EXAMINATION:  VITAL SIGNS: Blood pressure 116/76; pulse 75; weight 69.7 kg; height 1.73 m; oxygen saturation 96%.  GENERAL: Well-nourished man who presents to the evaluation accompanied by his brother, awake and alert and in no acute distress.    Assessment/plan:    1. Multiple sclerosis  The patient remains clinically stable as regards any evidence of active inflammatory demyelination on current disease modifying therapy with ocrelizumab, which he will continue, with the next infusion due to be performed on or about 5/1/2025.    On the date of the infusion, I will obtain routine laboratory studies for monitoring of this medication to include complete blood counts with differential, hepatic panel, and total antibody (IgG) level.    2. Parkinsonism  He has not been off of olanzapine for long enough to make any conclusion about whether discontinuing this will improve motor symptoms of parkinsonism. Accordingly, I will plan to see him back in another 3 months to repeat his motor examination. Ideally I would like one of the people who lives with him (daughter and her significant other) to attend this visit, to provide collateral history and to discuss possible levodopa trial at that time.    3. Lower urinary tract symptoms  He appears less bothered by urinary symptoms during the daytime with the increased frequency of oxybutynin dosing.     Regarding nocturia, waking up once per night to void is not unusual in a man in his age group and I am " not sure that we are going to be able to eliminate this with medications. Presently, his symptoms do not seem severe enough to warrant urologic evaluation, although we can re-address this in the future, as needed.    4. Insomnia  I would also like to get collateral history regarding his daily sleep schedule. My sense is that there are likely significant difficulties with sleep hygiene. At the present time I am not going to make any changes in medications for sleep, although there is room to increase amitriptyline in the future, if needed.    The longitudinal plans of care for the diagnoses as documented were addressed during this visit. Due to the added complexity in care, I will continue to support the patient in subsequent management and with ongoing continuity of care.

## 2025-03-14 ENCOUNTER — TELEPHONE (OUTPATIENT)
Dept: NEUROLOGY | Facility: CLINIC | Age: 70
End: 2025-03-14
Payer: MEDICARE

## 2025-03-18 NOTE — TELEPHONE ENCOUNTER
Prior Authorization Approval    Medication: OXYBUTYNIN CHLORIDE 5 MG PO TABS  Authorization Effective Date: 3/18/2025  Authorization Expiration Date: 12/31/2025  Approved Dose/Quantity: 180/90  Reference #: [id number]   Insurance Company: [employer] [phone] Fax [phone]  Expected CoPay: $    CoPay Card Available:      Financial Assistance Needed:   Which Pharmacy is filling the prescription: [facility] [address] [facility], [place] - [address]  Pharmacy Notified: Yes  Patient Notified: Yes

## 2025-03-18 NOTE — TELEPHONE ENCOUNTER
PA Initiation    Medication: OXYBUTYNIN CHLORIDE 5 MG PO TABS  Insurance Company: [employer] - Phone [phone] Fax [phone]  Pharmacy Filling the Rx: [facility] [address] [place] [place] - [address]  Filling Pharmacy Phone: [phone]  Filling Pharmacy Fax:    Start Date: 3/18/2025

## 2025-03-19 NOTE — TELEPHONE ENCOUNTER
Pt is scheduled for Ocrevus infusion on 4/30.    Esperanza Peña RN    
Pt to continue Ocrevus every 6 months, with next dose due at the end of April. Infuses in [facility]. Updated orders pended to Dr Morillo for signature.     Esperanza Peña RN      
no

## 2025-04-30 ENCOUNTER — INFUSION THERAPY VISIT (OUTPATIENT)
Dept: INFUSION THERAPY | Facility: CLINIC | Age: 70
End: 2025-04-30
Attending: PSYCHIATRY & NEUROLOGY
Payer: MEDICARE

## 2025-04-30 VITALS
SYSTOLIC BLOOD PRESSURE: 114 MMHG | TEMPERATURE: 97.8 F | RESPIRATION RATE: 16 BRPM | HEART RATE: 54 BPM | DIASTOLIC BLOOD PRESSURE: 73 MMHG | OXYGEN SATURATION: 98 %

## 2025-04-30 DIAGNOSIS — G35 MS (MULTIPLE SCLEROSIS) (H): ICD-10-CM

## 2025-04-30 DIAGNOSIS — G35 MULTIPLE SCLEROSIS (H): Primary | ICD-10-CM

## 2025-04-30 LAB
ALBUMIN SERPL BCG-MCNC: 4.3 G/DL (ref 3.5–5.2)
ALP SERPL-CCNC: 85 U/L (ref 40–150)
ALT SERPL W P-5'-P-CCNC: 23 U/L (ref 0–70)
AST SERPL W P-5'-P-CCNC: 25 U/L (ref 0–45)
BASOPHILS # BLD AUTO: 0 10E3/UL (ref 0–0.2)
BASOPHILS NFR BLD AUTO: 1 %
BILIRUB DIRECT SERPL-MCNC: 0.1 MG/DL (ref 0–0.3)
BILIRUB SERPL-MCNC: 0.2 MG/DL
EOSINOPHIL # BLD AUTO: 0.4 10E3/UL (ref 0–0.7)
EOSINOPHIL NFR BLD AUTO: 6 %
ERYTHROCYTE [DISTWIDTH] IN BLOOD BY AUTOMATED COUNT: 13.3 % (ref 10–15)
HCT VFR BLD AUTO: 38.3 % (ref 40–53)
HGB BLD-MCNC: 13 G/DL (ref 13.3–17.7)
IGG SERPL-MCNC: 556 MG/DL (ref 610–1616)
IMM GRANULOCYTES # BLD: 0 10E3/UL
IMM GRANULOCYTES NFR BLD: 0 %
LYMPHOCYTES # BLD AUTO: 1.9 10E3/UL (ref 0.8–5.3)
LYMPHOCYTES NFR BLD AUTO: 30 %
MCH RBC QN AUTO: 33.1 PG (ref 26.5–33)
MCHC RBC AUTO-ENTMCNC: 33.9 G/DL (ref 31.5–36.5)
MCV RBC AUTO: 98 FL (ref 78–100)
MONOCYTES # BLD AUTO: 0.8 10E3/UL (ref 0–1.3)
MONOCYTES NFR BLD AUTO: 12 %
NEUTROPHILS # BLD AUTO: 3.4 10E3/UL (ref 1.6–8.3)
NEUTROPHILS NFR BLD AUTO: 52 %
NRBC # BLD AUTO: 0 10E3/UL
NRBC BLD AUTO-RTO: 0 /100
PLATELET # BLD AUTO: 268 10E3/UL (ref 150–450)
PROT SERPL-MCNC: 6.8 G/DL (ref 6.4–8.3)
RBC # BLD AUTO: 3.93 10E6/UL (ref 4.4–5.9)
WBC # BLD AUTO: 6.5 10E3/UL (ref 4–11)

## 2025-04-30 PROCEDURE — 85004 AUTOMATED DIFF WBC COUNT: CPT

## 2025-04-30 PROCEDURE — 82784 ASSAY IGA/IGD/IGG/IGM EACH: CPT

## 2025-04-30 PROCEDURE — 96375 TX/PRO/DX INJ NEW DRUG ADDON: CPT

## 2025-04-30 PROCEDURE — 96365 THER/PROPH/DIAG IV INF INIT: CPT

## 2025-04-30 PROCEDURE — 258N000003 HC RX IP 258 OP 636: Performed by: PSYCHIATRY & NEUROLOGY

## 2025-04-30 PROCEDURE — 250N000011 HC RX IP 250 OP 636: Mod: JZ | Performed by: PSYCHIATRY & NEUROLOGY

## 2025-04-30 PROCEDURE — 250N000013 HC RX MED GY IP 250 OP 250 PS 637: Performed by: PSYCHIATRY & NEUROLOGY

## 2025-04-30 PROCEDURE — 84155 ASSAY OF PROTEIN SERUM: CPT

## 2025-04-30 PROCEDURE — 96366 THER/PROPH/DIAG IV INF ADDON: CPT

## 2025-04-30 PROCEDURE — 36415 COLL VENOUS BLD VENIPUNCTURE: CPT

## 2025-04-30 RX ORDER — METHYLPREDNISOLONE SODIUM SUCCINATE 125 MG/2ML
125 INJECTION INTRAMUSCULAR; INTRAVENOUS ONCE
Status: COMPLETED | OUTPATIENT
Start: 2025-04-30 | End: 2025-04-30

## 2025-04-30 RX ORDER — METHYLPREDNISOLONE SODIUM SUCCINATE 40 MG/ML
40 INJECTION INTRAMUSCULAR; INTRAVENOUS
Start: 2025-10-27

## 2025-04-30 RX ORDER — DIPHENHYDRAMINE HCL 25 MG
50 CAPSULE ORAL ONCE
OUTPATIENT
Start: 2025-10-27

## 2025-04-30 RX ORDER — ALBUTEROL SULFATE 0.83 MG/ML
2.5 SOLUTION RESPIRATORY (INHALATION)
OUTPATIENT
Start: 2025-10-27

## 2025-04-30 RX ORDER — DIPHENHYDRAMINE HCL 25 MG
50 CAPSULE ORAL ONCE
Status: COMPLETED | OUTPATIENT
Start: 2025-04-30 | End: 2025-04-30

## 2025-04-30 RX ORDER — ALBUTEROL SULFATE 90 UG/1
1-2 INHALANT RESPIRATORY (INHALATION)
Start: 2025-10-27

## 2025-04-30 RX ORDER — DIPHENHYDRAMINE HYDROCHLORIDE 50 MG/ML
50 INJECTION, SOLUTION INTRAMUSCULAR; INTRAVENOUS
Start: 2025-10-27

## 2025-04-30 RX ORDER — DIPHENHYDRAMINE HYDROCHLORIDE 50 MG/ML
25 INJECTION, SOLUTION INTRAMUSCULAR; INTRAVENOUS
Start: 2025-10-27

## 2025-04-30 RX ORDER — HEPARIN SODIUM,PORCINE 10 UNIT/ML
5-20 VIAL (ML) INTRAVENOUS DAILY PRN
OUTPATIENT
Start: 2025-10-27

## 2025-04-30 RX ORDER — EPINEPHRINE 1 MG/ML
0.3 INJECTION, SOLUTION INTRAMUSCULAR; SUBCUTANEOUS EVERY 5 MIN PRN
OUTPATIENT
Start: 2025-10-27

## 2025-04-30 RX ORDER — ACETAMINOPHEN 325 MG/1
650 TABLET ORAL ONCE
Status: COMPLETED | OUTPATIENT
Start: 2025-04-30 | End: 2025-04-30

## 2025-04-30 RX ORDER — HEPARIN SODIUM (PORCINE) LOCK FLUSH IV SOLN 100 UNIT/ML 100 UNIT/ML
5 SOLUTION INTRAVENOUS
OUTPATIENT
Start: 2025-10-27

## 2025-04-30 RX ORDER — METHYLPREDNISOLONE SODIUM SUCCINATE 125 MG/2ML
125 INJECTION INTRAMUSCULAR; INTRAVENOUS ONCE
OUTPATIENT
Start: 2025-10-27

## 2025-04-30 RX ORDER — ACETAMINOPHEN 325 MG/1
650 TABLET ORAL ONCE
OUTPATIENT
Start: 2025-10-27

## 2025-04-30 RX ADMIN — METHYLPREDNISOLONE SODIUM SUCCINATE 125 MG: 125 INJECTION, POWDER, FOR SOLUTION INTRAMUSCULAR; INTRAVENOUS at 08:03

## 2025-04-30 RX ADMIN — ACETAMINOPHEN 650 MG: 325 TABLET ORAL at 08:03

## 2025-04-30 RX ADMIN — DIPHENHYDRAMINE HYDROCHLORIDE 50 MG: 25 CAPSULE ORAL at 08:03

## 2025-04-30 RX ADMIN — SODIUM CHLORIDE 600 MG: 0.9 INJECTION, SOLUTION INTRAVENOUS at 08:38

## 2025-04-30 ASSESSMENT — PAIN SCALES - GENERAL: PAINLEVEL_OUTOF10: NO PAIN (0)

## 2025-04-30 NOTE — PROGRESS NOTES
Infusion Nursing Note:  Vincent Zuniga presents today for   Chief Complaint   Patient presents with    Infusion     NEURO - Ocrelizumab (Ocrevus)       Patient seen by provider today: No   present during visit today: No    Note:   -Orders from Dominik Morillo MD completed. Frequency: every 6 months; patient's last infusion was 10/31/25.  -Labs were drawn via PIV by RN.  -Premeds:    650mg Tylenol PO   50mg Benadryl PO   125mg Solu-medrol IVP  -600mg Ocrevus IV over 2hrs. Rates: 100ml/hr x15min, 200ml/hr x15min, 250ml/hr x30min, 300ml/hr x60min.    Intravenous Access:  PIV placed in Lt AC by RN.    Treatment Conditions:  Biological Infusion Checklist:  ~~~ NOTE: If the patient answers yes to any of the questions below, hold the infusion and contact ordering provider or on-call provider.    Have you recently had an elevated temperature, fever, chills, productive cough, coughing for 3 weeks or longer or hemoptysis,  abnormal vital signs, night sweats,  chest pain or have you noticed a decrease in your appetite, unexplained weight loss or fatigue? No  Do you have any open wounds or new incisions? No  Do you have any upcoming hospitalizations or surgeries? Does not include esophagogastroduodenoscopy, colonoscopy, endoscopic retrograde cholangiopancreatography (ERCP), endoscopic ultrasound (EUS), dental procedures or joint aspiration/steroid injections No  Do you currently have any signs of illness or infection or are you on any antibiotics? No  Have you had any new, sudden or worsening abdominal pain? No  Have you or anyone in your household received a live vaccination in the past 4 weeks? Please note: No live vaccines while on biologic/chemotherapy until 6 months after the last treatment. Patient can receive the flu vaccine (shot only), pneumovax and the Covid vaccine. It is optimal for the patient to get these vaccines mid cycle, but they can be given at any time as long as it is not on the day of the  infusion. No  Have you recently been diagnosed with any new nervous system diseases (ie. Multiple sclerosis, Guillain [place], seizures, neurological changes) or cancer diagnosis? Are you on any form of radiation or chemotherapy? No  Are you pregnant or breast feeding or do you have plans of pregnancy in the future? N/A  Have you been having any signs of worsening depression or suicidal ideations?  (benlysta only) N/A  Have there been any other new onset medical symptoms? No  Have you had any new blood clots? (IVIG only) N/A    Post Infusion Assessment:  Patient tolerated infusion without incident.  Blood return noted pre and post infusion.  Site patent and intact, free from redness, edema or discomfort.  No evidence of extravasations.  Access discontinued per protocol.     Discharge Plan:   Discharge instructions reviewed with: Patient.  Patient and/or family verbalized understanding of discharge instructions and all questions answered.  AVS to patient via MYCHART.    Patient discharged in stable condition accompanied by: friend.  Departure Mode: Ambulatory.    Scheduling will call patient for next appt.      Juhi Leal RN    /73 (BP Location: Left arm, Cuff Size: Adult Regular)   Pulse 54   Temp 97.8  F (36.6  C)   Resp 16   SpO2 98%     Administrations This Visit       acetaminophen (TYLENOL) tablet 650 mg       Admin Date  04/30/2025 Action  $Given Dose  650 mg Route  Oral Documented By  Juhi Leal RN              diphenhydrAMINE (BENADRYL) capsule 50 mg       Admin Date  04/30/2025 Action  $Given Dose  50 mg Route  Oral Documented By  Juhi Leal RN              methylPREDNISolone Na Suc (solu-MEDROL) injection 125 mg       Admin Date  04/30/2025 Action  $Given Dose  125 mg Route  Intravenous Documented By  Juhi Leal RN              ocrelizumab (OCREVUS) 600 mg in sodium chloride 0.9 % 500 mL infusion       Admin Date  04/30/2025 Action  $New Bag Dose  600 mg Route  Intravenous Documented  By  Juhi Leal RN

## 2025-04-30 NOTE — PATIENT INSTRUCTIONS
EDUCATION POST BIOLOGICAL/CHEMOTHERAPY INFUSION  Call the triage nurse at your clinic or seek medical attention if you have chills and/or temperature greater than or equal to 100.5, uncontrolled nausea/vomiting, diarrhea, constipation, dizziness, shortness of breath, chest pain, heart palpitations, weakness or any other new or concerning symptoms, questions or concerns.  You can not have any live virus vaccines prior to or during treatment or up to 6 months post infusion.  If you have an upcoming surgery, medical procedure or dental procedure during treatment, this should be discussed with your ordering physician and your surgeon/dentist.  If you are having any concerning symptom, if you are unsure if you should get your next infusion or wish to speak to a provider before your next infusion, please call your care coordinator or triage nurse at your clinic to notify them so we can adequately serve you.    Male

## 2025-06-14 ENCOUNTER — HEALTH MAINTENANCE LETTER (OUTPATIENT)
Age: 70
End: 2025-06-14